# Patient Record
Sex: MALE | Race: WHITE | NOT HISPANIC OR LATINO | Employment: OTHER | URBAN - METROPOLITAN AREA
[De-identification: names, ages, dates, MRNs, and addresses within clinical notes are randomized per-mention and may not be internally consistent; named-entity substitution may affect disease eponyms.]

---

## 2017-01-05 ENCOUNTER — TRANSFERRED RECORDS (OUTPATIENT)
Dept: HEALTH INFORMATION MANAGEMENT | Facility: CLINIC | Age: 72
End: 2017-01-05

## 2017-01-13 ENCOUNTER — HOSPITAL ENCOUNTER (OUTPATIENT)
Dept: CT IMAGING | Facility: CLINIC | Age: 72
Discharge: HOME | End: 2017-01-13
Attending: INTERNAL MEDICINE | Admitting: INTERNAL MEDICINE
Payer: COMMERCIAL

## 2017-01-13 DIAGNOSIS — J30.9 ALLERGIC RHINITIS, UNSPECIFIED ALLERGIC RHINITIS TRIGGER, UNSPECIFIED RHINITIS SEASONALITY: ICD-10-CM

## 2017-01-13 DIAGNOSIS — R05.9 COUGH: ICD-10-CM

## 2017-01-13 PROCEDURE — 71250 CT THORAX DX C-: CPT

## 2017-01-26 ENCOUNTER — TRANSFERRED RECORDS (OUTPATIENT)
Dept: HEALTH INFORMATION MANAGEMENT | Facility: CLINIC | Age: 72
End: 2017-01-26

## 2017-01-30 ENCOUNTER — TELEPHONE (OUTPATIENT)
Dept: NEUROSURGERY | Facility: CLINIC | Age: 72
End: 2017-01-30

## 2017-01-30 NOTE — TELEPHONE ENCOUNTER
Left message that because his appointment is here and not at [facility], his x-ray appointment is now scheduled at [facility]  For noon and then he will see Miryam Laboy at 1    Rachel Whitt CMA, AAS

## 2017-01-31 ENCOUNTER — HOSPITAL ENCOUNTER (OUTPATIENT)
Dept: GENERAL RADIOLOGY | Facility: CLINIC | Age: 72
Discharge: HOME | End: 2017-01-31
Attending: NURSE PRACTITIONER | Admitting: NURSE PRACTITIONER
Payer: COMMERCIAL

## 2017-01-31 ENCOUNTER — OFFICE VISIT (OUTPATIENT)
Dept: NEUROSURGERY | Facility: CLINIC | Age: 72
End: 2017-01-31
Attending: NURSE PRACTITIONER
Payer: COMMERCIAL

## 2017-01-31 VITALS
HEART RATE: 85 BPM | TEMPERATURE: 97.8 F | BODY MASS INDEX: 32.91 KG/M2 | SYSTOLIC BLOOD PRESSURE: 107 MMHG | OXYGEN SATURATION: 94 % | WEIGHT: 243 LBS | DIASTOLIC BLOOD PRESSURE: 74 MMHG | HEIGHT: 72 IN

## 2017-01-31 DIAGNOSIS — J30.9 ALLERGIC RHINITIS: Primary | ICD-10-CM

## 2017-01-31 DIAGNOSIS — Z98.1 STATUS POST LUMBAR SPINAL FUSION: Primary | ICD-10-CM

## 2017-01-31 DIAGNOSIS — Z98.1 STATUS POST LUMBAR SPINAL FUSION: ICD-10-CM

## 2017-01-31 DIAGNOSIS — R05.9 COUGH: ICD-10-CM

## 2017-01-31 PROCEDURE — 72100 X-RAY EXAM L-S SPINE 2/3 VWS: CPT

## 2017-01-31 PROCEDURE — 99214 OFFICE O/P EST MOD 30 MIN: CPT | Performed by: NURSE PRACTITIONER

## 2017-01-31 PROCEDURE — 99211 OFF/OP EST MAY X REQ PHY/QHP: CPT | Performed by: NURSE PRACTITIONER

## 2017-01-31 ASSESSMENT — PAIN SCALES - GENERAL: PAINLEVEL: NO PAIN (0)

## 2017-01-31 NOTE — NURSING NOTE
Khurram Reynoso is a 71 year old male who presents for:  Chief Complaint   Patient presents with     Neurologic Problem     7 month follow, status post lumbar fusion DOS 6/27/16, right numbness in the leg        Initial Vitals:  /74 mmHg  Pulse 85  Temp(Src) 97.8  F (36.6  C) (Oral)  Ht 6' (1.829 m)  Wt 243 lb (110.224 kg)  BMI 32.95 kg/m2  SpO2 94% Estimated body mass index is 32.95 kg/(m^2) as calculated from the following:    Height as of this encounter: 6' (1.829 m).    Weight as of this encounter: 243 lb (110.224 kg).. Body surface area is 2.37 meters squared. BP completed using cuff size: large  No Pain (0)    Do you feel safe in your environment?  Yes  Do you need any refills today? No    Nursing Comments: 7 month follow up, status post lumbar fusion DOS 6/27/16, right leg numbness.  Patient rates his pain today as 0    5 min. nursing intake time  Roma Andrade MA     Discharge plan: - Continue activity using pain as your guide  - followup in 6 months with xray prior   - Call the clinic at [phone] for increased pain or any other questions and concerns.  2 min. nursing discharge time  Roma Andrade MA

## 2017-01-31 NOTE — PROGRESS NOTES
[facility]  Neurosurgery followup:    HPI: Mr. Reynoso is a 71 year old male that returns almost 7 months post Redo L2-5 decompression with instrumented fusion.  He has continued baseline weakness on the right leg. He has completed PT. He states that his right leg numbness does not affect his walking or his ability to do his ADL's. He reports that his pain is 0/10.   Exam:  Constitutional:  Alert, well nourished, NAD.  HEENT: Normocephalic, atraumatic.   Pulm:  Without shortness of breath   CV:  No pitting edema of BLE.      Neurological:  Awake  Alert  Oriented x 3  Motor exam:        IP Q DF PF EHL  R   5  5   5   5    5  L   5  5   5   5    5     Able to spontaneously move L/E bilaterally  Sensation intact throughout all L/E dermatomes     Imaging:  Lumbar xray shows fusion intact  A/P:  Mr. Reynoso is a 71 year old male that returns almost 7 months post Redo L2-5 decompression with instrumented fusion.  He has continued baseline weakness on the right leg. He has completed PT. He states that his right leg numbness does not affect his walking or his ability to do his ADL's. He reports that his pain is 0/10.       Plan:  - Continue activity using pain as your guide  - followup in 6 months with xray prior   - Call the clinic at [phone] for increased pain or any other questions and concerns.      Miryam Laboy [facility]  [facility]  [address]  [address] [place] [address]    Tel [phone]  Pager [phone]

## 2017-01-31 NOTE — MR AVS SNAPSHOT
After Visit Summary   1/31/2017    Khurram Reynoso    MRN: [id number]           Patient Information     Date Of Birth          1945        Visit Information        Provider Department      1/31/2017 1:00 PM Miryam Laboy APRN CNP [facility] Neurosurgery [facility]        Today's Diagnoses     Status post lumbar spinal fusion    -  1       Care Instructions    Plan:  - Continue activity using pain as your guide  - followup in 6 months with xray prior   - Call the clinic at [phone] for increased pain or any other questions and concerns.            Follow-ups after your visit        Future tests that were ordered for you today     Open Future Orders        Priority Expected Expires Ordered    XR Lumbar Spine 2/3 Views Routine 7/28/2017 1/31/2018 1/31/2017    AFB Culture Non Blood Routine  1/31/2018 1/31/2017    AFB stain Routine  1/31/2018 1/31/2017    Fungus Culture, non-blood Routine  1/31/2018 1/31/2017    Koh prep Routine  1/31/2018 1/31/2017    Gram stain Routine  1/31/2018 1/31/2017    Sputum Culture Aerobic Bacterial Routine  1/31/2018 1/31/2017            Who to contact     If you have questions or need follow up information about today's clinic visit or your schedule please contact [facility] directly at [phone].  Normal or non-critical lab and imaging results will be communicated to you by MyChart, letter or phone within 4 business days after the clinic has received the results. If you do not hear from us within 7 days, please contact the clinic through MyChart or phone. If you have a critical or abnormal lab result, we will notify you by phone as soon as possible.  Submit refill requests through [employer] or call your pharmacy and they will forward the refill request to us. Please allow 3 business days for your refill to be completed.          Additional Information About Your Visit        [employer] Information     [employer] gives you secure  access to your electronic health record. If you see a primary care provider, you can also send messages to your care team and make appointments. If you have questions, please call your primary care clinic.  If you do not have a primary care provider, please call [phone] and they will assist you.        Care EveryWhere ID     This is your Care EveryWhere ID. This could be used by other organizations to access your [facility] medical records  [id number]        Your Vitals Were     Pulse Temperature Height BMI (Body Mass Index) Pulse Oximetry       85 97.8  F (36.6  C) (Oral) 6' (1.829 m) 32.95 kg/m2 94%        Blood Pressure from Last 3 Encounters:   01/31/17 107/74   11/22/16 118/76   10/31/16 109/70    Weight from Last 3 Encounters:   01/31/17 243 lb (110.224 kg)   11/22/16 245 lb (111.131 kg)   10/31/16 243 lb 12.8 oz (110.587 kg)               Primary Care Provider    Clinic [facility]       No address on file        Thank you!     Thank you for choosing [facility] NEUROSURGERY [facility]  for your care. Our goal is always to provide you with excellent care. Hearing back from our patients is one way we can continue to improve our services. Please take a few minutes to complete the written survey that you may receive in the mail after your visit with us. Thank you!             Your Updated Medication List - Protect others around you: Learn how to safely use, store and throw away your medicines at www.disposemymeds.org.          This list is accurate as of: 1/31/17  1:21 PM.  Always use your most recent med list.                   Brand Name Dispense Instructions for use    albuterol 108 (90 BASE) MCG/ACT Inhaler    PROAIR HFA/PROVENTIL HFA/VENTOLIN HFA     Inhale 2 puffs into the lungs as needed for shortness of breath / dyspnea or wheezing       BREO ELLIPTA 200-25 MCG/INH oral inhaler   Generic drug:  fluticasone-vilanterol      Inhale QD       gabapentin 300 MG capsule    NEURONTIN    90  capsule    Take 1 capsule (300 mg) by mouth 3 times daily       mometasone 50 MCG/ACT spray    NASONEX     Inhale qd

## 2017-01-31 NOTE — NURSING NOTE
Khurram Reynoso is a 71 year old male who presents for:  Chief Complaint   Patient presents with     Neurologic Problem     7 month follow, status post lumbar fusion DOS 6/27/16, right numbness in the leg        Initial Vitals:  Wt 243 lb (110.224 kg) Estimated body mass index is 32.95 kg/(m^2) as calculated from the following:    Height as of 11/22/16: 6' (1.829 m).    Weight as of this encounter: 243 lb (110.224 kg).. There is no height on file to calculate BSA. BP completed using cuff size: large  No Pain (0)    Do you feel safe in your environment?  Yes  Do you need any refills today? No    Nursing Comments: 7 month follow, status post lumbar fusion DOS 6/27/16, right numbness in the leg.  Patient rates his pain today as 0      5 min. nursing intake time  Roma Andrade MA       Discharge plan: - Continue activity using pain as your guide  - followup in 6 months with xray prior   - Call the clinic at [phone] for increased pain or any other questions and concerns.  2 min. nursing discharge time  Roma Andrade MA

## 2017-01-31 NOTE — PATIENT INSTRUCTIONS
Plan:  - Continue activity using pain as your guide  - followup in 6 months with xray prior   - Call the clinic at [phone] for increased pain or any other questions and concerns.

## 2017-02-01 ENCOUNTER — DOCUMENTATION ONLY (OUTPATIENT)
Dept: OTHER | Facility: CLINIC | Age: 72
End: 2017-02-01

## 2017-02-01 DIAGNOSIS — Z71.89 ACP (ADVANCE CARE PLANNING): Primary | Chronic | ICD-10-CM

## 2017-02-02 DIAGNOSIS — J30.9 ALLERGIC RHINITIS: ICD-10-CM

## 2017-02-02 DIAGNOSIS — R05.9 COUGH: ICD-10-CM

## 2017-02-02 LAB
BACTERIA SPEC CULT: ABNORMAL
FUNGUS SPEC CULT: ABNORMAL
GRAM STN SPEC: ABNORMAL
KOH PREP SPEC: NORMAL
Lab: ABNORMAL
MICRO REPORT STATUS: ABNORMAL
MICRO REPORT STATUS: NORMAL
SPECIMEN SOURCE: ABNORMAL
SPECIMEN SOURCE: NORMAL

## 2017-02-02 PROCEDURE — 87205 SMEAR GRAM STAIN: CPT | Performed by: FAMILY MEDICINE

## 2017-02-02 PROCEDURE — 87210 SMEAR WET MOUNT SALINE/INK: CPT | Performed by: FAMILY MEDICINE

## 2017-02-03 ENCOUNTER — TRANSFERRED RECORDS (OUTPATIENT)
Dept: HEALTH INFORMATION MANAGEMENT | Facility: CLINIC | Age: 72
End: 2017-02-03

## 2017-02-03 DIAGNOSIS — R05.9 COUGH: ICD-10-CM

## 2017-02-03 DIAGNOSIS — J30.9 ALLERGIC RHINITIS: ICD-10-CM

## 2017-02-03 PROCEDURE — 99000 SPECIMEN HANDLING OFFICE-LAB: CPT | Performed by: FAMILY MEDICINE

## 2017-02-03 PROCEDURE — 87116 MYCOBACTERIA CULTURE: CPT | Mod: 90 | Performed by: FAMILY MEDICINE

## 2017-02-03 PROCEDURE — 87206 SMEAR FLUORESCENT/ACID STAI: CPT | Mod: 90 | Performed by: FAMILY MEDICINE

## 2017-02-06 LAB
ACID FAST STN SPEC QL: NORMAL
MICRO REPORT STATUS: NORMAL
SPECIMEN SOURCE: NORMAL

## 2017-02-07 ENCOUNTER — TELEPHONE (OUTPATIENT)
Dept: NEUROSURGERY | Facility: CLINIC | Age: 72
End: 2017-02-07

## 2017-02-08 NOTE — TELEPHONE ENCOUNTER
Spoke with patient over the phone. He decided he'd like to continue taking the gabapentin since he's still experiencing numbness in his right leg. Advised patient to take medication as prescribed and contact clinic if symptoms change or worsen. Patient verbalized understanding.

## 2017-02-10 ENCOUNTER — TELEPHONE (OUTPATIENT)
Dept: NEUROSURGERY | Facility: CLINIC | Age: 72
End: 2017-02-10

## 2017-02-10 DIAGNOSIS — Z98.1 STATUS POST LUMBAR SPINAL FUSION: Primary | ICD-10-CM

## 2017-02-10 RX ORDER — GABAPENTIN 300 MG/1
300 CAPSULE ORAL 3 TIMES DAILY
Qty: 90 CAPSULE | Refills: 2 | Status: SHIPPED | OUTPATIENT
Start: 2017-02-10 | End: 2017-07-25

## 2017-02-10 NOTE — TELEPHONE ENCOUNTER
Pt called and left message on clinic vm stating Christopher is having a hard time reaching our clinic re: Gabapentin. No further details were left.

## 2017-02-10 NOTE — TELEPHONE ENCOUNTER
Gabapentin refilled and sent to [facility] pharmacy in [facility]. Patient notified.

## 2017-03-31 LAB
MICRO REPORT STATUS: NORMAL
MYCOBACTERIUM SPEC CULT: NORMAL
SPECIMEN SOURCE: NORMAL

## 2017-04-26 ENCOUNTER — TELEPHONE (OUTPATIENT)
Dept: NEUROSURGERY | Facility: CLINIC | Age: 72
End: 2017-04-26

## 2017-04-26 NOTE — TELEPHONE ENCOUNTER
Spoke to patient. Patient takes 1 tab Gabapentin TID.Taper schedule is to decrease by one tab every 3 days then done. Patient verbalized understanding.

## 2017-04-26 NOTE — TELEPHONE ENCOUNTER
"Pt would like to stop gabapentin, but was told by Miryam not to stop \"cold turkey\".   Please call   "

## 2017-07-25 ENCOUNTER — OFFICE VISIT (OUTPATIENT)
Dept: PEDIATRICS | Facility: CLINIC | Age: 72
End: 2017-07-25
Payer: COMMERCIAL

## 2017-07-25 ENCOUNTER — RADIANT APPOINTMENT (OUTPATIENT)
Dept: GENERAL RADIOLOGY | Facility: CLINIC | Age: 72
End: 2017-07-25
Attending: INTERNAL MEDICINE
Payer: COMMERCIAL

## 2017-07-25 VITALS
BODY MASS INDEX: 33.18 KG/M2 | HEIGHT: 72 IN | HEART RATE: 81 BPM | OXYGEN SATURATION: 97 % | DIASTOLIC BLOOD PRESSURE: 68 MMHG | TEMPERATURE: 98.3 F | WEIGHT: 245 LBS | SYSTOLIC BLOOD PRESSURE: 120 MMHG

## 2017-07-25 DIAGNOSIS — Z01.818 PREOP GENERAL PHYSICAL EXAM: Primary | ICD-10-CM

## 2017-07-25 DIAGNOSIS — J32.9 CHRONIC RECURRENT SINUSITIS: ICD-10-CM

## 2017-07-25 DIAGNOSIS — Z13.6 CARDIOVASCULAR SCREENING; LDL GOAL LESS THAN 100: ICD-10-CM

## 2017-07-25 DIAGNOSIS — R06.2 WHEEZING WITHOUT DIAGNOSIS OF ASTHMA: ICD-10-CM

## 2017-07-25 DIAGNOSIS — Z23 NEED FOR PNEUMOCOCCAL VACCINATION: ICD-10-CM

## 2017-07-25 DIAGNOSIS — Z11.59 NEED FOR HEPATITIS C SCREENING TEST: ICD-10-CM

## 2017-07-25 LAB
ALBUMIN SERPL-MCNC: 3.7 G/DL (ref 3.4–5)
ALP SERPL-CCNC: 128 U/L (ref 40–150)
ALT SERPL W P-5'-P-CCNC: 33 U/L (ref 0–70)
ANION GAP SERPL CALCULATED.3IONS-SCNC: 4 MMOL/L (ref 3–14)
AST SERPL W P-5'-P-CCNC: 37 U/L (ref 0–45)
BILIRUB SERPL-MCNC: 0.6 MG/DL (ref 0.2–1.3)
BUN SERPL-MCNC: 17 MG/DL (ref 7–30)
CALCIUM SERPL-MCNC: 8.8 MG/DL (ref 8.5–10.1)
CHLORIDE SERPL-SCNC: 108 MMOL/L (ref 94–109)
CHOLEST SERPL-MCNC: 178 MG/DL
CO2 SERPL-SCNC: 29 MMOL/L (ref 20–32)
CREAT SERPL-MCNC: 1.13 MG/DL (ref 0.66–1.25)
GFR SERPL CREATININE-BSD FRML MDRD: 64 ML/MIN/1.7M2
GLUCOSE SERPL-MCNC: 86 MG/DL (ref 70–99)
HCV AB SERPL QL IA: NORMAL
HDLC SERPL-MCNC: 44 MG/DL
LDLC SERPL CALC-MCNC: 109 MG/DL
NONHDLC SERPL-MCNC: 134 MG/DL
POTASSIUM SERPL-SCNC: 4.2 MMOL/L (ref 3.4–5.3)
PROT SERPL-MCNC: 7.5 G/DL (ref 6.8–8.8)
SODIUM SERPL-SCNC: 141 MMOL/L (ref 133–144)
TRIGL SERPL-MCNC: 124 MG/DL

## 2017-07-25 PROCEDURE — 80053 COMPREHEN METABOLIC PANEL: CPT | Performed by: INTERNAL MEDICINE

## 2017-07-25 PROCEDURE — 36415 COLL VENOUS BLD VENIPUNCTURE: CPT | Performed by: INTERNAL MEDICINE

## 2017-07-25 PROCEDURE — 90670 PCV13 VACCINE IM: CPT | Performed by: INTERNAL MEDICINE

## 2017-07-25 PROCEDURE — 80061 LIPID PANEL: CPT | Performed by: INTERNAL MEDICINE

## 2017-07-25 PROCEDURE — 99215 OFFICE O/P EST HI 40 MIN: CPT | Mod: 25 | Performed by: INTERNAL MEDICINE

## 2017-07-25 PROCEDURE — 86803 HEPATITIS C AB TEST: CPT | Performed by: INTERNAL MEDICINE

## 2017-07-25 PROCEDURE — 93000 ELECTROCARDIOGRAM COMPLETE: CPT | Performed by: INTERNAL MEDICINE

## 2017-07-25 PROCEDURE — 71020 XR CHEST 2 VW: CPT

## 2017-07-25 PROCEDURE — 90471 IMMUNIZATION ADMIN: CPT | Performed by: INTERNAL MEDICINE

## 2017-07-25 NOTE — PROGRESS NOTES
[facility]  [address]  [address]  [place] [address]  [phone]  Dept: [phone]    PRE-OP EVALUATION:  Today's date: 2017    Khurram Reynoso (: 1945) presents for pre-operative evaluation assessment as requested by Dr. Martinez.  He requires evaluation and anesthesia risk assessment prior to undergoing surgery/procedure for treatment of Sinus Surgery .  Proposed procedure: treatment for recurrent sinusitis    Date of Surgery/ Procedure: 2017  Time of Surgery/ Procedure: [facility]  Hospital/Surgical Facility: [facility]  Fax number for surgical facility: [phone], [phone]  Primary Physician: [facility]  Type of Anesthesia Anticipated: to be determined    Patient has a Health Care Directive or Living Will:  YES     Preop Questions 2017   1.  Do you have a history of heart attack, stroke, stent, bypass or surgery on an artery in the head, neck, heart or legs? No   2.  Do you ever have any pain or discomfort in your chest? No   3.  Do you have a history of  Heart Failure? No   4.   Are you troubled by shortness of breath when:  walking on a level surface, or up a slight hill, or at night? No   5.  Do you currently have a cold, bronchitis or other respiratory infection? No   6.  Do you have a cough, shortness of breath, or wheezing? YES - All due to sinuses   7.  Do you sometimes get pains in the calves of your legs when you walk? No   8. Do you or anyone in your family have previous history of blood clots? YES - Mother    9.  Do you or does anyone in your family have a serious bleeding problem such as prolonged bleeding following surgeries or cuts? No   10. Have you ever had problems with anemia or been told to take iron pills? No   11. Have you had any abnormal blood loss such as black, tarry or bloody stools? No   12. Have you ever had a blood transfusion? No   13. Have you or any of your relatives ever had problems  with anesthesia? No   14. Do you have sleep apnea, excessive snoring or daytime drowsiness? No   15. Do you have any prosthetic heart valves? No   16. Do you have prosthetic joints? YES - Three, Two knees and right hip. Rods in back as well L2-5           HPI:                                                      Brief HPI related to upcoming procedure: Mr. Reynoso has been having sinus issues for the last 2 years. He will get recurrent sinus infections that progress to infections in the lungs. He had CT scan of the sinuses showing retained fluid and concerns for ongoing infection.     He has had trials of inhalers in the past, which did not help. Has ongoing congestion from the nose area. No chest pain or pressures. No shortness of breath. Not currently on medications.    He has a history PVCs and underwent ablation in the past. While he was being evaluated for this, he had a V. Fib arrest requiring cardioversion. He has been doing well since that time. Not on current medications. Last echo was in 2/2016 and demonstrated EF of 50-55% and had moderate mid and distal inferolateral wall hypokinesis. He has followed with cardiology.         MEDICAL HISTORY:                                                    Patient Active Problem List    Diagnosis Date Noted     ACP (advance care planning) 02/01/2017     Priority: Medium     Advance Care Planning 2/1/2017: Receipt of ACP document:  Received: Health Care Directive which was witnessed or notarized on 8/2/16.  Document previously scanned on 11/28/16.  Validation form completed and sent to be scanned.  Code Status reflects choices in most recent ACP document.  Confirmed/documented designated decision maker(s).  Added by Stefany Wilson RN, Advance Care Planning Liaison.         PVC (premature ventricular contraction)      Priority: Medium     PAC (premature atrial contraction)      Priority: Medium     CARDIOVASCULAR SCREENING; LDL GOAL LESS THAN 160 02/10/2010      Priority: Medium     Gait difficulty 12/30/2009     Priority: Medium     Primary cardiomyopathy (H) 07/18/2006     Priority: Medium     Problem list name updated by automated process. Provider to review       Acute bronchitis 10/17/2005     Priority: Medium     Cardiac dysrhythmia 07/27/2005     Priority: Medium     Problem list name updated by automated process. Provider to review        Past Medical History:   Diagnosis Date     Acute bronchitis 10/17/2005     Cardiac arrest (H) 6/2006    V-Fib arrest during heart cath     CARDIAC DYSRHYTHMIA NOS 7/27/2005     Degeneration of lumbar or lumbosacral intervertebral disc      Other chronic pain     back     PAC (premature atrial contraction)      PONV (postoperative nausea and vomiting)      PRIM CARDIOMYOPATHY NEC 7/18/2006     PVC (premature ventricular contraction)      Renal disease     kidney stones     Uncomplicated asthma      Past Surgical History:   Procedure Laterality Date     C NONSPECIFIC PROCEDURE      knee     CARDIAC SURGERY      Ablation     DECOMPRESS DISC RF LUMBAR  3/2001     OPTICAL TRACKING SYSTEM FUSION SPINE POSTERIOR LUMBAR THREE+ LEVELS N/A 6/27/2016    Procedure: OPTICAL TRACKING SYSTEM FUSION SPINE POSTERIOR LUMBAR THREE+ LEVELS;  Surgeon: Hieu Araiza MD;  Location: [facility] OR     ORTHOPEDIC SURGERY Bilateral     total knee replacements     ORTHOPEDIC SURGERY Right     Total Hipe Replacement     SOFT TISSUE SURGERY Right     right wrist ganglion surgery     Current Outpatient Prescriptions   Medication Sig Dispense Refill     gabapentin (NEURONTIN) 300 MG capsule Take 1 capsule (300 mg) by mouth 3 times daily 90 capsule 2     BREO ELLIPTA 200-25 MCG/INH oral inhaler Inhale QD       mometasone (NASONEX) 50 MCG/ACT nasal spray Inhale qd       albuterol (PROAIR HFA, PROVENTIL HFA, VENTOLIN HFA) 108 (90 BASE) MCG/ACT inhaler Inhale 2 puffs into the lungs as needed for shortness of breath / dyspnea or wheezing       OTC products: no  recent use of OTC ASA, NSAIDS or Steroids    No Known Allergies   Latex Allergy: NO    Social History   Substance Use Topics     Smoking status: Never Smoker     Smokeless tobacco: Never Used     Alcohol use No     History   Drug Use No       REVIEW OF SYSTEMS:                                                    Constitutional, neuro, ENT, endocrine, pulmonary, cardiac, gastrointestinal, genitourinary, musculoskeletal, integument and psychiatric systems are negative, except as otherwise noted.      EXAM:                                                    /68 (BP Location: Right arm, Cuff Size: Adult Large)  Pulse 81  Temp 98.3  F (36.8  C) (Oral)  Ht 6' (1.829 m)  Wt 245 lb (111.1 kg)  SpO2 97%  BMI 33.23 kg/m2    GENERAL APPEARANCE: healthy, alert and no distress     EYES: EOMI,  PERRL     HENT: significant congestion in the sinuses, cobblestoned pharynx     NECK: no adenopathy, no asymmetry, masses, or scars and thyroid normal to palpation     RESP: lungs clear to auscultation - no rales, rhonchi, occasional fine wheezing, improves with coughing     CV: regular rates and rhythm, normal S1 S2, no S3 or S4 and no murmur, click or rub     ABDOMEN:  soft, nontender, no HSM or masses and bowel sounds normal     MS: extremities normal- no gross deformities noted, no evidence of inflammation in joints, FROM in all extremities.     SKIN: no suspicious lesions or rashes     NEURO: Normal strength and tone, sensory exam grossly normal, mentation intact and speech normal     PSYCH: mentation appears normal. and affect normal/bright     LYMPHATICS: No axillary, cervical, or supraclavicular nodes    DIAGNOSTICS:                                                    EKG: unchanged from baseline. Possibly old Q waves present, sinus rhythm  Results for orders placed or performed in visit on 07/25/17 (from the past 48 hour(s))   Lipid panel reflex to direct LDL   Result Value Ref Range    Cholesterol 178 <200 mg/dL     Triglycerides 124 <150 mg/dL    HDL Cholesterol 44 >39 mg/dL    LDL Cholesterol Calculated 109 (H) <100 mg/dL    Non HDL Cholesterol 134 (H) <130 mg/dL   Comprehensive metabolic panel   Result Value Ref Range    Sodium 141 133 - 144 mmol/L    Potassium 4.2 3.4 - 5.3 mmol/L    Chloride 108 94 - 109 mmol/L    Carbon Dioxide 29 20 - 32 mmol/L    Anion Gap 4 3 - 14 mmol/L    Glucose 86 70 - 99 mg/dL    Urea Nitrogen 17 7 - 30 mg/dL    Creatinine 1.13 0.66 - 1.25 mg/dL    GFR Estimate 64 >60 mL/min/1.7m2    GFR Estimate If Black 77 >60 mL/min/1.7m2    Calcium 8.8 8.5 - 10.1 mg/dL    Bilirubin Total 0.6 0.2 - 1.3 mg/dL    Albumin 3.7 3.4 - 5.0 g/dL    Protein Total 7.5 6.8 - 8.8 g/dL    Alkaline Phosphatase 128 40 - 150 U/L    ALT 33 0 - 70 U/L    AST 37 0 - 45 U/L         Chest xray without acute infiltrates    IMPRESSION:                                                    Reason for surgery/procedure: Pre-operative clearance  Diagnosis/reason for consult: recurrent sinusitis     The proposed surgical procedure is considered LOW risk.    REVISED CARDIAC RISK INDEX  The patient has the following serious cardiovascular risks for perioperative complications such as (MI, PE, VFib and 3  AV Block):  Heart disease in the past, no current issues    INTERPRETATION: 1 risks: Class II (low risk - 0.9% complication rate)    The patient has the following additional risks for perioperative complications:  No identified additional risks      ICD-10-CM    1. Preop general physical exam Z01.818 EKG 12-lead complete w/read - Clinics   2. Chronic recurrent sinusitis J32.9    3. CARDIOVASCULAR SCREENING; LDL GOAL LESS THAN 100 Z13.6 Lipid panel reflex to direct LDL     Comprehensive metabolic panel   4. Need for hepatitis C screening test Z11.59 Hepatitis C Screen Reflex to HCV RNA Quant and Genotype   5. Wheezing without diagnosis of asthma R06.2 XR Chest 2 Views       RECOMMENDATIONS:                                                         Pulmonary Risk  Incentive spirometry post op  Respiratory Therapy (Respiratory Care IP Consult)  post op  He has breo and albuterol at home, instructed to restart breo and bring albuterol with to surgical center      APPROVAL GIVEN to proceed with proposed procedure, without further diagnostic evaluation       Signed Electronically by: Camacho Mercer MD, MD    Copy of this evaluation report is provided to requesting physician.    [place] Preop Guidelines

## 2017-07-25 NOTE — PATIENT INSTRUCTIONS
1) Restart the Breo and use the albuterol, bring with you to surgery    2) Chest xray today    3) labs downstairs as well    4) Pneumonia vaccine today    Camacho Mercer MD      Before Your Surgery      Call your surgeon if there is any change in your health. This includes signs of a cold or flu (such as a sore throat, runny nose, cough, rash or fever).    Do not smoke, drink alcohol or take over the counter medicine (unless your surgeon or primary care doctor tells you to) for the 24 hours before and after surgery.    If you take prescribed drugs: Follow your doctor s orders about which medicines to take and which to stop until after surgery.    Eating and drinking prior to surgery: follow the instructions from your surgeon    Take a shower or bath the night before surgery. Use the soap your surgeon gave you to gently clean your skin. If you do not have soap from your surgeon, use your regular soap. Do not shave or scrub the surgery site.  Wear clean pajamas and have clean sheets on your bed.

## 2017-07-25 NOTE — NURSING NOTE
Chief Complaint   Patient presents with     Pre-Op Exam       Initial /68 (BP Location: Right arm, Cuff Size: Adult Large)  Pulse 81  Temp 98.3  F (36.8  C) (Oral)  Ht 6' (1.829 m)  Wt 245 lb (111.1 kg)  SpO2 97%  BMI 33.23 kg/m2 Estimated body mass index is 33.23 kg/(m^2) as calculated from the following:    Height as of this encounter: 6' (1.829 m).    Weight as of this encounter: 245 lb (111.1 kg).  Medication Reconciliation: complete   Debo Broussard MA

## 2017-07-25 NOTE — MR AVS SNAPSHOT
After Visit Summary   7/25/2017    Khurram Reynoso    MRN: [id number]           Patient Information     Date Of Birth          1945        Visit Information        Provider Department      7/25/2017 9:30 AM Camacho Mercer MD [facility]        Today's Diagnoses     Preop general physical exam    -  1    Chronic recurrent sinusitis        CARDIOVASCULAR SCREENING; LDL GOAL LESS THAN 100        Need for hepatitis C screening test        Wheezing without diagnosis of asthma          Care Instructions    1) Restart the Breo and use the albuterol, bring with you to surgery    2) Chest xray today    3) labs downstairs as well    4) Pneumonia vaccine today    Camacho Mercer MD      Before Your Surgery      Call your surgeon if there is any change in your health. This includes signs of a cold or flu (such as a sore throat, runny nose, cough, rash or fever).    Do not smoke, drink alcohol or take over the counter medicine (unless your surgeon or primary care doctor tells you to) for the 24 hours before and after surgery.    If you take prescribed drugs: Follow your doctor s orders about which medicines to take and which to stop until after surgery.    Eating and drinking prior to surgery: follow the instructions from your surgeon    Take a shower or bath the night before surgery. Use the soap your surgeon gave you to gently clean your skin. If you do not have soap from your surgeon, use your regular soap. Do not shave or scrub the surgery site.  Wear clean pajamas and have clean sheets on your bed.           Follow-ups after your visit        Your next 10 appointments already scheduled     Aug 02, 2017 10:15 AM CDT   XR LUMBAR SPINE 2/3 VIEWS with [id number]   [facility] ([facility])    [address] [address] [address] [phone]           Please bring a list of your current medicines to your exam. (Include vitamins,  minerals and over-thecounter medicines.) Leave your valuables at home.  Tell your doctor if there is a chance you may be pregnant.  You do not need to do anything special for this exam.            Aug 02, 2017 10:40 AM CDT   Return Visit with MICKEY Burroughs CNP   [facility] ([facility])    [address]  [facility] [address] [phone]              Future tests that were ordered for you today     Open Future Orders        Priority Expected Expires Ordered    XR Chest 2 Views Routine 7/25/2017 7/25/2018 7/25/2017            Who to contact     If you have questions or need follow up information about today's clinic visit or your schedule please contact [facility] directly at [phone].  Normal or non-critical lab and imaging results will be communicated to you by Cortria Corporationhart, letter or phone within 4 business days after the clinic has received the results. If you do not hear from us within 7 days, please contact the clinic through [employer]hart or phone. If you have a critical or abnormal lab result, we will notify you by phone as soon as possible.  Submit refill requests through Dim[employer] D[employer] or call your pharmacy and they will forward the refill request to us. Please allow 3 business days for your refill to be completed.          Additional Information About Your Visit        Dim[employer] D[employer] Information     [employer] gives you secure access to your electronic health record. If you see a primary care provider, you can also send messages to your care team and make appointments. If you have questions, please call your primary care clinic.  If you do not have a primary care provider, please call [phone] and they will assist you.        Care EveryWhere ID     This is your Care EveryWhere ID. This could be used by other organizations to access your [facility] medical records  [id number]        Your Vitals Were     Pulse Temperature Height Pulse  Oximetry BMI (Body Mass Index)       81 98.3  F (36.8  C) (Oral) 6' (1.829 m) 97% 33.23 kg/m2        Blood Pressure from Last 3 Encounters:   07/25/17 120/68   01/31/17 107/74   11/22/16 118/76    Weight from Last 3 Encounters:   07/25/17 245 lb (111.1 kg)   01/31/17 243 lb (110.2 kg)   11/22/16 245 lb (111.1 kg)              We Performed the Following     Comprehensive metabolic panel     EKG 12-lead complete w/read - Clinics     Hepatitis C Screen Reflex to HCV RNA Quant and Genotype     Lipid panel reflex to direct LDL          Today's Medication Changes          These changes are accurate as of: 7/25/17  9:50 AM.  If you have any questions, ask your nurse or doctor.               Stop taking these medicines if you haven't already. Please contact your care team if you have questions.     albuterol 108 (90 BASE) MCG/ACT Inhaler   Commonly known as:  PROAIR HFA/PROVENTIL HFA/VENTOLIN HFA   Stopped by:  Camacho Mercer MD           BREO ELLIPTA 200-25 MCG/INH oral inhaler   Generic drug:  fluticasone-vilanterol   Stopped by:  Camacho Mercer MD           mometasone 50 MCG/ACT spray   Commonly known as:  NASONEX   Stopped by:  Camacho Mercer MD                    Primary Care Provider    Clinic [facility]       No address on file        Equal Access to Services     [place] AH: Hadii aad ku hadasho Soomaali, waaxda luqadaha, qaybta kaalmada adeegyada, erica roldan aderichy tidwell . So [facility] [phone].    ATENCIÓN: Si habla español, tiene a santiago disposición servicios gratuitos de asistencia lingüística. Lizeth al [phone].    We comply with applicable federal civil rights laws and [place] laws. We do not discriminate on the basis of race, color, national origin, age, disability sex, sexual orientation or gender identity.            Thank you!     Thank you for choosing [facility]  for your care. Our goal is always to provide you with excellent care. Hearing back  from our patients is one way we can continue to improve our services. Please take a few minutes to complete the written survey that you may receive in the mail after your visit with us. Thank you!             Your Updated Medication List - Protect others around you: Learn how to safely use, store and throw away your medicines at www.disposemymeds.org.      Notice  As of 7/25/2017  9:50 AM    You have not been prescribed any medications.

## 2017-08-02 ENCOUNTER — HOSPITAL ENCOUNTER (OUTPATIENT)
Dept: GENERAL RADIOLOGY | Facility: CLINIC | Age: 72
Discharge: HOME | End: 2017-08-02
Attending: NURSE PRACTITIONER | Admitting: NURSE PRACTITIONER
Payer: COMMERCIAL

## 2017-08-02 ENCOUNTER — TELEPHONE (OUTPATIENT)
Dept: NEUROSURGERY | Facility: CLINIC | Age: 72
End: 2017-08-02

## 2017-08-02 ENCOUNTER — OFFICE VISIT (OUTPATIENT)
Dept: NEUROSURGERY | Facility: CLINIC | Age: 72
End: 2017-08-02
Attending: NURSE PRACTITIONER
Payer: COMMERCIAL

## 2017-08-02 VITALS
DIASTOLIC BLOOD PRESSURE: 84 MMHG | HEART RATE: 74 BPM | TEMPERATURE: 97.9 F | BODY MASS INDEX: 33.23 KG/M2 | SYSTOLIC BLOOD PRESSURE: 129 MMHG | WEIGHT: 245 LBS | OXYGEN SATURATION: 96 %

## 2017-08-02 DIAGNOSIS — Z98.1 STATUS POST LUMBAR SPINAL FUSION: Primary | ICD-10-CM

## 2017-08-02 DIAGNOSIS — Z98.1 STATUS POST LUMBAR SPINAL FUSION: ICD-10-CM

## 2017-08-02 PROCEDURE — 72100 X-RAY EXAM L-S SPINE 2/3 VWS: CPT

## 2017-08-02 PROCEDURE — 99214 OFFICE O/P EST MOD 30 MIN: CPT | Performed by: NURSE PRACTITIONER

## 2017-08-02 PROCEDURE — 99211 OFF/OP EST MAY X REQ PHY/QHP: CPT | Performed by: NURSE PRACTITIONER

## 2017-08-02 ASSESSMENT — PAIN SCALES - GENERAL: PAINLEVEL: MILD PAIN (3)

## 2017-08-02 NOTE — NURSING NOTE
Khurram Reynoso is a 72 year old male who presents for:  Chief Complaint   Patient presents with     Neurologic Problem     6 mo f/u with xray prior. hk (1 yr f/u DOS 06/27/16 s/p lumbar fusion         Initial Vitals:  /84 (BP Location: Left arm, Patient Position: Chair, Cuff Size: Adult Large)  Pulse 74  Temp 97.9  F (36.6  C) (Oral)  Wt 245 lb (111.1 kg)  SpO2 96%  BMI 33.23 kg/m2 Estimated body mass index is 33.23 kg/(m^2) as calculated from the following:    Height as of 7/25/17: 6' (1.829 m).    Weight as of this encounter: 245 lb (111.1 kg).. Body surface area is 2.38 meters squared. BP completed using cuff size: large  Mild Pain (3)    Do you feel safe in your environment?  Yes  Do you need any refills today? No    Nursing Comments: 6 mo f/u with xray prior.  (1 yr f/u DOS 06/27/16 s/p lumbar fusion .  Patient rates 3 pain today as 8/2/17      5 min. nursing intake time  Jael Elam CMA      Discharge plan: See NP dictation  2 min. nursing discharge time  Jael Elam CMA

## 2017-08-02 NOTE — PROGRESS NOTES
[facility]  Neurosurgery followup:    HPI: Mr. Reynoso is a 71 year old male that returns almost 12 months post Redo L2-5 decompression with instrumented fusion.  He has continued baseline weakness on the right leg. He is doing well and trying to be active. He denies any pain at this time.     Exam:  Constitutional:  Alert, well nourished, NAD.  HEENT: Normocephalic, atraumatic.   Pulm:  Without shortness of breath   CV:  No pitting edema of BLE.      Neurological:  Awake  Alert  Oriented x 3  Motor exam:        IP Q DF PF EHL  R   5  5   5   5    5  L   5  5   5   5    5     Able to spontaneously move L/E bilaterally  Sensation intact throughout all L/E dermatomes       Imaging: lumbar xray shows fusion intact     A/P: Mr. Reynoso is a 71 year old male that returns almost 12 months post Redo L2-5 decompression with instrumented fusion.  He has continued baseline weakness on the right leg. He is doing well and trying to be active. He denies any pain at this time.  At this time it was explained that he can return as needed.      Patient Instructions   1. Continue activity     2.  Follow up as needed            Miryam Laboy [facility]  [facility]  [address] [address]    Tel [phone]  Pager [phone]

## 2017-08-02 NOTE — MR AVS SNAPSHOT
After Visit Summary   8/2/2017    Khurram Reynoso    MRN: [id number]           Patient Information     Date Of Birth          1945        Visit Information        Provider Department      8/2/2017 10:40 AM Miryam Laboy APRN CNP [facility]        Care Instructions    1. Continue activity     2.  Follow up as needed             Follow-ups after your visit        Who to contact     If you have questions or need follow up information about today's clinic visit or your schedule please contact [facility] directly at [phone].  Normal or non-critical lab and imaging results will be communicated to you by [employer]shart, letter or phone within 4 business days after the clinic has received the results. If you do not hear from us within 7 days, please contact the clinic through [employer] or phone. If you have a critical or abnormal lab result, we will notify you by phone as soon as possible.  Submit refill requests through [employer] or call your pharmacy and they will forward the refill request to us. Please allow 3 business days for your refill to be completed.          Additional Information About Your Visit        MyChart Information     [employer] gives you secure access to your electronic health record. If you see a primary care provider, you can also send messages to your care team and make appointments. If you have questions, please call your primary care clinic.  If you do not have a primary care provider, please call [phone] and they will assist you.        Care EveryWhere ID     This is your Care EveryWhere ID. This could be used by other organizations to access your [facility] medical records  [id number]        Your Vitals Were     Pulse Temperature Pulse Oximetry BMI (Body Mass Index)          74 97.9  F (36.6  C) (Oral) 96% 33.23 kg/m2         Blood Pressure from Last 3 Encounters:   08/02/17 129/84   07/25/17 120/68   01/31/17 107/74    Weight from  Last 3 Encounters:   08/02/17 245 lb (111.1 kg)   07/25/17 245 lb (111.1 kg)   01/31/17 243 lb (110.2 kg)              Today, you had the following     No orders found for display       Primary Care Provider Office Phone # Fax #    Camacho Mercer [phone]       [facility] [address] [place] [address]        Equal Access to Services     [facility] : Hadii aad ku hadasho Soomaali, waaxda luqadaha, qaybta kaalmada adeegyada, waxay idiin hayaan adeeg kharash laabdoul . So [facility] [phone].    ATENCIÓN: Si habla español, tiene a santiago disposición servicios gratuitos de asistencia lingüística. Llame al [phone].    We comply with applicable federal civil rights laws and [place] laws. We do not discriminate on the basis of race, color, national origin, age, disability sex, sexual orientation or gender identity.            Thank you!     Thank you for choosing [facility]  for your care. Our goal is always to provide you with excellent care. Hearing back from our patients is one way we can continue to improve our services. Please take a few minutes to complete the written survey that you may receive in the mail after your visit with us. Thank you!             Your Updated Medication List - Protect others around you: Learn how to safely use, store and throw away your medicines at www.disposemymeds.org.      Notice  As of 8/2/2017 11:13 AM    You have not been prescribed any medications.

## 2017-08-02 NOTE — TELEPHONE ENCOUNTER
Has some additional questions following appt this morning.   Still having lots of numbness in r leg and is wondering if it will ever subside and has pain on r side and wondering if they will ever go away.

## 2018-01-26 ENCOUNTER — RECORDS - HEALTHEAST (OUTPATIENT)
Dept: ADMINISTRATIVE | Facility: OTHER | Age: 73
End: 2018-01-26

## 2018-01-26 ENCOUNTER — HOSPITAL ENCOUNTER (OUTPATIENT)
Dept: ULTRASOUND IMAGING | Facility: CLINIC | Age: 73
Discharge: HOME | End: 2018-01-26
Attending: ORTHOPAEDIC SURGERY

## 2018-01-26 DIAGNOSIS — M79.89 PAIN AND SWELLING OF LOWER EXTREMITY, RIGHT: ICD-10-CM

## 2018-01-26 DIAGNOSIS — M79.604 PAIN AND SWELLING OF LOWER EXTREMITY, RIGHT: ICD-10-CM

## 2018-02-05 ENCOUNTER — TRANSFERRED RECORDS (OUTPATIENT)
Dept: HEALTH INFORMATION MANAGEMENT | Facility: CLINIC | Age: 73
End: 2018-02-05

## 2018-06-20 ENCOUNTER — OFFICE VISIT (OUTPATIENT)
Dept: INTERNAL MEDICINE | Facility: CLINIC | Age: 73
End: 2018-06-20
Payer: COMMERCIAL

## 2018-06-20 ENCOUNTER — RADIANT APPOINTMENT (OUTPATIENT)
Dept: GENERAL RADIOLOGY | Facility: CLINIC | Age: 73
End: 2018-06-20
Attending: INTERNAL MEDICINE
Payer: COMMERCIAL

## 2018-06-20 VITALS
HEIGHT: 72 IN | WEIGHT: 227 LBS | DIASTOLIC BLOOD PRESSURE: 60 MMHG | SYSTOLIC BLOOD PRESSURE: 100 MMHG | HEART RATE: 86 BPM | BODY MASS INDEX: 30.75 KG/M2 | TEMPERATURE: 98 F | OXYGEN SATURATION: 95 %

## 2018-06-20 DIAGNOSIS — R05.9 COUGH: Primary | ICD-10-CM

## 2018-06-20 DIAGNOSIS — R05.9 COUGH: ICD-10-CM

## 2018-06-20 DIAGNOSIS — J45.30 MILD PERSISTENT ASTHMA WITHOUT COMPLICATION: ICD-10-CM

## 2018-06-20 LAB
FEF 25/75: 2.74
FEV-1: 3.16
FEV1/FVC: 74
FVC: 4.25

## 2018-06-20 PROCEDURE — 71046 X-RAY EXAM CHEST 2 VIEWS: CPT

## 2018-06-20 PROCEDURE — 99214 OFFICE O/P EST MOD 30 MIN: CPT | Mod: 25 | Performed by: INTERNAL MEDICINE

## 2018-06-20 PROCEDURE — 94010 BREATHING CAPACITY TEST: CPT | Performed by: INTERNAL MEDICINE

## 2018-06-20 RX ORDER — PREDNISONE 20 MG/1
TABLET ORAL
Qty: 20 TABLET | Refills: 0 | Status: SHIPPED | OUTPATIENT
Start: 2018-06-20 | End: 2018-07-27

## 2018-06-20 RX ORDER — FLUTICASONE PROPIONATE 50 MCG
1-2 SPRAY, SUSPENSION (ML) NASAL DAILY
Qty: 1 BOTTLE | Refills: 11 | Status: SHIPPED | OUTPATIENT
Start: 2018-06-20 | End: 2019-10-28

## 2018-06-20 NOTE — PROGRESS NOTES
SUBJECTIVE:   Khurram Reynoso is a 73 year old male who presents to clinic today for the following health issues:      Ongoing sinus drainage, cough and congestion:    Concerns for chronic cough. Has had symptoms for 2-3 years. Seen ENT, Pulmonary. Has had sinus surgery , not improved. Tried antiallergy treatment. Has postnasal drip, feels a tickle in the throat, triggers cough.   Has also h/o asthma, takes as needed Albuterol. Has chronic phlegm production, usually mucus, wheezing.   No chest pain, heart palpitations, edema.   Treated with PPI for possible GERD with no improvement.         Problem list and histories reviewed & adjusted, as indicated.  Additional history: as documented    Patient Active Problem List   Diagnosis     Cardiac dysrhythmia     Acute bronchitis     Primary cardiomyopathy (H)     Gait difficulty     CARDIOVASCULAR SCREENING; LDL GOAL LESS THAN 160     PVC (premature ventricular contraction)     PAC (premature atrial contraction)     ACP (advance care planning)     Past Surgical History:   Procedure Laterality Date     C NONSPECIFIC PROCEDURE      knee     CARDIAC SURGERY      Ablation     DECOMPRESS DISC RF LUMBAR  3/2001     OPTICAL TRACKING SYSTEM FUSION SPINE POSTERIOR LUMBAR THREE+ LEVELS N/A 6/27/2016    Procedure: OPTICAL TRACKING SYSTEM FUSION SPINE POSTERIOR LUMBAR THREE+ LEVELS;  Surgeon: Hieu Araiza MD;  Location: [facility] OR     ORTHOPEDIC SURGERY Bilateral     total knee replacements     ORTHOPEDIC SURGERY Right     Total Hipe Replacement     SOFT TISSUE SURGERY Right     right wrist ganglion surgery       Social History   Substance Use Topics     Smoking status: Never Smoker     Smokeless tobacco: Never Used     Alcohol use No     Family History   Problem Relation Age of Onset     C.A.D. Father      Hypertension Father      Heart Surgery Father      bypass     Cancer Brother      bone ca      Family History Negative Mother      Other - See Comments Sister       polio     Unknown/Adopted Maternal Grandmother      Unknown/Adopted Maternal Grandfather      Unknown/Adopted Paternal Grandmother      Unknown/Adopted Paternal Grandfather      Family History Negative Sister          Current Outpatient Prescriptions   Medication Sig Dispense Refill     fluticasone (FLONASE) 50 MCG/ACT spray Spray 1-2 sprays into both nostrils daily 1 Bottle 11     fluticasone (FLOVENT DISKUS) 100 MCG/BLIST AEPB Inhale 1 puff into the lungs 2 times daily 3 Inhaler 1     OMEPRAZOLE PO Take 10 mg by mouth 2 times daily (before meals)       predniSONE (DELTASONE) 20 MG tablet Take 3 tabs (60 mg) by mouth daily x 3 days, 2 tabs (40 mg) daily x 3 days, 1 tab (20 mg) daily x 3 days, then 1/2 tab (10 mg) x 3 days. 20 tablet 0     RaNITidine HCl (ZANTAC PO) Take by mouth as needed for heartburn         Reviewed and updated as needed this visit by clinical staff  Tobacco  Allergies  Meds  Med Hx  Surg Hx  Fam Hx  Soc Hx      Reviewed and updated as needed this visit by Provider         ROS:  Constitutional, HEENT, cardiovascular, pulmonary, gi and gu systems are negative, except as otherwise noted.    OBJECTIVE:     /60  Pulse 86  Temp 98  F (36.7  C) (Oral)  Ht 6' (1.829 m)  Wt 227 lb (103 kg)  SpO2 95%  BMI 30.79 kg/m2  Body mass index is 30.79 kg/(m^2).   GENERAL: healthy, alert and no distress  EYES: Eyes grossly normal to inspection, PERRL and conjunctivae and sclerae normal  HENT: ear canals and TM's normal, nose and mouth without ulcers or lesions  NECK: no adenopathy, no asymmetry, masses, or scars and thyroid normal to palpation  RESP: lungs  - no rales, bilateral rhonchi and coarse wheezes  CV: regular rate and rhythm, normal S1 S2, no S3 or S4, no murmur, click or rub, no peripheral edema and peripheral pulses strong  ABDOMEN: soft, nontender, no hepatosplenomegaly, no masses and bowel sounds normal  MS: no gross musculoskeletal defects noted, no edema    Diagnostic Test  Results:  Results for orders placed or performed in visit on 06/20/18 (from the past 24 hour(s))   Spirometry, Breathing Capacity: Normal Order, Clinic Performed   Result Value Ref Range    FEV-1 3.16     FVC 4.25     FEV1/FVC 74     FEF 25/75 2.74        ASSESSMENT/PLAN:     Problem List Items Addressed This Visit     None      Visit Diagnoses     Cough    -  Primary    Relevant Medications    fluticasone (FLONASE) 50 MCG/ACT spray    fluticasone (FLOVENT DISKUS) 100 MCG/BLIST AEPB    Other Relevant Orders    XR Chest 2 Views    Spirometry, Breathing Capacity: Normal Order, Clinic Performed (Completed)    Mild persistent asthma without complication        Relevant Medications    fluticasone (FLONASE) 50 MCG/ACT spray    fluticasone (FLOVENT DISKUS) 100 MCG/BLIST AEPB    predniSONE (DELTASONE) 20 MG tablet           Start on nasal steroid   Start on inhaled steroid and continue PRN Albuterol.   Short course of oral Prednisone to help with acute symptoms       Follow-Up:in 1 month     Familia Gillespie MD  [facility]

## 2018-06-20 NOTE — MR AVS SNAPSHOT
After Visit Summary   6/20/2018    Khurram Reynoso    MRN: [id number]           Patient Information     Date Of Birth          1945        Visit Information        Provider Department      6/20/2018 10:00 AM Familia Gillespie MD [facility]        Today's Diagnoses     Cough    -  1    Mild persistent asthma without complication           Follow-ups after your visit        Who to contact     If you have questions or need follow up information about today's clinic visit or your schedule please contact [facility] directly at [phone].  Normal or non-critical lab and imaging results will be communicated to you by [employer]shart, letter or phone within 4 business days after the clinic has received the results. If you do not hear from us within 7 days, please contact the clinic through [employer] or phone. If you have a critical or abnormal lab result, we will notify you by phone as soon as possible.  Submit refill requests through [employer] or call your pharmacy and they will forward the refill request to us. Please allow 3 business days for your refill to be completed.          Additional Information About Your Visit        MyChart Information     [employer] gives you secure access to your electronic health record. If you see a primary care provider, you can also send messages to your care team and make appointments. If you have questions, please call your primary care clinic.  If you do not have a primary care provider, please call [phone] and they will assist you.        Care EveryWhere ID     This is your Care EveryWhere ID. This could be used by other organizations to access your [facility] medical records  [id number]        Your Vitals Were     Pulse Temperature Height Pulse Oximetry BMI (Body Mass Index)       86 98  F (36.7  C) (Oral) 6' (1.829 m) 95% 30.79 kg/m2        Blood Pressure from Last 3 Encounters:   06/20/18 100/60   08/02/17 129/84   07/25/17 120/68     Weight from Last 3 Encounters:   06/20/18 227 lb (103 kg)   08/02/17 245 lb (111.1 kg)   07/25/17 245 lb (111.1 kg)              We Performed the Following     Spirometry, Breathing Capacity: Normal Order, Clinic Performed          Today's Medication Changes          These changes are accurate as of 6/20/18 11:05 AM.  If you have any questions, ask your nurse or doctor.               Start taking these medicines.        Dose/Directions    fluticasone 100 MCG/BLIST Aepb   Commonly known as:  FLOVENT DISKUS   Used for:  Cough, Mild persistent asthma without complication   Started by:  Familia Gillespie MD        Dose:  1 puff   Inhale 1 puff into the lungs 2 times daily   Quantity:  3 Inhaler   Refills:  1       fluticasone 50 MCG/ACT spray   Commonly known as:  FLONASE   Used for:  Cough, Mild persistent asthma without complication   Started by:  Familia Gillespie MD        Dose:  1-2 spray   Spray 1-2 sprays into both nostrils daily   Quantity:  1 Bottle   Refills:  11       predniSONE 20 MG tablet   Commonly known as:  DELTASONE   Used for:  Mild persistent asthma without complication   Started by:  Familia Gillespie MD        Take 3 tabs (60 mg) by mouth daily x 3 days, 2 tabs (40 mg) daily x 3 days, 1 tab (20 mg) daily x 3 days, then 1/2 tab (10 mg) x 3 days.   Quantity:  20 tablet   Refills:  0            Where to get your medicines      [email or web address] [address] [place] [address] [id number] [place] [address] AT [address] [address] [facility] [address]     Phone:  [phone]     fluticasone 100 MCG/BLIST Aepb    fluticasone 50 MCG/ACT spray    predniSONE 20 MG tablet                Primary Care Provider Office Phone # Fax #    Camacho Mercer [phone] [address] [place] [address]        Equal Access to Services     [facility] AH: Kecia Brewer, vicente gonzalez, erica carlin  lore hyderichy kim'aan ah. So [facility] [phone].    ATENCIÓN: Si habla janine, tiene a santiago disposición servicios gratuitos de asistencia lingüística. Lizeth al [phone].    We comply with applicable federal civil rights laws and [place] laws. We do not discriminate on the basis of race, color, national origin, age, disability, sex, sexual orientation, or gender identity.            Thank you!     Thank you for choosing [facility]  for your care. Our goal is always to provide you with excellent care. Hearing back from our patients is one way we can continue to improve our services. Please take a few minutes to complete the written survey that you may receive in the mail after your visit with us. Thank you!             Your Updated Medication List - Protect others around you: Learn how to safely use, store and throw away your medicines at www.disposemymeds.org.          This list is accurate as of 6/20/18 11:05 AM.  Always use your most recent med list.                   Brand Name Dispense Instructions for use Diagnosis    fluticasone 100 MCG/BLIST Aepb    FLOVENT DISKUS    3 Inhaler    Inhale 1 puff into the lungs 2 times daily    Cough, Mild persistent asthma without complication       fluticasone 50 MCG/ACT spray    FLONASE    1 Bottle    Spray 1-2 sprays into both nostrils daily    Cough, Mild persistent asthma without complication       OMEPRAZOLE PO      Take 10 mg by mouth 2 times daily (before meals)        predniSONE 20 MG tablet    DELTASONE    20 tablet    Take 3 tabs (60 mg) by mouth daily x 3 days, 2 tabs (40 mg) daily x 3 days, 1 tab (20 mg) daily x 3 days, then 1/2 tab (10 mg) x 3 days.    Mild persistent asthma without complication       ZANTAC PO      Take by mouth as needed for heartburn

## 2018-07-27 ENCOUNTER — OFFICE VISIT (OUTPATIENT)
Dept: INTERNAL MEDICINE | Facility: CLINIC | Age: 73
End: 2018-07-27
Payer: COMMERCIAL

## 2018-07-27 VITALS
HEIGHT: 72 IN | OXYGEN SATURATION: 96 % | HEART RATE: 96 BPM | DIASTOLIC BLOOD PRESSURE: 70 MMHG | SYSTOLIC BLOOD PRESSURE: 122 MMHG | WEIGHT: 227 LBS | BODY MASS INDEX: 30.75 KG/M2 | TEMPERATURE: 97.4 F

## 2018-07-27 DIAGNOSIS — N52.9 ERECTILE DYSFUNCTION, UNSPECIFIED ERECTILE DYSFUNCTION TYPE: ICD-10-CM

## 2018-07-27 DIAGNOSIS — J98.01 ACUTE BRONCHOSPASM: Primary | ICD-10-CM

## 2018-07-27 PROCEDURE — 84403 ASSAY OF TOTAL TESTOSTERONE: CPT | Performed by: INTERNAL MEDICINE

## 2018-07-27 PROCEDURE — 99214 OFFICE O/P EST MOD 30 MIN: CPT | Performed by: INTERNAL MEDICINE

## 2018-07-27 PROCEDURE — 36415 COLL VENOUS BLD VENIPUNCTURE: CPT | Performed by: INTERNAL MEDICINE

## 2018-07-27 PROCEDURE — 84270 ASSAY OF SEX HORMONE GLOBUL: CPT | Performed by: INTERNAL MEDICINE

## 2018-07-27 RX ORDER — VARDENAFIL HYDROCHLORIDE 20 MG/1
10-20 TABLET ORAL DAILY PRN
Qty: 6 TABLET | Refills: 1 | Status: SHIPPED | OUTPATIENT
Start: 2018-07-27 | End: 2020-03-02

## 2018-07-27 NOTE — NURSING NOTE
Vital signs:  Temp: 97.4  F (36.3  C) Temp src: Oral BP: 122/70 (irregular) Pulse: 96     SpO2: 96 %     Height: 6' (182.9 cm) Weight: 227 lb (103 kg)  Estimated body mass index is 30.79 kg/(m^2) as calculated from the following:    Height as of this encounter: 6' (1.829 m).    Weight as of this encounter: 227 lb (103 kg).

## 2018-07-27 NOTE — PROGRESS NOTES
SUBJECTIVE:   Khurram Reynoso is a 73 year old male who presents to clinic today for the following health issues:      1 month F/U:    Patient is seen for a follow up visit.  Has h/o chronic cough, postnasal drip, wheezing.   Has been on Flonase and Flovent , mild improvement noted. Still has cough, tickle in the throat.   No chest pain. Has symptoms worse with laying on the right side.   Concern for ED, tried Cialis, not effective. Has h/o BPH with minor symptoms. Mild urine frequency, no nocturia.       Problem list and histories reviewed & adjusted, as indicated.  Additional history: as documented    Patient Active Problem List   Diagnosis     Cardiac dysrhythmia     Acute bronchitis     Primary cardiomyopathy (H)     Gait difficulty     CARDIOVASCULAR SCREENING; LDL GOAL LESS THAN 160     PVC (premature ventricular contraction)     PAC (premature atrial contraction)     ACP (advance care planning)     Past Surgical History:   Procedure Laterality Date     C NONSPECIFIC PROCEDURE      knee     CARDIAC SURGERY      Ablation     DECOMPRESS DISC RF LUMBAR  3/2001     OPTICAL TRACKING SYSTEM FUSION SPINE POSTERIOR LUMBAR THREE+ LEVELS N/A 6/27/2016    Procedure: OPTICAL TRACKING SYSTEM FUSION SPINE POSTERIOR LUMBAR THREE+ LEVELS;  Surgeon: Hieu Araiza MD;  Location: [facility] OR     ORTHOPEDIC SURGERY Bilateral     total knee replacements     ORTHOPEDIC SURGERY Right     Total Hipe Replacement     SOFT TISSUE SURGERY Right     right wrist ganglion surgery       Social History   Substance Use Topics     Smoking status: Never Smoker     Smokeless tobacco: Never Used     Alcohol use No     Family History   Problem Relation Age of Onset     C.A.D. Father      Hypertension Father      Heart Surgery Father      bypass     Cancer Brother      bone ca      Family History Negative Mother      Other - See Comments Sister      polio     Unknown/Adopted Maternal Grandmother      Unknown/Adopted Maternal Grandfather       Unknown/Adopted Paternal Grandmother      Unknown/Adopted Paternal Grandfather      Family History Negative Sister          Current Outpatient Prescriptions   Medication Sig Dispense Refill     fluticasone (FLONASE) 50 MCG/ACT spray Spray 1-2 sprays into both nostrils daily 1 Bottle 11     fluticasone-salmeterol (ADVAIR) 250-50 MCG/DOSE diskus inhaler Inhale 1 puff into the lungs 2 times daily 3 Inhaler 1     RaNITidine HCl (ZANTAC PO) Take by mouth as needed for heartburn       vardenafil (LEVITRA) 20 MG tablet Take 0.5-1 tablets (10-20 mg) by mouth daily as needed 60 min prior to sex. Do not use with nitroglycerin, terazosin or doxazosin. 6 tablet 1     OMEPRAZOLE PO Take 10 mg by mouth 2 times daily (before meals)         Reviewed and updated as needed this visit by clinical staff       Reviewed and updated as needed this visit by Provider         ROS:  Constitutional, HEENT, cardiovascular, pulmonary, gi and gu systems are negative, except as otherwise noted.    OBJECTIVE:     /70  Pulse 96  Temp 97.4  F (36.3  C) (Oral)  Ht 6' (1.829 m)  Wt 227 lb (103 kg)  SpO2 96%  BMI 30.79 kg/m2  Body mass index is 30.79 kg/(m^2).   GENERAL: healthy, alert and no distress  NECK: no adenopathy, no asymmetry, masses, or scars and thyroid normal to palpation  RESP: lungs clear to auscultation - no rales, rhonchi ,. + coarse expiratory wheezes  CV: regular rate and rhythm, normal S1 S2, no S3 or S4, no murmur, click or rub, no peripheral edema and peripheral pulses strong  ABDOMEN: soft, nontender, no hepatosplenomegaly, no masses and bowel sounds normal  MS: no gross musculoskeletal defects noted, no edema    Diagnostic Test Results:  none     ASSESSMENT/PLAN:     Problem List Items Addressed This Visit     None      Visit Diagnoses     Acute bronchospasm    -  Primary    Relevant Medications    fluticasone-salmeterol (ADVAIR) 250-50 MCG/DOSE diskus inhaler    Erectile dysfunction, unspecified erectile  dysfunction type        Relevant Medications    vardenafil (LEVITRA) 20 MG tablet    Other Relevant Orders    Testosterone Free and Total           Assess testosterone  Trial of Levitra   Star on Advair, advised for side effects     Follow-Up:in 3 months       Familia Gillespie MD  [facility]

## 2018-07-27 NOTE — LETTER
[facility]  [address], [address]  [place] [address]  [phone]        August 1, 2018    Khurram Reynoso  [address] [facility] [address]            Dear Mr. Khurram Reynoso:      The results of your recent labs were NORMAL.      If you have any further questions or problems, please contact our office.      Sincerely,        Familia Gillespie M.D.

## 2018-07-27 NOTE — MR AVS SNAPSHOT
After Visit Summary   7/27/2018    Khurram Reynoso    MRN: [id number]           Patient Information     Date Of Birth          1945        Visit Information        Provider Department      7/27/2018 11:20 AM Familia Gillespie MD [facility]        Today's Diagnoses     Acute bronchospasm    -  1       Follow-ups after your visit        Follow-up notes from your care team     Return in about 3 months (around 10/27/2018).      Who to contact     If you have questions or need follow up information about today's clinic visit or your schedule please contact [facility] directly at [phone].  Normal or non-critical lab and imaging results will be communicated to you by [employer], letter or phone within 4 business days after the clinic has received the results. If you do not hear from us within 7 days, please contact the clinic through [employer] or phone. If you have a critical or abnormal lab result, we will notify you by phone as soon as possible.  Submit refill requests through [employer] or call your pharmacy and they will forward the refill request to us. Please allow 3 business days for your refill to be completed.          Additional Information About Your Visit        MyChart Information     [employer] gives you secure access to your electronic health record. If you see a primary care provider, you can also send messages to your care team and make appointments. If you have questions, please call your primary care clinic.  If you do not have a primary care provider, please call [phone] and they will assist you.        Care EveryWhere ID     This is your Care EveryWhere ID. This could be used by other organizations to access your [place] medical records  [id number]        Your Vitals Were     Pulse Temperature Height Pulse Oximetry BMI (Body Mass Index)       96 97.4  F (36.3  C) (Oral) 6' (1.829 m) 96% 30.79 kg/m2        Blood Pressure from Last 3 Encounters:    07/27/18 122/70   06/20/18 100/60   08/02/17 129/84    Weight from Last 3 Encounters:   07/27/18 227 lb (103 kg)   06/20/18 227 lb (103 kg)   08/02/17 245 lb (111.1 kg)              Today, you had the following     No orders found for display         Today's Medication Changes          These changes are accurate as of 7/27/18 11:56 AM.  If you have any questions, ask your nurse or doctor.               Start taking these medicines.        Dose/Directions    fluticasone-salmeterol 250-50 MCG/DOSE diskus inhaler   Commonly known as:  ADVAIR   Used for:  Acute bronchospasm   Started by:  Familia Gillespie MD        Dose:  1 puff   Inhale 1 puff into the lungs 2 times daily   Quantity:  3 Inhaler   Refills:  1         Stop taking these medicines if you haven't already. Please contact your care team if you have questions.     fluticasone 100 MCG/BLIST Aepb   Commonly known as:  FLOVENT DISKUS   Stopped by:  Familia Gillespie MD                Where to get your medicines      These medications were sent to [facility] [address] [place] [address] AT [address]  [address] [facility] [address]     Phone:  [phone]     fluticasone-salmeterol 250-50 MCG/DOSE diskus inhaler                Primary Care Provider Office Phone # Fax #    Camacho Mercer [phone] [address] [place] [address]        Equal Access to Services     [facility] ALETA AH: Hadii adán daleo Sochasidy, waaxda luqadaha, qaybta kaalmada solitario, erica jenkins. So [facility] [phone].    ATENCIÓN: Si habla español, tiene a santiago disposición servicios gratuitos de asistencia lingüística. Lizeth al [phone].    We comply with applicable federal civil rights laws and [place] laws. We do not discriminate on the basis of race, color, national origin, age, disability, sex, sexual orientation, or gender identity.            Thank you!     Thank  you for choosing [facility]  for your care. Our goal is always to provide you with excellent care. Hearing back from our patients is one way we can continue to improve our services. Please take a few minutes to complete the written survey that you may receive in the mail after your visit with us. Thank you!             Your Updated Medication List - Protect others around you: Learn how to safely use, store and throw away your medicines at www.disposemymeds.org.          This list is accurate as of 7/27/18 11:56 AM.  Always use your most recent med list.                   Brand Name Dispense Instructions for use Diagnosis    fluticasone 50 MCG/ACT spray    FLONASE    1 Bottle    Spray 1-2 sprays into both nostrils daily    Cough, Mild persistent asthma without complication       fluticasone-salmeterol 250-50 MCG/DOSE diskus inhaler    ADVAIR    3 Inhaler    Inhale 1 puff into the lungs 2 times daily    Acute bronchospasm       OMEPRAZOLE PO      Take 10 mg by mouth 2 times daily (before meals)        ZANTAC PO      Take by mouth as needed for heartburn

## 2018-07-31 PROBLEM — J45.30 MILD PERSISTENT ASTHMA WITHOUT COMPLICATION: Status: ACTIVE | Noted: 2018-07-20

## 2018-07-31 PROBLEM — J45.30 MILD PERSISTENT ASTHMA WITHOUT COMPLICATION: Status: ACTIVE | Noted: 2018-07-31

## 2018-07-31 LAB
SHBG SERPL-SCNC: 63 NMOL/L (ref 11–80)
TESTOST FREE SERPL-MCNC: 5.56 NG/DL (ref 4.7–24.4)
TESTOST SERPL-MCNC: 425 NG/DL (ref 240–950)

## 2018-09-16 DIAGNOSIS — R06.02 SOB (SHORTNESS OF BREATH): Primary | ICD-10-CM

## 2018-09-17 NOTE — TELEPHONE ENCOUNTER
"Requested Prescriptions   Pending Prescriptions Disp Refills     VENTOLIN  (90 Base) MCG/ACT inhaler [Pharmacy Med Name: VENTOLIN HFA INH W/DOS CTR 200PUFFS] 18 g 0    Last Written Prescription Date:   Not on meds list  Last Fill Quantity: n/a,  # refills: n/a   Last office visit: 7/27/2018 with prescribing provider:     Future Office Visit:   Next 5 appointments (look out 90 days)     Sep 19, 2018  3:20 PM CDT   SHORT with Familia Gillespie MD   [facility]    [address]  [facility] [address]   [phone]                Sig: INHALE 2 PUFFS BY MOUTH FOUR TIMES DAILY IF NEEDED    Asthma Maintenance Inhalers - Anticholinergics Failed    9/16/2018  1:33 PM       Failed - Asthma control assessment score within normal limits in last 6 months    Please review ACT score.          Passed - Patient is age 12 years or older       Passed - Recent (6 mo) or future (30 days) visit within the authorizing provider's specialty    Patient had office visit in the last 6 months or has a visit in the next 30 days with authorizing provider or within the authorizing provider's specialty.  See \"Patient Info\" tab in inbasket, or \"Choose Columns\" in Meds & Orders section of the refill encounter.            "

## 2018-09-19 ENCOUNTER — OFFICE VISIT (OUTPATIENT)
Dept: INTERNAL MEDICINE | Facility: CLINIC | Age: 73
End: 2018-09-19
Payer: COMMERCIAL

## 2018-09-19 VITALS
HEART RATE: 97 BPM | HEIGHT: 72 IN | OXYGEN SATURATION: 96 % | WEIGHT: 229 LBS | TEMPERATURE: 98.5 F | SYSTOLIC BLOOD PRESSURE: 122 MMHG | DIASTOLIC BLOOD PRESSURE: 68 MMHG | BODY MASS INDEX: 31.02 KG/M2

## 2018-09-19 DIAGNOSIS — Z23 NEED FOR PROPHYLACTIC VACCINATION AND INOCULATION AGAINST INFLUENZA: ICD-10-CM

## 2018-09-19 DIAGNOSIS — J98.01 ACUTE BRONCHOSPASM: ICD-10-CM

## 2018-09-19 DIAGNOSIS — R06.02 SOB (SHORTNESS OF BREATH): ICD-10-CM

## 2018-09-19 DIAGNOSIS — J45.30 MILD PERSISTENT ASTHMA WITHOUT COMPLICATION: Primary | ICD-10-CM

## 2018-09-19 PROCEDURE — 90471 IMMUNIZATION ADMIN: CPT | Performed by: INTERNAL MEDICINE

## 2018-09-19 PROCEDURE — 99214 OFFICE O/P EST MOD 30 MIN: CPT | Mod: 25 | Performed by: INTERNAL MEDICINE

## 2018-09-19 PROCEDURE — 90662 IIV NO PRSV INCREASED AG IM: CPT | Performed by: INTERNAL MEDICINE

## 2018-09-19 RX ORDER — GUAIFENESIN 600 MG/1
1200 TABLET, EXTENDED RELEASE ORAL 2 TIMES DAILY PRN
Qty: 40 TABLET | Refills: 0 | Status: SHIPPED | OUTPATIENT
Start: 2018-09-19 | End: 2018-11-27

## 2018-09-19 RX ORDER — ALBUTEROL SULFATE 90 UG/1
AEROSOL, METERED RESPIRATORY (INHALATION)
Qty: 18 G | Refills: 0 | Status: SHIPPED | OUTPATIENT
Start: 2018-09-19 | End: 2018-09-19

## 2018-09-19 RX ORDER — PREDNISONE 20 MG/1
TABLET ORAL
Qty: 30 TABLET | Refills: 0 | Status: SHIPPED | OUTPATIENT
Start: 2018-09-19 | End: 2018-11-05

## 2018-09-19 RX ORDER — ALBUTEROL SULFATE 90 UG/1
AEROSOL, METERED RESPIRATORY (INHALATION)
Qty: 18 G | Refills: 3 | Status: SHIPPED | OUTPATIENT
Start: 2018-09-19 | End: 2020-03-02

## 2018-09-19 NOTE — PROGRESS NOTES

## 2018-09-19 NOTE — NURSING NOTE
Vital signs:  Temp: 98.5  F (36.9  C) Temp src: Oral BP: 122/68 Pulse: 97     SpO2: 96 %     Height: 6' (182.9 cm) Weight: 229 lb (103.9 kg)  Estimated body mass index is 31.06 kg/(m^2) as calculated from the following:    Height as of this encounter: 6' (1.829 m).    Weight as of this encounter: 229 lb (103.9 kg).

## 2018-09-19 NOTE — PROGRESS NOTES
SUBJECTIVE:   Khurram Reynoso is a 73 year old male who presents to clinic today for the following health issues:      Sinus drainage, SOB/difficulty breathing and cough:    Patient is seen for a follow up visit.  Has h/o asthma, SOB, wheezing, cough with clear mucus productions.   On Advair and Albuterol. Still not well controlled. Symptoms worse with laying down.   Has postnasal drip, chronic cough to clear the throat. On Flonase nasal spray.   Discussed preventive measures.       Problem list and histories reviewed & adjusted, as indicated.  Additional history: as documented    Patient Active Problem List   Diagnosis     Cardiac dysrhythmia     Acute bronchitis     Primary cardiomyopathy (H)     Gait difficulty     CARDIOVASCULAR SCREENING; LDL GOAL LESS THAN 160     PVC (premature ventricular contraction)     PAC (premature atrial contraction)     ACP (advance care planning)     Mild persistent asthma without complication     Past Surgical History:   Procedure Laterality Date     C NONSPECIFIC PROCEDURE      knee     CARDIAC SURGERY      Ablation     DECOMPRESS DISC RF LUMBAR  3/2001     OPTICAL TRACKING SYSTEM FUSION SPINE POSTERIOR LUMBAR THREE+ LEVELS N/A 6/27/2016    Procedure: OPTICAL TRACKING SYSTEM FUSION SPINE POSTERIOR LUMBAR THREE+ LEVELS;  Surgeon: Hieu Araiza MD;  Location: [facility] OR     ORTHOPEDIC SURGERY Bilateral     total knee replacements     ORTHOPEDIC SURGERY Right     Total Hipe Replacement     SOFT TISSUE SURGERY Right     right wrist ganglion surgery       Social History   Substance Use Topics     Smoking status: Never Smoker     Smokeless tobacco: Never Used     Alcohol use No     Family History   Problem Relation Age of Onset     C.A.D. Father      Hypertension Father      Heart Surgery Father      bypass     Cancer Brother      bone ca      Family History Negative Mother      Other - See Comments Sister      polio     Unknown/Adopted Maternal Grandmother       Unknown/Adopted Maternal Grandfather      Unknown/Adopted Paternal Grandmother      Unknown/Adopted Paternal Grandfather      Family History Negative Sister          Current Outpatient Prescriptions   Medication Sig Dispense Refill     albuterol (VENTOLIN HFA) 108 (90 Base) MCG/ACT inhaler INHALE 2 PUFFS BY MOUTH FOUR TIMES DAILY IF NEEDED 18 g 3     fluticasone (FLONASE) 50 MCG/ACT spray Spray 1-2 sprays into both nostrils daily 1 Bottle 11     fluticasone-salmeterol (ADVAIR) 250-50 MCG/DOSE diskus inhaler Inhale 1 puff into the lungs 2 times daily 3 Inhaler 1     guaiFENesin (MUCINEX) 600 MG 12 hr tablet Take 2 tablets (1,200 mg) by mouth 2 times daily as needed for congestion 40 tablet 0     predniSONE (DELTASONE) 20 MG tablet Take 3 tabs (60 mg) by mouth daily x 3 days, 2 tabs (40 mg) daily x 3 days, 1 tab (20 mg) daily x 3 days, then 1/2 tab (10 mg) x 20 days. 30 tablet 0     RaNITidine HCl (ZANTAC PO) Take by mouth as needed for heartburn       vardenafil (LEVITRA) 20 MG tablet Take 0.5-1 tablets (10-20 mg) by mouth daily as needed 60 min prior to sex. Do not use with nitroglycerin, terazosin or doxazosin. 6 tablet 1     [DISCONTINUED] fluticasone-salmeterol (ADVAIR) 250-50 MCG/DOSE diskus inhaler Inhale 1 puff into the lungs 2 times daily 3 Inhaler 1     [DISCONTINUED] VENTOLIN  (90 Base) MCG/ACT inhaler INHALE 2 PUFFS BY MOUTH FOUR TIMES DAILY IF NEEDED 18 g 0       Reviewed and updated as needed this visit by clinical staff  Tobacco  Allergies       Reviewed and updated as needed this visit by Provider         ROS:  Constitutional, HEENT, cardiovascular, pulmonary, gi and gu systems are negative, except as otherwise noted.    OBJECTIVE:     /68  Pulse 97  Temp 98.5  F (36.9  C) (Oral)  Ht 6' (1.829 m)  Wt 229 lb (103.9 kg)  SpO2 96%  BMI 31.06 kg/m2  Body mass index is 31.06 kg/(m^2).   GENERAL: healthy, alert and no distress  EYES: Eyes grossly normal to inspection, PERRL and  conjunctivae and sclerae normal  HENT: ear canals and TM's normal, nose and mouth without ulcers or lesions  NECK: no adenopathy, no asymmetry, masses, or scars and thyroid normal to palpation  RESP: lungs  - no rales, + rhonchi and expiratory coarse wheezes  CV: regular rate and rhythm, normal S1 S2, no S3 or S4, no murmur, click or rub, no peripheral edema and peripheral pulses strong  ABDOMEN: soft, nontender, no hepatosplenomegaly, no masses and bowel sounds normal  MS: no gross musculoskeletal defects noted, no edema    Diagnostic Test Results:  none     ASSESSMENT/PLAN:     Problem List Items Addressed This Visit     Mild persistent asthma without complication - Primary    Relevant Medications    fluticasone-salmeterol (ADVAIR) 250-50 MCG/DOSE diskus inhaler    albuterol (VENTOLIN HFA) 108 (90 Base) MCG/ACT inhaler    predniSONE (DELTASONE) 20 MG tablet    guaiFENesin (MUCINEX) 600 MG 12 hr tablet    Other Relevant Orders    CT Chest w Contrast      Other Visit Diagnoses     Acute bronchospasm        Relevant Medications    fluticasone-salmeterol (ADVAIR) 250-50 MCG/DOSE diskus inhaler    SOB (shortness of breath)        Relevant Medications    fluticasone-salmeterol (ADVAIR) 250-50 MCG/DOSE diskus inhaler    albuterol (VENTOLIN HFA) 108 (90 Base) MCG/ACT inhaler    predniSONE (DELTASONE) 20 MG tablet    Other Relevant Orders    CT Chest w Contrast    Need for prophylactic vaccination and inoculation against influenza        Relevant Orders    FLU VACCINE, INCREASED ANTIGEN, PRESV FREE, AGE 65+ [72008] (Completed)    Vaccine Administration, Initial [40127] (Completed)           Assess lungs CT- possible bronchiectasis , ILD  Continue inhalers, start Prednisone taper   Start Mucinex   continue Flonase  Immunized for influenza     Follow-Up: in one month       Familia Gillespie MD  [facility]

## 2018-09-19 NOTE — MR AVS SNAPSHOT
After Visit Summary   9/19/2018    Khurram Reynoso    MRN: [id number]           Patient Information     Date Of Birth          1945        Visit Information        Provider Department      9/19/2018 3:20 PM Familia Gillespie MD [facility]        Today's Diagnoses     Mild persistent asthma without complication    -  1    Acute bronchospasm        SOB (shortness of breath)           Follow-ups after your visit        Follow-up notes from your care team     Return in about 4 weeks (around 10/17/2018) for Routine Visit.      Future tests that were ordered for you today     Open Future Orders        Priority Expected Expires Ordered    CT Chest w Contrast Routine  9/19/2019 9/19/2018            Who to contact     If you have questions or need follow up information about today's clinic visit or your schedule please contact [facility] directly at [phone].  Normal or non-critical lab and imaging results will be communicated to you by [employer], letter or phone within 4 business days after the clinic has received the results. If you do not hear from us within 7 days, please contact the clinic through [employer] or phone. If you have a critical or abnormal lab result, we will notify you by phone as soon as possible.  Submit refill requests through [employer] or call your pharmacy and they will forward the refill request to us. Please allow 3 business days for your refill to be completed.          Additional Information About Your Visit        MyChart Information     [employer] gives you secure access to your electronic health record. If you see a primary care provider, you can also send messages to your care team and make appointments. If you have questions, please call your primary care clinic.  If you do not have a primary care provider, please call [phone] and they will assist you.        Care EveryWhere ID     This is your Care EveryWhere ID. This could be used by  other organizations to access your [place] medical records  [id number]        Your Vitals Were     Pulse Temperature Height Pulse Oximetry BMI (Body Mass Index)       97 98.5  F (36.9  C) (Oral) 6' (1.829 m) 96% 31.06 kg/m2        Blood Pressure from Last 3 Encounters:   09/19/18 122/68   07/27/18 122/70   06/20/18 100/60    Weight from Last 3 Encounters:   09/19/18 229 lb (103.9 kg)   07/27/18 227 lb (103 kg)   06/20/18 227 lb (103 kg)                 Today's Medication Changes          These changes are accurate as of 9/19/18  4:01 PM.  If you have any questions, ask your nurse or doctor.               Start taking these medicines.        Dose/Directions    guaiFENesin 600 MG 12 hr tablet   Commonly known as:  MUCINEX   Used for:  Mild persistent asthma without complication   Started by:  Familia Gillespie MD        Dose:  1200 mg   Take 2 tablets (1,200 mg) by mouth 2 times daily as needed for congestion   Quantity:  40 tablet   Refills:  0       predniSONE 20 MG tablet   Commonly known as:  DELTASONE   Used for:  SOB (shortness of breath), Mild persistent asthma without complication   Started by:  Familia Gillespie MD        Take 3 tabs (60 mg) by mouth daily x 3 days, 2 tabs (40 mg) daily x 3 days, 1 tab (20 mg) daily x 3 days, then 1/2 tab (10 mg) x 20 days.   Quantity:  30 tablet   Refills:  0            Where to get your medicines      These medications were sent to [facility] [address] [facility] [address] [facility] AT [address] [address] [facility] [address]     Phone:  [phone]     albuterol 108 (90 Base) MCG/ACT inhaler    fluticasone-salmeterol 250-50 MCG/DOSE diskus inhaler    guaiFENesin 600 MG 12 hr tablet    predniSONE 20 MG tablet                Primary Care Provider Office Phone # Fax #    Familia Gillespie [phone]       [facility] [address]  [facility] [address]        Equal Access to Services     DALE RIEVR AH: Kecia guevara  jeff Brewer, waaxda luqadaha, qaybta kaalmada solitario, erica delcidbenita gregory. So [facility] [phone].    ATENCIÓN: Si habla janine, tiene a santiago disposición servicios gratuitos de asistencia lingüística. Lizeth al [phone].    We comply with applicable federal civil rights laws and [place] laws. We do not discriminate on the basis of race, color, national origin, age, disability, sex, sexual orientation, or gender identity.            Thank you!     Thank you for choosing [facility]  for your care. Our goal is always to provide you with excellent care. Hearing back from our patients is one way we can continue to improve our services. Please take a few minutes to complete the written survey that you may receive in the mail after your visit with us. Thank you!             Your Updated Medication List - Protect others around you: Learn how to safely use, store and throw away your medicines at www.disposemymeds.org.          This list is accurate as of 9/19/18  4:01 PM.  Always use your most recent med list.                   Brand Name Dispense Instructions for use Diagnosis    albuterol 108 (90 Base) MCG/ACT inhaler    VENTOLIN HFA    18 g    INHALE 2 PUFFS BY MOUTH FOUR TIMES DAILY IF NEEDED    SOB (shortness of breath)       fluticasone 50 MCG/ACT spray    FLONASE    1 Bottle    Spray 1-2 sprays into both nostrils daily    Cough, Mild persistent asthma without complication       fluticasone-salmeterol 250-50 MCG/DOSE diskus inhaler    ADVAIR    3 Inhaler    Inhale 1 puff into the lungs 2 times daily    Acute bronchospasm       guaiFENesin 600 MG 12 hr tablet    MUCINEX    40 tablet    Take 2 tablets (1,200 mg) by mouth 2 times daily as needed for congestion    Mild persistent asthma without complication       predniSONE 20 MG tablet    DELTASONE    30 tablet    Take 3 tabs (60 mg) by mouth daily x 3 days, 2 tabs (40 mg) daily x 3 days, 1 tab (20 mg) daily x 3 days, then  1/2 tab (10 mg) x 20 days.    SOB (shortness of breath), Mild persistent asthma without complication       vardenafil 20 MG tablet    LEVITRA    6 tablet    Take 0.5-1 tablets (10-20 mg) by mouth daily as needed 60 min prior to sex. Do not use with nitroglycerin, terazosin or doxazosin.    Erectile dysfunction, unspecified erectile dysfunction type       ZANTAC PO      Take by mouth as needed for heartburn

## 2018-09-19 NOTE — TELEPHONE ENCOUNTER
Routing refill request to provider for review/approval because:  Drug not active on patient's medication list  No ACT on file

## 2018-10-08 ENCOUNTER — TRANSFERRED RECORDS (OUTPATIENT)
Dept: HEALTH INFORMATION MANAGEMENT | Facility: CLINIC | Age: 73
End: 2018-10-08

## 2018-10-18 ENCOUNTER — MYC MEDICAL ADVICE (OUTPATIENT)
Dept: INTERNAL MEDICINE | Facility: CLINIC | Age: 73
End: 2018-10-18

## 2018-10-22 NOTE — TELEPHONE ENCOUNTER
Please see patient's mychart message below.  Patient is asking for ct results from 10-8-18.    Please advise, thanks.

## 2018-11-02 DIAGNOSIS — Z98.1 HISTORY OF LUMBAR FUSION: Primary | ICD-10-CM

## 2018-11-05 ENCOUNTER — OFFICE VISIT (OUTPATIENT)
Dept: INTERNAL MEDICINE | Facility: CLINIC | Age: 73
End: 2018-11-05
Payer: COMMERCIAL

## 2018-11-05 VITALS
BODY MASS INDEX: 31.15 KG/M2 | DIASTOLIC BLOOD PRESSURE: 66 MMHG | RESPIRATION RATE: 12 BRPM | OXYGEN SATURATION: 98 % | TEMPERATURE: 97.9 F | HEIGHT: 72 IN | HEART RATE: 91 BPM | WEIGHT: 230 LBS | SYSTOLIC BLOOD PRESSURE: 118 MMHG

## 2018-11-05 DIAGNOSIS — N20.0 CALCULUS OF KIDNEY: ICD-10-CM

## 2018-11-05 DIAGNOSIS — M10.071 ACUTE IDIOPATHIC GOUT OF RIGHT FOOT: ICD-10-CM

## 2018-11-05 DIAGNOSIS — J45.30 MILD PERSISTENT ASTHMA WITHOUT COMPLICATION: ICD-10-CM

## 2018-11-05 DIAGNOSIS — Z00.00 ENCOUNTER FOR PREVENTATIVE ADULT HEALTH CARE EXAMINATION: Primary | ICD-10-CM

## 2018-11-05 LAB
ALBUMIN UR-MCNC: NEGATIVE MG/DL
APPEARANCE UR: CLEAR
BILIRUB UR QL STRIP: NEGATIVE
COLOR UR AUTO: YELLOW
ERYTHROCYTE [DISTWIDTH] IN BLOOD BY AUTOMATED COUNT: 14.1 % (ref 10–15)
GLUCOSE UR STRIP-MCNC: NEGATIVE MG/DL
HCT VFR BLD AUTO: 47.7 % (ref 40–53)
HGB BLD-MCNC: 15.9 G/DL (ref 13.3–17.7)
HGB UR QL STRIP: NEGATIVE
KETONES UR STRIP-MCNC: NEGATIVE MG/DL
LEUKOCYTE ESTERASE UR QL STRIP: NEGATIVE
MCH RBC QN AUTO: 31.6 PG (ref 26.5–33)
MCHC RBC AUTO-ENTMCNC: 33.3 G/DL (ref 31.5–36.5)
MCV RBC AUTO: 95 FL (ref 78–100)
NITRATE UR QL: NEGATIVE
PH UR STRIP: 5 PH (ref 5–7)
PLATELET # BLD AUTO: 339 10E9/L (ref 150–450)
RBC # BLD AUTO: 5.03 10E12/L (ref 4.4–5.9)
SOURCE: NORMAL
SP GR UR STRIP: <=1.005 (ref 1–1.03)
UROBILINOGEN UR STRIP-ACNC: 0.2 EU/DL (ref 0.2–1)
WBC # BLD AUTO: 7.9 10E9/L (ref 4–11)

## 2018-11-05 PROCEDURE — 85027 COMPLETE CBC AUTOMATED: CPT | Performed by: INTERNAL MEDICINE

## 2018-11-05 PROCEDURE — 99397 PER PM REEVAL EST PAT 65+ YR: CPT | Performed by: INTERNAL MEDICINE

## 2018-11-05 PROCEDURE — G0103 PSA SCREENING: HCPCS | Performed by: INTERNAL MEDICINE

## 2018-11-05 PROCEDURE — 80053 COMPREHEN METABOLIC PANEL: CPT | Performed by: INTERNAL MEDICINE

## 2018-11-05 PROCEDURE — 36415 COLL VENOUS BLD VENIPUNCTURE: CPT | Performed by: INTERNAL MEDICINE

## 2018-11-05 PROCEDURE — 84439 ASSAY OF FREE THYROXINE: CPT | Performed by: INTERNAL MEDICINE

## 2018-11-05 PROCEDURE — 84443 ASSAY THYROID STIM HORMONE: CPT | Performed by: INTERNAL MEDICINE

## 2018-11-05 PROCEDURE — 84550 ASSAY OF BLOOD/URIC ACID: CPT | Performed by: INTERNAL MEDICINE

## 2018-11-05 PROCEDURE — 80061 LIPID PANEL: CPT | Performed by: INTERNAL MEDICINE

## 2018-11-05 PROCEDURE — 81003 URINALYSIS AUTO W/O SCOPE: CPT | Performed by: INTERNAL MEDICINE

## 2018-11-05 NOTE — PROGRESS NOTES
SUBJECTIVE:   Khurram Reynoso is a 73 year old male who presents to clinic today for the following health issues:      Discuss CT results:

## 2018-11-05 NOTE — NURSING NOTE
Vital signs:  Temp: 97.9  F (36.6  C) Temp src: Oral BP: 118/66 Pulse: 91   Resp: 12 SpO2: 98 %     Height: 6' (182.9 cm) Weight: 230 lb (104.3 kg)  Estimated body mass index is 31.19 kg/(m^2) as calculated from the following:    Height as of this encounter: 6' (1.829 m).    Weight as of this encounter: 230 lb (104.3 kg).

## 2018-11-05 NOTE — MR AVS SNAPSHOT
After Visit Summary   11/5/2018    Khurram Reynoso    MRN: [id number]           Patient Information     Date Of Birth          1945        Visit Information        Provider Department      11/5/2018 10:00 AM Familia Gillespie MD [facility]        Today's Diagnoses     Mild persistent asthma without complication    -  1       Follow-ups after your visit        Additional Services     ALLERGY/ASTHMA ADULT REFERRAL       Your provider has referred you to: N: [facility] [phone]   [email or web address]    Please be aware that coverage of these services is subject to the terms and limitations of your health insurance plan.  Call member services at your health plan with any benefit or coverage questions.      Please bring the following with you to your appointment:    (1) Any X-Rays, CTs or MRIs which have been performed.  Contact the facility where they were done to arrange for  prior to your scheduled appointment.    (2) List of current medications  (3) This referral request   (4) Any documents/labs given to you for this referral                  Follow-up notes from your care team     Return in about 6 months (around 5/5/2019) for Physical Exam.      Your next 10 appointments already scheduled     Nov 07, 2018 11:00 AM CST   XR LUMBAR SPINE 2/3 VIEWS with [id number]   [facility] ([facility])    [address] [address] [address] [phone]           How do I prepare for my exam? (Food and drink instructions) No Food and Drink Restrictions.  How do I prepare for my exam? (Other instructions) You do not need to do anything special for this exam.  What should I wear: Wear comfortable clothes.  How long does the exam take: Most scans take less than 5 minutes.  What should I bring: Bring a list of your medicines, including vitamins, minerals and over-the-counter drugs. It is safest to leave  personal items at home.  Do I need a :  No  is needed.  What do I need to tell my doctor: Tell your doctor if there s any chance you are pregnant.  What should I do after the exam: No restrictions, You may resume normal activities.  What is this test: An image of a specific body part shown in shades of black and white.  Who should I call with questions: If you have any questions, please call the Imaging Department where you will have your exam. Directions, parking instructions, and other information is available on our website, [email or web address].            Nov 07, 2018 11:40 AM CST   Return Visit with Hieu Araiza MD   [facility] ([facility])    [address] [facility] [address] [place] [address] [phone]              Who to contact     If you have questions or need follow up information about today's clinic visit or your schedule please contact [facility] directly at [phone].  Normal or non-critical lab and imaging results will be communicated to you by "[employer]hart, letter or phone within 4 business days after the clinic has received the results. If you do not hear from us within 7 days, please contact the clinic through [employer] or phone. If you have a critical or abnormal lab result, we will notify you by phone as soon as possible.  Submit refill requests through [employer] or call your pharmacy and they will forward the refill request to us. Please allow 3 business days for your refill to be completed.          Additional Information About Your Visit        [employer] Information     [employer] gives you secure access to your electronic health record. If you see a primary care provider, you can also send messages to your care team and make appointments. If you have questions, please call your primary care clinic.  If you do not have a primary care provider, please call [phone] and they will assist you.         Care EveryWhere ID     This is your Care EveryWhere ID. This could be used by other organizations to access your [place] medical records  [id number]        Your Vitals Were     Pulse Temperature Respirations Height Pulse Oximetry BMI (Body Mass Index)    91 97.9  F (36.6  C) (Oral) 12 6' (1.829 m) 98% 31.19 kg/m2       Blood Pressure from Last 3 Encounters:   11/05/18 118/66   09/19/18 122/68   07/27/18 122/70    Weight from Last 3 Encounters:   11/05/18 230 lb (104.3 kg)   09/19/18 229 lb (103.9 kg)   07/27/18 227 lb (103 kg)              We Performed the Following     ALLERGY/ASTHMA ADULT REFERRAL        Primary Care Provider Office Phone # Fax #    Familia Gillespie [phone]       [address] [facility] [address]        Equal Access to Services     [facility]: Hadii aad ku hadasho Soomaali, waaxda luqadaha, qaybta kaalmada adeegyada, waxay idiin hayaan aderichy tidwell . So [facility] [phone].    ATENCIÓN: Si habla español, tiene a santiago disposición servicios gratuitos de asistencia lingüística. Llame al [phone].    We comply with applicable federal civil rights laws and [place] laws. We do not discriminate on the basis of race, color, national origin, age, disability, sex, sexual orientation, or gender identity.            Thank you!     Thank you for choosing [facility]  for your care. Our goal is always to provide you with excellent care. Hearing back from our patients is one way we can continue to improve our services. Please take a few minutes to complete the written survey that you may receive in the mail after your visit with us. Thank you!             Your Updated Medication List - Protect others around you: Learn how to safely use, store and throw away your medicines at www.disposemymeds.org.          This list is accurate as of 11/5/18 10:31 AM.  Always use your most recent med list.                   Brand Name Dispense Instructions for use  Diagnosis    albuterol 108 (90 Base) MCG/ACT inhaler    VENTOLIN HFA    18 g    INHALE 2 PUFFS BY MOUTH FOUR TIMES DAILY IF NEEDED    SOB (shortness of breath)       fluticasone 50 MCG/ACT spray    FLONASE    1 Bottle    Spray 1-2 sprays into both nostrils daily    Cough, Mild persistent asthma without complication       fluticasone-salmeterol 250-50 MCG/DOSE diskus inhaler    ADVAIR    3 Inhaler    Inhale 1 puff into the lungs 2 times daily    Acute bronchospasm       guaiFENesin 600 MG 12 hr tablet    MUCINEX    40 tablet    Take 2 tablets (1,200 mg) by mouth 2 times daily as needed for congestion    Mild persistent asthma without complication       vardenafil 20 MG tablet    LEVITRA    6 tablet    Take 0.5-1 tablets (10-20 mg) by mouth daily as needed 60 min prior to sex. Do not use with nitroglycerin, terazosin or doxazosin.    Erectile dysfunction, unspecified erectile dysfunction type       ZANTAC PO      Take by mouth as needed for heartburn

## 2018-11-06 LAB
ALBUMIN SERPL-MCNC: 3.9 G/DL (ref 3.4–5)
ALP SERPL-CCNC: 201 U/L (ref 40–150)
ALT SERPL W P-5'-P-CCNC: 26 U/L (ref 0–70)
ANION GAP SERPL CALCULATED.3IONS-SCNC: 12 MMOL/L (ref 3–14)
AST SERPL W P-5'-P-CCNC: 21 U/L (ref 0–45)
BILIRUB SERPL-MCNC: 0.5 MG/DL (ref 0.2–1.3)
BUN SERPL-MCNC: 16 MG/DL (ref 7–30)
CALCIUM SERPL-MCNC: 9.3 MG/DL (ref 8.5–10.1)
CHLORIDE SERPL-SCNC: 105 MMOL/L (ref 94–109)
CHOLEST SERPL-MCNC: 178 MG/DL
CO2 SERPL-SCNC: 24 MMOL/L (ref 20–32)
CREAT SERPL-MCNC: 1.06 MG/DL (ref 0.66–1.25)
GFR SERPL CREATININE-BSD FRML MDRD: 69 ML/MIN/1.7M2
GLUCOSE SERPL-MCNC: 71 MG/DL (ref 70–99)
HDLC SERPL-MCNC: 53 MG/DL
LDLC SERPL CALC-MCNC: 92 MG/DL
NONHDLC SERPL-MCNC: 125 MG/DL
POTASSIUM SERPL-SCNC: 4.1 MMOL/L (ref 3.4–5.3)
PROT SERPL-MCNC: 8 G/DL (ref 6.8–8.8)
PSA SERPL-ACNC: 1.75 UG/L (ref 0–4)
SODIUM SERPL-SCNC: 141 MMOL/L (ref 133–144)
T4 FREE SERPL-MCNC: 1 NG/DL (ref 0.76–1.46)
TRIGL SERPL-MCNC: 164 MG/DL
TSH SERPL DL<=0.005 MIU/L-ACNC: 5.08 MU/L (ref 0.4–4)
URATE SERPL-MCNC: 7.2 MG/DL (ref 3.5–7.2)

## 2018-11-07 ENCOUNTER — OFFICE VISIT (OUTPATIENT)
Dept: NEUROSURGERY | Facility: CLINIC | Age: 73
End: 2018-11-07
Attending: INTERNAL MEDICINE
Payer: COMMERCIAL

## 2018-11-07 ENCOUNTER — RADIANT APPOINTMENT (OUTPATIENT)
Dept: GENERAL RADIOLOGY | Facility: CLINIC | Age: 73
End: 2018-11-07
Attending: NURSE PRACTITIONER
Payer: COMMERCIAL

## 2018-11-07 VITALS
HEART RATE: 81 BPM | OXYGEN SATURATION: 96 % | WEIGHT: 225 LBS | HEIGHT: 72 IN | DIASTOLIC BLOOD PRESSURE: 70 MMHG | SYSTOLIC BLOOD PRESSURE: 125 MMHG | BODY MASS INDEX: 30.48 KG/M2

## 2018-11-07 DIAGNOSIS — Z98.1 STATUS POST LUMBAR SPINAL FUSION: ICD-10-CM

## 2018-11-07 DIAGNOSIS — M54.50 LUMBAR SPINE PAIN: Primary | ICD-10-CM

## 2018-11-07 DIAGNOSIS — Z98.1 HISTORY OF LUMBAR FUSION: ICD-10-CM

## 2018-11-07 PROCEDURE — 72100 X-RAY EXAM L-S SPINE 2/3 VWS: CPT

## 2018-11-07 PROCEDURE — G0463 HOSPITAL OUTPT CLINIC VISIT: HCPCS

## 2018-11-07 PROCEDURE — 99213 OFFICE O/P EST LOW 20 MIN: CPT | Performed by: NEUROLOGICAL SURGERY

## 2018-11-07 RX ORDER — METHOCARBAMOL 750 MG/1
750 TABLET, FILM COATED ORAL 4 TIMES DAILY PRN
Qty: 90 TABLET | Refills: 1 | Status: SHIPPED | OUTPATIENT
Start: 2018-11-07 | End: 2020-08-14

## 2018-11-07 ASSESSMENT — PAIN SCALES - GENERAL: PAINLEVEL: MODERATE PAIN (4)

## 2018-11-07 NOTE — LETTER
11/7/2018         RE: Khurram Reynoso  [address]  [place] [address]        Dear Colleague,    Thank you for referring your patient, Khurram Reynoso, to the [facility]. Please see a copy of my visit note below.    Neurosurgery Follow Up Clinic Visit      Khurram Reynoso returns for follow up visit regarding his L2-5 decompression and fusion by me on June 27, 2016    Currently the patient describes having soreness in his back secondary to coughing just above his previous incision.  He states that his left dorsiflexion weakness is stable.  Denies any radicular pain and new onset weakness.    Exam is stable  He continues to have left dorsiflexion weakness, otherwise, full motor strength in the bilateral lower extremities    We reviewed the x-rays of his lumbar spine that show L2-5 good hardware placement and stable construct      Plan:   I discussed with the patient a trial of Robaxin for his pain and spasms and he will try physical therapy if no better.  He does not need any surgical intervention at this time.              Again, thank you for allowing me to participate in the care of your patient.        Sincerely,        Hieu Araiza MD

## 2018-11-07 NOTE — PATIENT INSTRUCTIONS
Preventive Health Recommendations:     See your health care provider every year to    Review health changes.     Discuss preventive care.      Review your medicines if your doctor has prescribed any.      Talk with your health care provider about whether you should have a test to screen for prostate cancer (PSA).    Every 3 years, have a diabetes test (fasting glucose). If you are at risk for diabetes, you should have this test more often.    Every 5 years, have a cholesterol test. Have this test more often if you are at risk for high cholesterol or heart disease.     Every 10 years, have a colonoscopy. Or, have a yearly FIT test (stool test). These exams will check for colon cancer.    Talk to with your health care provider about screening for Abdominal Aortic Aneurysm if you have a family history of AAA or have a history of smoking.    Shots:     Get a flu shot each year.     Get a tetanus shot every 10 years.     Talk to your doctor about your pneumonia vaccines. There are now two you should receive - Pneumovax (PPSV 23) and Prevnar (PCV 13).     Talk to your pharmacist about a shingles vaccine.     Talk to your doctor about the hepatitis B vaccine.  Nutrition:     Eat at least 5 servings of fruits and vegetables each day.     Eat whole-grain bread, whole-wheat pasta and brown rice instead of white grains and rice.     Get adequate Calcium and Vitamin D.   Lifestyle    Exercise for at least 150 minutes a week (30 minutes a day, 5 days a week). This will help you control your weight and prevent disease.     Limit alcohol to one drink per day.     No smoking.     Wear sunscreen to prevent skin cancer.    See your dentist every six months for an exam and cleaning.    See your eye doctor every 1 to 2 years to screen for conditions such as glaucoma, macular degeneration, cataracts, etc.    Personalized Prevention Plan  You are due for the preventive services outlined below.  Your care team is available to assist you  in scheduling these services.  If you have already completed any of these items, please share that information with your care team to update in your medical record.  Health Maintenance Due   Topic Date Due     Heart Failure Action Plan Reviewed Every 3 Years  1945     Asthma Action Plan - yearly  06/02/1950     Asthma Control Test - every 6 months  06/02/1950     AORTIC ANEURYSM SCREENING (SYSTEM ASSIGNED)  06/02/2011     FALL RISK ASSESSMENT  06/09/2017

## 2018-11-07 NOTE — NURSING NOTE
Khurram Reynoso is a 73 year old male who presents for:  Chief Complaint   Patient presents with     Neurologic Problem      Feeling pain and foot drop. Worst on the left leg, a lot of numbness on the right leg, starting on the left side too.        Vitals:    Vitals:    11/07/18 1158   BP: 125/70   BP Location: Right arm   Patient Position: Sitting   Cuff Size: Adult Large   Pulse: 81   SpO2: 96%   Weight: 225 lb (102.1 kg)   Height: 6' (1.829 m)       BMI:  Estimated body mass index is 30.52 kg/(m^2) as calculated from the following:    Height as of this encounter: 6' (1.829 m).    Weight as of this encounter: 225 lb (102.1 kg).    Pain Score:  Moderate Pain (4)      Do you feel safe in your environment?  Yes      Vicki Villagran

## 2018-11-07 NOTE — PROGRESS NOTES
Neurosurgery Follow Up Clinic Visit      Khurram Reynoso returns for follow up visit regarding his L2-5 decompression and fusion by me on June 27, 2016    Currently the patient describes having soreness in his back secondary to coughing just above his previous incision.  He states that his left dorsiflexion weakness is stable.  Denies any radicular pain and new onset weakness.    Exam is stable  He continues to have left dorsiflexion weakness, otherwise, full motor strength in the bilateral lower extremities    We reviewed the x-rays of his lumbar spine that show L2-5 good hardware placement and stable construct      Plan:   I discussed with the patient a trial of Robaxin for his pain and spasms and he will try physical therapy if no better.  He does not need any surgical intervention at this time.

## 2018-11-07 NOTE — PROGRESS NOTES
SUBJECTIVE:   CC: Khurram Reynoso is an 73 year old male who presents for preventative health visit.     Healthy Habits:    Do you get at least three servings of calcium containing foods daily (dairy, green leafy vegetables, etc.)? yes    Amount of exercise or daily activities, outside of work: 3 day(s) per week    Problems taking medications regularly No    Medication side effects: No    Have you had an eye exam in the past two years? yes    Do you see a dentist twice per year? yes    Do you have sleep apnea, excessive snoring or daytime drowsiness?no       PROBLEMS TO ADD ON...    Has h/o chronic cough, mild asthma. Had improvement with steroids, on inhaled bronchodilator/ steroid treatment now. Has postnasal drainage with laying down. Triggers cough. No chest pain, SOB, fevers.   Has had a chest CT, normal lungs, incidental finding of kidney stone. Pt is not  Symptomatic.   Has h/o gout. No acute flares.     Today's PHQ-2 Score:   PHQ-2 ( 1999 Pfizer) 6/20/2018 1/31/2017   Q1: Little interest or pleasure in doing things 0 0   Q2: Feeling down, depressed or hopeless 0 0   PHQ-2 Score 0 0       Abuse: Current or Past(Physical, Sexual or Emotional)- No  Do you feel safe in your environment - Yes    Social History   Substance Use Topics     Smoking status: Never Smoker     Smokeless tobacco: Never Used     Alcohol use No      If you drink alcohol do you typically have >3 drinks per day or >7 drinks per week? No                      Last PSA:   PSA   Date Value Ref Range Status   11/05/2018 1.75 0 - 4 ug/L Final     Comment:     Assay Method:  Chemiluminescence using Siemens Vista analyzer       Reviewed orders with patient. Reviewed health maintenance and updated orders accordingly - Yes  Labs reviewed in EPIC  BP Readings from Last 3 Encounters:   11/05/18 118/66   09/19/18 122/68   07/27/18 122/70    Wt Readings from Last 3 Encounters:   11/05/18 230 lb (104.3 kg)   09/19/18 229 lb (103.9 kg)   07/27/18 227 lb  (103 kg)                    Reviewed and updated as needed this visit by clinical staff  Tobacco  Allergies  Meds  Med Hx  Surg Hx  Fam Hx  Soc Hx        Reviewed and updated as needed this visit by Provider            ROS:  CONSTITUTIONAL: NEGATIVE for fever, chills, change in weight  INTEGUMENTARY/SKIN: NEGATIVE for worrisome rashes, moles or lesions  EYES: NEGATIVE for vision changes or irritation  ENT: NEGATIVE for ear, mouth and throat problems  RESP: NEGATIVE for significant cough or SOB  CV: NEGATIVE for chest pain, palpitations or peripheral edema  GI: NEGATIVE for nausea, abdominal pain, heartburn, or change in bowel habits   male: negative for dysuria, hematuria, decreased urinary stream, erectile dysfunction, urethral discharge  MUSCULOSKELETAL: NEGATIVE for significant arthralgias or myalgia  NEURO: NEGATIVE for weakness, dizziness or paresthesias  PSYCHIATRIC: NEGATIVE for changes in mood or affect    OBJECTIVE:   /66  Pulse 91  Temp 97.9  F (36.6  C) (Oral)  Resp 12  Ht 6' (1.829 m)  Wt 230 lb (104.3 kg)  SpO2 98%  BMI 31.19 kg/m2  EXAM:  GENERAL: healthy, alert and no distress  EYES: Eyes grossly normal to inspection, PERRL and conjunctivae and sclerae normal  HENT: ear canals and TM's normal, nose and mouth without ulcers or lesions  NECK: no adenopathy, no asymmetry, masses, or scars and thyroid normal to palpation  RESP: lungs clear to auscultation - no rales, rhonchi or wheezes  CV: regular rate and rhythm, normal S1 S2, no S3 or S4, no murmur, click or rub, no peripheral edema and peripheral pulses strong  ABDOMEN: soft, nontender, no hepatosplenomegaly, no masses and bowel sounds normal  MS: no gross musculoskeletal defects noted, no edema  SKIN: no suspicious lesions or rashes  NEURO: Normal strength and tone, mentation intact and speech normal  PSYCH: mentation appears normal, affect normal/bright    Diagnostic Test Results:  Results for orders placed or performed in visit  on 11/05/18   CBC with platelets   Result Value Ref Range    WBC 7.9 4.0 - 11.0 10e9/L    RBC Count 5.03 4.4 - 5.9 10e12/L    Hemoglobin 15.9 13.3 - 17.7 g/dL    Hematocrit 47.7 40.0 - 53.0 %    MCV 95 78 - 100 fl    MCH 31.6 26.5 - 33.0 pg    MCHC 33.3 31.5 - 36.5 g/dL    RDW 14.1 10.0 - 15.0 %    Platelet Count 339 150 - 450 10e9/L   Comprehensive metabolic panel   Result Value Ref Range    Sodium 141 133 - 144 mmol/L    Potassium 4.1 3.4 - 5.3 mmol/L    Chloride 105 94 - 109 mmol/L    Carbon Dioxide 24 20 - 32 mmol/L    Anion Gap 12 3 - 14 mmol/L    Glucose 71 70 - 99 mg/dL    Urea Nitrogen 16 7 - 30 mg/dL    Creatinine 1.06 0.66 - 1.25 mg/dL    GFR Estimate 69 >60 mL/min/1.7m2    GFR Estimate If Black 83 >60 mL/min/1.7m2    Calcium 9.3 8.5 - 10.1 mg/dL    Bilirubin Total 0.5 0.2 - 1.3 mg/dL    Albumin 3.9 3.4 - 5.0 g/dL    Protein Total 8.0 6.8 - 8.8 g/dL    Alkaline Phosphatase 201 (H) 40 - 150 U/L    ALT 26 0 - 70 U/L    AST 21 0 - 45 U/L   TSH with free T4 reflex   Result Value Ref Range    TSH 5.08 (H) 0.40 - 4.00 mU/L   Prostate spec antigen screen   Result Value Ref Range    PSA 1.75 0 - 4 ug/L   *UA reflex to Microscopic   Result Value Ref Range    Color Urine Yellow     Appearance Urine Clear     Glucose Urine Negative NEG^Negative mg/dL    Bilirubin Urine Negative NEG^Negative    Ketones Urine Negative NEG^Negative mg/dL    Specific Gravity Urine <=1.005 1.003 - 1.035    Blood Urine Negative NEG^Negative    pH Urine 5.0 5.0 - 7.0 pH    Protein Albumin Urine Negative NEG^Negative mg/dL    Urobilinogen Urine 0.2 0.2 - 1.0 EU/dL    Nitrite Urine Negative NEG^Negative    Leukocyte Esterase Urine Negative NEG^Negative    Source Midstream Urine    Uric acid   Result Value Ref Range    Uric Acid 7.2 3.5 - 7.2 mg/dL   Lipid panel reflex to direct LDL Non-fasting   Result Value Ref Range    Cholesterol 178 <200 mg/dL    Triglycerides 164 (H) <150 mg/dL    HDL Cholesterol 53 >39 mg/dL    LDL Cholesterol  Calculated 92 <100 mg/dL    Non HDL Cholesterol 125 <130 mg/dL   T4 free   Result Value Ref Range    T4 Free 1.00 0.76 - 1.46 ng/dL       ASSESSMENT/PLAN:       ICD-10-CM    1. Encounter for preventative adult health care examination Z00.00 CBC with platelets     Comprehensive metabolic panel     TSH with free T4 reflex     Prostate spec antigen screen     *UA reflex to Microscopic     Uric acid     Lipid panel reflex to direct LDL Non-fasting     CANCELED: Lipid panel reflex to direct LDL Fasting   2. Mild persistent asthma without complication J45.30 ALLERGY/ASTHMA ADULT REFERRAL     CBC with platelets     Comprehensive metabolic panel     TSH with free T4 reflex     *UA reflex to Microscopic     Uric acid     T4 free     T4 free   3. Acute idiopathic gout of right foot M10.071 *UA reflex to Microscopic   4. Calculus of kidney N20.0 *UA reflex to Microscopic     Uric acid       COUNSELING:  Reviewed preventive health counseling, as reflected in patient instructions       Regular exercise       Healthy diet/nutrition       Vision screening       Hearing screening       Colon cancer screening       Prostate cancer screening    BP Readings from Last 1 Encounters:   11/05/18 118/66     Estimated body mass index is 31.19 kg/(m^2) as calculated from the following:    Height as of this encounter: 6' (1.829 m).    Weight as of this encounter: 230 lb (104.3 kg).      Weight management plan: Discussed healthy diet and exercise guidelines and patient will follow up in 3 months in clinic to re-evaluate.     reports that he has never smoked. He has never used smokeless tobacco.      Counseling Resources:  ATP IV Guidelines  Pooled Cohorts Equation Calculator  FRAX Risk Assessment  ICSI Preventive Guidelines  Dietary Guidelines for Americans, 2010  USDA's MyPlate  ASA Prophylaxis  Lung CA Screening    Familia Gillespie MD  [facility]

## 2018-11-07 NOTE — PATIENT INSTRUCTIONS
1. Trial Robaxin 1 tab three times per day as needed for muscle spasms.  Prescription sent to [facility] in [place].

## 2018-11-07 NOTE — MR AVS SNAPSHOT
After Visit Summary   11/7/2018    Khurram Reynoso    MRN: [id number]           Patient Information     Date Of Birth          1945        Visit Information        Provider Department      11/7/2018 11:40 AM Hieu Araiza MD [facility]        Today's Diagnoses     Lumbar spine pain    -  1      Care Instructions    1. Trial Robaxin 1 tab three times per day as needed for muscle spasms.  Prescription sent to [facility] in [place].           Follow-ups after your visit        Who to contact     If you have questions or need follow up information about today's clinic visit or your schedule please contact [facility] [place] directly at [phone].  Normal or non-critical lab and imaging results will be communicated to you by MyChart, letter or phone within 4 business days after the clinic has received the results. If you do not hear from us within 7 days, please contact the clinic through Sok[employer] or phone. If you have a critical or abnormal lab result, we will notify you by phone as soon as possible.  Submit refill requests through [employer] or call your pharmacy and they will forward the refill request to us. Please allow 3 business days for your refill to be completed.          Additional Information About Your Visit        MyChart Information     [employer] gives you secure access to your electronic health record. If you see a primary care provider, you can also send messages to your care team and make appointments. If you have questions, please call your primary care clinic.  If you do not have a primary care provider, please call [phone] and they will assist you.        Care EveryWhere ID     This is your Care EveryWhere ID. This could be used by other organizations to access your [facility] medical records  [id number]        Your Vitals Were     Pulse Height Pulse Oximetry BMI (Body Mass Index)          81 6' (1.829 m) 96% 30.52 kg/m2          Blood Pressure from Last 3 Encounters:   11/07/18 125/70   11/05/18 118/66   09/19/18 122/68    Weight from Last 3 Encounters:   11/07/18 225 lb (102.1 kg)   11/05/18 230 lb (104.3 kg)   09/19/18 229 lb (103.9 kg)              Today, you had the following     No orders found for display         Today's Medication Changes          These changes are accurate as of 11/7/18 12:25 PM.  If you have any questions, ask your nurse or doctor.               Start taking these medicines.        Dose/Directions    methocarbamol 750 MG tablet   Commonly known as:  ROBAXIN   Used for:  Lumbar spine pain   Started by:  Hieu Araiza MD        Dose:  750 mg   Take 1 tablet (750 mg) by mouth 4 times daily as needed for muscle spasms   Quantity:  90 tablet   Refills:  1            Where to get your medicines      These medications were sent to [facility] [address] [place] [address] [address]  [address] [facility] [address]     Phone:  [phone]     methocarbamol 750 MG tablet                Primary Care Provider Office Phone # Fax #    Familia Gillespie [phone]       [address] [facility] [address]        Equal Access to Services     [place] AH: Hadii aad ku hadasho Soomaali, waaxda luqadaha, qaybta kaalmada adeegyada, waxay idiin hayaan romana jenkins. So [facility] [phone].    ATENCIÓN: Si habla español, tiene a santiago disposición servicios gratuitos de asistencia lingüística. Lizeth al [phone].    We comply with applicable federal civil rights laws and [place] laws. We do not discriminate on the basis of race, color, national origin, age, disability, sex, sexual orientation, or gender identity.            Thank you!     Thank you for choosing [facility]  for your care. Our goal is always to provide you with excellent care. Hearing back from our patients is one way we can continue to improve our  services. Please take a few minutes to complete the written survey that you may receive in the mail after your visit with us. Thank you!             Your Updated Medication List - Protect others around you: Learn how to safely use, store and throw away your medicines at www.disposemymeds.org.          This list is accurate as of 11/7/18 12:25 PM.  Always use your most recent med list.                   Brand Name Dispense Instructions for use Diagnosis    albuterol 108 (90 Base) MCG/ACT inhaler    VENTOLIN HFA    18 g    INHALE 2 PUFFS BY MOUTH FOUR TIMES DAILY IF NEEDED    SOB (shortness of breath)       fluticasone 50 MCG/ACT spray    FLONASE    1 Bottle    Spray 1-2 sprays into both nostrils daily    Cough, Mild persistent asthma without complication       fluticasone-salmeterol 250-50 MCG/DOSE diskus inhaler    ADVAIR    3 Inhaler    Inhale 1 puff into the lungs 2 times daily    Acute bronchospasm       guaiFENesin 600 MG 12 hr tablet    MUCINEX    40 tablet    Take 2 tablets (1,200 mg) by mouth 2 times daily as needed for congestion    Mild persistent asthma without complication       methocarbamol 750 MG tablet    ROBAXIN    90 tablet    Take 1 tablet (750 mg) by mouth 4 times daily as needed for muscle spasms    Lumbar spine pain       vardenafil 20 MG tablet    LEVITRA    6 tablet    Take 0.5-1 tablets (10-20 mg) by mouth daily as needed 60 min prior to sex. Do not use with nitroglycerin, terazosin or doxazosin.    Erectile dysfunction, unspecified erectile dysfunction type       ZANTAC PO      Take by mouth as needed for heartburn

## 2018-11-12 ENCOUNTER — TRANSFERRED RECORDS (OUTPATIENT)
Dept: HEALTH INFORMATION MANAGEMENT | Facility: CLINIC | Age: 73
End: 2018-11-12

## 2018-11-27 ENCOUNTER — OFFICE VISIT (OUTPATIENT)
Dept: INTERNAL MEDICINE | Facility: CLINIC | Age: 73
End: 2018-11-27
Payer: COMMERCIAL

## 2018-11-27 ENCOUNTER — RADIANT APPOINTMENT (OUTPATIENT)
Dept: GENERAL RADIOLOGY | Facility: CLINIC | Age: 73
End: 2018-11-27
Attending: INTERNAL MEDICINE
Payer: COMMERCIAL

## 2018-11-27 VITALS
TEMPERATURE: 97.8 F | HEART RATE: 89 BPM | BODY MASS INDEX: 31.29 KG/M2 | SYSTOLIC BLOOD PRESSURE: 110 MMHG | WEIGHT: 231 LBS | DIASTOLIC BLOOD PRESSURE: 60 MMHG | HEIGHT: 72 IN | OXYGEN SATURATION: 96 %

## 2018-11-27 DIAGNOSIS — R05.9 COUGH: Primary | ICD-10-CM

## 2018-11-27 DIAGNOSIS — R05.9 COUGH: ICD-10-CM

## 2018-11-27 DIAGNOSIS — J45.30 MILD PERSISTENT ASTHMA WITHOUT COMPLICATION: ICD-10-CM

## 2018-11-27 DIAGNOSIS — J01.01 ACUTE RECURRENT MAXILLARY SINUSITIS: ICD-10-CM

## 2018-11-27 PROCEDURE — 99214 OFFICE O/P EST MOD 30 MIN: CPT | Performed by: INTERNAL MEDICINE

## 2018-11-27 PROCEDURE — 71046 X-RAY EXAM CHEST 2 VIEWS: CPT

## 2018-11-27 NOTE — PROGRESS NOTES
SUBJECTIVE:   Khurram Reynoso is a 73 year old male who presents to clinic today for the following health issues:      Cough, sinus congestion and pressure:    Presents with 3 weeks symptoms of increased cough, productive of scarce yellow colored phlegm.   Has nasal congestion, thick nasal secretions, pressure and HA in the frontal areas. No fever, no SOB. Has coughing spells affecting sleep. No n/v/CP.   Has h/o chronic cough, mild asthma, on inhaled steroid and bronchodilators.   Has noticed more coughing at night with laying down and after eating.       Problem list and histories reviewed & adjusted, as indicated.  Additional history: as documented    Patient Active Problem List   Diagnosis     Cardiac dysrhythmia     Acute bronchitis     Primary cardiomyopathy (H)     Gait difficulty     CARDIOVASCULAR SCREENING; LDL GOAL LESS THAN 160     PVC (premature ventricular contraction)     PAC (premature atrial contraction)     ACP (advance care planning)     Mild persistent asthma without complication     Calculus of kidney     Acute idiopathic gout of right foot     Past Surgical History:   Procedure Laterality Date     C NONSPECIFIC PROCEDURE      knee     CARDIAC SURGERY      Ablation     DECOMPRESS DISC RF LUMBAR  3/2001     OPTICAL TRACKING SYSTEM FUSION SPINE POSTERIOR LUMBAR THREE+ LEVELS N/A 6/27/2016    Procedure: OPTICAL TRACKING SYSTEM FUSION SPINE POSTERIOR LUMBAR THREE+ LEVELS;  Surgeon: Hieu Araiza MD;  Location: [facility] OR     ORTHOPEDIC SURGERY Bilateral     total knee replacements     ORTHOPEDIC SURGERY Right     Total Hipe Replacement     SOFT TISSUE SURGERY Right     right wrist ganglion surgery       Social History   Substance Use Topics     Smoking status: Never Smoker     Smokeless tobacco: Never Used     Alcohol use No     Family History   Problem Relation Age of Onset     C.A.D. Father      Hypertension Father      Heart Surgery Father      bypass     Cancer Brother      bone ca       Family History Negative Mother      Other - See Comments Sister      polio     Unknown/Adopted Maternal Grandmother      Unknown/Adopted Maternal Grandfather      Unknown/Adopted Paternal Grandmother      Unknown/Adopted Paternal Grandfather      Family History Negative Sister          Current Outpatient Prescriptions   Medication Sig Dispense Refill     albuterol (VENTOLIN HFA) 108 (90 Base) MCG/ACT inhaler INHALE 2 PUFFS BY MOUTH FOUR TIMES DAILY IF NEEDED 18 g 3     amoxicillin-clavulanate (AUGMENTIN) 875-125 MG tablet Take 1 tablet by mouth 2 times daily 20 tablet 0     fluticasone (FLONASE) 50 MCG/ACT spray Spray 1-2 sprays into both nostrils daily 1 Bottle 11     fluticasone-salmeterol (ADVAIR) 250-50 MCG/DOSE diskus inhaler Inhale 1 puff into the lungs 2 times daily 3 Inhaler 1     methocarbamol (ROBAXIN) 750 MG tablet Take 1 tablet (750 mg) by mouth 4 times daily as needed for muscle spasms 90 tablet 1     RaNITidine HCl (ZANTAC PO) Take by mouth as needed for heartburn       vardenafil (LEVITRA) 20 MG tablet Take 0.5-1 tablets (10-20 mg) by mouth daily as needed 60 min prior to sex. Do not use with nitroglycerin, terazosin or doxazosin. 6 tablet 1       Reviewed and updated as needed this visit by clinical staff  Tobacco  Allergies       Reviewed and updated as needed this visit by Provider         ROS:  Constitutional, HEENT, cardiovascular, pulmonary, gi and gu systems are negative, except as otherwise noted.    OBJECTIVE:     /60  Pulse 89  Temp 97.8  F (36.6  C) (Oral)  Ht 6' (1.829 m)  Wt 231 lb (104.8 kg)  SpO2 96%  BMI 31.33 kg/m2  Body mass index is 31.33 kg/(m^2).   GENERAL: healthy, alert and no distress  EYES: Eyes grossly normal to inspection, PERRL and conjunctivae and sclerae normal  HENT: ear canals and TM's normal, nose and mouth without ulcers or lesions, nasal mucosal edema, yellow exudate, frontal sinus tenderness   NECK: no adenopathy, no asymmetry, masses, or scars and  thyroid normal to palpation  RESP: lungs clear to auscultation - no rales, rhonchi , + expiratory mild wheezes  CV: regular rate and rhythm, normal S1 S2, no S3 or S4, no murmur, click or rub, no peripheral edema and peripheral pulses strong  ABDOMEN: soft, nontender, no hepatosplenomegaly, no masses and bowel sounds normal  MS: no gross musculoskeletal defects noted, no edema    Diagnostic Test Results:  No results found for this or any previous visit (from the past 24 hour(s)).    ASSESSMENT/PLAN:     Problem List Items Addressed This Visit     Mild persistent asthma without complication      Other Visit Diagnoses     Cough    -  Primary    Relevant Orders    XR Chest 2 Views (Completed)    SPEECH THERAPY REFERRAL    Acute recurrent maxillary sinusitis        Relevant Medications    amoxicillin-clavulanate (AUGMENTIN) 875-125 MG tablet           Assess CXR- no infiltrate   Start Augmentin for acute sinusitis > 3 weeks symptoms   Cont current treatment   Assess video swallow exam for possible aspiration   Cont antireflux treatment     Follow-Up:with results     Familia Gillespie MD  [facility]

## 2018-11-27 NOTE — MR AVS SNAPSHOT
After Visit Summary   11/27/2018    Khurram Reynoso    MRN: [id number]           Patient Information     Date Of Birth          1945        Visit Information        Provider Department      11/27/2018 4:20 PM Familia Gillespie MD [facility]        Today's Diagnoses     Cough    -  1    Acute recurrent maxillary sinusitis        Mild persistent asthma without complication           Follow-ups after your visit        Additional Services     SPEECH THERAPY REFERRAL       If you have not heard from the scheduling office within 2 business days, please call [phone] for all locations, with the exception of [place], please call [phone] and [place], please call [phone].    Please be aware that coverage of these services is subject to the terms and limitations of your health insurance plan.  Call member services at your health plan with any benefit or coverage questions.                  Future tests that were ordered for you today     Open Future Orders        Priority Expected Expires Ordered    SPEECH THERAPY REFERRAL Routine  11/27/2019 11/27/2018            Who to contact     If you have questions or need follow up information about today's clinic visit or your schedule please contact [facility] directly at [phone].  Normal or non-critical lab and imaging results will be communicated to you by MyChart, letter or phone within 4 business days after the clinic has received the results. If you do not hear from us within 7 days, please contact the clinic through [employer] or phone. If you have a critical or abnormal lab result, we will notify you by phone as soon as possible.  Submit refill requests through [employer] or call your pharmacy and they will forward the refill request to us. Please allow 3 business days for your refill to be completed.          Additional Information About Your Visit        [employer] Information     [employer] gives you secure  access to your electronic health record. If you see a primary care provider, you can also send messages to your care team and make appointments. If you have questions, please call your primary care clinic.  If you do not have a primary care provider, please call [phone] and they will assist you.        Care EveryWhere ID     This is your Care EveryWhere ID. This could be used by other organizations to access your [facility] medical records  [id number]        Your Vitals Were     Pulse Temperature Height Pulse Oximetry BMI (Body Mass Index)       89 97.8  F (36.6  C) (Oral) 6' (1.829 m) 96% 31.33 kg/m2        Blood Pressure from Last 3 Encounters:   11/27/18 110/60   11/07/18 125/70   11/05/18 118/66    Weight from Last 3 Encounters:   11/27/18 231 lb (104.8 kg)   11/07/18 225 lb (102.1 kg)   11/05/18 230 lb (104.3 kg)                 Today's Medication Changes          These changes are accurate as of 11/27/18 11:59 PM.  If you have any questions, ask your nurse or doctor.               Start taking these medicines.        Dose/Directions    amoxicillin-clavulanate 875-125 MG tablet   Commonly known as:  AUGMENTIN   Used for:  Acute recurrent maxillary sinusitis   Started by:  Familia Gillespie MD        Dose:  1 tablet   Take 1 tablet by mouth 2 times daily   Quantity:  20 tablet   Refills:  0            Where to get your medicines      These medications were sent to [facility] [address] [place] [address] [id number] [place] [address] AT [address] [place] [address]     Phone:  [phone]     amoxicillin-clavulanate 875-125 MG tablet                Primary Care Provider Office Phone # Fax #    Familia Gillespie [phone]       [address]  [facility] [address]        Equal Access to Services     [facility] [place] AH: Hadii adán guevara hadasho Soomaali, waaxda luqadaha, qaybta kaalmada adeegyada, erica jenkins. So [facility]  [phone].    ATENCIÓN: Si cesia mehta, tiene a santiago disposición servicios gratuitos de asistencia lingüística. Lizeth العراقي [phone].    We comply with applicable federal civil rights laws and [place] laws. We do not discriminate on the basis of race, color, national origin, age, disability, sex, sexual orientation, or gender identity.            Thank you!     Thank you for choosing [facility]  for your care. Our goal is always to provide you with excellent care. Hearing back from our patients is one way we can continue to improve our services. Please take a few minutes to complete the written survey that you may receive in the mail after your visit with us. Thank you!             Your Updated Medication List - Protect others around you: Learn how to safely use, store and throw away your medicines at www.disposemymeds.org.          This list is accurate as of 11/27/18 11:59 PM.  Always use your most recent med list.                   Brand Name Dispense Instructions for use Diagnosis    albuterol 108 (90 Base) MCG/ACT inhaler    VENTOLIN HFA    18 g    INHALE 2 PUFFS BY MOUTH FOUR TIMES DAILY IF NEEDED    SOB (shortness of breath)       amoxicillin-clavulanate 875-125 MG tablet    AUGMENTIN    20 tablet    Take 1 tablet by mouth 2 times daily    Acute recurrent maxillary sinusitis       fluticasone 50 MCG/ACT nasal spray    FLONASE    1 Bottle    Spray 1-2 sprays into both nostrils daily    Cough, Mild persistent asthma without complication       fluticasone-salmeterol 250-50 MCG/DOSE inhaler    ADVAIR    3 Inhaler    Inhale 1 puff into the lungs 2 times daily    Acute bronchospasm       methocarbamol 750 MG tablet    ROBAXIN    90 tablet    Take 1 tablet (750 mg) by mouth 4 times daily as needed for muscle spasms    Lumbar spine pain       vardenafil 20 MG tablet    LEVITRA    6 tablet    Take 0.5-1 tablets (10-20 mg) by mouth daily as needed 60 min prior to sex. Do not use with  nitroglycerin, terazosin or doxazosin.    Erectile dysfunction, unspecified erectile dysfunction type       ZANTAC PO      Take by mouth as needed for heartburn

## 2018-11-27 NOTE — NURSING NOTE
Vital signs:  Temp: 97.8  F (36.6  C) Temp src: Oral BP: 110/60 Pulse: 89     SpO2: 96 %     Height: 6' (182.9 cm) Weight: 231 lb (104.8 kg)  Estimated body mass index is 31.33 kg/(m^2) as calculated from the following:    Height as of this encounter: 6' (1.829 m).    Weight as of this encounter: 231 lb (104.8 kg).

## 2018-12-27 ENCOUNTER — HOSPITAL ENCOUNTER (OUTPATIENT)
Dept: SPEECH THERAPY | Facility: CLINIC | Age: 73
End: 2018-12-27
Attending: INTERNAL MEDICINE
Payer: COMMERCIAL

## 2018-12-27 ENCOUNTER — HOSPITAL ENCOUNTER (OUTPATIENT)
Dept: GENERAL RADIOLOGY | Facility: CLINIC | Age: 73
Discharge: HOME | End: 2018-12-27
Attending: INTERNAL MEDICINE | Admitting: INTERNAL MEDICINE
Payer: COMMERCIAL

## 2018-12-27 DIAGNOSIS — R05.9 COUGH: ICD-10-CM

## 2018-12-27 DIAGNOSIS — R13.10 DYSPHAGIA: ICD-10-CM

## 2018-12-27 PROCEDURE — 92611 MOTION FLUOROSCOPY/SWALLOW: CPT | Mod: GN

## 2018-12-27 PROCEDURE — 74230 X-RAY XM SWLNG FUNCJ C+: CPT

## 2018-12-27 RX ORDER — BARIUM SULFATE 400 MG/ML
SUSPENSION ORAL ONCE
Status: COMPLETED | OUTPATIENT
Start: 2018-12-27 | End: 2018-12-27

## 2018-12-27 RX ADMIN — BARIUM SULFATE 30 ML: 400 SUSPENSION ORAL at 15:06

## 2018-12-27 NOTE — PROGRESS NOTES
Video Swallow Study:      12/27/18 1500   General Information   Type Of Visit Initial   Start Of Care Date 12/27/18   Referring Physician Dr. Gillespie   Orders Evaluate And Treat   Medical Diagnosis Dysphagia, cough   General Information Comments Pt referred for a video swallow study to assess if aspiration is a possible cause for coughing x3 years and consistent thick secretions that are difficult to manage. Pt reports coughing sometimes while eating ice cream, sometimes after finishing a meal and most prominently when he lays down to go to bed (happens when he lays on his side not his back). MD also ordered a chest x-ray and CT/chest to assess lungs, both revealed clear lungs with no acute disease.    VFSS Evaluation   VFSS Additional Documentation Yes   VFSS Eval: Radiology   Radiologist Dr. Fuentes   Views Taken left lateral   Physical Location of Procedure [facility]   VFSS Eval: Thin Liquid Texture Trial   Mode of Presentation, Thin Liquid cup;straw;self-fed   Order of Presentation 1, 2, 3   Oral Phase, Thin Liquid Premature pharyngeal entry  (triggered mostly at valleculae, trace over epiglottis)   Pharyngeal Phase, Thin Liquid Delayed swallow reflex   Rosenbek's Penetration Aspiration Scale: Thin Liquid Trial Results 2 - contrast enters airway, remains above the vocal cords, no residue remains (penetration)   Diagnostic Statement trace in-out penetration with rapid consecutive sips, none with small single sips via cup/straw   VFSS Eval: Puree Solid Texture Trial   Mode of Presentation, Puree spoon;self-fed   Order of Presentation 4   Oral Phase, Puree Premature pharyngeal entry  (to the valleculae)   Pharyngeal Phase, Puree Delayed swallow reflex   Rosenbek's Penetration Aspiration Scale: Puree Food Trial Results 1 - no aspiration, contrast does not enter airway   Diagnostic Statement possible UE bolus retention/slow movement   VFSS Eval: Solid Food Texture Trial   Mode of Presentation, Solid self-fed   Order of  Presentation 5   Oral Phase, Solid Premature pharyngeal entry  (to the valleculae)   Pharyngeal Phase, Solid Delayed swallow reflex   Rosenbek's Penetration Aspiration Scale: Solid Food Trial Results 1 - no aspiration, contrast does not enter airway   Diagnostic Statement possible UE bolus retention/slow movement   Esophageal Phase of Swallow   Patient reports or presents with symptoms of esophageal dysphagia No   Esophageal comments may benefit from further GI/esophageal work up   Swallow Eval: Clinical Impressions   Skilled Criteria for Therapy Intervention No problems identified which require skilled intervention   Functional Assessment Scale (FAS) 6   Treatment Diagnosis minimal oropharyngeal dysphagia   Diet texture recommendations Regular diet;Thin liquids   Risks and Benefits of Treatment have been explained. Yes   Patient, family and/or staff in agreement with Plan of Care Yes   Clinical Impression Comments Video swallow study completed with thin liquids, puree solids, general solids. Pt currently presents with minimal oropharyngeal dysphagia, functional in most settings. Pt with premature spillage to the valleculae with all consistencies, though trace amount creeping over the epiglottis with thin liquids prior to swallow initiation. Hyolaryngeal elevation/excursion and epiglottic inversion functional and complete. Trace flash in-out before/during penetration noted with rapid consecutive sips of thin liquids (which is not atypical), no penetration with small single sips. No other penetration or aspiration observed. No significant pharyngeal residuals noted, however possible upper esophagus bolus retention noted, specifically with solids. Pt may benefit from further GI work up (esophagram vs EGD vs manometry) for further assessment re: esophageal function, determine if reflux could possibly be contributing to pt's sx.     Recommend: continue regular solids, thin liquids via single sips with general safe  swallow precautions. Advised pt to remain upright 30 minis-1 hour s/p meals and for 2-3 prior to sleeping at night. Pt verbalized understanding.      Total Session Time   SLP Eval: VideoFluoroscopic Swallow function Minutes (78687) 30   Total Evaluation Time 30

## 2018-12-28 ENCOUNTER — MYC MEDICAL ADVICE (OUTPATIENT)
Dept: INTERNAL MEDICINE | Facility: CLINIC | Age: 73
End: 2018-12-28

## 2018-12-28 DIAGNOSIS — R13.10 DYSPHAGIA: ICD-10-CM

## 2018-12-28 DIAGNOSIS — R13.12 OROPHARYNGEAL DYSPHAGIA: Primary | ICD-10-CM

## 2019-01-09 ENCOUNTER — TELEPHONE (OUTPATIENT)
Dept: INTERNAL MEDICINE | Facility: CLINIC | Age: 74
End: 2019-01-09

## 2019-01-09 NOTE — TELEPHONE ENCOUNTER
Kiley, Speech Therapist at [facility] calls. Patient scheduled to see her tomorrow for outpatient evaluation, however when she reviewed his Video swallow study there was no evidence of aspiration or recommendation from the speech therapist for further speech therapy. The speech therapist had suggested further GI workup to further assess esophageal function to see if reflux could be contributing to symptoms.     Advised Kiley that Dr. Gillespie recommended outpatient speech therapy for exercises due to possible predisposition to aspiration. Kiley wanted to make sure she is not missing anything, she can provide patient with exercises to do at home, but asks to confirm that patient should have outpatient speech therapy.     Please call Kiley back with an update - phone [phone], okay to leave detailed voice message.

## 2019-01-10 ENCOUNTER — HOSPITAL ENCOUNTER (OUTPATIENT)
Dept: SPEECH THERAPY | Facility: CLINIC | Age: 74
Setting detail: THERAPIES SERIES
End: 2019-01-10
Attending: INTERNAL MEDICINE
Payer: COMMERCIAL

## 2019-01-10 DIAGNOSIS — R13.12 OROPHARYNGEAL DYSPHAGIA: ICD-10-CM

## 2019-01-10 PROCEDURE — 92610 EVALUATE SWALLOWING FUNCTION: CPT | Mod: GN | Performed by: SPEECH-LANGUAGE PATHOLOGIST

## 2019-01-11 ENCOUNTER — OFFICE VISIT (OUTPATIENT)
Dept: URGENT CARE | Facility: URGENT CARE | Age: 74
End: 2019-01-11
Payer: COMMERCIAL

## 2019-01-11 VITALS
TEMPERATURE: 99 F | SYSTOLIC BLOOD PRESSURE: 120 MMHG | DIASTOLIC BLOOD PRESSURE: 76 MMHG | WEIGHT: 231 LBS | HEART RATE: 87 BPM | OXYGEN SATURATION: 95 % | BODY MASS INDEX: 31.33 KG/M2

## 2019-01-11 DIAGNOSIS — J01.01 ACUTE RECURRENT MAXILLARY SINUSITIS: Primary | ICD-10-CM

## 2019-01-11 PROCEDURE — 99214 OFFICE O/P EST MOD 30 MIN: CPT | Performed by: PHYSICIAN ASSISTANT

## 2019-01-11 RX ORDER — CEFUROXIME AXETIL 500 MG/1
500 TABLET ORAL 2 TIMES DAILY
Qty: 20 TABLET | Refills: 0 | Status: SHIPPED | OUTPATIENT
Start: 2019-01-11 | End: 2019-07-10

## 2019-01-11 NOTE — PROGRESS NOTES
"     Speech-Language Pathology Department-Clinical Dysphagia Evaluation  [facility]  [phone]  01/10/19 1000   General Information   Type Of Visit Initial   Start Of Care Date 01/10/19   Referring Physician Dr. Gillespie   Orders Evaluate And Treat   Medical Diagnosis Dysphagia   Onset Of Illness/injury Or Date Of Surgery 01/04/19   Pertinent History of Current Problem/OT: Additional Occupational Profile Info Mr. Reynoso is a 73 year old male who is here for a follow up after the videoswallow study he completed on 12/27/18, per the request of his primary MD.  The results of his videoswallow study indicated no aspiration or penetration and , \" minimal oropharyngeal dysphagia, functional in most settings,\" with recommendations to follow up with GI for further assessment of esophageal function to determine if reflux could possibly be contributing to his symptoms. Mr. Reynoso does not report any difficulty with swallowing foods or liquids since the evaluation and is not concerned of aspiration, however, he does report more mucus which makes him cough (sinus drainage) and pain/discomfort in lower esophagus. Mr. Reynoso will reportedly cough even when he is not eating any foods or liquids.  He reports that he thought today's appointment was to examine his lower esophagus and determine if reflux is contributing to his symptoms since results from his videoswallow study were normal.     Respiratory Status Room air   Prior Level Of Function Comment Patient is currently eating a regular diet with thin liquids   Living Environment [facility]   General Observations Patient is pleasant and cooperative   Patient/family Goals Patient would like to determine why he is having pain when swallowing in his lower esophagus.    Fall Risk Screen   Have you fallen 2 or more times in the past year? No   Have you fallen and had an injury in the past year? No   Is patient a fall risk? No   Clinical " Swallow Evaluation   Oral Musculature generally intact   Structural Abnormalities none present   Dentition present and adequate   Mucosal Quality good   Mandibular Strength and Mobility intact   Oral Labial Strength and Mobility WFL   Lingual Strength and Mobility WFL   Velar Elevation intact   Buccal Strength and Mobility intact   Laryngeal Function Cough;Throat clear;Swallow;Voicing initiated;Dry swallow palpated   Clinical Swallow Eval: Thin Liquid Texture Trial   Mode of Presentation, Thin Liquids cup;self-fed   Volume of Liquid or Food Presented 3 sips from cup   Oral Phase of Swallow WFL   Pharyngeal Phase of Swallow intact   Diagnostic Statement No clinical s/s of aspiration with thin water trial.  Pt observed to cough prior to any po trials.     Swallow Compensations   Swallow Compensations No compensations were used   Results No difficulties noted   Educational Assessment   Barriers to Learning No barriers   Preferred Learning Style Listening;Reading;Demonstration;Pictures/video   General Therapy Interventions   Intervention Comments Provided patient with reflux management strategies and foods to avoid and strategies for managing thick saliva.  Patient not interested in oropharyngeal exercises.    Swallow Eval: Clinical Impressions   Skilled Criteria for Therapy Intervention No problems identified which require skilled intervention   Functional Assessment Scale (FAS) 7   Dysphagia Outcome Severity Scale (IAN) Level 7 - IAN   Treatment Diagnosis Functional oral-pharyngeal swallow   Diet texture recommendations Regular diet;Thin liquids   Patient, family and/or staff in agreement with Plan of Care Yes   Clinical Impression Comments Mr. Reynoso presents with functional oropharyngeal swallow, there is no aspiration or penetration on any consistency trialed as evidenced by recent videoswallow study on 12/27/18 and trials from today's session.  Mr. Reynoso is not at risk for aspiration when eating.  Based on  these results it is recommended that he follow up with GI medicine to fully assess his esophageal function to determine if this is impacting his symptoms. No further speech language therapy services indicated at this time as his swallow function is within normal limits.  Mr. Reynoso was given verbal and written information regarding management of thick saliva and dry mouth, and strategies to minimize reflux symptoms.  He was not interested in receiving oral-pharyngeal exercises at this time.    Total Session Time   SLP Eval: oral/pharyngeal swallow function, clinical minutes ([id number]) 30   Total Evaluation Time 30 min

## 2019-01-12 NOTE — PROGRESS NOTES
SUBJECTIVE:   Khurram Reynoso is a 73 year old male presenting with a chief complaint of   Chief Complaint   Patient presents with     Urgent Care     Sinus Problem     pressure, facial pain started back in November        He is an established patient of [facility].    URI Adult    Onset of symptoms was 2 month(s) ago.  Course of illness is worsening.    Severity moderate  Current and Associated symptoms: facial pain/pressure, and post nasal drainage  Treatment measures tried include flonase nasal spray.  Predisposing factors include HX of chronic sinusitis and HX of asthma.  Denies f/c/n/v/d.      Review of Systems   Constitutional: Negative for chills and fever.   HENT: Positive for congestion, postnasal drip, sinus pressure and sinus pain.    Respiratory: Negative for cough.    Gastrointestinal: Negative for diarrhea and vomiting.   Neurological: Negative for headaches.       Past Medical History:   Diagnosis Date     Acute bronchitis 10/17/2005     Cardiac arrest (H) 6/2006    V-Fib arrest during heart cath     CARDIAC DYSRHYTHMIA NOS 7/27/2005     Degeneration of lumbar or lumbosacral intervertebral disc      Other chronic pain     back     PAC (premature atrial contraction)      PONV (postoperative nausea and vomiting)      PRIM CARDIOMYOPATHY NEC 7/18/2006     PVC (premature ventricular contraction)      Renal disease     kidney stones     Uncomplicated asthma      Family History   Problem Relation Age of Onset     C.A.D. Father      Hypertension Father      Heart Surgery Father         bypass     Cancer Brother         bone ca      Family History Negative Mother      Other - See Comments Sister         polio     Unknown/Adopted Maternal Grandmother      Unknown/Adopted Maternal Grandfather      Unknown/Adopted Paternal Grandmother      Unknown/Adopted Paternal Grandfather      Family History Negative Sister      Current Outpatient Medications   Medication Sig Dispense Refill     cefuroxime (CEFTIN) 500 MG  tablet Take 1 tablet (500 mg) by mouth 2 times daily for 10 days 20 tablet 0     albuterol (VENTOLIN HFA) 108 (90 Base) MCG/ACT inhaler INHALE 2 PUFFS BY MOUTH FOUR TIMES DAILY IF NEEDED 18 g 3     amoxicillin-clavulanate (AUGMENTIN) 875-125 MG tablet Take 1 tablet by mouth 2 times daily (Patient not taking: Reported on 1/11/2019) 20 tablet 0     fluticasone (FLONASE) 50 MCG/ACT spray Spray 1-2 sprays into both nostrils daily 1 Bottle 11     fluticasone-salmeterol (ADVAIR) 250-50 MCG/DOSE diskus inhaler Inhale 1 puff into the lungs 2 times daily 3 Inhaler 1     methocarbamol (ROBAXIN) 750 MG tablet Take 1 tablet (750 mg) by mouth 4 times daily as needed for muscle spasms 90 tablet 1     RaNITidine HCl (ZANTAC PO) Take by mouth as needed for heartburn       vardenafil (LEVITRA) 20 MG tablet Take 0.5-1 tablets (10-20 mg) by mouth daily as needed 60 min prior to sex. Do not use with nitroglycerin, terazosin or doxazosin. 6 tablet 1     Social History     Tobacco Use     Smoking status: Never Smoker     Smokeless tobacco: Never Used   Substance Use Topics     Alcohol use: No     Alcohol/week: 0.0 oz       OBJECTIVE  /76   Pulse 87   Temp 99  F (37.2  C) (Oral)   Wt 104.8 kg (231 lb)   SpO2 95%   BMI 31.33 kg/m      Physical Exam   Constitutional: He appears well-developed and well-nourished. No distress.   HENT:   Head: Normocephalic and atraumatic.   Right Ear: Tympanic membrane normal.   Left Ear: Tympanic membrane normal.   Mouth/Throat: Oropharynx is clear and moist.   Nasal passages with boggy turbinates. Maxillary sinuses are tender to percussion.   Eyes: Conjunctivae are normal.   Neck: Normal range of motion.   Cardiovascular: Regular rhythm and normal heart sounds.   Pulmonary/Chest: Effort normal and breath sounds normal. No respiratory distress. He has no wheezes. He has no rales.   Neurological: He is alert.   Skin: Skin is warm and dry.   Psychiatric: He has a normal mood and affect.   Nursing  note and vitals reviewed.      Labs:  No results found for this or any previous visit (from the past 24 hour(s)).      ASSESSMENT:      ICD-10-CM    1. Acute recurrent maxillary sinusitis J01.01 cefuroxime (CEFTIN) 500 MG tablet          PLAN:    Chronic sinusitis: Ceftin Rx. Continue to use flonase nasal spray. Follow up if any worsening symptoms. He agrees.     Followup:    If not improving or if condition worsens, follow up with your Primary Care Provider

## 2019-01-13 ASSESSMENT — ENCOUNTER SYMPTOMS
SINUS PRESSURE: 1
FEVER: 0
SINUS PAIN: 1
HEADACHES: 0
COUGH: 0
CHILLS: 0
DIARRHEA: 0
VOMITING: 0

## 2019-01-22 ENCOUNTER — TRANSFERRED RECORDS (OUTPATIENT)
Dept: HEALTH INFORMATION MANAGEMENT | Facility: CLINIC | Age: 74
End: 2019-01-22

## 2019-02-11 ENCOUNTER — TRANSFERRED RECORDS (OUTPATIENT)
Dept: HEALTH INFORMATION MANAGEMENT | Facility: CLINIC | Age: 74
End: 2019-02-11

## 2019-02-11 ENCOUNTER — OFFICE VISIT - HEALTHEAST (OUTPATIENT)
Dept: CARDIOLOGY | Facility: CLINIC | Age: 74
End: 2019-02-11

## 2019-02-11 DIAGNOSIS — I77.810 AORTIC ROOT DILATATION (H): ICD-10-CM

## 2019-02-11 DIAGNOSIS — I48.0 PAF (PAROXYSMAL ATRIAL FIBRILLATION) (H): ICD-10-CM

## 2019-02-11 ASSESSMENT — MIFFLIN-ST. JEOR: SCORE: 1825.35

## 2019-02-18 ENCOUNTER — TRANSFERRED RECORDS (OUTPATIENT)
Dept: HEALTH INFORMATION MANAGEMENT | Facility: CLINIC | Age: 74
End: 2019-02-18

## 2019-02-22 ENCOUNTER — HOSPITAL ENCOUNTER (OUTPATIENT)
Dept: CARDIOLOGY | Facility: CLINIC | Age: 74
Discharge: HOME | End: 2019-02-22
Attending: INTERNAL MEDICINE

## 2019-02-22 DIAGNOSIS — I77.810 AORTIC ROOT DILATATION (H): ICD-10-CM

## 2019-02-22 ASSESSMENT — MIFFLIN-ST. JEOR: SCORE: 1825.35

## 2019-02-25 LAB
AORTIC ROOT: 4.1 CM
AR DECEL SLOPE: 2100 MM/S2
AR PEAK VELOCITY: 353 CM/S
ASCENDING AORTA: 3.5 CM
AV REGURGITANT PEAK GRADIENT: 49.8 MMHG
AV REGURGITATION PRESSURE HALF TIME: 666 MS
BSA FOR ECHO PROCEDURE: 2.31 M2
CV BLOOD PRESSURE: ABNORMAL MMHG
CV ECHO HEIGHT: 72 IN
CV ECHO WEIGHT: 232 LBS
DOP CALC LVOT AREA: 4.91 CM2
DOP CALC LVOT DIAMETER: 2.5 CM
DOP CALC LVOT PEAK VEL: 79.9 CM/S
DOP CALC LVOT STROKE VOLUME: 89.8 CM3
DOP CALCLVOT PEAK VEL VTI: 18.3 CM
EJECTION FRACTION: 43 % (ref 55–75)
FRACTIONAL SHORTENING: 28.3 % (ref 28–44)
INTERVENTRICULAR SEPTUM IN END DIASTOLE: 1.31 CM (ref 0.6–1)
IVS/PW RATIO: 1
LA AREA 1: 16.2 CM2
LA AREA 2: 21.6 CM2
LEFT ATRIUM LENGTH: 4.76 CM
LEFT ATRIUM SIZE: 3.3 CM
LEFT ATRIUM VOLUME INDEX: 27.1 ML/M2
LEFT ATRIUM VOLUME: 62.5 ML
LEFT VENTRICLE CARDIAC INDEX: 3 L/MIN/M2
LEFT VENTRICLE CARDIAC OUTPUT: 6.8 L/MIN
LEFT VENTRICLE DIASTOLIC VOLUME INDEX: 57.1 CM3/M2 (ref 34–74)
LEFT VENTRICLE DIASTOLIC VOLUME: 132 CM3 (ref 62–150)
LEFT VENTRICLE HEART RATE: 76 BPM
LEFT VENTRICLE MASS INDEX: 93.5 G/M2
LEFT VENTRICLE SYSTOLIC VOLUME INDEX: 32.5 CM3/M2 (ref 11–31)
LEFT VENTRICLE SYSTOLIC VOLUME: 75 CM3 (ref 21–61)
LEFT VENTRICULAR INTERNAL DIMENSION IN DIASTOLE: 4.38 CM (ref 4.2–5.8)
LEFT VENTRICULAR INTERNAL DIMENSION IN SYSTOLE: 3.14 CM (ref 2.5–4)
LEFT VENTRICULAR MASS: 216.1 G
LEFT VENTRICULAR OUTFLOW TRACT MEAN GRADIENT: 2 MMHG
LEFT VENTRICULAR OUTFLOW TRACT MEAN VELOCITY: 57.3 CM/S
LEFT VENTRICULAR OUTFLOW TRACT PEAK GRADIENT: 3 MMHG
LEFT VENTRICULAR POSTERIOR WALL IN END DIASTOLE: 1.31 CM (ref 0.6–1)
LV STROKE VOLUME INDEX: 38.9 ML/M2
MITRAL VALVE E/A RATIO: 0.7
MV AVERAGE E/E' RATIO: 9.9 CM/S
MV DECELERATION TIME: 176 MS
MV E'TISSUE VEL-LAT: 4.39 CM/S
MV E'TISSUE VEL-MED: 6.04 CM/S
MV LATERAL E/E' RATIO: 11.8
MV MEDIAL E/E' RATIO: 8.6
MV PEAK A VELOCITY: 71 CM/S
MV PEAK E VELOCITY: 51.8 CM/S
NUC REST DIASTOLIC VOLUME INDEX: 3712 LBS
NUC REST SYSTOLIC VOLUME INDEX: 72 IN
PR MAX PG: 3 MMHG
PR PEAK VELOCITY: 84.5 CM/S
TRICUSPID REGURGITATION PEAK PRESSURE GRADIENT: 21.5 MMHG
TRICUSPID VALVE ANULAR PLANE SYSTOLIC EXCURSION: 2.1 CM
TRICUSPID VALVE PEAK REGURGITANT VELOCITY: 232 CM/S

## 2019-03-28 ENCOUNTER — TELEPHONE (OUTPATIENT)
Dept: OTOLARYNGOLOGY | Facility: CLINIC | Age: 74
End: 2019-03-28

## 2019-03-28 NOTE — TELEPHONE ENCOUNTER
Received a VM from the patient saying he received his packet of information for his appointment with Dr. Potts on 4/9 and is wondering if it would be beneficial to have everything done that it said will be done during that appointment as at [facility] he had a lot of these same tests done.  He isn't sure that his insurance would cover it due to it being duplicative testing.    Please review and call patient to let him know what testing is needed and will be done.

## 2019-03-28 NOTE — TELEPHONE ENCOUNTER
Returned patient call to answer questions about upcoming appointment. Patient wanted to make sure we had records from [facility]. Records in chart.

## 2019-03-29 NOTE — TELEPHONE ENCOUNTER
FUTURE VISIT INFORMATION      FUTURE VISIT INFORMATION:    Date: 4/8/19    Time: 9:00 am    Location: [facility]  REFERRAL INFORMATION:    Referring provider:  Dr. Zana Khan    Referring providers clinic:  [facility]    Reason for visit/diagnosis  Cough and Sinus Drainage    RECORDS REQUESTED FROM:       Clinic name Comments Records Status Imaging Status   [facility] Office visit/Referral-1/22/19 Dr. Bill Leroy    [facility] Office visit-1/10/19 [facility] Upper GI-2/18/19 [facility] [place] XR Video Speech eval w/ esophagram 12/27/18 Epic PACS   [facility] UC Visit 1/11/19 [facility] Office Visit 11/27/18, 6/20/18Dr. Gillespie [facility]    [facility] ED Visit 5/23/17 Care Everywhere    [facility] Office Visit 3/16/16 Vicki Decker DO [facility] Radiology XR Chest 11/27/18, 6/20/18  CT Chest 1/13/17 [facility]  PACS   [facility] CT Chest, 10[facility]8/18 [facility]

## 2019-04-08 ENCOUNTER — PRE VISIT (OUTPATIENT)
Dept: OTOLARYNGOLOGY | Facility: CLINIC | Age: 74
End: 2019-04-08

## 2019-04-08 ENCOUNTER — OFFICE VISIT (OUTPATIENT)
Dept: OTOLARYNGOLOGY | Facility: CLINIC | Age: 74
End: 2019-04-08

## 2019-04-08 VITALS — HEIGHT: 72 IN | RESPIRATION RATE: 12 BRPM | BODY MASS INDEX: 31.29 KG/M2 | WEIGHT: 231 LBS

## 2019-04-08 DIAGNOSIS — J38.7 IRRITABLE LARYNX SYNDROME: ICD-10-CM

## 2019-04-08 DIAGNOSIS — R13.19 ESOPHAGEAL DYSPHAGIA: ICD-10-CM

## 2019-04-08 DIAGNOSIS — R05.3 CHRONIC COUGH: Primary | ICD-10-CM

## 2019-04-08 DIAGNOSIS — R05.3 CHRONIC COUGH: ICD-10-CM

## 2019-04-08 DIAGNOSIS — J01.20 ACUTE ETHMOIDAL SINUSITIS, RECURRENCE NOT SPECIFIED: ICD-10-CM

## 2019-04-08 DIAGNOSIS — R49.0 DYSPHONIA: Primary | ICD-10-CM

## 2019-04-08 LAB
GRAM STN SPEC: ABNORMAL
GRAM STN SPEC: ABNORMAL
SPECIMEN SOURCE: ABNORMAL

## 2019-04-08 ASSESSMENT — MIFFLIN-ST. JEOR: SCORE: 1835.81

## 2019-04-08 NOTE — PROGRESS NOTES
Dear Dr. Khan:    I had the pleasure of meeting Khurram Reynoso in consultation at the [facility] of the [facility] at your request, for evaluation of chronic cough. The note from our visit follows. Speech recognition software may have been used in the documentation below; input is reviewed before signature to the best of my ability. I appreciate the opportunity to participate in the care of this pleasant patient.    Please feel free to contact me with any questions.    Sincerely yours,    Lavern Potts M.D., M.P.H.  , Laryngology  Director, [facility]  Otolaryngology- Head & Neck Surgery  [phone]          =====    History of Present Illness:   Khurram Reynoso is a pleasant 73-year-old male with a history of asthma who presents with a 3-4 year history of chronic cough. Symptoms include mucus in throat and frequent cough.      Voice  No concerns.      Swallowing   No concerns.  He does not experience increased swallowing effort with any texture(s).      Cough/Throat-clearing  He notes a three to four year history of chronic cough associated with sinus drainage.  He feels it is worse after eating, especially sweet sticky foods like ice cream.    Prior workup included chest MRI, sinus CT scan. Per the patient, these were unremarkable.  A trial of omeprazole was unhelpful.  Prior antibiotics may have helped transiently.    Swallow evaluation in December 2018 showed minimal oropharyngeal dysphagia.  Upper esophageal bolus retention was noted with solids.    Often has the feeling of mucus in the throat.  This is worse with voice use or with laying down.    He feels like he has a chest rattle first, sometimes also a tickle in the throat.  He routinely does saline nasal rinses and gargles but has not needed to recently.  He also uses Flonase.       Breathing  No concerns.       Throat discomfort  No concerns.        Reflux-type symptoms  He experiences heartburn/indigestion rarely. He is not taking reflux medications.      Prior Epic and outside records were reviewed for this visit. He previously had sinus surgery with Dr. Martinez.      MEDICATIONS:     Current Outpatient Medications   Medication Sig Dispense Refill     albuterol (VENTOLIN HFA) 108 (90 Base) MCG/ACT inhaler INHALE 2 PUFFS BY MOUTH FOUR TIMES DAILY IF NEEDED 18 g 3     fluticasone (FLONASE) 50 MCG/ACT spray Spray 1-2 sprays into both nostrils daily 1 Bottle 11     fluticasone-salmeterol (ADVAIR) 250-50 MCG/DOSE diskus inhaler Inhale 1 puff into the lungs 2 times daily 3 Inhaler 1     methocarbamol (ROBAXIN) 750 MG tablet Take 1 tablet (750 mg) by mouth 4 times daily as needed for muscle spasms 90 tablet 1     RaNITidine HCl (ZANTAC PO) Take by mouth as needed for heartburn       vardenafil (LEVITRA) 20 MG tablet Take 0.5-1 tablets (10-20 mg) by mouth daily as needed 60 min prior to sex. Do not use with nitroglycerin, terazosin or doxazosin. 6 tablet 1     amoxicillin-clavulanate (AUGMENTIN) 875-125 MG tablet Take 1 tablet by mouth 2 times daily (Patient not taking: Reported on 1/11/2019) 20 tablet 0       ALLERGIES:  No Known Allergies    PAST MEDICAL HISTORY:   Past Medical History:   Diagnosis Date     Acute bronchitis 10/17/2005     Cardiac arrest (H) 6/2006    V-Fib arrest during heart cath     CARDIAC DYSRHYTHMIA NOS 7/27/2005     Degeneration of lumbar or lumbosacral intervertebral disc      Other chronic pain     back     PAC (premature atrial contraction)      PONV (postoperative nausea and vomiting)      PRIM CARDIOMYOPATHY NEC 7/18/2006     PVC (premature ventricular contraction)      Renal disease     kidney stones     Uncomplicated asthma         PAST SURGICAL HISTORY:   Past Surgical History:   Procedure Laterality Date     C NONSPECIFIC PROCEDURE      knee     CARDIAC SURGERY      Ablation     DECOMPRESS DISC RF LUMBAR  3/2001     OPTICAL  TRACKING SYSTEM FUSION SPINE POSTERIOR LUMBAR THREE+ LEVELS N/A 6/27/2016    Procedure: OPTICAL TRACKING SYSTEM FUSION SPINE POSTERIOR LUMBAR THREE+ LEVELS;  Surgeon: Hieu Araiza MD;  Location: [facility] OR     ORTHOPEDIC SURGERY Bilateral     total knee replacements     ORTHOPEDIC SURGERY Right     Total Hipe Replacement     SOFT TISSUE SURGERY Right     right wrist ganglion surgery       HABITS/SOCIAL HISTORY:    Social History     Tobacco Use     Smoking status: Never Smoker     Smokeless tobacco: Never Used   Substance Use Topics     Alcohol use: No     Alcohol/week: 0.0 oz     Retired teacher    FAMILY HISTORY:    Family History   Problem Relation Age of Onset     C.A.D. Father      Hypertension Father      Heart Surgery Father         bypass     Cancer Brother         bone ca      Family History Negative Mother      Other - See Comments Sister         polio     Unknown/Adopted Maternal Grandmother      Unknown/Adopted Maternal Grandfather      Unknown/Adopted Paternal Grandmother      Unknown/Adopted Paternal Grandfather      Family History Negative Sister        REVIEW OF SYSTEMS:  The patient completed a comprehensive 11 point review of systems (below), which was reviewed. Positives are as noted below; pertinent findings are as noted in the HPI.     Patient Supplied Answers to Review of Systems   ENT ROS 4/2/2019   Ears, Nose, Throat Hearing loss, Ringing/noise in ears, Nasal congestion or drainage   Musculoskeletal Sore or stiff joints, Swollen joints, Back pain, Swollen legs/feet   Allergy/Immunology Allergies or hay fever       PHYSICAL EXAMINATION:  General: The patient was alert and conversant, and in no acute distress.    Eyes: PERRL, conjunctiva and lids normal, sclera nonicteric.  Nose: Anterior rhinoscopy: no gross abnormalities. no  bleeding; no  mucopurulence; septum grossly normal, no  anterior mucoid drainage and/or crusting.  Oral cavity/oropharynx: No masses or lesions. Dentition in  good condition. Floor of mouth and oral tongue soft to palpation. Tongue mobility and palate elevation intact and symmetric.  Ears: Normal auricles, external auditory canals bilaterally. Visualized portions of tympanic membranes normal bilaterally.   Neck: No palpable cervical lymphadenopathy. There was moderate  tenderness to palpation of the thyrohyoid space, which was very narrow. No obvious thyroid abnormality. Landmarks palpable.  Resp: Breathing comfortably, no stridor or stertor.  Neuro: Symmetric facial function. Other cranial nerves as documented above.  Psych: Normal affect, pleasant and cooperative.  Voice/speech: No significant dysphonia.  Extremities: No cyanosis, clubbing, or edema of the upper extremities.    Intake scores  Total Score for Last Patient-Answered VHI Questionnaire  VHI Total Score 4/8/2019   VHI Total Score 2     Total Score for Last Patient-Answered EAT Questionnaire  EAT Total Score 4/8/2019   EAT Total Score 1     Total Score for Last Patient-Answered CSI Questionnaire  CSI Total Score 4/8/2019   CSI Total Score 8         PROCEDURE:   Flexible fiberoptic laryngoscopy  Indications: This procedure was warranted to evaluate the patient's laryngeal anatomy and function. Risks, benefits, and alternatives were discussed.  Description: After written informed consent was obtained, a time-out was performed to confirm patient identity, procedure, and procedure site. Topical 3% lidocaine with 0.25% phenylephrine was applied to the nasal cavities. I performed the endoscopy and no complications were apparent.  Performed by: Lavren Potts MD MPH  SLP: Thaddeus Kim MM, MA, CCC-SLP   Findings: Normal nasopharynx. Sticky mucopurulent secretions R>L. Normal base of tongue, valleculae, and epiglottis. The right true vocal fold demonstrated normal mobility. The left true vocal fold demonstrated normal mobility. The medial edges of the vocal folds appeared smooth and straight. Mild left  "posterior vocal fold mucosal irritation. Glissade produced appropriate elongation. There was mild to moderate supraglottic recruitment with connected speech. Mucosa of the false vocal folds, aryepiglottic folds, piriform sinuses, and posterior glottis unremarkable. Airway was patent. Response to the therapy probes was good.. Left posterior vocal fold mucosal irritation highlighted on NBI.                       Swallow study 12/27/18  SLP report:  \"Video swallow study completed with thin liquids, puree solids, general solids. Pt currently presents with minimal oropharyngeal dysphagia, functional in most settings. Pt with premature spillage to the valleculae with all consistencies, though trace amount creeping over the epiglottis with thin liquids prior to swallow initiation. Hyolaryngeal elevation/excursion and epiglottic inversion functional and complete. Trace flash in-out before/during penetration noted with rapid consecutive sips of thin liquids (which is not atypical), no penetration with small single sips. No other penetration or aspiration observed. No significant pharyngeal residuals noted, however possible upper esophagus bolus retention noted, specifically with solids. Pt may benefit from further GI work up (esophagram vs EGD vs manometry) for further assessment re: esophageal function, determine if reflux could possibly be contributing to pt's sx.\"  Radiologist report  \"1. Minimal flash penetration was noted when the patient took  consecutive swallows of thin barium.  2. Otherwise unremarkable video swallowing exam. No evidence for  Aspiration.\"    IMPRESSION AND PLAN:   Khurram Reynoso presents with chronic cough that is likely multifactorial, including sinusitis, irritable larynx and mucosal irritation, and possible esophageal dysfunction.    Recommendations:  1) Follow up on sinus culture. We also discussed continuing routine sinus care.  2) I recommended speech therapy as initial primary management, " with goals including improving laryngeal hygiene, reducing laryngeal irritability and decreasing vocal fold trauma.     If his symptoms persist despite these steps, it would be reasonable for him to return to [facility] for further evaluation given the upper esophageal bolus retention observed on his swallow study.    He will return in three to four months, or sooner as needed. We have not yet discussed neuromodulators as other initial measures are more appropriate.     I appreciate the opportunity to participate in the care of this pleasant patient.

## 2019-04-08 NOTE — PATIENT INSTRUCTIONS
1.  You were seen in the ENT Clinic today by Dr. Potts.  If you have any questions or concerns after your appointment, please call [phone].    2.  Plan is to return to clinic to work with [facility] for Speech Therapy.   3. We will call you with results of your sinus culture.  4. Follow up in 3-4 months with Dr. Potts or sooner as needed.    Clinic contact information:   1. To schedule an appointment or to speak to someone regarding your medical care, please call [phone], option #1   2. Miranda RN:[phone]   3. Surgery scheduling (if applicable):       Ying Stewart: [phone]    4. Fax: [phone]   5. [phone]

## 2019-04-08 NOTE — LETTER
4/8/2019     RE: Khurram Reynoso  [address]  [facility] [address]     Dear Colleague,    Thank you for referring your patient, Khurram Reynoso, to the [facility]. Please see a copy of my visit note below.    [facility]  Evaluation report    Clinician: Thaddeus Kim M.M., M.A., CCC/SLP  Seen in conjunction with: Dr. Potts  Referring physician:  Dr. Khan  Patient: Khurram Reynoso  Date of Visit: 4/8/2019    HISTORY  Chief complaint: Khurram Reynoso is a 73 year old gentlemanpresenting today for evaluation of cough.    Onset: 3-4 years ago  Inciting incident: unclear, initially felt it was illness  Course: Stable  Salient history: He has a history significant for paroxismal atrial fibrillation, sinus surgery, knee surgery, and hip replacement .  Symptoms initially presented with what felt like cold like symptoms.  Frequent sinus drainage and cough.  Symptoms would often present after eating with higher viscosity foods precipitating the problem.  He has had an extensive workup including MRI, sinus CT, and video swallow study.  Video swallow study demonstrated flash penetration on think liquids, and possible slow transit in the proximal esophagus,  but was otherwise within normal limits.  All other testing was negative per patient report.  Given association with eating he was referred to [facility].  Dr. Khan felt there was a low likelihood of GI etiology, given ENT symptoms, but felt a upper endoscopy was warranted.  For significant sinus pressure and antibiotic was provided at this visit as well.  This demonstrated esophagitis and reactive gastropathy.  A follow-up with a second ENT was also recommended for which he presents today. Patient reports that the use of antibiotics have been helpful for reducing sinus pressure, but have made no significant difference with regard to cough.      CURRENT SYMPTOMS INCLUDE  VOICE    No  changes in quality    If he sings or uses his voice it will cause an in increase in the sensation of mucus    COUGH/THROAT CLEARING    Frequent cough    Moderately productive    Often spits out these secretions    Significant thickened mucus recently in his nose  o Both rhinitis and PND    Cough can be initiated with tickle but also with a rattle in the chest    his cough/ throat clearing triggers include:  o Laying down at night  o Talking    Patient denies significant dyspnea, dysphagia and pain.     OTHER PERTINENT HISTORY    Otherwise unknown.  Please also refer to Dr. Potts's dictation.     Past Medical History:   Diagnosis Date     Acute bronchitis 10/17/2005     Cardiac arrest (H) 6/2006    V-Fib arrest during heart cath     CARDIAC DYSRHYTHMIA NOS 7/27/2005     Degeneration of lumbar or lumbosacral intervertebral disc      Other chronic pain     back     PAC (premature atrial contraction)      PONV (postoperative nausea and vomiting)      PRIM CARDIOMYOPATHY NEC 7/18/2006     PVC (premature ventricular contraction)      Renal disease     kidney stones     Uncomplicated asthma      Past Surgical History:   Procedure Laterality Date     C NONSPECIFIC PROCEDURE      knee     CARDIAC SURGERY      Ablation     DECOMPRESS DISC RF LUMBAR  3/2001     OPTICAL TRACKING SYSTEM FUSION SPINE POSTERIOR LUMBAR THREE+ LEVELS N/A 6/27/2016    Procedure: OPTICAL TRACKING SYSTEM FUSION SPINE POSTERIOR LUMBAR THREE+ LEVELS;  Surgeon: Hieu Araiza MD;  Location: [facility] OR     ORTHOPEDIC SURGERY Bilateral     total knee replacements     ORTHOPEDIC SURGERY Right     Total Hipe Replacement     SOFT TISSUE SURGERY Right     right wrist ganglion surgery       OBJECTIVE  PATIENT REPORTED MEASURES  Patient Supplied Answers To VHI Questionnaire  Voice Handicap Index (VHI-10) 4/8/2019   My voice makes it difficult for people to hear me 0   People have difficulty understanding me in a noisy room 0   My voice difficulties  "restrict my personal and social life.  0   I feel left out of conversations because of my voice 0   My voice problem causes me to lose income 0   I feel as though I have to strain to produce voice 0   The clarity of my voice is unpredictable 0   My voice problem upsets me 0   My voice makes me feel handicapped 2   People ask, \"What's wrong with your voice?\" 0   VHI-10 2     Patient Supplied Answers To CSI Questionnaire  Cough Severity Index (CSI) 4/8/2019   My cough is worse when I lie down 3   My coughing problem causes me to restrict my personal and social life 1   I tend to avoid places because of my cough problem 1   I feel embarrassed because of my coughing problem 2   People ask, ''What's wrong?'' because I cough a lot 1   I run out of air when I cough 2   My coughing problem affects my voice 1   My coughing problem limits my physical activity 1   My coughing problem upsets me 2   People ask me if I am sick because I cough a lot 2   CSI Score 16     Patient Supplied Answers To EAT Questionnaire  Eating Assessment Tool (EAT-10) 4/8/2019   My swallowing problem has caused me to lose weight 0   My swallowing problem interferes with my ability to go out for meals 0   Swallowing liquids takes extra effort 0   Swallowing solids takes extra effort 0   Swallowing pills takes extra effort 0   Swallowing is painful 0   The pleasure of eating is affected by my swallowing 0   When I swallow food sticks in my throat 0   I cough when I eat 1   Swallowing is stressful 0   EAT-10 1     PERCEPTUAL EVALUATION (CPT 49356)  POSTURE / TENSION:     neck and shoulders    BREATHING:     appears within normal limits and adequate     LARYNGEAL PALPATION:     tenderness of the thyrohyoid area    reduced thyrohyoid space    left greater than right    VOICE:    Roughness: Mild Consistent    Breathiness: Minimal    Strain: Mild Intermittent    Loudness    Conversational speech:  WNL    Projected speech:  WNL    Pitch:    Conversational " speech:  WNL    Pitch glide:     Adequate access to loVerisim register with no notable breaks    Resonance:    Conversational speech: intermittent laryngeal pharyngeal resonance    Singing vs. Speech:  Decreased roughness and strain in sustained phonation vs. Running speech    CAPE-V Overall Severity:  16/100    COUGH/THROAT CLEARING:    Frequent Cough    Increased in conjunction with voice use, but not exclusively    Locus of cough/ throat clear: sounds consistent with upper airway     THERAPY PROBES: Improvement was elicited with use of forward resonant stimuli and coordination of respiration and phonation    ____________________________________________________________________  Laryngeal Function Studies   Laryngeal function studies are not warranted during today's evaluation.  ____________________________________________________________________    LARYNGEAL EXAMINATION  Procedure: Flexible endoscopy with chip-tip technology without stroboscopy, right nostril; topical anesthesia with 3% Lidocaine and 0.25% phenylephrine was applied.   Performed by: Dr. Potts   The laryngeal and pharyngeal structures were evaluated for gross appearance, mobility, function, and focal lesions / abnormalities of the associated mucosa.    All findings were within normal limits with the exception of the following salient features:     Visible evidence of sinus drainage in the nasopharynx (R>L)    Mild posterior commissure erythema    Left vocal process /medial arytenoid wall demonstrate visible irritation / erythema    With speaking voice use there is mildly excessive compression of the posterior glottis    No focal lesions or abnormalities of the vocal folds bilaterally    Moderately thickened secretions present throughout the supraglottis and at the level of the larynx      Visible irritation of the posterior commissure and left vocal process      The laryngeal exam was reviewed with Mr. Reynoso, and I provided pertinent explanations, as  well as written and oral information.    ASSESSMENT / PLAN  IMPRESSIONS: Khurram Reynoso is presenting today with R49.0 (Dysphonia) and R05 (Chronic Cough) in the context of J38.7 (Irritable Larynx Syndrome) and an imbalance in function of the intrinsic and extrinsic muscles of the larynx.  Laryngeal evaluation demonstrates essentially healthy vocal fold mucosa, with mild irritation of the left vocal process and posterior commissure.  This is likely associated with patterns of excessive compression of the posterior glottis during phonation and frequent cough.  Ultimately these findings result in increased sensitivity of the laryngeal and pharyngeal mucosa which in turn exacerbate chronic cough.  Cough is however multifactorial in nature, as evidenced by nasal drainage which was cultured today by Dr. Potts.    STIMULABILITY: results of therapy probes during perceptual and laryngeal evaluation demonstrate improvement with use of forward resonant stimuli and coordination of respiration and phonation    RECOMMENDATIONS:     A course of speech therapy is recommended to promote reduced discomfort, effort and fatigue and help reduce chronic cough, mucosal irritation and hyperfunctional / phonotraumatic behaviors which contribute to ongoing laryngeal hypersensitivity..     A culture of nasal drainage was taken today.  Pending labs physician may recommend a course of antibiotics, and possibly referral to sinus specialist.  Patient agrees with this plan of care.    He demonstrates a Good prognosis for improvement given adherence to therapeutic recommendations.     Positive indicators: positive response to therapy probes diagnosis is known to respond to treatment    Negative indicators: none    DURATION / FREQUENCY: 4 bi-weekly and 2 monthly one-hour sessions    GOALS:  Patient goal:   1. To improve and maintain a healthy voice quality  2. To understand the problem and fix it as much as possible    Short-term goal(s): Within the  first 4 sessions, Mr. Reynoso:  1. will demonstrate assigned laryngeal massage techniques with 80% accuracy or better with no clinician support  2. will be able to demonstrate provided cough suppression and substitution strategies from memory independently with 90% accuracy  3. will be able to independently list key factors in maintenance of good vocal hygiene with 80% accuracy, and report on their use outside the therapy room.  4. will demonstrate semi-occluded vocal tract (SOVT) exercises with at least 80% accuracy with no clinician support  5. will demonstrate the ability to alternate between target and habitual voice quality given clinician cue 75% of the time during therapy tasks    Long-term goal(s): In 6 months, Mr. Reynoso will:  1. Report a week of typical vocal activities, in which dysphonia and cough do not exceed a level of 2 out of 10, 80% of the time     This treatment plan was developed with the patient who agreed with the recommendations.    TOTAL SERVICE TIME: 60 minutes  EVALUATION OF VOICE AND RESONANCE (21793)  NO CHARGE FACILITY FEE (51218)    Thaddeus Kim M.M., M.A., CCC-SLP  Speech-Language Pathologist  Certificate of Vocology  [phone]    *this report was created in part through the use of computerized dictation software, and though reviewed following completion, some typographic errors may persist.  If there is confusion regarding any of this notes contents, please contact me for clarification.*

## 2019-04-08 NOTE — LETTER
4/8/2019      RE: Khurram Reynoso  [address]  [facility] [address]       Dear Dr. Khan:    I had the pleasure of meeting Khurram Reynoso in consultation at the [facility] of the [facility] at your request, for evaluation of chronic cough. The note from our visit follows. Speech recognition software may have been used in the documentation below; input is reviewed before signature to the best of my ability. I appreciate the opportunity to participate in the care of this pleasant patient.    Please feel free to contact me with any questions.    Sincerely yours,    Lavern Potts M.D., M.P.H.  , Laryngology  Director, [facility]  Otolaryngology- Head & Neck Surgery  [phone]          =====    History of Present Illness:   Khurram Reynoso is a pleasant 73-year-old male with a history of asthma who presents with a 3-4 year history of chronic cough. Symptoms include mucus in throat and frequent cough.      Voice  No concerns.      Swallowing   No concerns.  He does not experience increased swallowing effort with any texture(s).      Cough/Throat-clearing  He notes a three to four year history of chronic cough associated with sinus drainage.  He feels it is worse after eating, especially sweet sticky foods like ice cream.    Prior workup included chest MRI, sinus CT scan. Per the patient, these were unremarkable.  A trial of omeprazole was unhelpful.  Prior antibiotics may have helped transiently.    Swallow evaluation in December 2018 showed minimal oropharyngeal dysphagia.  Upper esophageal bolus retention was noted with solids.    Often has the feeling of mucus in the throat.  This is worse with voice use or with laying down.    He feels like he has a chest rattle first, sometimes also a tickle in the throat.  He routinely does saline nasal rinses and gargles but has not needed to recently.  He also  uses Flonase.       Breathing  No concerns.       Throat discomfort  No concerns.       Reflux-type symptoms  He experiences heartburn/indigestion rarely. He is not taking reflux medications.      Prior Epic and outside records were reviewed for this visit. He previously had sinus surgery with Dr. Martinez.      MEDICATIONS:     Current Outpatient Medications   Medication Sig Dispense Refill     albuterol (VENTOLIN HFA) 108 (90 Base) MCG/ACT inhaler INHALE 2 PUFFS BY MOUTH FOUR TIMES DAILY IF NEEDED 18 g 3     fluticasone (FLONASE) 50 MCG/ACT spray Spray 1-2 sprays into both nostrils daily 1 Bottle 11     fluticasone-salmeterol (ADVAIR) 250-50 MCG/DOSE diskus inhaler Inhale 1 puff into the lungs 2 times daily 3 Inhaler 1     methocarbamol (ROBAXIN) 750 MG tablet Take 1 tablet (750 mg) by mouth 4 times daily as needed for muscle spasms 90 tablet 1     RaNITidine HCl (ZANTAC PO) Take by mouth as needed for heartburn       vardenafil (LEVITRA) 20 MG tablet Take 0.5-1 tablets (10-20 mg) by mouth daily as needed 60 min prior to sex. Do not use with nitroglycerin, terazosin or doxazosin. 6 tablet 1     amoxicillin-clavulanate (AUGMENTIN) 875-125 MG tablet Take 1 tablet by mouth 2 times daily (Patient not taking: Reported on 1/11/2019) 20 tablet 0       ALLERGIES:  No Known Allergies    PAST MEDICAL HISTORY:   Past Medical History:   Diagnosis Date     Acute bronchitis 10/17/2005     Cardiac arrest (H) 6/2006    V-Fib arrest during heart cath     CARDIAC DYSRHYTHMIA NOS 7/27/2005     Degeneration of lumbar or lumbosacral intervertebral disc      Other chronic pain     back     PAC (premature atrial contraction)      PONV (postoperative nausea and vomiting)      PRIM CARDIOMYOPATHY NEC 7/18/2006     PVC (premature ventricular contraction)      Renal disease     kidney stones     Uncomplicated asthma         PAST SURGICAL HISTORY:   Past Surgical History:   Procedure Laterality Date     C NONSPECIFIC PROCEDURE      knee      CARDIAC SURGERY      Ablation     DECOMPRESS DISC RF LUMBAR  3/2001     OPTICAL TRACKING SYSTEM FUSION SPINE POSTERIOR LUMBAR THREE+ LEVELS N/A 6/27/2016    Procedure: OPTICAL TRACKING SYSTEM FUSION SPINE POSTERIOR LUMBAR THREE+ LEVELS;  Surgeon: Hieu Araiza MD;  Location: [facility] OR     ORTHOPEDIC SURGERY Bilateral     total knee replacements     ORTHOPEDIC SURGERY Right     Total Hipe Replacement     SOFT TISSUE SURGERY Right     right wrist ganglion surgery       HABITS/SOCIAL HISTORY:    Social History     Tobacco Use     Smoking status: Never Smoker     Smokeless tobacco: Never Used   Substance Use Topics     Alcohol use: No     Alcohol/week: 0.0 oz     Retired teacher    FAMILY HISTORY:    Family History   Problem Relation Age of Onset     C.A.D. Father      Hypertension Father      Heart Surgery Father         bypass     Cancer Brother         bone ca      Family History Negative Mother      Other - See Comments Sister         polio     Unknown/Adopted Maternal Grandmother      Unknown/Adopted Maternal Grandfather      Unknown/Adopted Paternal Grandmother      Unknown/Adopted Paternal Grandfather      Family History Negative Sister        REVIEW OF SYSTEMS:  The patient completed a comprehensive 11 point review of systems (below), which was reviewed. Positives are as noted below; pertinent findings are as noted in the HPI.     Patient Supplied Answers to Review of Systems   ENT ROS 4/2/2019   Ears, Nose, Throat Hearing loss, Ringing/noise in ears, Nasal congestion or drainage   Musculoskeletal Sore or stiff joints, Swollen joints, Back pain, Swollen legs/feet   Allergy/Immunology Allergies or hay fever       PHYSICAL EXAMINATION:  General: The patient was alert and conversant, and in no acute distress.    Eyes: PERRL, conjunctiva and lids normal, sclera nonicteric.  Nose: Anterior rhinoscopy: no gross abnormalities. no  bleeding; no  mucopurulence; septum grossly normal, no  anterior mucoid  drainage and/or crusting.  Oral cavity/oropharynx: No masses or lesions. Dentition in good condition. Floor of mouth and oral tongue soft to palpation. Tongue mobility and palate elevation intact and symmetric.  Ears: Normal auricles, external auditory canals bilaterally. Visualized portions of tympanic membranes normal bilaterally.   Neck: No palpable cervical lymphadenopathy. There was moderate  tenderness to palpation of the thyrohyoid space, which was very narrow. No obvious thyroid abnormality. Landmarks palpable.  Resp: Breathing comfortably, no stridor or stertor.  Neuro: Symmetric facial function. Other cranial nerves as documented above.  Psych: Normal affect, pleasant and cooperative.  Voice/speech: No significant dysphonia.  Extremities: No cyanosis, clubbing, or edema of the upper extremities.    Intake scores  Total Score for Last Patient-Answered VHI Questionnaire  VHI Total Score 4/8/2019   VHI Total Score 2     Total Score for Last Patient-Answered EAT Questionnaire  EAT Total Score 4/8/2019   EAT Total Score 1     Total Score for Last Patient-Answered CSI Questionnaire  CSI Total Score 4/8/2019   CSI Total Score 8         PROCEDURE:   Flexible fiberoptic laryngoscopy  Indications: This procedure was warranted to evaluate the patient's laryngeal anatomy and function. Risks, benefits, and alternatives were discussed.  Description: After written informed consent was obtained, a time-out was performed to confirm patient identity, procedure, and procedure site. Topical 3% lidocaine with 0.25% phenylephrine was applied to the nasal cavities. I performed the endoscopy and no complications were apparent.  Performed by: Lavern Potts MD MPH  SLP: Thaddeus Kim MM, MA, CCC-SLP   Findings: Normal nasopharynx. Sticky mucopurulent secretions R>L. Normal base of tongue, valleculae, and epiglottis. The right true vocal fold demonstrated normal mobility. The left true vocal fold demonstrated normal  "mobility. The medial edges of the vocal folds appeared smooth and straight. Mild left posterior vocal fold mucosal irritation. Glissade produced appropriate elongation. There was mild to moderate supraglottic recruitment with connected speech. Mucosa of the false vocal folds, aryepiglottic folds, piriform sinuses, and posterior glottis unremarkable. Airway was patent. Response to the therapy probes was good.. Left posterior vocal fold mucosal irritation highlighted on NBI.                       Swallow study 12/27/18  SLP report:  \"Video swallow study completed with thin liquids, puree solids, general solids. Pt currently presents with minimal oropharyngeal dysphagia, functional in most settings. Pt with premature spillage to the valleculae with all consistencies, though trace amount creeping over the epiglottis with thin liquids prior to swallow initiation. Hyolaryngeal elevation/excursion and epiglottic inversion functional and complete. Trace flash in-out before/during penetration noted with rapid consecutive sips of thin liquids (which is not atypical), no penetration with small single sips. No other penetration or aspiration observed. No significant pharyngeal residuals noted, however possible upper esophagus bolus retention noted, specifically with solids. Pt may benefit from further GI work up (esophagram vs EGD vs manometry) for further assessment re: esophageal function, determine if reflux could possibly be contributing to pt's sx.\"  Radiologist report  \"1. Minimal flash penetration was noted when the patient took  consecutive swallows of thin barium.  2. Otherwise unremarkable video swallowing exam. No evidence for  Aspiration.\"    IMPRESSION AND PLAN:   Khurram Reynoso presents with chronic cough that is likely multifactorial, including sinusitis, irritable larynx and mucosal irritation, and possible esophageal dysfunction.    Recommendations:  1) Follow up on sinus culture. We also discussed " continuing routine sinus care.  2) I recommended speech therapy as initial primary management, with goals including improving laryngeal hygiene, reducing laryngeal irritability and decreasing vocal fold trauma.     If his symptoms persist despite these steps, it would be reasonable for him to return to [facility] for further evaluation given the upper esophageal bolus retention observed on his swallow study.    He will return in three to four months, or sooner as needed. We have not yet discussed neuromodulators as other initial measures are more appropriate.     I appreciate the opportunity to participate in the care of this pleasant patient.       Lavern Potts MD

## 2019-04-08 NOTE — PROGRESS NOTES
[facility]  Evaluation report    Clinician: Thaddeus Kim M.M., M.A., CCC/SLP  Seen in conjunction with: Dr. Potts  Referring physician:  Dr. Khan  Patient: Khurram Reynoso  Date of Visit: 4/8/2019    HISTORY  Chief complaint: Khurram Reynoso is a 73 year old gentlemanpresenting today for evaluation of cough.    Onset: 3-4 years ago  Inciting incident: unclear, initially felt it was illness  Course: Stable  Salient history: He has a history significant for paroxismal atrial fibrillation, sinus surgery, knee surgery, and hip replacement .  Symptoms initially presented with what felt like cold like symptoms.  Frequent sinus drainage and cough.  Symptoms would often present after eating with higher viscosity foods precipitating the problem.  He has had an extensive workup including MRI, sinus CT, and video swallow study.  Video swallow study demonstrated flash penetration on think liquids, and possible slow transit in the proximal esophagus,  but was otherwise within normal limits.  All other testing was negative per patient report.  Given association with eating he was referred to [facility].  Dr. Khan felt there was a low likelihood of GI etiology, given ENT symptoms, but felt a upper endoscopy was warranted.  For significant sinus pressure and antibiotic was provided at this visit as well.  This demonstrated esophagitis and reactive gastropathy.  A follow-up with a second ENT was also recommended for which he presents today. Patient reports that the use of antibiotics have been helpful for reducing sinus pressure, but have made no significant difference with regard to cough.      CURRENT SYMPTOMS INCLUDE  VOICE    No changes in quality    If he sings or uses his voice it will cause an in increase in the sensation of mucus    COUGH/THROAT CLEARING    Frequent cough    Moderately productive    Often spits out these secretions    Significant thickened mucus recently in his nose  o Both rhinitis  and PND    Cough can be initiated with tickle but also with a rattle in the chest    his cough/ throat clearing triggers include:  o Laying down at night  o Talking    Patient denies significant dyspnea, dysphagia and pain.     OTHER PERTINENT HISTORY    Otherwise unknown.  Please also refer to Dr. Potts's dictation.     Past Medical History:   Diagnosis Date     Acute bronchitis 10/17/2005     Cardiac arrest (H) 6/2006    V-Fib arrest during heart cath     CARDIAC DYSRHYTHMIA NOS 7/27/2005     Degeneration of lumbar or lumbosacral intervertebral disc      Other chronic pain     back     PAC (premature atrial contraction)      PONV (postoperative nausea and vomiting)      PRIM CARDIOMYOPATHY NEC 7/18/2006     PVC (premature ventricular contraction)      Renal disease     kidney stones     Uncomplicated asthma      Past Surgical History:   Procedure Laterality Date     C NONSPECIFIC PROCEDURE      knee     CARDIAC SURGERY      Ablation     DECOMPRESS DISC RF LUMBAR  3/2001     OPTICAL TRACKING SYSTEM FUSION SPINE POSTERIOR LUMBAR THREE+ LEVELS N/A 6/27/2016    Procedure: OPTICAL TRACKING SYSTEM FUSION SPINE POSTERIOR LUMBAR THREE+ LEVELS;  Surgeon: Hieu Araiza MD;  Location: [facility] OR     ORTHOPEDIC SURGERY Bilateral     total knee replacements     ORTHOPEDIC SURGERY Right     Total Hipe Replacement     SOFT TISSUE SURGERY Right     right wrist ganglion surgery       OBJECTIVE  PATIENT REPORTED MEASURES  Patient Supplied Answers To VHI Questionnaire  Voice Handicap Index (VHI-10) 4/8/2019   My voice makes it difficult for people to hear me 0   People have difficulty understanding me in a noisy room 0   My voice difficulties restrict my personal and social life.  0   I feel left out of conversations because of my voice 0   My voice problem causes me to lose income 0   I feel as though I have to strain to produce voice 0   The clarity of my voice is unpredictable 0   My voice problem upsets me 0   My voice  "makes me feel handicapped 2   People ask, \"What's wrong with your voice?\" 0   VHI-10 2     Patient Supplied Answers To CSI Questionnaire  Cough Severity Index (CSI) 4/8/2019   My cough is worse when I lie down 3   My coughing problem causes me to restrict my personal and social life 1   I tend to avoid places because of my cough problem 1   I feel embarrassed because of my coughing problem 2   People ask, ''What's wrong?'' because I cough a lot 1   I run out of air when I cough 2   My coughing problem affects my voice 1   My coughing problem limits my physical activity 1   My coughing problem upsets me 2   People ask me if I am sick because I cough a lot 2   CSI Score 16     Patient Supplied Answers To EAT Questionnaire  Eating Assessment Tool (EAT-10) 4/8/2019   My swallowing problem has caused me to lose weight 0   My swallowing problem interferes with my ability to go out for meals 0   Swallowing liquids takes extra effort 0   Swallowing solids takes extra effort 0   Swallowing pills takes extra effort 0   Swallowing is painful 0   The pleasure of eating is affected by my swallowing 0   When I swallow food sticks in my throat 0   I cough when I eat 1   Swallowing is stressful 0   EAT-10 1     PERCEPTUAL EVALUATION (CPT 21854)  POSTURE / TENSION:     neck and shoulders    BREATHING:     appears within normal limits and adequate     LARYNGEAL PALPATION:     tenderness of the thyrohyoid area    reduced thyrohyoid space    left greater than right    VOICE:    Roughness: Mild Consistent    Breathiness: Minimal    Strain: Mild Intermittent    Loudness    Conversational speech:  WNL    Projected speech:  WNL    Pitch:    Conversational speech:  WNL    Pitch glide:     Adequate access to lo register with no notable breaks    Resonance:    Conversational speech: intermittent laryngeal pharyngeal resonance    Singing vs. Speech:  Decreased roughness and strain in sustained phonation vs. Running speech    CAPE-V Overall " Severity:  16/100    COUGH/THROAT CLEARING:    Frequent Cough    Increased in conjunction with voice use, but not exclusively    Locus of cough/ throat clear: sounds consistent with upper airway     THERAPY PROBES: Improvement was elicited with use of forward resonant stimuli and coordination of respiration and phonation    ____________________________________________________________________  Laryngeal Function Studies   Laryngeal function studies are not warranted during today's evaluation.  ____________________________________________________________________    LARYNGEAL EXAMINATION  Procedure: Flexible endoscopy with chip-tip technology without stroboscopy, right nostril; topical anesthesia with 3% Lidocaine and 0.25% phenylephrine was applied.   Performed by: Dr. Potts   The laryngeal and pharyngeal structures were evaluated for gross appearance, mobility, function, and focal lesions / abnormalities of the associated mucosa.    All findings were within normal limits with the exception of the following salient features:     Visible evidence of sinus drainage in the nasopharynx (R>L)    Mild posterior commissure erythema    Left vocal process /medial arytenoid wall demonstrate visible irritation / erythema    With speaking voice use there is mildly excessive compression of the posterior glottis    No focal lesions or abnormalities of the vocal folds bilaterally    Moderately thickened secretions present throughout the supraglottis and at the level of the larynx      Visible irritation of the posterior commissure and left vocal process      The laryngeal exam was reviewed with Mr. Reynoso, and I provided pertinent explanations, as well as written and oral information.    ASSESSMENT / PLAN  IMPRESSIONS: Khurram Reynoso is presenting today with R49.0 (Dysphonia) and R05 (Chronic Cough) in the context of J38.7 (Irritable Larynx Syndrome) and an imbalance in function of the intrinsic and extrinsic muscles of the larynx.   Laryngeal evaluation demonstrates essentially healthy vocal fold mucosa, with mild irritation of the left vocal process and posterior commissure.  This is likely associated with patterns of excessive compression of the posterior glottis during phonation and frequent cough.  Ultimately these findings result in increased sensitivity of the laryngeal and pharyngeal mucosa which in turn exacerbate chronic cough.  Cough is however multifactorial in nature, as evidenced by nasal drainage which was cultured today by Dr. Potts.    STIMULABILITY: results of therapy probes during perceptual and laryngeal evaluation demonstrate improvement with use of forward resonant stimuli and coordination of respiration and phonation    RECOMMENDATIONS:     A course of speech therapy is recommended to promote reduced discomfort, effort and fatigue and help reduce chronic cough, mucosal irritation and hyperfunctional / phonotraumatic behaviors which contribute to ongoing laryngeal hypersensitivity..     A culture of nasal drainage was taken today.  Pending labs physician may recommend a course of antibiotics, and possibly referral to sinus specialist.  Patient agrees with this plan of care.    He demonstrates a Good prognosis for improvement given adherence to therapeutic recommendations.     Positive indicators: positive response to therapy probes diagnosis is known to respond to treatment    Negative indicators: none    DURATION / FREQUENCY: 4 bi-weekly and 2 monthly one-hour sessions    GOALS:  Patient goal:   1. To improve and maintain a healthy voice quality  2. To understand the problem and fix it as much as possible    Short-term goal(s): Within the first 4 sessions, Mr. Reynoso:  1. will demonstrate assigned laryngeal massage techniques with 80% accuracy or better with no clinician support  2. will be able to demonstrate provided cough suppression and substitution strategies from memory independently with 90% accuracy  3. will be  able to independently list key factors in maintenance of good vocal hygiene with 80% accuracy, and report on their use outside the therapy room.  4. will demonstrate semi-occluded vocal tract (SOVT) exercises with at least 80% accuracy with no clinician support  5. will demonstrate the ability to alternate between target and habitual voice quality given clinician cue 75% of the time during therapy tasks    Long-term goal(s): In 6 months, Mr. Reynoso will:  1. Report a week of typical vocal activities, in which dysphonia and cough do not exceed a level of 2 out of 10, 80% of the time     This treatment plan was developed with the patient who agreed with the recommendations.    TOTAL SERVICE TIME: 60 minutes  EVALUATION OF VOICE AND RESONANCE (28103)  NO CHARGE FACILITY FEE (28539)    Thaddeus Kim M.M., M.A., CCC-SLP  Speech-Language Pathologist  Certificate of Vocology  [phone]    *this report was created in part through the use of computerized dictation software, and though reviewed following completion, some typographic errors may persist.  If there is confusion regarding any of this notes contents, please contact me for clarification.*

## 2019-04-12 DIAGNOSIS — J32.9 SINUS INFECTION: Primary | ICD-10-CM

## 2019-04-12 LAB
BACTERIA SPEC CULT: ABNORMAL
SPECIMEN SOURCE: ABNORMAL

## 2019-04-12 RX ORDER — SULFAMETHOXAZOLE/TRIMETHOPRIM 800-160 MG
1 TABLET ORAL 2 TIMES DAILY
Qty: 20 TABLET | Refills: 0 | Status: SHIPPED | OUTPATIENT
Start: 2019-04-12 | End: 2019-07-10

## 2019-05-12 ENCOUNTER — HOSPITAL ENCOUNTER (EMERGENCY)
Facility: CLINIC | Age: 74
Discharge: HOME | End: 2019-05-12
Attending: EMERGENCY MEDICINE | Admitting: EMERGENCY MEDICINE
Payer: COMMERCIAL

## 2019-05-12 ENCOUNTER — APPOINTMENT (OUTPATIENT)
Dept: GENERAL RADIOLOGY | Facility: CLINIC | Age: 74
End: 2019-05-12
Attending: EMERGENCY MEDICINE
Payer: COMMERCIAL

## 2019-05-12 VITALS
DIASTOLIC BLOOD PRESSURE: 67 MMHG | HEART RATE: 84 BPM | OXYGEN SATURATION: 93 % | TEMPERATURE: 97.7 F | RESPIRATION RATE: 28 BRPM | SYSTOLIC BLOOD PRESSURE: 125 MMHG

## 2019-05-12 DIAGNOSIS — J98.01 ACUTE BRONCHOSPASM: ICD-10-CM

## 2019-05-12 LAB
ANION GAP SERPL CALCULATED.3IONS-SCNC: 6 MMOL/L (ref 3–14)
BASOPHILS # BLD AUTO: 0.1 10E9/L (ref 0–0.2)
BASOPHILS NFR BLD AUTO: 0.8 %
BUN SERPL-MCNC: 19 MG/DL (ref 7–30)
CALCIUM SERPL-MCNC: 8.5 MG/DL (ref 8.5–10.1)
CHLORIDE SERPL-SCNC: 109 MMOL/L (ref 94–109)
CO2 BLDCOV-SCNC: 24 MMOL/L (ref 21–28)
CO2 SERPL-SCNC: 26 MMOL/L (ref 20–32)
CREAT SERPL-MCNC: 1.11 MG/DL (ref 0.66–1.25)
DIFFERENTIAL METHOD BLD: ABNORMAL
EOSINOPHIL # BLD AUTO: 0.9 10E9/L (ref 0–0.7)
EOSINOPHIL NFR BLD AUTO: 12.4 %
ERYTHROCYTE [DISTWIDTH] IN BLOOD BY AUTOMATED COUNT: 13.9 % (ref 10–15)
GFR SERPL CREATININE-BSD FRML MDRD: 66 ML/MIN/{1.73_M2}
GLUCOSE SERPL-MCNC: 100 MG/DL (ref 70–99)
HCT VFR BLD AUTO: 45.9 % (ref 40–53)
HGB BLD-MCNC: 15.8 G/DL (ref 13.3–17.7)
IMM GRANULOCYTES # BLD: 0 10E9/L (ref 0–0.4)
IMM GRANULOCYTES NFR BLD: 0.1 %
LACTATE BLD-SCNC: 1.2 MMOL/L (ref 0.7–2.1)
LYMPHOCYTES # BLD AUTO: 1.7 10E9/L (ref 0.8–5.3)
LYMPHOCYTES NFR BLD AUTO: 23.3 %
MCH RBC QN AUTO: 32.2 PG (ref 26.5–33)
MCHC RBC AUTO-ENTMCNC: 34.4 G/DL (ref 31.5–36.5)
MCV RBC AUTO: 94 FL (ref 78–100)
MONOCYTES # BLD AUTO: 0.6 10E9/L (ref 0–1.3)
MONOCYTES NFR BLD AUTO: 8.3 %
NEUTROPHILS # BLD AUTO: 4.1 10E9/L (ref 1.6–8.3)
NEUTROPHILS NFR BLD AUTO: 55.1 %
NRBC # BLD AUTO: 0 10*3/UL
NRBC BLD AUTO-RTO: 0 /100
PCO2 BLDV: 44 MM HG (ref 40–50)
PH BLDV: 7.35 PH (ref 7.32–7.43)
PLATELET # BLD AUTO: 307 10E9/L (ref 150–450)
PO2 BLDV: 44 MM HG (ref 25–47)
POTASSIUM SERPL-SCNC: 3.9 MMOL/L (ref 3.4–5.3)
RBC # BLD AUTO: 4.91 10E12/L (ref 4.4–5.9)
SAO2 % BLDV FROM PO2: 77 %
SODIUM SERPL-SCNC: 141 MMOL/L (ref 133–144)
WBC # BLD AUTO: 7.4 10E9/L (ref 4–11)

## 2019-05-12 PROCEDURE — 71045 X-RAY EXAM CHEST 1 VIEW: CPT

## 2019-05-12 PROCEDURE — 82803 BLOOD GASES ANY COMBINATION: CPT

## 2019-05-12 PROCEDURE — 85025 COMPLETE CBC W/AUTO DIFF WBC: CPT | Performed by: EMERGENCY MEDICINE

## 2019-05-12 PROCEDURE — 93005 ELECTROCARDIOGRAM TRACING: CPT

## 2019-05-12 PROCEDURE — 94640 AIRWAY INHALATION TREATMENT: CPT

## 2019-05-12 PROCEDURE — 99285 EMERGENCY DEPT VISIT HI MDM: CPT | Mod: 25

## 2019-05-12 PROCEDURE — 80048 BASIC METABOLIC PNL TOTAL CA: CPT | Performed by: EMERGENCY MEDICINE

## 2019-05-12 PROCEDURE — 25000125 ZZHC RX 250

## 2019-05-12 PROCEDURE — 25000128 H RX IP 250 OP 636: Performed by: EMERGENCY MEDICINE

## 2019-05-12 PROCEDURE — 83605 ASSAY OF LACTIC ACID: CPT

## 2019-05-12 PROCEDURE — 96374 THER/PROPH/DIAG INJ IV PUSH: CPT

## 2019-05-12 RX ORDER — DOXYCYCLINE 100 MG/1
100 CAPSULE ORAL 2 TIMES DAILY
Qty: 14 CAPSULE | Refills: 0 | Status: SHIPPED | OUTPATIENT
Start: 2019-05-12 | End: 2019-07-10

## 2019-05-12 RX ORDER — IPRATROPIUM BROMIDE AND ALBUTEROL SULFATE 2.5; .5 MG/3ML; MG/3ML
SOLUTION RESPIRATORY (INHALATION)
Status: COMPLETED
Start: 2019-05-12 | End: 2019-05-12

## 2019-05-12 RX ORDER — IPRATROPIUM BROMIDE AND ALBUTEROL SULFATE 2.5; .5 MG/3ML; MG/3ML
9 SOLUTION RESPIRATORY (INHALATION) ONCE
Status: COMPLETED | OUTPATIENT
Start: 2019-05-12 | End: 2019-05-12

## 2019-05-12 RX ORDER — IPRATROPIUM BROMIDE AND ALBUTEROL SULFATE 2.5; .5 MG/3ML; MG/3ML
6 SOLUTION RESPIRATORY (INHALATION) ONCE
Status: DISCONTINUED | OUTPATIENT
Start: 2019-05-12 | End: 2019-05-12 | Stop reason: HOSPADM

## 2019-05-12 RX ORDER — PREDNISONE 20 MG/1
TABLET ORAL
Qty: 10 TABLET | Refills: 0 | Status: SHIPPED | OUTPATIENT
Start: 2019-05-12 | End: 2019-07-10

## 2019-05-12 RX ORDER — METHYLPREDNISOLONE SODIUM SUCCINATE 125 MG/2ML
125 INJECTION, POWDER, LYOPHILIZED, FOR SOLUTION INTRAMUSCULAR; INTRAVENOUS ONCE
Status: COMPLETED | OUTPATIENT
Start: 2019-05-12 | End: 2019-05-12

## 2019-05-12 RX ADMIN — IPRATROPIUM BROMIDE AND ALBUTEROL SULFATE 9 ML: 2.5; .5 SOLUTION RESPIRATORY (INHALATION) at 12:28

## 2019-05-12 RX ADMIN — IPRATROPIUM BROMIDE AND ALBUTEROL SULFATE 3 ML: .5; 3 SOLUTION RESPIRATORY (INHALATION) at 13:25

## 2019-05-12 RX ADMIN — IPRATROPIUM BROMIDE AND ALBUTEROL SULFATE 9 ML: .5; 3 SOLUTION RESPIRATORY (INHALATION) at 12:28

## 2019-05-12 RX ADMIN — METHYLPREDNISOLONE SODIUM SUCCINATE 125 MG: 125 INJECTION, POWDER, FOR SOLUTION INTRAMUSCULAR; INTRAVENOUS at 12:42

## 2019-05-12 ASSESSMENT — ENCOUNTER SYMPTOMS
FEVER: 0
APPETITE CHANGE: 0
SHORTNESS OF BREATH: 1

## 2019-05-12 NOTE — ED PROVIDER NOTES
History     Chief Complaint:  Shortness of Breath      HPI   Khurram Reynoso is a 73 year old male who presents with his wife for evaluation of shortness of breath. The patient reports that he has a chronic cough which he has been following with ENT and his allergist. The patient reports that the last ENT he saw diagnosed him with pneumonia and a later he discussed his asthma diagnosis with his allergist who told him that he does not have asthma. He comes in today due to difficulty breathing. Mr. Reynoso has been having symptoms of a productive cough since Friday, but then he woke up this morning short of breath. He has albuterol neb at home which he used this morning and felt better. He reports that he had a big breakfast but could not breathe very well so he came in. He reports that walking worsens the shortness of breath. He denies any leg swelling. He denies any fevers today or last several days. The patient denies any new chest pain. The patient reports that he was never a smoker.     Allergies:  No Known Allergies     Medications:    Albuterol (ventolin hfa) 108 (90 base) mcg/act inhaler  Amoxicillin-clavulanate (Augmentin) 875-125 mg tablet  Fluticasone (Flonase) 50 mcg/act spray  Fluticasone-salmeterol (Advair) 250-50 mcg/dose Diskus inhaler  Methocarbamol (robaxin) 750 mg tablet  Ranitidine HCl (zantac PO)  Vardenafil (levitra) 20 mg tablet     Past Medical History:    Acute bronchitis   Cardiac arrest (H)   Cardiac dysrhythmia nos   Degeneration of lumbar or lumbosacral intervertebral disc   Other chronic pain   PAC (premature atrial contraction)   PONV (postoperative nausea and vomiting)   PRIM cardiomyopathy  PVC (premature ventricular contraction)   Renal disease  Uncomplicated asthma      Past Surgical History:    Knee ;   total knee replacements;  total hip replacements;    decompress disc rf lumbar (3/2001);   right wrist ganglion surgery (Right);   Cardiac ablation surgery;   Optical tracking  system fusion spine posterior lumbar three+ levels (6/27/2016).     Family History:    CAD  Hypertension  Bypass surgery  Polio    Social History:  The patient  reports that he has never smoked. He has never used smokeless tobacco. He reports that he does not drink alcohol or use drugs.   Marital Status:   [2]    Review of Systems   Constitutional: Negative for appetite change and fever.   Respiratory: Positive for shortness of breath.    Cardiovascular: Negative for chest pain.   All other systems reviewed and are negative.      Physical Exam     Vital signs  Patient Vitals for the past 24 hrs:   BP Temp Temp src Pulse Heart Rate Resp SpO2   05/12/19 1246 -- -- -- -- -- -- 92 %   05/12/19 1245 116/71 -- -- 81 -- -- 93 %   05/12/19 1227 140/78 97.7  F (36.5  C) Oral -- 92 28 92 %          Physical Exam    General:   Pleasant, age appropriate male.  HEENT:    Oropharynx is moist, without lesions or trismus.  Eyes:    Conjunctiva normal  Neck:    Supple, no meningismus.     CV:     Regular rate and rhythm.      No murmurs, rubs or gallops.       No unilateral leg swelling.       2+ radial pulses bilateral.       No lower extremity edema.  PULM:    Moderate diffuse expiratory wheezing.       No respiratory distress although tachypneic.      Speaks in full sentences.     No rales or rhonci.     No stridor.  ABD:    Soft, non-tender, non-distended.       No rebound, guarding or rigidity.  MSK:     No gross deformity to all four extremities.   LYMPH:   No cervical lymphadenopathy.  NEURO:   Alert; good muscle tone     No tremor  Skin:    Warm, dry and intact.    Psych:    Mood is good and affect is appropriate.          Emergency Department Course   ECG (12:54:52):  Rate 82 bpm. MS interval 164. QRS duration 84. QT/QTc 400/467. P-R-T axes 86 67 -36. Normal sinus rhythm.. T wave abnormality, consider inferior ischemia. Abnormal EKG. Interpreted at 1255 by Patrice Saenz MD.     Imaging:  Chest  XR, 1 view  PORTABLE   Final Result   IMPRESSION: No acute disease.      TRES LABOY MD        Results as read by radiology.   I communicated the results of the imaging studies with the patient who expressed understanding of these findings.      Laboratory:  1228: Duo neb 9 mL   1242: Solumedrol 125 mg IV  1325: Duo neb 3 mL     Interventions:  CBC: Absolute Eosinophils 0.9, otherwise normal   BMP: Glucose 100, otherwise normal     1239 ISTAT gases lactate [place] POCT: pH Venous 7.35, PCO2 Venous 44, PO2 Venous 44, Bicarbonate Venous 24, O2 Sat Venous 77, Lactic Acid 1.2    Emergency Department Course:  Past medical records, nursing notes, and vitals reviewed.  1231: I performed an exam of the patient and obtained history, as documented above.        1426: Ambulated with O2 93 % or higher. No increase in dyspnea. Feels well.     1430: Rechecked the patient - trace late expiratory wheezing. Good air exchange. Tachypnea resolved. Findings and plan explained to the patient. Patient discharged home, status improved, with instructions regarding supportive care, medications, and reasons to return as well as the importance of close follow-up was reviewed.    Impression & Plan    Medical Decision Makin-year-old male presented to the ED with progressively worsening dyspnea.  On examination he has clear signs of acute bronchospasm.  Patient was responsive to bronchodilators and steroids.  Chest x-ray reveals no evidence of pneumonia, pneumothorax or pulmonary edema to otherwise account for shortness of breath.  Basic laboratory studies and EKG are reassuring.  After observation in the ED, patient feels much improved with trace late expiratory wheezing.  He was abmmbulated through the ED without developing hypoxia and no developing dyspnea.  He feels well enough for a trial of home.  Her findings are most consistent with viral induced reactive airway disease.  Patient will be discharged home on a course of prednisone and continue  use of his albuterol nebs at home.  Given his age, it is possible the patient has early onset COPD although uncertain.  Due to this concern, patient will be placed on a 7-day course of doxycycline.  Close follow-up PCP and return to ED for any worsening symptoms.    Diagnosis:    ICD-10-CM    1. Acute bronchospasm J98.01        Disposition:  discharged to home    Deana WELCH am serving as a scribe at 12:52 PM on 5/12/2019 to document services personally performed by Patrice Saenz MD based on my observations and the provider's statements to me.    [facility] EMERGENCY DEPARTMENT       Patrice Saenz MD  05/14/19 0725

## 2019-05-12 NOTE — ED AVS SNAPSHOT
[facility] Emergency Department  [address]  [facility] [address]  Phone:  [phone]  Fax:  [phone]                                    Khurram Reynoso   MRN: [id number]    Department:  [facility] Emergency Department   Date of Visit:  5/12/2019           After Visit Summary Signature Page    I have received my discharge instructions, and my questions have been answered. I have discussed any challenges I see with this plan with the nurse or doctor.    ..........................................................................................................................................  Patient/Patient Representative Signature      ..........................................................................................................................................  Patient Representative Print Name and Relationship to Patient    ..................................................               ................................................  Date                                   Time    ..........................................................................................................................................  Reviewed by Signature/Title    ...................................................              ..............................................  Date                                               Time          22EPIC Rev 08/18

## 2019-05-12 NOTE — ED TRIAGE NOTES
Pt arrives with SOB starting last night worsening today. States thinks its asthma, last neb this morning. Pt tachypneic with inspiratory wheezes throughout.

## 2019-05-12 NOTE — DISCHARGE INSTRUCTIONS
Please follow-up with your pulmonologist for further evaluation and management of your breathing issues.    Discharge Instructions  Bronchitis, Pneumonia, Bronchospasm    You were seen today for a chest infection or inflammation. If your provider decided this was due to a bacterial infection, you may need an antibiotic. Sometimes these are caused by a virus, and then an antibiotic will not help.     Generally, every Emergency Department visit should have a follow-up clinic visit with either a primary or a specialty clinic/provider. Please follow-up as instructed by your emergency provider today.    Return to the Emergency Department if:  Your breathing gets much worse.  You are very weak, or feel much more ill.  You develop new symptoms, such as chest pain.  You cough up blood.  You are vomiting (throwing up) enough that you cannot keep fluids or your medicine down.    What can I do to help myself?  Fill any prescriptions the provider gave you and take them right away--especially antibiotics. Be sure to finish the whole antibiotic prescription.  You may be given a prescription for an inhaler, which can help loosen tight air passages.  Use this as needed, but not more often than directed. Inhalers work much better when used with a spacer.   You may be given a prescription for a steroid to reduce inflammation. Used long-term, these can have side effects, but for short-term use they are safe. You may notice restlessness or increased appetite.      You may use non-prescription cough or cold medicines. Cough medicines may help, but don?t make the cough go away completely.   Avoid smoke, because this can make your symptoms worse. If you smoke, this may be a good time to quit! Consider using nicotine lozenges, gum, or patches to reduce cravings.   If you have a fever, Tylenol  (acetaminophen), Motrin  (ibuprofen), or Advil  (ibuprofen) may help bring fever down and may help you feel more comfortable. Be sure to read and  follow the package directions, and ask your provider if you have questions.  Be sure to get your flu shot each year.  For certain ages, the pneumonia shot can help prevent pneumonia.  If you were given a prescription for medicine here today, be sure to read all of the information (including the package insert) that comes with your prescription.  This will include important information about the medicine, its side effects, and any warnings that you need to know about.  The pharmacist who fills the prescription can provide more information and answer questions you may have about the medicine.  If you have questions or concerns that the pharmacist cannot address, please call or return to the Emergency Department.     Remember that you can always come back to the Emergency Department if you are not able to see your regular provider in the amount of time listed above, if you get any new symptoms, or if there is anything that worries you.

## 2019-05-12 NOTE — ED NOTES
Pt ambulated with no assistance and SpO2 monitoring. Pt denied feeling dizzy or lightheaded. SpO2 was 95% when we started and dropped to 93% during the trial. Pulse was 82 bpm when we started and shashank to 91 bpm during the trial.

## 2019-05-13 LAB — INTERPRETATION ECG - MUSE: NORMAL

## 2019-07-10 ENCOUNTER — OFFICE VISIT (OUTPATIENT)
Dept: FAMILY MEDICINE | Facility: CLINIC | Age: 74
End: 2019-07-10
Payer: COMMERCIAL

## 2019-07-10 ENCOUNTER — ANCILLARY PROCEDURE (OUTPATIENT)
Dept: GENERAL RADIOLOGY | Facility: CLINIC | Age: 74
End: 2019-07-10
Attending: FAMILY MEDICINE
Payer: COMMERCIAL

## 2019-07-10 VITALS
WEIGHT: 240.6 LBS | TEMPERATURE: 98.7 F | HEART RATE: 87 BPM | DIASTOLIC BLOOD PRESSURE: 62 MMHG | SYSTOLIC BLOOD PRESSURE: 118 MMHG | OXYGEN SATURATION: 96 % | BODY MASS INDEX: 32.59 KG/M2 | HEIGHT: 72 IN

## 2019-07-10 DIAGNOSIS — M79.644 PAIN OF FINGER OF RIGHT HAND: Primary | ICD-10-CM

## 2019-07-10 DIAGNOSIS — M79.644 PAIN OF FINGER OF RIGHT HAND: ICD-10-CM

## 2019-07-10 DIAGNOSIS — M10.9 ACUTE GOUTY ARTHRITIS: ICD-10-CM

## 2019-07-10 PROCEDURE — 73140 X-RAY EXAM OF FINGER(S): CPT | Mod: RT

## 2019-07-10 PROCEDURE — 99213 OFFICE O/P EST LOW 20 MIN: CPT | Performed by: FAMILY MEDICINE

## 2019-07-10 RX ORDER — INDOMETHACIN 50 MG/1
50 CAPSULE ORAL
Qty: 42 CAPSULE | Refills: 0 | Status: ON HOLD | OUTPATIENT
Start: 2019-07-10 | End: 2020-08-06

## 2019-07-10 ASSESSMENT — MIFFLIN-ST. JEOR: SCORE: 1874.35

## 2019-07-10 NOTE — PROGRESS NOTES
Subjective     Khurram Reynoso is a 74 year old male who presents to clinic today for the following health issues:    HPI     Patient here with right index finger pain for the past five days. The pain had a sudden onset. Patient has a history of gout and states this pain seems similar. He is taking Advil and allopurinol for pain management. Denies injury. Denies fever, other joint pain.    Patient Active Problem List   Diagnosis     Cardiac dysrhythmia     Acute bronchitis     Primary cardiomyopathy (H)     Gait difficulty     CARDIOVASCULAR SCREENING; LDL GOAL LESS THAN 160     PVC (premature ventricular contraction)     PAC (premature atrial contraction)     ACP (advance care planning)     Mild persistent asthma without complication     Calculus of kidney     Acute idiopathic gout of right foot     Past Surgical History:   Procedure Laterality Date     C NONSPECIFIC PROCEDURE      knee     CARDIAC SURGERY      Ablation     DECOMPRESS DISC RF LUMBAR  3/2001     OPTICAL TRACKING SYSTEM FUSION SPINE POSTERIOR LUMBAR THREE+ LEVELS N/A 6/27/2016    Procedure: OPTICAL TRACKING SYSTEM FUSION SPINE POSTERIOR LUMBAR THREE+ LEVELS;  Surgeon: Hieu Araiza MD;  Location: [facility] OR     ORTHOPEDIC SURGERY Bilateral     total knee replacements     ORTHOPEDIC SURGERY Right     Total Hipe Replacement     SOFT TISSUE SURGERY Right     right wrist ganglion surgery       Social History     Tobacco Use     Smoking status: Never Smoker     Smokeless tobacco: Never Used   Substance Use Topics     Alcohol use: No     Alcohol/week: 0.0 oz     Family History   Problem Relation Age of Onset     C.A.D. Father      Hypertension Father      Heart Surgery Father         bypass     Cancer Brother         bone ca      Family History Negative Mother      Other - See Comments Sister         polio     Unknown/Adopted Maternal Grandmother      Unknown/Adopted Maternal Grandfather      Unknown/Adopted Paternal Grandmother       Unknown/Adopted Paternal Grandfather      Family History Negative Sister          Reviewed and updated as needed this visit by Provider  Tobacco  Allergies  Meds  Problems  Med Hx  Surg Hx  Fam Hx       Review of Systems   ROS COMP: CONSTITUTIONAL: NEGATIVE for fever  MUSCULOSKELETAL: as noted above     This document serves as a record of the services and decisions personally performed and made by Asif Moctezuma MD. It was created on his behalf by Mariam Brock, a trained medical scribe. The creation of this document is based on the provider's statements to the medical scribe.  Mariam Brock 4:11 PM July 10, 2019      Objective    /62 (BP Location: Right arm, Patient Position: Chair, Cuff Size: Adult Large)   Pulse 87   Temp 98.7  F (37.1  C) (Oral)   Ht 1.829 m (6')   Wt 109.1 kg (240 lb 9.6 oz)   SpO2 96%   BMI 32.63 kg/m    Body mass index is 32.63 kg/m .  Physical Exam   GENERAL: healthy, alert and no distress  MS: Moderate swelling at PIP     X-ray Right Finger ordered and interpreted in the office today. X-ray shows: normal.  Radiology read pending.    Diagnostic Test Results:  Labs reviewed in Epic      Assessment & Plan     1. Pain of finger of right hand  Pain appears to be likely gout.  No significant degenerative changes noted.  Does not have symptoms of infection currently.  Discussed consider aspiration of fluid versus treating as likely gout and patient would like to treat as below initially and follow-up if he has change in symptoms.  - XR Finger Right G/E 2 Views; Future    2. Acute gouty arthritis  - indomethacin (INDOCIN) 50 MG capsule; Take 1 capsule (50 mg) by mouth 3 times daily (with meals) for 14 days  Dispense: 42 capsule; Refill: 0     BMI:   Estimated body mass index is 32.63 kg/m  as calculated from the following:    Height as of this encounter: 1.829 m (6').    Weight as of this encounter: 109.1 kg (240 lb 9.6 oz).   Weight management plan: Discussed healthy diet and  exercise guidelines    Return in about 3 days (around 7/13/2019) for if not improving or sooner if worsening symptoms.    The information in this document, created by the medical scribe for me, accurately reflects the services I personally performed and the decisions made by me. I have reviewed and approved this document for accuracy prior to leaving the patient care area.  July 10, 2019 4:35 PM    Asif Moctezuma MD  [facility]

## 2019-07-11 ASSESSMENT — ASTHMA QUESTIONNAIRES: ACT_TOTALSCORE: 19

## 2019-08-19 ENCOUNTER — TRANSFERRED RECORDS (OUTPATIENT)
Dept: HEALTH INFORMATION MANAGEMENT | Facility: CLINIC | Age: 74
End: 2019-08-19

## 2019-08-19 ENCOUNTER — MEDICAL CORRESPONDENCE (OUTPATIENT)
Dept: HEALTH INFORMATION MANAGEMENT | Facility: CLINIC | Age: 74
End: 2019-08-19

## 2019-08-20 ENCOUNTER — HOSPITAL ENCOUNTER (OUTPATIENT)
Dept: LAB | Facility: CLINIC | Age: 74
Discharge: HOME | End: 2019-08-20
Attending: INTERNAL MEDICINE | Admitting: INTERNAL MEDICINE
Payer: COMMERCIAL

## 2019-08-20 DIAGNOSIS — J45.51 SEVERE PERSISTENT ASTHMA WITH EXACERBATION (H): ICD-10-CM

## 2019-08-20 DIAGNOSIS — J45.51 SEVERE PERSISTENT ASTHMA WITH EXACERBATION (H): Primary | ICD-10-CM

## 2019-08-20 LAB
BACTERIA SPEC CULT: ABNORMAL
FUNGUS SPEC CULT: ABNORMAL
GRAM STN SPEC: ABNORMAL
KOH PREP SPEC: NORMAL
Lab: ABNORMAL
SPECIMEN SOURCE: ABNORMAL
SPECIMEN SOURCE: NORMAL

## 2019-08-20 PROCEDURE — 87206 SMEAR FLUORESCENT/ACID STAI: CPT | Performed by: INTERNAL MEDICINE

## 2019-08-20 PROCEDURE — 87205 SMEAR GRAM STAIN: CPT | Performed by: INTERNAL MEDICINE

## 2019-08-20 PROCEDURE — 87015 SPECIMEN INFECT AGNT CONCNTJ: CPT | Performed by: INTERNAL MEDICINE

## 2019-08-20 PROCEDURE — 87116 MYCOBACTERIA CULTURE: CPT | Performed by: INTERNAL MEDICINE

## 2019-08-20 PROCEDURE — 87210 SMEAR WET MOUNT SALINE/INK: CPT | Performed by: INTERNAL MEDICINE

## 2019-08-21 ENCOUNTER — HOSPITAL ENCOUNTER (OUTPATIENT)
Dept: LAB | Facility: CLINIC | Age: 74
Discharge: HOME | End: 2019-08-21
Attending: INTERNAL MEDICINE | Admitting: INTERNAL MEDICINE
Payer: COMMERCIAL

## 2019-08-21 DIAGNOSIS — J45.51 SEVERE PERSISTENT ASTHMA WITH EXACERBATION (H): Primary | ICD-10-CM

## 2019-08-21 DIAGNOSIS — J45.51 SEVERE PERSISTENT ASTHMA WITH EXACERBATION (H): ICD-10-CM

## 2019-08-21 LAB
BASOPHILS # BLD AUTO: 0.1 10E9/L (ref 0–0.2)
BASOPHILS NFR BLD AUTO: 1.1 %
DIFFERENTIAL METHOD BLD: NORMAL
EOSINOPHIL # BLD AUTO: 0.7 10E9/L (ref 0–0.7)
EOSINOPHIL NFR BLD AUTO: 10.6 %
ERYTHROCYTE [DISTWIDTH] IN BLOOD BY AUTOMATED COUNT: 12.6 % (ref 10–15)
GRAM STN SPEC: NORMAL
HCT VFR BLD AUTO: 45.4 % (ref 40–53)
HGB BLD-MCNC: 15.6 G/DL (ref 13.3–17.7)
IMM GRANULOCYTES # BLD: 0 10E9/L (ref 0–0.4)
IMM GRANULOCYTES NFR BLD: 0.5 %
KOH PREP SPEC: NORMAL
LYMPHOCYTES # BLD AUTO: 1.9 10E9/L (ref 0.8–5.3)
LYMPHOCYTES NFR BLD AUTO: 29.2 %
Lab: NORMAL
MCH RBC QN AUTO: 32.5 PG (ref 26.5–33)
MCHC RBC AUTO-ENTMCNC: 34.4 G/DL (ref 31.5–36.5)
MCV RBC AUTO: 95 FL (ref 78–100)
MONOCYTES # BLD AUTO: 0.6 10E9/L (ref 0–1.3)
MONOCYTES NFR BLD AUTO: 8.9 %
NEUTROPHILS # BLD AUTO: 3.3 10E9/L (ref 1.6–8.3)
NEUTROPHILS NFR BLD AUTO: 49.7 %
NRBC # BLD AUTO: 0 10*3/UL
NRBC BLD AUTO-RTO: 0 /100
PLATELET # BLD AUTO: 398 10E9/L (ref 150–450)
RBC # BLD AUTO: 4.8 10E12/L (ref 4.4–5.9)
SPECIMEN SOURCE: NORMAL
SPECIMEN SOURCE: NORMAL
WBC # BLD AUTO: 6.6 10E9/L (ref 4–11)

## 2019-08-21 PROCEDURE — 87149 DNA/RNA DIRECT PROBE: CPT | Performed by: INTERNAL MEDICINE

## 2019-08-21 PROCEDURE — 87153 DNA/RNA SEQUENCING: CPT | Performed by: INTERNAL MEDICINE

## 2019-08-21 PROCEDURE — 87206 SMEAR FLUORESCENT/ACID STAI: CPT | Performed by: INTERNAL MEDICINE

## 2019-08-21 PROCEDURE — 87116 MYCOBACTERIA CULTURE: CPT | Performed by: INTERNAL MEDICINE

## 2019-08-21 PROCEDURE — 87102 FUNGUS ISOLATION CULTURE: CPT | Performed by: INTERNAL MEDICINE

## 2019-08-21 PROCEDURE — 36415 COLL VENOUS BLD VENIPUNCTURE: CPT | Performed by: INTERNAL MEDICINE

## 2019-08-21 PROCEDURE — 87210 SMEAR WET MOUNT SALINE/INK: CPT | Performed by: INTERNAL MEDICINE

## 2019-08-21 PROCEDURE — 85025 COMPLETE CBC W/AUTO DIFF WBC: CPT | Performed by: INTERNAL MEDICINE

## 2019-08-21 PROCEDURE — 87158 CULTURE TYPING ADDED METHOD: CPT | Performed by: INTERNAL MEDICINE

## 2019-08-21 PROCEDURE — 87070 CULTURE OTHR SPECIMN AEROBIC: CPT | Performed by: INTERNAL MEDICINE

## 2019-08-21 PROCEDURE — 87015 SPECIMEN INFECT AGNT CONCNTJ: CPT | Performed by: INTERNAL MEDICINE

## 2019-08-21 PROCEDURE — 87205 SMEAR GRAM STAIN: CPT | Performed by: INTERNAL MEDICINE

## 2019-08-21 PROCEDURE — 87107 FUNGI IDENTIFICATION MOLD: CPT | Performed by: INTERNAL MEDICINE

## 2019-08-21 PROCEDURE — 82785 ASSAY OF IGE: CPT | Performed by: INTERNAL MEDICINE

## 2019-08-22 ENCOUNTER — HOSPITAL ENCOUNTER (OUTPATIENT)
Dept: LAB | Facility: CLINIC | Age: 74
Discharge: HOME | End: 2019-08-22
Attending: INTERNAL MEDICINE | Admitting: INTERNAL MEDICINE
Payer: COMMERCIAL

## 2019-08-22 DIAGNOSIS — J45.51 SEVERE PERSISTENT ASTHMA WITH EXACERBATION (H): ICD-10-CM

## 2019-08-22 LAB
ACID FAST STN SPEC QL: NORMAL
ACID FAST STN SPEC QL: NORMAL
BACTERIA SPEC CULT: ABNORMAL
FUNGUS SPEC CULT: ABNORMAL
GRAM STN SPEC: ABNORMAL
IGE SERPL-ACNC: 1055 KIU/L (ref 0–114)
KOH PREP SPEC: NORMAL
Lab: ABNORMAL
SPECIMEN SOURCE: ABNORMAL
SPECIMEN SOURCE: NORMAL
SPECIMEN SOURCE: NORMAL

## 2019-08-22 PROCEDURE — 87015 SPECIMEN INFECT AGNT CONCNTJ: CPT | Performed by: INTERNAL MEDICINE

## 2019-08-22 PROCEDURE — 87116 MYCOBACTERIA CULTURE: CPT | Performed by: INTERNAL MEDICINE

## 2019-08-22 PROCEDURE — 87206 SMEAR FLUORESCENT/ACID STAI: CPT | Performed by: INTERNAL MEDICINE

## 2019-08-22 PROCEDURE — 87210 SMEAR WET MOUNT SALINE/INK: CPT | Performed by: INTERNAL MEDICINE

## 2019-08-22 PROCEDURE — 87205 SMEAR GRAM STAIN: CPT | Performed by: INTERNAL MEDICINE

## 2019-08-23 LAB
ACID FAST STN SPEC QL: NORMAL
ACID FAST STN SPEC QL: NORMAL
BACTERIA SPEC CULT: NORMAL
SPECIMEN SOURCE: NORMAL
SPECIMEN SOURCE: NORMAL

## 2019-08-26 ENCOUNTER — OFFICE VISIT (OUTPATIENT)
Dept: URGENT CARE | Facility: URGENT CARE | Age: 74
End: 2019-08-26
Payer: COMMERCIAL

## 2019-08-26 VITALS
DIASTOLIC BLOOD PRESSURE: 75 MMHG | BODY MASS INDEX: 32.51 KG/M2 | TEMPERATURE: 98.9 F | RESPIRATION RATE: 14 BRPM | SYSTOLIC BLOOD PRESSURE: 125 MMHG | HEIGHT: 72 IN | WEIGHT: 240 LBS | HEART RATE: 98 BPM

## 2019-08-26 DIAGNOSIS — R22.0 LIP SWELLING: Primary | ICD-10-CM

## 2019-08-26 DIAGNOSIS — Z91.030 BEE STING ALLERGY: ICD-10-CM

## 2019-08-26 LAB
ACID FAST STN SPEC QL: NORMAL
SPECIMEN SOURCE: NORMAL

## 2019-08-26 PROCEDURE — 99214 OFFICE O/P EST MOD 30 MIN: CPT | Performed by: FAMILY MEDICINE

## 2019-08-26 RX ORDER — PREDNISONE 10 MG/1
40 TABLET ORAL ONCE
Status: DISCONTINUED | OUTPATIENT
Start: 2019-08-26 | End: 2020-01-14

## 2019-08-26 RX ORDER — EPINEPHRINE 0.3 MG/.3ML
0.3 INJECTION SUBCUTANEOUS PRN
Qty: 1 EACH | Refills: 1 | Status: SHIPPED | OUTPATIENT
Start: 2019-08-26 | End: 2020-11-09

## 2019-08-26 RX ORDER — DIPHENHYDRAMINE HCL 25 MG
50 CAPSULE ORAL ONCE
Status: DISCONTINUED | OUTPATIENT
Start: 2019-08-26 | End: 2020-11-09

## 2019-08-26 ASSESSMENT — MIFFLIN-ST. JEOR: SCORE: 1871.63

## 2019-08-26 NOTE — PATIENT INSTRUCTIONS
Take prednisone as prescribed by your doctor for your asthma, this will also help with the swelling.     Take benadryl 25-50mg every 6-8 hours as needed to help with itching/swelling. (caution dizziness)    Apply ice to the area of redness for 10-15 minutes at a time every 1-2 hours to help with swelling    Have the epi-pen ready in case your throat becomes tight, you become lightheaded.  - Call 911 immediately and use epi pen if you have these symptoms  - Please have your significant other read the instructions on how to use this in the event you are unable to administer to yourself

## 2019-08-26 NOTE — PROGRESS NOTES
Subjective:   Khurram Reynoso is a 74 year old male who presents for   Chief Complaint   Patient presents with     Insect Bites     swelling right side of the face.      Bee stings today about an hour ago on the left forearm, right face/cheek, one on back of head    Does not feel dizzy or lightheaded at this time.     He did not take any medications so far today.     Of note he was about to start prednisone at direction of pulmonologist. No swelling of the throat or difficulty breathing. NO hx of copd. Has diagnosis of asthma after a very thorough workup.   He is not a smoker.     He does not have an epi-pen at home.   Has been stung multiple times in the past and has never had symptoms like this before.     Patient Active Problem List    Diagnosis Date Noted     Calculus of kidney 11/05/2018     Priority: Medium     Acute idiopathic gout of right foot 11/05/2018     Priority: Medium     Mild persistent asthma without complication 07/20/2018     Priority: Medium     ACP (advance care planning) 02/01/2017     Priority: Medium     Advance Care Planning 2/1/2017: Receipt of ACP document:  Received: Health Care Directive which was witnessed or notarized on 8/2/16.  Document previously scanned on 11/28/16.  Validation form completed and sent to be scanned.  Code Status reflects choices in most recent ACP document.  Confirmed/documented designated decision maker(s).  Added by Stefany Wilson RN, Advance Care Planning Liaison.         PVC (premature ventricular contraction)      Priority: Medium     PAC (premature atrial contraction)      Priority: Medium     CARDIOVASCULAR SCREENING; LDL GOAL LESS THAN 160 02/10/2010     Priority: Medium     Gait difficulty 12/30/2009     Priority: Medium     Primary cardiomyopathy (H) 07/18/2006     Priority: Medium     Problem list name updated by automated process. Provider to review       Acute bronchitis 10/17/2005     Priority: Medium     Cardiac dysrhythmia 07/27/2005      Priority: Medium     Problem list name updated by automated process. Provider to review         Current Outpatient Medications   Medication     albuterol (VENTOLIN HFA) 108 (90 Base) MCG/ACT inhaler     EPINEPHrine (EPIPEN/ADRENACLICK/OR ANY BX GENERIC EQUIV) 0.3 MG/0.3ML injection 2-pack     fluticasone (FLONASE) 50 MCG/ACT spray     fluticasone-salmeterol (ADVAIR) 250-50 MCG/DOSE diskus inhaler     RaNITidine HCl (ZANTAC PO)     indomethacin (INDOCIN) 50 MG capsule     methocarbamol (ROBAXIN) 750 MG tablet     vardenafil (LEVITRA) 20 MG tablet     Current Facility-Administered Medications   Medication     diphenhydrAMINE (BENADRYL) capsule 50 mg     predniSONE (DELTASONE) tablet 40 mg       ROS:  As above per HPI    Objective:   /75 (BP Location: Right arm, Patient Position: Sitting, Cuff Size: Adult Large)   Pulse 98   Temp 98.9  F (37.2  C) (Oral)   Resp 14   Ht 1.829 m (6')   Wt 108.9 kg (240 lb)   BMI 32.55 kg/m  , Body mass index is 32.55 kg/m .  Gen:  NAD, well-nourished, sitting in chair comfortably  HEENT: EOMI, sclera anicteric, Head normocephalic, ; nares patent; moist mucous membranes, no throat swelling, mallampati II/IV,  Neck: trachea midline, no thyromegaly, no neck swelling  CV:  Hemodynamically stable, RRR  Pulm:  no increased work of breathing , end expiratory wheezes diffusely  Extrem: no cyanosis, edema or clubbing  Skin: no obvious rashes or abnormalities  Psych: Euthymic, linear thoughts, normal rate of speech                    Assessment & Plan:   Khurram Reynoso, 74 year old male who presents with:    Lip swelling  Bee sting allergy  Patient was observed in clinic for 90 minutes from time of check-in to discharge. No decreased in BP seen and no symptoms of throat swelling or dizziness. Benadryl and prednisone were given at 1406, he tolerated this. No worsening of symptoms. Recommended ice to the areas intermittently. He is about to start prednisone for his respiratory  symptoms so this should provide adequate medication. Benadryl PRN q8h for itching/swelling. Epi-pen prescription was sent with patient given his mucosal/lip involvement today.   - EPINEPHrine (EPIPEN/ADRENACLICK/OR ANY BX GENERIC EQUIV) 0.3 MG/0.3ML injection 2-pack  Dispense: 1 each; Refill: 1      Dank Vazquez MD   [facility]    The use of Dragon/Wishpot dictation services may have been used to construct the content in this note; any grammatical or spelling errors are non-intentional. Please contact the author of this note directly if you are in need of any clarification.

## 2019-08-26 NOTE — NURSING NOTE
Per provider verbal order, gave pt 40mg oral Prednisone, and 50mg oral Benadryl.  Pt drank w/glass of water and no complications of swallowing.  Verified pt identity using name and .  Diphenhydramin NDC: 0692-9688-54 Lot: [id number] Exp: 2021  Prednisone NDC: [id number] Lot: [id number] Exp: 10/2020  Darshan Parkinson, CMA

## 2019-09-06 ENCOUNTER — HOSPITAL ENCOUNTER (OUTPATIENT)
Dept: CT IMAGING | Facility: CLINIC | Age: 74
Discharge: HOME | End: 2019-09-06
Attending: INTERNAL MEDICINE | Admitting: INTERNAL MEDICINE
Payer: COMMERCIAL

## 2019-09-06 DIAGNOSIS — J45.51 SEVERE PERSISTENT ASTHMA WITH (ACUTE) EXACERBATION (H): ICD-10-CM

## 2019-09-06 PROCEDURE — 71250 CT THORAX DX C-: CPT

## 2019-09-17 ENCOUNTER — TRANSFERRED RECORDS (OUTPATIENT)
Dept: HEALTH INFORMATION MANAGEMENT | Facility: CLINIC | Age: 74
End: 2019-09-17

## 2019-09-24 LAB
FUNGUS SPEC CULT: ABNORMAL
FUNGUS SPEC CULT: ABNORMAL
SPECIMEN SOURCE: ABNORMAL

## 2019-10-02 ENCOUNTER — TRANSFERRED RECORDS (OUTPATIENT)
Dept: HEALTH INFORMATION MANAGEMENT | Facility: CLINIC | Age: 74
End: 2019-10-02

## 2019-10-02 ENCOUNTER — COMMUNICATION - HEALTHEAST (OUTPATIENT)
Dept: CARDIOLOGY | Facility: CLINIC | Age: 74
End: 2019-10-02

## 2019-10-04 ENCOUNTER — AMBULATORY - HEALTHEAST (OUTPATIENT)
Dept: CARDIOLOGY | Facility: CLINIC | Age: 74
End: 2019-10-04

## 2019-10-04 DIAGNOSIS — I48.0 PAF (PAROXYSMAL ATRIAL FIBRILLATION) (H): ICD-10-CM

## 2019-10-04 DIAGNOSIS — Z00.6 EXAMINATION OF PARTICIPANT OR CONTROL IN CLINICAL RESEARCH: ICD-10-CM

## 2019-10-04 LAB
ATRIAL RATE - MUSE: 71 BPM
DIASTOLIC BLOOD PRESSURE - MUSE: NORMAL
INTERPRETATION ECG - MUSE: NORMAL
P AXIS - MUSE: 70 DEGREES
PR INTERVAL - MUSE: 166 MS
QRS DURATION - MUSE: 90 MS
QT - MUSE: 410 MS
QTC - MUSE: 445 MS
R AXIS - MUSE: 59 DEGREES
SYSTOLIC BLOOD PRESSURE - MUSE: NORMAL
T AXIS - MUSE: 34 DEGREES
VENTRICULAR RATE- MUSE: 71 BPM

## 2019-10-04 ASSESSMENT — MIFFLIN-ST. JEOR: SCORE: 1806.75

## 2019-10-07 ENCOUNTER — AMBULATORY - HEALTHEAST (OUTPATIENT)
Dept: CARDIOLOGY | Facility: CLINIC | Age: 74
End: 2019-10-07

## 2019-10-10 LAB
MYCOBACTERIUM SPEC CULT: ABNORMAL
SPECIMEN SOURCE: ABNORMAL

## 2019-10-16 LAB
MYCOBACTERIUM SPEC CULT: NORMAL
MYCOBACTERIUM SPEC CULT: NORMAL
SPECIMEN SOURCE: NORMAL

## 2019-10-18 LAB
MYCOBACTERIUM SPEC CULT: NORMAL
MYCOBACTERIUM SPEC CULT: NORMAL
SPECIMEN SOURCE: NORMAL

## 2019-10-28 DIAGNOSIS — R05.9 COUGH: ICD-10-CM

## 2019-10-28 DIAGNOSIS — J45.30 MILD PERSISTENT ASTHMA WITHOUT COMPLICATION: ICD-10-CM

## 2019-10-28 NOTE — TELEPHONE ENCOUNTER
"Requested Prescriptions   Pending Prescriptions Disp Refills     fluticasone (FLONASE) 50 MCG/ACT nasal spray [Pharmacy Med Name: FLUTICASONE 50MCG NASAL SP (120) RX] 16 mL 0     Sig: SHAKE LIQUID AND USE 1 TO 2 SPRAYS IN EACH NOSTRIL DAILY   Last Written Prescription Date:  06/20/2018  Last Fill Quantity: 1 bottle,  # refills: 11   Last office visit: 11/27/2018 with prescribing provider:     Future Office Visit:      Inhaled Steroids Protocol Failed - 10/28/2019  9:24 AM        Failed - Asthma control assessment score within normal limits in last 6 months     Please review ACT score.           Failed - Recent (6 mo) or future (30 days) visit within the authorizing provider's specialty     Patient had office visit in the last 6 months or has a visit in the next 30 days with authorizing provider or within the authorizing provider's specialty.  See \"Patient Info\" tab in inbasket, or \"Choose Columns\" in Meds & Orders section of the refill encounter.            Passed - Patient is age 12 or older        Passed - Medication is active on med list        "

## 2019-10-29 RX ORDER — FLUTICASONE PROPIONATE 50 MCG
SPRAY, SUSPENSION (ML) NASAL
Qty: 16 ML | Refills: 0 | Status: SHIPPED | OUTPATIENT
Start: 2019-10-29 | End: 2019-12-26

## 2019-11-07 ENCOUNTER — HEALTH MAINTENANCE LETTER (OUTPATIENT)
Age: 74
End: 2019-11-07

## 2019-11-07 ENCOUNTER — TRANSFERRED RECORDS (OUTPATIENT)
Dept: HEALTH INFORMATION MANAGEMENT | Facility: CLINIC | Age: 74
End: 2019-11-07

## 2019-11-15 ENCOUNTER — TRANSFERRED RECORDS (OUTPATIENT)
Dept: HEALTH INFORMATION MANAGEMENT | Facility: CLINIC | Age: 74
End: 2019-11-15

## 2019-12-20 ENCOUNTER — TRANSFERRED RECORDS (OUTPATIENT)
Dept: HEALTH INFORMATION MANAGEMENT | Facility: CLINIC | Age: 74
End: 2019-12-20

## 2019-12-23 DIAGNOSIS — J45.30 MILD PERSISTENT ASTHMA WITHOUT COMPLICATION: ICD-10-CM

## 2019-12-23 DIAGNOSIS — R05.9 COUGH: ICD-10-CM

## 2019-12-23 NOTE — LETTER
[place] [address]  [facility] [address]  [phone]        December 26, 2019      Khurram Reynoso  [address]  [facility] [address]          Dear Khurram Reynoso    APPOINTMENT REMINDER:   Our records indicates that it is time for you to be seen for a recheck.     Your current medication request will be approved for one refill but you will need to be seen before any additional refills can be approved.    Taking care of your health is important to us, and ongoing visits with your provider are vital to your care.    We look forward to seeing you in the near future.  You may call our office at [phone] to schedule a visit.    Please disregard this notice if you have already made an appointment.      Sincerely,        [facility] [place] Nursing Team

## 2019-12-26 RX ORDER — FLUTICASONE PROPIONATE 50 MCG
SPRAY, SUSPENSION (ML) NASAL
Qty: 16 G | Refills: 0 | Status: SHIPPED | OUTPATIENT
Start: 2019-12-26 | End: 2020-05-11

## 2020-01-14 ENCOUNTER — OFFICE VISIT (OUTPATIENT)
Dept: INTERNAL MEDICINE | Facility: CLINIC | Age: 75
End: 2020-01-14
Payer: COMMERCIAL

## 2020-01-14 ENCOUNTER — ANCILLARY PROCEDURE (OUTPATIENT)
Dept: GENERAL RADIOLOGY | Facility: CLINIC | Age: 75
End: 2020-01-14
Attending: INTERNAL MEDICINE
Payer: COMMERCIAL

## 2020-01-14 VITALS
RESPIRATION RATE: 11 BRPM | BODY MASS INDEX: 33.18 KG/M2 | DIASTOLIC BLOOD PRESSURE: 72 MMHG | OXYGEN SATURATION: 95 % | TEMPERATURE: 98.3 F | HEART RATE: 89 BPM | SYSTOLIC BLOOD PRESSURE: 128 MMHG | HEIGHT: 72 IN | WEIGHT: 245 LBS

## 2020-01-14 DIAGNOSIS — J45.41 MODERATE PERSISTENT ASTHMA WITH EXACERBATION: ICD-10-CM

## 2020-01-14 DIAGNOSIS — J45.41 MODERATE PERSISTENT ASTHMA WITH EXACERBATION: Primary | ICD-10-CM

## 2020-01-14 PROCEDURE — 99214 OFFICE O/P EST MOD 30 MIN: CPT | Performed by: INTERNAL MEDICINE

## 2020-01-14 PROCEDURE — 71046 X-RAY EXAM CHEST 2 VIEWS: CPT

## 2020-01-14 RX ORDER — ALBUTEROL SULFATE 0.83 MG/ML
SOLUTION RESPIRATORY (INHALATION) EVERY 4 HOURS PRN
Status: ON HOLD | COMMUNITY
Start: 2020-01-08 | End: 2022-07-13

## 2020-01-14 RX ORDER — PREDNISONE 20 MG/1
TABLET ORAL
Qty: 20 TABLET | Refills: 0 | Status: SHIPPED | OUTPATIENT
Start: 2020-01-14 | End: 2020-03-02

## 2020-01-14 ASSESSMENT — MIFFLIN-ST. JEOR: SCORE: 1894.31

## 2020-01-14 NOTE — PROGRESS NOTES
Subjective     Khurram Reynoso is a 74 year old male who presents to clinic today for the following health issues:    HPI     Patient presents with 1 week of cough, congestion, wheezing, clear phlegm.   Has h/o asthma. On inhaled bronchodilators and steroids. No chest pain. No fever. No palpitations.   Seen by pulmonary. Has been controlled until last week.   Has  Had influenza immunization.   Has nasal congestion, mild sinus drainage, uses a nettipot         Patient Active Problem List   Diagnosis     Cardiac dysrhythmia     Acute bronchitis     Primary cardiomyopathy (H)     Gait difficulty     CARDIOVASCULAR SCREENING; LDL GOAL LESS THAN 160     PVC (premature ventricular contraction)     PAC (premature atrial contraction)     ACP (advance care planning)     Mild persistent asthma without complication     Calculus of kidney     Acute idiopathic gout of right foot     Past Surgical History:   Procedure Laterality Date     C NONSPECIFIC PROCEDURE      knee     CARDIAC SURGERY      Ablation     DECOMPRESS DISC RF LUMBAR  3/2001     OPTICAL TRACKING SYSTEM FUSION SPINE POSTERIOR LUMBAR THREE+ LEVELS N/A 6/27/2016    Procedure: OPTICAL TRACKING SYSTEM FUSION SPINE POSTERIOR LUMBAR THREE+ LEVELS;  Surgeon: Hieu Araiza MD;  Location: [facility] OR     ORTHOPEDIC SURGERY Bilateral     total knee replacements     ORTHOPEDIC SURGERY Right     Total Hipe Replacement     SOFT TISSUE SURGERY Right     right wrist ganglion surgery       Social History     Tobacco Use     Smoking status: Never Smoker     Smokeless tobacco: Never Used   Substance Use Topics     Alcohol use: No     Alcohol/week: 0.0 standard drinks     Family History   Problem Relation Age of Onset     C.A.D. Father      Hypertension Father      Heart Surgery Father         bypass     Cancer Brother         bone ca      Family History Negative Mother      Other - See Comments Sister         polio     Unknown/Adopted Maternal Grandmother       Unknown/Adopted Maternal Grandfather      Unknown/Adopted Paternal Grandmother      Unknown/Adopted Paternal Grandfather      Family History Negative Sister          Current Outpatient Medications   Medication Sig Dispense Refill     albuterol (PROVENTIL) (2.5 MG/3ML) 0.083% neb solution        albuterol (VENTOLIN HFA) 108 (90 Base) MCG/ACT inhaler INHALE 2 PUFFS BY MOUTH FOUR TIMES DAILY IF NEEDED 18 g 3     BREO ELLIPTA 200-25 MCG/INH Inhaler INHALE 1 PUFF PO ONCE QAM.       EPINEPHrine (EPIPEN/ADRENACLICK/OR ANY BX GENERIC EQUIV) 0.3 MG/0.3ML injection 2-pack Inject 0.3 mLs (0.3 mg) into the muscle as needed for anaphylaxis 1 each 1     fluticasone (FLONASE) 50 MCG/ACT nasal spray SHAKE LIQUID AND USE 1 TO 2 SPRAYS IN EACH NOSTRIL DAILY 16 g 0     methocarbamol (ROBAXIN) 750 MG tablet Take 1 tablet (750 mg) by mouth 4 times daily as needed for muscle spasms 90 tablet 1     predniSONE (DELTASONE) 20 MG tablet Take 3 tabs by mouth daily x 3 days, then 2 tabs daily x 3 days, then 1 tab daily x 3 days, then 1/2 tab daily x 3 days. 20 tablet 0     RaNITidine HCl (ZANTAC PO) Take by mouth as needed for heartburn       vardenafil (LEVITRA) 20 MG tablet Take 0.5-1 tablets (10-20 mg) by mouth daily as needed 60 min prior to sex. Do not use with nitroglycerin, terazosin or doxazosin. 6 tablet 1     indomethacin (INDOCIN) 50 MG capsule Take 1 capsule (50 mg) by mouth 3 times daily (with meals) for 14 days (Patient not taking: Reported on 1/14/2020) 42 capsule 0       Reviewed and updated as needed this visit by Provider         Review of Systems   ROS COMP: Constitutional, HEENT, cardiovascular, pulmonary, gi and gu systems are negative, except as otherwise noted.      Objective    /72   Pulse 89   Temp 98.3  F (36.8  C) (Oral)   Resp 11   Ht 1.829 m (6')   Wt 111.1 kg (245 lb)   SpO2 95%   BMI 33.23 kg/m    Body mass index is 33.23 kg/m .  Physical Exam   GENERAL: healthy, alert and no distress  EYES: Eyes  grossly normal to inspection, PERRL and conjunctivae and sclerae normal  HENT: ear canals and TM's normal, nose - mild erythema, and mouth without ulcers or lesions  NECK: no adenopathy, no asymmetry, masses, or scars and thyroid normal to palpation  RESP: lungs - no rales, bilateral coarse rhonchi and expiratory  wheezes  CV: regular rate and rhythm, normal S1 S2, no S3 or S4, no murmur, click or rub, no peripheral edema and peripheral pulses strong  ABDOMEN: soft, nontender, no hepatosplenomegaly, no masses and bowel sounds normal  MS: no gross musculoskeletal defects noted, no edema    Diagnostic Test Results:  Labs reviewed in Epic  No results found for this or any previous visit (from the past 24 hour(s)).        Assessment & Plan   Problem List Items Addressed This Visit     None      Visit Diagnoses     Moderate persistent asthma with exacerbation    -  Primary    Relevant Medications    BREO ELLIPTA 200-25 MCG/INH Inhaler    albuterol (PROVENTIL) (2.5 MG/3ML) 0.083% neb solution    predniSONE (DELTASONE) 20 MG tablet    Other Relevant Orders    XR Chest 2 Views         Assess X rays - no infiltrate  Asthma exacerbation, start on Prednisone taper  Reassess if cough becomes more productive, if fever, SOB.   Cont inhalers     BMI:   Estimated body mass index is 33.23 kg/m  as calculated from the following:    Height as of this encounter: 1.829 m (6').    Weight as of this encounter: 111.1 kg (245 lb).   Weight management plan: Discussed healthy diet and exercise guidelines        See Patient Instructions  Return in about 4 weeks (around 2/11/2020) for follow up on acute problem if persists.    Familia Gillespie MD  [facility]

## 2020-02-06 ENCOUNTER — TRANSFERRED RECORDS (OUTPATIENT)
Dept: HEALTH INFORMATION MANAGEMENT | Facility: CLINIC | Age: 75
End: 2020-02-06

## 2020-02-16 ENCOUNTER — HEALTH MAINTENANCE LETTER (OUTPATIENT)
Age: 75
End: 2020-02-16

## 2020-03-02 ENCOUNTER — OFFICE VISIT (OUTPATIENT)
Dept: INTERNAL MEDICINE | Facility: CLINIC | Age: 75
End: 2020-03-02
Payer: COMMERCIAL

## 2020-03-02 VITALS
OXYGEN SATURATION: 95 % | DIASTOLIC BLOOD PRESSURE: 66 MMHG | RESPIRATION RATE: 12 BRPM | HEIGHT: 72 IN | BODY MASS INDEX: 33.18 KG/M2 | TEMPERATURE: 98.7 F | SYSTOLIC BLOOD PRESSURE: 112 MMHG | HEART RATE: 91 BPM | WEIGHT: 245 LBS

## 2020-03-02 DIAGNOSIS — Z00.00 ENCOUNTER FOR PREVENTATIVE ADULT HEALTH CARE EXAMINATION: Primary | ICD-10-CM

## 2020-03-02 DIAGNOSIS — Z12.5 SCREENING FOR PROSTATE CANCER: ICD-10-CM

## 2020-03-02 DIAGNOSIS — J45.41 MODERATE PERSISTENT ASTHMA WITH EXACERBATION: ICD-10-CM

## 2020-03-02 DIAGNOSIS — R06.02 SOB (SHORTNESS OF BREATH): ICD-10-CM

## 2020-03-02 LAB
ALBUMIN UR-MCNC: NEGATIVE MG/DL
APPEARANCE UR: CLEAR
BILIRUB UR QL STRIP: NEGATIVE
COLOR UR AUTO: YELLOW
ERYTHROCYTE [DISTWIDTH] IN BLOOD BY AUTOMATED COUNT: 14.1 % (ref 10–15)
GLUCOSE UR STRIP-MCNC: NEGATIVE MG/DL
HCT VFR BLD AUTO: 44.3 % (ref 40–53)
HGB BLD-MCNC: 14.7 G/DL (ref 13.3–17.7)
HGB UR QL STRIP: ABNORMAL
KETONES UR STRIP-MCNC: NEGATIVE MG/DL
LEUKOCYTE ESTERASE UR QL STRIP: NEGATIVE
MCH RBC QN AUTO: 31.2 PG (ref 26.5–33)
MCHC RBC AUTO-ENTMCNC: 33.2 G/DL (ref 31.5–36.5)
MCV RBC AUTO: 94 FL (ref 78–100)
NITRATE UR QL: NEGATIVE
PH UR STRIP: 7 PH (ref 5–7)
PLATELET # BLD AUTO: 324 10E9/L (ref 150–450)
RBC # BLD AUTO: 4.71 10E12/L (ref 4.4–5.9)
RBC #/AREA URNS AUTO: NORMAL /HPF
SOURCE: ABNORMAL
SP GR UR STRIP: 1.02 (ref 1–1.03)
UROBILINOGEN UR STRIP-ACNC: 0.2 EU/DL (ref 0.2–1)
WBC # BLD AUTO: 9.3 10E9/L (ref 4–11)
WBC #/AREA URNS AUTO: NORMAL /HPF

## 2020-03-02 PROCEDURE — 80053 COMPREHEN METABOLIC PANEL: CPT | Performed by: INTERNAL MEDICINE

## 2020-03-02 PROCEDURE — 99214 OFFICE O/P EST MOD 30 MIN: CPT | Mod: 25 | Performed by: INTERNAL MEDICINE

## 2020-03-02 PROCEDURE — 84443 ASSAY THYROID STIM HORMONE: CPT | Performed by: INTERNAL MEDICINE

## 2020-03-02 PROCEDURE — 84439 ASSAY OF FREE THYROXINE: CPT | Performed by: INTERNAL MEDICINE

## 2020-03-02 PROCEDURE — 85027 COMPLETE CBC AUTOMATED: CPT | Performed by: INTERNAL MEDICINE

## 2020-03-02 PROCEDURE — 81001 URINALYSIS AUTO W/SCOPE: CPT | Performed by: INTERNAL MEDICINE

## 2020-03-02 PROCEDURE — G0103 PSA SCREENING: HCPCS | Performed by: INTERNAL MEDICINE

## 2020-03-02 PROCEDURE — 99397 PER PM REEVAL EST PAT 65+ YR: CPT | Performed by: INTERNAL MEDICINE

## 2020-03-02 PROCEDURE — 80061 LIPID PANEL: CPT | Performed by: INTERNAL MEDICINE

## 2020-03-02 PROCEDURE — 36415 COLL VENOUS BLD VENIPUNCTURE: CPT | Performed by: INTERNAL MEDICINE

## 2020-03-02 RX ORDER — PREDNISONE 20 MG/1
TABLET ORAL
Qty: 25 TABLET | Refills: 0 | Status: SHIPPED | OUTPATIENT
Start: 2020-03-02 | End: 2020-03-05

## 2020-03-02 RX ORDER — ALBUTEROL SULFATE 90 UG/1
AEROSOL, METERED RESPIRATORY (INHALATION)
Qty: 18 G | Refills: 3 | Status: SHIPPED | OUTPATIENT
Start: 2020-03-02 | End: 2021-06-18

## 2020-03-02 ASSESSMENT — ENCOUNTER SYMPTOMS
NAUSEA: 0
CONSTIPATION: 0
ARTHRALGIAS: 1
PARESTHESIAS: 0
MYALGIAS: 0
FEVER: 0
FREQUENCY: 0
ABDOMINAL PAIN: 0
HEADACHES: 0
DIARRHEA: 0
PALPITATIONS: 0
WEAKNESS: 0
HEMATURIA: 0
HEMATOCHEZIA: 0
NERVOUS/ANXIOUS: 0
DIZZINESS: 0
SORE THROAT: 0
JOINT SWELLING: 0
CHILLS: 0
DYSURIA: 0
HEARTBURN: 0
COUGH: 1
SHORTNESS OF BREATH: 0
EYE PAIN: 0

## 2020-03-02 ASSESSMENT — MIFFLIN-ST. JEOR: SCORE: 1894.31

## 2020-03-02 ASSESSMENT — ACTIVITIES OF DAILY LIVING (ADL): CURRENT_FUNCTION: NO ASSISTANCE NEEDED

## 2020-03-02 NOTE — LETTER
[facility]  [address], [address]  [place] [address]  [phone]        March 4, 2020    Khurram Reynoso  [address]  [facility] [address]            Dear Mr. Khurram Reynoso:      The results of your recent labs were NORMAL.      If you have any further questions or problems, please contact our office.      Sincerely,        Familia Gillespie M.D.

## 2020-03-02 NOTE — NURSING NOTE
Vital signs:  Temp: 98.7  F (37.1  C) Temp src: Oral BP: 112/66 Pulse: 91   Resp: 12 SpO2: 95 %     Height: 182.9 cm (6') Weight: 111.1 kg (245 lb)  Estimated body mass index is 33.23 kg/m  as calculated from the following:    Height as of this encounter: 1.829 m (6').    Weight as of this encounter: 111.1 kg (245 lb).

## 2020-03-02 NOTE — PROGRESS NOTES
"SUBJECTIVE:   Khurram Reynoso is a 74 year old male who presents for Preventive Visit.      Are you in the first 12 months of your Medicare coverage?  No    Healthy Habits:     In general, how would you rate your overall health?  Good    Frequency of exercise:  None    Do you usually eat at least 4 servings of fruit and vegetables a day, include whole grains    & fiber and avoid regularly eating high fat or \"junk\" foods?  No    Taking medications regularly:  Yes    Medication side effects:  None    Ability to successfully perform activities of daily living:  No assistance needed    Home Safety:  No safety concerns identified    Hearing Impairment:  Difficulty following a conversation in a noisy restaurant or crowded room, find that men's voices are easier to understand than woman's and difficulty understanding soft or whispered speech    In the past 6 months, have you been bothered by leaking of urine?  No    In general, how would you rate your overall mental or emotional health?  Excellent      PHQ-2 Total Score: 0    Additional concerns today:  Yes    Do you feel safe in your environment? Yes    Have you ever done Advance Care Planning? (For example, a Health Directive, POLST, or a discussion with a medical provider or your loved ones about your wishes): Yes, patient states has an Advance Care Planning document and will bring a copy to the clinic.      Fall risk  Fallen 2 or more times in the past year?: Yes  Any fall with injury in the past year?: No    Cognitive Screening   1) Repeat 3 items (Leader, Season, Table)    2) Clock draw: NORMAL  3) 3 item recall: Recalls 2 objects   Results: NORMAL clock, 2 items recalled: COGNITIVE IMPAIRMENT LESS LIKELY    Mini-CogTM Copyright DA Sprague. Licensed by the author for use in [facility]; reprinted with permission (jolanta@.[facility]). All rights reserved.      Do you have sleep apnea, excessive snoring or daytime drowsiness?: no    Reviewed and updated as " needed this visit by clinical staff  Tobacco  Allergies  Meds         Reviewed and updated as needed this visit by Provider        Social History     Tobacco Use     Smoking status: Never Smoker     Smokeless tobacco: Never Used   Substance Use Topics     Alcohol use: No     Alcohol/week: 0.0 standard drinks     If you drink alcohol do you typically have >3 drinks per day or >7 drinks per week? No    Alcohol Use 3/2/2020   Prescreen: >3 drinks/day or >7 drinks/week? Not Applicable           PROBLEMS TO ADD ON...    Patient has h/o asthma. Has had exacerbation with need of Prednisone course. Improved , but now again has increased wheezing, cough, mild SOB , nighttime symptoms. No fever, CP, non productive cough.     Current providers sharing in care for this patient include:   Patient Care Team:  Familia Gillespie MD as PCP - [facility] (Internal Medicine)  Peterson, [facility] as PCP - Internal Medicine  [facility], Lavern Norris MD as MD (Otolaryngology)  Familia Gillespie MD as Assigned PCP    The following health maintenance items are reviewed in Epic and correct as of today:  Health Maintenance   Topic Date Due     ASTHMA ACTION PLAN  1945     ZOSTER IMMUNIZATION (1 of 2) 06/02/1996     AORTIC ANEURYSM SCREENING (SYSTEM ASSIGNED)  06/02/2011     MEDICARE ANNUAL WELLNESS VISIT  11/05/2019     ASTHMA CONTROL TEST  01/10/2020     FALL RISK ASSESSMENT  07/10/2020     DTAP/TDAP/TD IMMUNIZATION (2 - Td) 07/11/2020     ADVANCE CARE PLANNING  02/01/2022     LIPID  11/05/2023     COLONOSCOPY  01/05/2027     HEPATITIS C SCREENING  Completed     PHQ-2  Completed     INFLUENZA VACCINE  Completed     PNEUMOCOCCAL IMMUNIZATION 65+ LOW/MEDIUM RISK  Completed     IPV IMMUNIZATION  Aged Out     MENINGITIS IMMUNIZATION  Aged Out     Lab work is in process  Labs reviewed in EPIC      Review of Systems   Constitutional: Negative for chills and fever.   HENT: Positive for congestion and hearing loss. Negative for  ear pain and sore throat.    Eyes: Negative for pain and visual disturbance.   Respiratory: Positive for cough. Negative for shortness of breath.    Cardiovascular: Positive for peripheral edema. Negative for chest pain and palpitations.   Gastrointestinal: Negative for abdominal pain, constipation, diarrhea, heartburn, hematochezia and nausea.   Genitourinary: Positive for impotence. Negative for discharge, dysuria, frequency, genital sores, hematuria and urgency.   Musculoskeletal: Positive for arthralgias. Negative for joint swelling and myalgias.   Skin: Negative for rash.   Neurological: Negative for dizziness, weakness, headaches and paresthesias.   Psychiatric/Behavioral: Negative for mood changes. The patient is not nervous/anxious.          OBJECTIVE:   Ht 1.829 m (6')   BMI 33.23 kg/m   Estimated body mass index is 33.23 kg/m  as calculated from the following:    Height as of this encounter: 1.829 m (6').    Weight as of 1/14/20: 111.1 kg (245 lb).  Physical Exam  GENERAL: healthy, alert and no distress  EYES: Eyes grossly normal to inspection, PERRL and conjunctivae and sclerae normal  HENT: ear canals and TM's normal, nose and mouth without ulcers or lesions  NECK: no adenopathy, no asymmetry, masses, or scars and thyroid normal to palpation  RESP: lungs  - no rales, + course rhonchi and wheezes  CV: regular rate and rhythm, normal S1 S2, no S3 or S4, no murmur, click or rub, no peripheral edema and peripheral pulses strong  ABDOMEN: soft, nontender, no hepatosplenomegaly, no masses and bowel sounds normal  RECTAL: normal sphincter tone, no rectal masses, prostate 1+ normal size, smooth, nontender without nodules or masses  MS: no gross musculoskeletal defects noted, no edema  SKIN: no suspicious lesions or rashes  NEURO: Normal strength and tone, mentation intact and speech normal  PSYCH: mentation appears normal, affect normal/bright    Diagnostic Test Results:  Labs reviewed in Epic  Results for  orders placed or performed in visit on 03/02/20 (from the past 24 hour(s))   CBC with platelets   Result Value Ref Range    WBC 9.3 4.0 - 11.0 10e9/L    RBC Count 4.71 4.4 - 5.9 10e12/L    Hemoglobin 14.7 13.3 - 17.7 g/dL    Hematocrit 44.3 40.0 - 53.0 %    MCV 94 78 - 100 fl    MCH 31.2 26.5 - 33.0 pg    MCHC 33.2 31.5 - 36.5 g/dL    RDW 14.1 10.0 - 15.0 %    Platelet Count 324 150 - 450 10e9/L   *UA reflex to Microscopic   Result Value Ref Range    Color Urine Yellow     Appearance Urine Clear     Glucose Urine Negative NEG^Negative mg/dL    Bilirubin Urine Negative NEG^Negative    Ketones Urine Negative NEG^Negative mg/dL    Specific Gravity Urine 1.020 1.003 - 1.035    Blood Urine Trace (A) NEG^Negative    pH Urine 7.0 5.0 - 7.0 pH    Protein Albumin Urine Negative NEG^Negative mg/dL    Urobilinogen Urine 0.2 0.2 - 1.0 EU/dL    Nitrite Urine Negative NEG^Negative    Leukocyte Esterase Urine Negative NEG^Negative    Source Midstream Urine    Urine Microscopic   Result Value Ref Range    WBC Urine 0 - 5 OTO5^0 - 5 /HPF    RBC Urine O - 2 OTO2^O - 2 /HPF       ASSESSMENT / PLAN:       ICD-10-CM    1. Encounter for preventative adult health care examination Z00.00 CBC with platelets     Comprehensive metabolic panel     Lipid panel reflex to direct LDL Non-fasting     TSH with free T4 reflex     Prostate spec antigen screen     *UA reflex to Microscopic     Urine Microscopic   2. SOB (shortness of breath) R06.02 albuterol (VENTOLIN HFA) 108 (90 Base) MCG/ACT inhaler     OFFICE/OUTPT VISIT,EST,LEVL III   3. Moderate persistent asthma with exacerbation J45.41 predniSONE (DELTASONE) 20 MG tablet     CBC with platelets     Comprehensive metabolic panel     OFFICE/OUTPT VISIT,EST,LEVL III   4. Screening for prostate cancer Z12.5 Prostate spec antigen screen   assess lab work  Continue treatment   Start on Prednisone taper  Follow up with pulmonary     COUNSELING:  Reviewed preventive health counseling, as reflected in  patient instructions       Regular exercise       Healthy diet/nutrition       Vision screening       Hearing screening       Dental care       Colon cancer screening       Prostate cancer screening    Estimated body mass index is 33.23 kg/m  as calculated from the following:    Height as of this encounter: 1.829 m (6').    Weight as of 1/14/20: 111.1 kg (245 lb).    Weight management plan: Discussed healthy diet and exercise guidelines     reports that he has never smoked. He has never used smokeless tobacco.      Appropriate preventive services were discussed with this patient, including applicable screening as appropriate for cardiovascular disease, diabetes, osteopenia/osteoporosis, and glaucoma.  As appropriate for age/gender, discussed screening for colorectal cancer, prostate cancer, breast cancer, and cervical cancer. Checklist reviewing preventive services available has been given to the patient.    Reviewed patients plan of care and provided an AVS. The Intermediate Care Plan ( asthma action plan, low back pain action plan, and migraine action plan) for Khurram meets the Care Plan requirement. This Care Plan has been established and reviewed with the Patient.    Counseling Resources:  ATP IV Guidelines  Pooled Cohorts Equation Calculator  Breast Cancer Risk Calculator  FRAX Risk Assessment  ICSI Preventive Guidelines  Dietary Guidelines for Americans, 2010  [employer] MyPlate  ASA Prophylaxis  Lung CA Screening    Familia Gillespie MD  [facility]    Identified Health Risks:

## 2020-03-03 LAB
ALBUMIN SERPL-MCNC: 3.5 G/DL (ref 3.4–5)
ALP SERPL-CCNC: 122 U/L (ref 40–150)
ALT SERPL W P-5'-P-CCNC: 31 U/L (ref 0–70)
ANION GAP SERPL CALCULATED.3IONS-SCNC: 8 MMOL/L (ref 3–14)
AST SERPL W P-5'-P-CCNC: 26 U/L (ref 0–45)
BILIRUB SERPL-MCNC: 0.4 MG/DL (ref 0.2–1.3)
BUN SERPL-MCNC: 19 MG/DL (ref 7–30)
CALCIUM SERPL-MCNC: 9 MG/DL (ref 8.5–10.1)
CHLORIDE SERPL-SCNC: 107 MMOL/L (ref 94–109)
CHOLEST SERPL-MCNC: 162 MG/DL
CO2 SERPL-SCNC: 24 MMOL/L (ref 20–32)
CREAT SERPL-MCNC: 1.04 MG/DL (ref 0.66–1.25)
GFR SERPL CREATININE-BSD FRML MDRD: 71 ML/MIN/{1.73_M2}
GLUCOSE SERPL-MCNC: 82 MG/DL (ref 70–99)
HDLC SERPL-MCNC: 42 MG/DL
LDLC SERPL CALC-MCNC: 101 MG/DL
NONHDLC SERPL-MCNC: 120 MG/DL
POTASSIUM SERPL-SCNC: 4.4 MMOL/L (ref 3.4–5.3)
PROT SERPL-MCNC: 7.8 G/DL (ref 6.8–8.8)
PSA SERPL-ACNC: 1.95 UG/L (ref 0–4)
SODIUM SERPL-SCNC: 139 MMOL/L (ref 133–144)
T4 FREE SERPL-MCNC: 1.11 NG/DL (ref 0.76–1.46)
TRIGL SERPL-MCNC: 94 MG/DL
TSH SERPL DL<=0.005 MIU/L-ACNC: 5.33 MU/L (ref 0.4–4)

## 2020-03-05 ENCOUNTER — TELEPHONE (OUTPATIENT)
Dept: INTERNAL MEDICINE | Facility: CLINIC | Age: 75
End: 2020-03-05

## 2020-03-05 DIAGNOSIS — J45.41 MODERATE PERSISTENT ASTHMA WITH EXACERBATION: ICD-10-CM

## 2020-03-05 RX ORDER — PREDNISONE 20 MG/1
TABLET ORAL
Qty: 25 TABLET | Refills: 0 | Status: SHIPPED | OUTPATIENT
Start: 2020-03-05 | End: 2020-07-14

## 2020-03-05 NOTE — TELEPHONE ENCOUNTER
Instruction says: Take 3 tabs by mouth daily x 3 days, then 2 tabs daily x 3 days, then 1 tab daily x 3 days, then 1/2 tab daily x 3 days, then 1/5 tablet every other day for 10 days     For now I would advise them to dispense from 3 tabs Daily to 1/2 tab daily for 3 days and hold off on the clarification of 1/5 tablet with Familia Gillespie MD by tomorrow.    Casimiro Glaser MD

## 2020-03-05 NOTE — TELEPHONE ENCOUNTER
[facility] pharmacy calling to ask get clarification for prednisone. They need to clarify directions on how the patient is suppose to take 1/5 of a tablet and also the quantity does not match the directions. The patient already came there today to pick this up but wasn't able to do so.  Please ask another provider to do this if possible since Dr Gillespie is gone today.

## 2020-03-05 NOTE — TELEPHONE ENCOUNTER
See message below. Pharmacy needs clarification for Prednisone directions AND Quantity amount.

## 2020-03-05 NOTE — TELEPHONE ENCOUNTER
Call to [facility] and left detailed message.     Dr Gillespie, please clarify the 1/5 tablet directions AND the dispense amount.

## 2020-04-24 ENCOUNTER — TRANSFERRED RECORDS (OUTPATIENT)
Dept: HEALTH INFORMATION MANAGEMENT | Facility: CLINIC | Age: 75
End: 2020-04-24

## 2020-05-09 DIAGNOSIS — J45.30 MILD PERSISTENT ASTHMA WITHOUT COMPLICATION: ICD-10-CM

## 2020-05-09 DIAGNOSIS — R05.9 COUGH: ICD-10-CM

## 2020-05-11 RX ORDER — FLUTICASONE PROPIONATE 50 MCG
SPRAY, SUSPENSION (ML) NASAL
Qty: 16 G | Refills: 2 | Status: SHIPPED | OUTPATIENT
Start: 2020-05-11 | End: 2020-10-27

## 2020-05-11 NOTE — TELEPHONE ENCOUNTER
Pending Prescriptions:                       Disp   Refills    fluticasone (FLONASE) 50 MCG/ACT nasal sp*16 g   2            Sig: SHAKE LIQUID AND USE 1 TO 2 SPRAYS IN EACH           NOSTRIL DAILY    Prescription approved per [facility] Refill Protocol.

## 2020-06-12 ENCOUNTER — HOSPITAL ENCOUNTER (EMERGENCY)
Facility: CLINIC | Age: 75
Discharge: HOME | End: 2020-06-12
Attending: EMERGENCY MEDICINE | Admitting: EMERGENCY MEDICINE
Payer: COMMERCIAL

## 2020-06-12 ENCOUNTER — APPOINTMENT (OUTPATIENT)
Dept: GENERAL RADIOLOGY | Facility: CLINIC | Age: 75
End: 2020-06-12
Attending: EMERGENCY MEDICINE
Payer: COMMERCIAL

## 2020-06-12 VITALS
RESPIRATION RATE: 18 BRPM | SYSTOLIC BLOOD PRESSURE: 126 MMHG | HEART RATE: 90 BPM | OXYGEN SATURATION: 96 % | DIASTOLIC BLOOD PRESSURE: 81 MMHG

## 2020-06-12 DIAGNOSIS — S61.419A LACERATION OF HAND INVOLVING EXTENSOR TENDON, INITIAL ENCOUNTER: ICD-10-CM

## 2020-06-12 DIAGNOSIS — S66.829A LACERATION OF HAND INVOLVING EXTENSOR TENDON, INITIAL ENCOUNTER: ICD-10-CM

## 2020-06-12 DIAGNOSIS — S61.012A LACERATION OF LEFT THUMB WITHOUT FOREIGN BODY WITHOUT DAMAGE TO NAIL, INITIAL ENCOUNTER: ICD-10-CM

## 2020-06-12 PROCEDURE — 12002 RPR S/N/AX/GEN/TRNK2.6-7.5CM: CPT | Mod: 59

## 2020-06-12 PROCEDURE — 99284 EMERGENCY DEPT VISIT MOD MDM: CPT | Mod: 25

## 2020-06-12 PROCEDURE — 73140 X-RAY EXAM OF FINGER(S): CPT | Mod: LT

## 2020-06-12 PROCEDURE — 25000132 ZZH RX MED GY IP 250 OP 250 PS 637: Performed by: EMERGENCY MEDICINE

## 2020-06-12 PROCEDURE — 26418 REPAIR FINGER TENDON: CPT

## 2020-06-12 RX ORDER — CEPHALEXIN 500 MG/1
500 CAPSULE ORAL 3 TIMES DAILY
Qty: 15 CAPSULE | Refills: 0 | Status: SHIPPED | OUTPATIENT
Start: 2020-06-12 | End: 2020-07-14

## 2020-06-12 RX ORDER — CEPHALEXIN 500 MG/1
1000 CAPSULE ORAL ONCE
Status: COMPLETED | OUTPATIENT
Start: 2020-06-12 | End: 2020-06-12

## 2020-06-12 RX ORDER — BUPIVACAINE HYDROCHLORIDE 5 MG/ML
INJECTION, SOLUTION PERINEURAL
Status: DISCONTINUED
Start: 2020-06-12 | End: 2020-06-12 | Stop reason: HOSPADM

## 2020-06-12 RX ADMIN — CEPHALEXIN 1000 MG: 500 CAPSULE ORAL at 13:12

## 2020-06-12 ASSESSMENT — ENCOUNTER SYMPTOMS
NUMBNESS: 0
WOUND: 1
WEAKNESS: 1

## 2020-06-12 NOTE — DISCHARGE INSTRUCTIONS
We have placed 2 sutures into the left extensor tendon as well as 18 stitches into the skin to repair a jagged multilayer laceration of the thumb.  Use antibiotics as prevention for infection.  Please follow-up with Dr. Clements after the weekend for reassessment of extensor tendon injury.

## 2020-06-12 NOTE — ED PROVIDER NOTES
History     Chief Complaint:  Laceration    HPI  Khurram Reynoso is a 75 year old male who presents for evaluation of a laceration to his left thumb. The patient was using a chain saw for landscaping and this slipped and cut his thumb. He has full sensation but limited range of motion. The patient had a prior tendon surgery on this thumb several years ago and has some residual limited motion but today feels he is not able to extend the thumb his baseline amount. He denies any other injuries.     Allergies:  No known drug allergies    Medications:    Albuterol  Breo Ellipta  Epipen  Indocin  Robaxin  Zantac    Past Medical History:    Cardiac dysrhythmia  Acute bronchitis  Primary cardiomyopathy  PVC   PAC  Mild persistent asthma   Calculus of kidney  Acute idiopathic gout of right foot   Cardiac arrest  Degeneration of lumbar intervertebral discs  Chronic back pain  Renal disease     Past Surgical History:    Orthopedic surgery bilateral  Decompress disc rf lumbar   Soft tissue surgery right  Cardiac surgery  Optical tracking system fusion spine posterior lumbar three+ levels    Family History:    C.A.D. in his father  Cancer in his brother  Heart Surgery in his father  Hypertension in his father    Social History:  The patient arrives alone  Smoking: never  Alcohol use: no  PCP: Familia Gillespie  Marital Status:  [2]      Review of Systems   Skin: Positive for wound.   Neurological: Positive for weakness. Negative for numbness.   All other systems reviewed and are negative.      Physical Exam     Patient Vitals for the past 24 hrs:   BP Pulse Heart Rate Resp SpO2   06/12/20 1102 126/81 90 90 18 96 %     Physical Exam  Constitutional:       Appearance: He is obese.   Cardiovascular:      Rate and Rhythm: Normal rate.   Pulmonary:      Effort: Pulmonary effort is normal.   Musculoskeletal:      Comments: Left thumb: There is a laceration over the dorsum of the left thumb in between the metacarpophalangeal  and distal interphalangeal joint.  This is jagged with 3 parallel lacerations involving the dorsum of the thumb.  The thumb is held in flexion patient is unable to dorsiflex the distal aspect of the thumb.  There is a large amount of swelling over the metacarpal joint consistent with patient's history of arthritis.  No nailbed injury   Neurological:      Mental Status: He is alert.           Emergency Department Course     Imaging:  Radiology findings were communicated with the patient who voiced understanding of the findings.    XR Finger, 2 views left  IMPRESSION: No acute-appearing bony abnormality. Osteoarthritis   throughout the thumb. Small amount of soft tissue gas is seen   consistent with known laceration  Reading per radiology      Procedures    Laceration Repair        LACERATION:  A complex clean 3 cm laceration.      LOCATION:  Left thumb      FUNCTION:  Distally sensation, circulation and motor are intact.      ANESTHESIA:  Local using bupivacaine total of 8 mLs as didigtal block      PREPARATION:  Irrigation and Scrubbing with Normal Saline and Shur Clens      DEBRIDEMENT:  wound explored, no foreign body found      CLOSURE:  Wound was closed with Two Layers: The both proximal and distal aspects of the extensor tendons were identified using 3-0 Vicryl a figure-of-eight stitch was performed to tie the distal and proximal ends of the tendon together.  Patient tolerated this well.  Overlying skin was then sutured using 18 single 5-0 Ethilon in interrupted stitches with moderate skin approximation.  Wound was bandaged by the ER tech.  Patient is placed in a thumb AlumaFoam splint in extension.    Interventions:  1312 Keflex 1000 mg PO    Emergency Department Course:  Past medical records, nursing notes, and vitals reviewed.  1112: I performed an exam of the patient and obtained history, as documented above.     The patient was sent for a XR while in the emergency department, findings above    1320: I  rechecked the patient. Explained findings to patient.    I personally reviewed the imaging results with the Patient and answered all related questions prior to discharge.     Findings and plan explained to the Patient. Patient discharged home with instructions regarding supportive care, medications, and reasons to return. The importance of close follow-up was reviewed.  Impression & Plan      Medical Decision Making:  Khurram Reynoso is a 75 year old male who presents to the emergency department today for evaluation of thumb injury.  Patient has a history of prior tendon repair on examination patient has an exam consistent with likely extensor tendon injury.  Due to the fact that I could identify both the distal and proximal ends of the extensor tendon this was repaired using a figure of eight 3-0 Vicryl suture and then skin was repaired.  Extensor tendon repair is limited due to my inability only to suture the proximal end by a small amount.  Patient is encouraged to follow-up with hand surgery for reassessment of the repair.  For now we will start prophylactic antibiotics recommend holding thumb in extension as bridge to follow-up with hand surgery.    Diagnosis:    ICD-10-CM   1. Laceration of hand involving extensor tendon, initial encounter  S61.419A    S66.829A   2. Laceration of left thumb without foreign body without damage to nail, initial encounter  S61.012A       Disposition:   discharged to home    Discharge Medications:     Medication List      Started    cephALEXin 500 MG capsule  Commonly known as:  KEFLEX  500 mg, Oral, 3 TIMES DAILY            Scribe Disclosure:  Patricia WELCH, am serving as a scribe at 11:12 AM on 6/12/2020 to document services personally performed by Boni Smart MD based on my observations and the provider's statements to me.    [facility] EMERGENCY DEPARTMENT     Boni Smart MD  06/13/20 0856

## 2020-06-12 NOTE — ED AVS SNAPSHOT
[facility] Emergency Department  [address]  [facility] [address]  Phone:  [phone]  Fax:  [phone]                                    Khurram Reynoso   MRN: [id number]    Department:  [facility] Emergency Department   Date of Visit:  6/12/2020           After Visit Summary Signature Page    I have received my discharge instructions, and my questions have been answered. I have discussed any challenges I see with this plan with the nurse or doctor.    ..........................................................................................................................................  Patient/Patient Representative Signature      ..........................................................................................................................................  Patient Representative Print Name and Relationship to Patient    ..................................................               ................................................  Date                                   Time    ..........................................................................................................................................  Reviewed by Signature/Title    ...................................................              ..............................................  Date                                               Time          22EPIC Rev 08/18

## 2020-07-09 DIAGNOSIS — M10.9 ACUTE GOUTY ARTHRITIS: ICD-10-CM

## 2020-07-10 RX ORDER — INDOMETHACIN 50 MG/1
CAPSULE ORAL
Qty: 42 CAPSULE | Refills: 0 | OUTPATIENT
Start: 2020-07-10

## 2020-07-13 ENCOUNTER — NURSE TRIAGE (OUTPATIENT)
Dept: NURSING | Facility: CLINIC | Age: 75
End: 2020-07-13

## 2020-07-13 NOTE — TELEPHONE ENCOUNTER
"RN triage call from patient  He reports   Right ankle swelling for a few days  He has used some indomethacin that he had on hand which seemed to bring relief, he does not have anymore and would like to try some more  He states his Right leg is always swollen from the knee down and his cardiologist has said it was not \"a big deal\"  He states his right ankle now looks like someone blew air in it.  No real pain no calf pain no change in breathing  No color change no temperature change can walk and move foot and toes as normal  Patient declined having a visit  Review symptoms of when to call back  Routing message to primary pool for assistance  Bhakti Lyman RN  [facility] Nurse Advisor          Additional Information    Negative: Difficulty breathing    Negative: Entire foot is cool or blue in comparison to other side    Negative: Fever    Negative: Patient sounds very sick or weak to the triager    Negative: [1] SEVERE pain (e.g., excruciating, unable to walk) AND [2] not improved after 2 hours of pain medicine    Negative: [1] Can't move swollen joint at all AND [2] no fever    Negative: [1] Redness AND [2] painful when touched AND [3] no fever    Negative: [1] Red area or streak [2] large (> 2 in. or 5 cm)    Negative: [1] Very swollen joint AND [2] no fever    Negative: Looks like a boil, infected sore, deep ulcer or other infected rash (spreading redness, pus)    Swollen ankle joint  (Exception: area of localized swelling which is itchy)    Protocols used: ANKLE SWELLING-A-AH      "

## 2020-07-13 NOTE — TELEPHONE ENCOUNTER
See FNA visit. Please advise if Virtual Visit would suffice.     He declined appointment per notes below.     Last OV on 3/2/20

## 2020-07-14 ENCOUNTER — VIRTUAL VISIT (OUTPATIENT)
Dept: INTERNAL MEDICINE | Facility: CLINIC | Age: 75
End: 2020-07-14
Payer: COMMERCIAL

## 2020-07-14 DIAGNOSIS — M79.89 FOOT SWELLING: ICD-10-CM

## 2020-07-14 DIAGNOSIS — M10.071 ACUTE IDIOPATHIC GOUT OF RIGHT FOOT: Primary | ICD-10-CM

## 2020-07-14 PROCEDURE — 99213 OFFICE O/P EST LOW 20 MIN: CPT | Mod: 95 | Performed by: INTERNAL MEDICINE

## 2020-07-14 RX ORDER — OMEPRAZOLE 10 MG/1
20 CAPSULE, DELAYED RELEASE ORAL DAILY
COMMUNITY
End: 2020-11-09

## 2020-07-14 NOTE — PROGRESS NOTES
"Khurram Reynoso is a 75 year old male who is being evaluated via a billable video visit.      The patient has been notified of following:     \"This video visit will be conducted via a call between you and your physician/provider. We have found that certain health care needs can be provided without the need for an in-person physical exam.  This service lets us provide the care you need with a video conversation.  If a prescription is necessary we can send it directly to your pharmacy.  If lab work is needed we can place an order for that and you can then stop by our lab to have the test done at a later time.    Video visits are billed at different rates depending on your insurance coverage.  Please reach out to your insurance provider with any questions.    If during the course of the call the physician/provider feels a video visit is not appropriate, you will not be charged for this service.\"    Patient has given verbal consent for Video visit? Yes  How would you like to obtain your AVS? [facility]  Patient would like the video invitation sent by: Send to e-mail at: [phone]  Will anyone else be joining your video visit? No    Subjective     Khurram Reynoso is a 75 year old male who presents today via video visit for the following health issues:    HPI    Presents with swelling of the right foot and ankle for 3 days.   Worse at the end of the day. Better after rest and leg elevation.   No pain, able to walk, no injury, no redness, no warmth. No fever.   Mild swelling in the lower leg noted. Has h/o edema in the past, on and off.   Has h/o gout, affecting the ankle and toe. No pain currently.        Video Start Time: 5:15 PM        Patient Active Problem List   Diagnosis     Cardiac dysrhythmia     Acute bronchitis     Primary cardiomyopathy (H)     Gait difficulty     CARDIOVASCULAR SCREENING; LDL GOAL LESS THAN 160     PVC (premature ventricular contraction)     PAC (premature atrial contraction)     ACP " (advance care planning)     Mild persistent asthma without complication     Calculus of kidney     Acute idiopathic gout of right foot     Past Surgical History:   Procedure Laterality Date     C NONSPECIFIC PROCEDURE      knee     CARDIAC SURGERY      Ablation     DECOMPRESS DISC RF LUMBAR  3/2001     OPTICAL TRACKING SYSTEM FUSION SPINE POSTERIOR LUMBAR THREE+ LEVELS N/A 6/27/2016    Procedure: OPTICAL TRACKING SYSTEM FUSION SPINE POSTERIOR LUMBAR THREE+ LEVELS;  Surgeon: Hieu Araiza MD;  Location: [facility] OR     ORTHOPEDIC SURGERY Bilateral     total knee replacements     ORTHOPEDIC SURGERY Right     Total Hipe Replacement     SOFT TISSUE SURGERY Right     right wrist ganglion surgery       Social History     Tobacco Use     Smoking status: Never Smoker     Smokeless tobacco: Never Used   Substance Use Topics     Alcohol use: No     Alcohol/week: 0.0 standard drinks     Family History   Problem Relation Age of Onset     C.A.D. Father      Hypertension Father      Heart Surgery Father         bypass     Cancer Brother         bone ca      Family History Negative Mother      Other - See Comments Sister         polio     Unknown/Adopted Maternal Grandmother      Unknown/Adopted Maternal Grandfather      Unknown/Adopted Paternal Grandmother      Unknown/Adopted Paternal Grandfather      Family History Negative Sister          Current Outpatient Medications   Medication Sig Dispense Refill     albuterol (PROVENTIL) (2.5 MG/3ML) 0.083% neb solution        albuterol (VENTOLIN HFA) 108 (90 Base) MCG/ACT inhaler INHALE 2 PUFFS BY MOUTH FOUR TIMES DAILY IF NEEDED 18 g 3     BREO ELLIPTA 200-25 MCG/INH Inhaler INHALE 1 PUFF PO ONCE QAM.       fluticasone (FLONASE) 50 MCG/ACT nasal spray SHAKE LIQUID AND USE 1 TO 2 SPRAYS IN EACH NOSTRIL DAILY 16 g 2     omeprazole (PRILOSEC) 10 MG DR capsule Take 20 mg by mouth daily       EPINEPHrine (EPIPEN/ADRENACLICK/OR ANY BX GENERIC EQUIV) 0.3 MG/0.3ML injection 2-pack  Inject 0.3 mLs (0.3 mg) into the muscle as needed for anaphylaxis (Patient not taking: Reported on 7/14/2020) 1 each 1     indomethacin (INDOCIN) 50 MG capsule Take 1 capsule (50 mg) by mouth 3 times daily (with meals) for 14 days 42 capsule 0     methocarbamol (ROBAXIN) 750 MG tablet Take 1 tablet (750 mg) by mouth 4 times daily as needed for muscle spasms (Patient not taking: Reported on 7/14/2020) 90 tablet 1       Reviewed and updated as needed this visit by Provider         Review of Systems   Constitutional, HEENT, cardiovascular, pulmonary, gi and gu systems are negative, except as otherwise noted.      Objective             Physical Exam     GENERAL: Healthy, alert and no distress  EYES: Eyes grossly normal to inspection.  No discharge or erythema, or obvious scleral/conjunctival abnormalities.  RESP: No audible wheeze, cough, or visible cyanosis.  No visible retractions or increased work of breathing.    SKIN: Visible skin clear. No significant rash, abnormal pigmentation or lesions.  Right lower leg and ankle and foot edema 2+ , no redness   NEURO: Cranial nerves grossly intact.  Mentation and speech appropriate for age.  PSYCH: Mentation appears normal, affect normal/bright, judgement and insight intact, normal speech and appearance well-groomed.      Diagnostic Test Results:  Labs reviewed in Epic        Assessment & Plan   Problem List Items Addressed This Visit     Acute idiopathic gout of right foot - Primary    Relevant Orders    D dimer, quantitative    Uric acid    Erythrocyte sedimentation rate auto      Other Visit Diagnoses     Foot swelling        Relevant Orders    D dimer, quantitative    Uric acid    Erythrocyte sedimentation rate auto                 See Patient Instructions  Return in about 1 week (around 7/21/2020) for follow up on acute problem if persists.    Familia Gillespie MD  [facility]      Video-Visit Details    Type of service:  Video Visit    Video End  Time:5:24 PM    Originating Location (pt. Location): Home    Distant Location (provider location):  [facility]     Platform used for Video Visit: Doximity    Assess lab work for acute inflammation, DVT  Keep leg elevated  Use TEDs  Keep low salt diet       Familia Gillespie MD

## 2020-07-16 DIAGNOSIS — M10.071 ACUTE IDIOPATHIC GOUT OF RIGHT FOOT: ICD-10-CM

## 2020-07-16 DIAGNOSIS — M79.89 FOOT SWELLING: ICD-10-CM

## 2020-07-16 LAB
D DIMER PPP FEU-MCNC: 1.7 UG/ML FEU (ref 0–0.5)
ERYTHROCYTE [SEDIMENTATION RATE] IN BLOOD BY WESTERGREN METHOD: 7 MM/H (ref 0–20)
URATE SERPL-MCNC: 9.7 MG/DL (ref 3.5–7.2)

## 2020-07-16 PROCEDURE — 85379 FIBRIN DEGRADATION QUANT: CPT | Performed by: INTERNAL MEDICINE

## 2020-07-16 PROCEDURE — 36415 COLL VENOUS BLD VENIPUNCTURE: CPT | Performed by: INTERNAL MEDICINE

## 2020-07-16 PROCEDURE — 84550 ASSAY OF BLOOD/URIC ACID: CPT | Performed by: INTERNAL MEDICINE

## 2020-07-16 PROCEDURE — 85652 RBC SED RATE AUTOMATED: CPT | Performed by: INTERNAL MEDICINE

## 2020-07-22 ENCOUNTER — HOSPITAL ENCOUNTER (OUTPATIENT)
Dept: ULTRASOUND IMAGING | Facility: CLINIC | Age: 75
Discharge: HOME | End: 2020-07-22
Attending: INTERNAL MEDICINE | Admitting: INTERNAL MEDICINE
Payer: COMMERCIAL

## 2020-07-22 DIAGNOSIS — M79.89 FOOT SWELLING: ICD-10-CM

## 2020-07-22 PROCEDURE — 93971 EXTREMITY STUDY: CPT | Mod: RT

## 2020-08-06 ENCOUNTER — ANESTHESIA EVENT (OUTPATIENT)
Dept: SURGERY | Facility: CLINIC | Age: 75
End: 2020-08-06
Payer: COMMERCIAL

## 2020-08-06 ENCOUNTER — PREP FOR PROCEDURE (OUTPATIENT)
Dept: SURGERY | Facility: CLINIC | Age: 75
End: 2020-08-06

## 2020-08-06 ENCOUNTER — MYC MEDICAL ADVICE (OUTPATIENT)
Dept: INTERNAL MEDICINE | Facility: CLINIC | Age: 75
End: 2020-08-06

## 2020-08-06 ENCOUNTER — APPOINTMENT (OUTPATIENT)
Dept: GENERAL RADIOLOGY | Facility: CLINIC | Age: 75
End: 2020-08-06
Attending: UROLOGY
Payer: COMMERCIAL

## 2020-08-06 ENCOUNTER — APPOINTMENT (OUTPATIENT)
Dept: CT IMAGING | Facility: CLINIC | Age: 75
End: 2020-08-06
Attending: NURSE PRACTITIONER
Payer: COMMERCIAL

## 2020-08-06 ENCOUNTER — HOSPITAL ENCOUNTER (OUTPATIENT)
Facility: CLINIC | Age: 75
Discharge: HOME | End: 2020-08-06
Attending: NURSE PRACTITIONER | Admitting: UROLOGY
Payer: COMMERCIAL

## 2020-08-06 ENCOUNTER — ANESTHESIA (OUTPATIENT)
Dept: SURGERY | Facility: CLINIC | Age: 75
End: 2020-08-06
Payer: COMMERCIAL

## 2020-08-06 VITALS
WEIGHT: 243.4 LBS | HEIGHT: 72 IN | TEMPERATURE: 97.4 F | DIASTOLIC BLOOD PRESSURE: 90 MMHG | OXYGEN SATURATION: 95 % | RESPIRATION RATE: 16 BRPM | SYSTOLIC BLOOD PRESSURE: 144 MMHG | BODY MASS INDEX: 32.97 KG/M2 | HEART RATE: 71 BPM

## 2020-08-06 DIAGNOSIS — N20.1 URETERAL CALCULUS: Primary | ICD-10-CM

## 2020-08-06 DIAGNOSIS — N20.1 LEFT URETERAL STONE: ICD-10-CM

## 2020-08-06 DIAGNOSIS — N20.1 LEFT URETERAL STONE: Primary | ICD-10-CM

## 2020-08-06 DIAGNOSIS — N20.0 KIDNEY STONE: ICD-10-CM

## 2020-08-06 DIAGNOSIS — Z11.59 ENCOUNTER FOR SCREENING FOR OTHER VIRAL DISEASES: Primary | ICD-10-CM

## 2020-08-06 LAB
ALBUMIN UR-MCNC: NEGATIVE MG/DL
ANION GAP SERPL CALCULATED.3IONS-SCNC: 6 MMOL/L (ref 3–14)
APPEARANCE UR: CLEAR
BILIRUB UR QL STRIP: NEGATIVE
BUN SERPL-MCNC: 19 MG/DL (ref 7–30)
CALCIUM SERPL-MCNC: 8.9 MG/DL (ref 8.5–10.1)
CHLORIDE SERPL-SCNC: 108 MMOL/L (ref 94–109)
CO2 SERPL-SCNC: 25 MMOL/L (ref 20–32)
COLOR UR AUTO: ABNORMAL
CREAT SERPL-MCNC: 1.52 MG/DL (ref 0.66–1.25)
ERYTHROCYTE [DISTWIDTH] IN BLOOD BY AUTOMATED COUNT: 13.1 % (ref 10–15)
GFR SERPL CREATININE-BSD FRML MDRD: 44 ML/MIN/{1.73_M2}
GLUCOSE SERPL-MCNC: 113 MG/DL (ref 70–99)
GLUCOSE UR STRIP-MCNC: NEGATIVE MG/DL
HCT VFR BLD AUTO: 48.2 % (ref 40–53)
HGB BLD-MCNC: 16.2 G/DL (ref 13.3–17.7)
HGB UR QL STRIP: ABNORMAL
KETONES UR STRIP-MCNC: NEGATIVE MG/DL
LABORATORY COMMENT REPORT: NORMAL
LEUKOCYTE ESTERASE UR QL STRIP: NEGATIVE
MCH RBC QN AUTO: 31.5 PG (ref 26.5–33)
MCHC RBC AUTO-ENTMCNC: 33.6 G/DL (ref 31.5–36.5)
MCV RBC AUTO: 94 FL (ref 78–100)
MUCOUS THREADS #/AREA URNS LPF: PRESENT /LPF
NITRATE UR QL: NEGATIVE
PH UR STRIP: 5 PH (ref 5–7)
PLATELET # BLD AUTO: 347 10E9/L (ref 150–450)
POTASSIUM SERPL-SCNC: 4.1 MMOL/L (ref 3.4–5.3)
RBC # BLD AUTO: 5.14 10E12/L (ref 4.4–5.9)
RBC #/AREA URNS AUTO: 8 /HPF (ref 0–2)
SARS-COV-2 RNA SPEC QL NAA+PROBE: NEGATIVE
SARS-COV-2 RNA SPEC QL NAA+PROBE: NORMAL
SODIUM SERPL-SCNC: 139 MMOL/L (ref 133–144)
SOURCE: ABNORMAL
SP GR UR STRIP: 1.02 (ref 1–1.03)
SPECIMEN SOURCE: NORMAL
SPECIMEN SOURCE: NORMAL
SQUAMOUS #/AREA URNS AUTO: <1 /HPF (ref 0–1)
UROBILINOGEN UR STRIP-MCNC: NORMAL MG/DL (ref 0–2)
WBC # BLD AUTO: 10 10E9/L (ref 4–11)
WBC #/AREA URNS AUTO: 2 /HPF (ref 0–5)

## 2020-08-06 PROCEDURE — 71000027 ZZH RECOVERY PHASE 2 EACH 15 MINS: Performed by: UROLOGY

## 2020-08-06 PROCEDURE — 36000052 ZZH SURGERY LEVEL 2 EA 15 ADDTL MIN: Performed by: UROLOGY

## 2020-08-06 PROCEDURE — 40000306 ZZH STATISTIC PRE PROC ASSESS II: Performed by: UROLOGY

## 2020-08-06 PROCEDURE — 99203 OFFICE O/P NEW LOW 30 MIN: CPT | Performed by: PHYSICIAN ASSISTANT

## 2020-08-06 PROCEDURE — U0003 INFECTIOUS AGENT DETECTION BY NUCLEIC ACID (DNA OR RNA); SEVERE ACUTE RESPIRATORY SYNDROME CORONAVIRUS 2 (SARS-COV-2) (CORONAVIRUS DISEASE [COVID-19]), AMPLIFIED PROBE TECHNIQUE, MAKING USE OF HIGH THROUGHPUT TECHNOLOGIES AS DESCRIBED BY CMS-2020-01-R: HCPCS | Performed by: NURSE PRACTITIONER

## 2020-08-06 PROCEDURE — 85027 COMPLETE CBC AUTOMATED: CPT | Performed by: NURSE PRACTITIONER

## 2020-08-06 PROCEDURE — 52332 CYSTOSCOPY AND TREATMENT: CPT | Mod: LT | Performed by: UROLOGY

## 2020-08-06 PROCEDURE — 25000128 H RX IP 250 OP 636: Performed by: NURSE PRACTITIONER

## 2020-08-06 PROCEDURE — 99201 ZZC OFFICE/OUTPT VISIT, NEW, LEVEL I: CPT | Performed by: UROLOGY

## 2020-08-06 PROCEDURE — 37000008 ZZH ANESTHESIA TECHNICAL FEE, 1ST 30 MIN: Performed by: UROLOGY

## 2020-08-06 PROCEDURE — 25000132 ZZH RX MED GY IP 250 OP 250 PS 637: Performed by: NURSE PRACTITIONER

## 2020-08-06 PROCEDURE — 25800030 ZZH RX IP 258 OP 636: Performed by: NURSE PRACTITIONER

## 2020-08-06 PROCEDURE — C9803 HOPD COVID-19 SPEC COLLECT: HCPCS

## 2020-08-06 PROCEDURE — 81001 URINALYSIS AUTO W/SCOPE: CPT | Performed by: NURSE PRACTITIONER

## 2020-08-06 PROCEDURE — 25800030 ZZH RX IP 258 OP 636: Performed by: NURSE ANESTHETIST, CERTIFIED REGISTERED

## 2020-08-06 PROCEDURE — C2617 STENT, NON-COR, TEM W/O DEL: HCPCS | Performed by: UROLOGY

## 2020-08-06 PROCEDURE — 99207 ZZC CDG-CODE CATEGORY CHANGED: CPT | Performed by: PHYSICIAN ASSISTANT

## 2020-08-06 PROCEDURE — 99285 EMERGENCY DEPT VISIT HI MDM: CPT | Mod: 25

## 2020-08-06 PROCEDURE — 80048 BASIC METABOLIC PNL TOTAL CA: CPT | Performed by: NURSE PRACTITIONER

## 2020-08-06 PROCEDURE — 25000128 H RX IP 250 OP 636: Performed by: NURSE ANESTHETIST, CERTIFIED REGISTERED

## 2020-08-06 PROCEDURE — 37000009 ZZH ANESTHESIA TECHNICAL FEE, EACH ADDTL 15 MIN: Performed by: UROLOGY

## 2020-08-06 PROCEDURE — 96374 THER/PROPH/DIAG INJ IV PUSH: CPT | Mod: 59

## 2020-08-06 PROCEDURE — 71000012 ZZH RECOVERY PHASE 1 LEVEL 1 FIRST HR: Performed by: UROLOGY

## 2020-08-06 PROCEDURE — 96361 HYDRATE IV INFUSION ADD-ON: CPT

## 2020-08-06 PROCEDURE — 25000128 H RX IP 250 OP 636: Performed by: UROLOGY

## 2020-08-06 PROCEDURE — 36000054 ZZH SURGERY LEVEL 2 W FLUORO 1ST 30 MIN: Performed by: UROLOGY

## 2020-08-06 PROCEDURE — 74176 CT ABD & PELVIS W/O CONTRAST: CPT

## 2020-08-06 PROCEDURE — 40000277 XR SURGERY CARM FLUORO LESS THAN 5 MIN W STILLS: Mod: TC

## 2020-08-06 PROCEDURE — 25000125 ZZHC RX 250: Performed by: NURSE ANESTHETIST, CERTIFIED REGISTERED

## 2020-08-06 PROCEDURE — 25800030 ZZH RX IP 258 OP 636: Performed by: ANESTHESIOLOGY

## 2020-08-06 PROCEDURE — 27210794 ZZH OR GENERAL SUPPLY STERILE: Performed by: UROLOGY

## 2020-08-06 DEVICE — STENT URETERAL POLARIS ULTRA 5FRX24CM M0061921220
Type: IMPLANTABLE DEVICE | Site: URETHRA | Status: NON-FUNCTIONAL
Removed: 2020-08-20

## 2020-08-06 RX ORDER — PROCHLORPERAZINE 25 MG
12.5 SUPPOSITORY, RECTAL RECTAL EVERY 12 HOURS PRN
Status: CANCELLED | OUTPATIENT
Start: 2020-08-06

## 2020-08-06 RX ORDER — CEFAZOLIN SODIUM 1 G/3ML
1 INJECTION, POWDER, FOR SOLUTION INTRAMUSCULAR; INTRAVENOUS SEE ADMIN INSTRUCTIONS
Status: DISCONTINUED | OUTPATIENT
Start: 2020-08-06 | End: 2020-08-06 | Stop reason: HOSPADM

## 2020-08-06 RX ORDER — HYDROMORPHONE HYDROCHLORIDE 1 MG/ML
.3-.5 INJECTION, SOLUTION INTRAMUSCULAR; INTRAVENOUS; SUBCUTANEOUS EVERY 5 MIN PRN
Status: DISCONTINUED | OUTPATIENT
Start: 2020-08-06 | End: 2020-08-06 | Stop reason: HOSPADM

## 2020-08-06 RX ORDER — HYDRALAZINE HYDROCHLORIDE 20 MG/ML
2.5-5 INJECTION INTRAMUSCULAR; INTRAVENOUS EVERY 10 MIN PRN
Status: DISCONTINUED | OUTPATIENT
Start: 2020-08-06 | End: 2020-08-06 | Stop reason: HOSPADM

## 2020-08-06 RX ORDER — AMOXICILLIN 250 MG
1 CAPSULE ORAL 2 TIMES DAILY PRN
Status: CANCELLED | OUTPATIENT
Start: 2020-08-06

## 2020-08-06 RX ORDER — ONDANSETRON 4 MG/1
4 TABLET, ORALLY DISINTEGRATING ORAL EVERY 30 MIN PRN
Status: DISCONTINUED | OUTPATIENT
Start: 2020-08-06 | End: 2020-08-06 | Stop reason: HOSPADM

## 2020-08-06 RX ORDER — FENTANYL CITRATE 50 UG/ML
25-50 INJECTION, SOLUTION INTRAMUSCULAR; INTRAVENOUS
Status: DISCONTINUED | OUTPATIENT
Start: 2020-08-06 | End: 2020-08-06 | Stop reason: HOSPADM

## 2020-08-06 RX ORDER — LIDOCAINE HYDROCHLORIDE 10 MG/ML
INJECTION, SOLUTION INFILTRATION; PERINEURAL PRN
Status: DISCONTINUED | OUTPATIENT
Start: 2020-08-06 | End: 2020-08-06

## 2020-08-06 RX ORDER — ONDANSETRON 4 MG/1
4 TABLET, ORALLY DISINTEGRATING ORAL EVERY 6 HOURS PRN
Status: CANCELLED | OUTPATIENT
Start: 2020-08-06

## 2020-08-06 RX ORDER — POLYETHYLENE GLYCOL 3350 17 G/17G
17 POWDER, FOR SOLUTION ORAL DAILY PRN
Status: CANCELLED | OUTPATIENT
Start: 2020-08-06

## 2020-08-06 RX ORDER — ONDANSETRON 2 MG/ML
INJECTION INTRAMUSCULAR; INTRAVENOUS PRN
Status: DISCONTINUED | OUTPATIENT
Start: 2020-08-06 | End: 2020-08-06

## 2020-08-06 RX ORDER — FENTANYL CITRATE 50 UG/ML
INJECTION, SOLUTION INTRAMUSCULAR; INTRAVENOUS PRN
Status: DISCONTINUED | OUTPATIENT
Start: 2020-08-06 | End: 2020-08-06

## 2020-08-06 RX ORDER — ONDANSETRON 2 MG/ML
4 INJECTION INTRAMUSCULAR; INTRAVENOUS EVERY 30 MIN PRN
Status: DISCONTINUED | OUTPATIENT
Start: 2020-08-06 | End: 2020-08-06 | Stop reason: HOSPADM

## 2020-08-06 RX ORDER — SODIUM CHLORIDE 9 MG/ML
INJECTION, SOLUTION INTRAVENOUS CONTINUOUS
Status: CANCELLED | OUTPATIENT
Start: 2020-08-06

## 2020-08-06 RX ORDER — GLYCOPYRROLATE 0.2 MG/ML
INJECTION, SOLUTION INTRAMUSCULAR; INTRAVENOUS PRN
Status: DISCONTINUED | OUTPATIENT
Start: 2020-08-06 | End: 2020-08-06

## 2020-08-06 RX ORDER — CEFAZOLIN SODIUM 2 G/100ML
2 INJECTION, SOLUTION INTRAVENOUS
Status: DISCONTINUED | OUTPATIENT
Start: 2020-08-06 | End: 2020-08-06 | Stop reason: CLARIF

## 2020-08-06 RX ORDER — PROPOFOL 10 MG/ML
INJECTION, EMULSION INTRAVENOUS PRN
Status: DISCONTINUED | OUTPATIENT
Start: 2020-08-06 | End: 2020-08-06

## 2020-08-06 RX ORDER — NALOXONE HYDROCHLORIDE 0.4 MG/ML
.1-.4 INJECTION, SOLUTION INTRAMUSCULAR; INTRAVENOUS; SUBCUTANEOUS
Status: CANCELLED | OUTPATIENT
Start: 2020-08-06

## 2020-08-06 RX ORDER — LIDOCAINE 40 MG/G
CREAM TOPICAL
Status: DISCONTINUED | OUTPATIENT
Start: 2020-08-06 | End: 2020-08-06 | Stop reason: HOSPADM

## 2020-08-06 RX ORDER — CEFAZOLIN SODIUM 2 G/100ML
2 INJECTION, SOLUTION INTRAVENOUS
Status: COMPLETED | OUTPATIENT
Start: 2020-08-06 | End: 2020-08-06

## 2020-08-06 RX ORDER — SODIUM CHLORIDE, SODIUM LACTATE, POTASSIUM CHLORIDE, CALCIUM CHLORIDE 600; 310; 30; 20 MG/100ML; MG/100ML; MG/100ML; MG/100ML
INJECTION, SOLUTION INTRAVENOUS CONTINUOUS
Status: DISCONTINUED | OUTPATIENT
Start: 2020-08-06 | End: 2020-08-06 | Stop reason: HOSPADM

## 2020-08-06 RX ORDER — LABETALOL 20 MG/4 ML (5 MG/ML) INTRAVENOUS SYRINGE
10
Status: DISCONTINUED | OUTPATIENT
Start: 2020-08-06 | End: 2020-08-06 | Stop reason: HOSPADM

## 2020-08-06 RX ORDER — DIMENHYDRINATE 50 MG
50 TABLET ORAL ONCE
Status: COMPLETED | OUTPATIENT
Start: 2020-08-06 | End: 2020-08-06

## 2020-08-06 RX ORDER — PROCHLORPERAZINE MALEATE 5 MG
5 TABLET ORAL EVERY 6 HOURS PRN
Status: CANCELLED | OUTPATIENT
Start: 2020-08-06

## 2020-08-06 RX ORDER — KETOROLAC TROMETHAMINE 15 MG/ML
15 INJECTION, SOLUTION INTRAMUSCULAR; INTRAVENOUS ONCE
Status: COMPLETED | OUTPATIENT
Start: 2020-08-06 | End: 2020-08-06

## 2020-08-06 RX ORDER — ACETAMINOPHEN 325 MG/1
650 TABLET ORAL EVERY 4 HOURS PRN
Status: CANCELLED | OUTPATIENT
Start: 2020-08-06

## 2020-08-06 RX ORDER — KETOROLAC TROMETHAMINE 30 MG/ML
INJECTION, SOLUTION INTRAMUSCULAR; INTRAVENOUS PRN
Status: DISCONTINUED | OUTPATIENT
Start: 2020-08-06 | End: 2020-08-06

## 2020-08-06 RX ORDER — NALOXONE HYDROCHLORIDE 0.4 MG/ML
.1-.4 INJECTION, SOLUTION INTRAMUSCULAR; INTRAVENOUS; SUBCUTANEOUS
Status: DISCONTINUED | OUTPATIENT
Start: 2020-08-06 | End: 2020-08-06 | Stop reason: HOSPADM

## 2020-08-06 RX ORDER — OXYCODONE HYDROCHLORIDE 5 MG/1
5-10 TABLET ORAL
Status: CANCELLED | OUTPATIENT
Start: 2020-08-06

## 2020-08-06 RX ORDER — HYDROMORPHONE HYDROCHLORIDE 1 MG/ML
0.2 INJECTION, SOLUTION INTRAMUSCULAR; INTRAVENOUS; SUBCUTANEOUS
Status: CANCELLED | OUTPATIENT
Start: 2020-08-06

## 2020-08-06 RX ORDER — ONDANSETRON 2 MG/ML
4 INJECTION INTRAMUSCULAR; INTRAVENOUS EVERY 6 HOURS PRN
Status: CANCELLED | OUTPATIENT
Start: 2020-08-06

## 2020-08-06 RX ORDER — DEXAMETHASONE SODIUM PHOSPHATE 4 MG/ML
INJECTION, SOLUTION INTRA-ARTICULAR; INTRALESIONAL; INTRAMUSCULAR; INTRAVENOUS; SOFT TISSUE PRN
Status: DISCONTINUED | OUTPATIENT
Start: 2020-08-06 | End: 2020-08-06

## 2020-08-06 RX ORDER — AMOXICILLIN 250 MG
2 CAPSULE ORAL 2 TIMES DAILY PRN
Status: CANCELLED | OUTPATIENT
Start: 2020-08-06

## 2020-08-06 RX ADMIN — LIDOCAINE HYDROCHLORIDE 40 MG: 10 INJECTION, SOLUTION INFILTRATION; PERINEURAL at 14:23

## 2020-08-06 RX ADMIN — PHENYLEPHRINE HYDROCHLORIDE 100 MCG: 10 INJECTION INTRAVENOUS at 14:31

## 2020-08-06 RX ADMIN — CEFAZOLIN SODIUM 2 G: 2 INJECTION, SOLUTION INTRAVENOUS at 14:16

## 2020-08-06 RX ADMIN — ONDANSETRON HYDROCHLORIDE 4 MG: 2 INJECTION, SOLUTION INTRAVENOUS at 14:30

## 2020-08-06 RX ADMIN — GLYCOPYRROLATE 0.2 MG: 0.2 INJECTION, SOLUTION INTRAMUSCULAR; INTRAVENOUS at 14:23

## 2020-08-06 RX ADMIN — Medication 100 MG: at 14:23

## 2020-08-06 RX ADMIN — DIMENHYDRINATE 50 MG: 50 TABLET ORAL at 09:40

## 2020-08-06 RX ADMIN — MIDAZOLAM 2 MG: 1 INJECTION INTRAMUSCULAR; INTRAVENOUS at 14:16

## 2020-08-06 RX ADMIN — SODIUM CHLORIDE 1000 ML: 9 INJECTION, SOLUTION INTRAVENOUS at 10:02

## 2020-08-06 RX ADMIN — SODIUM CHLORIDE, POTASSIUM CHLORIDE, SODIUM LACTATE AND CALCIUM CHLORIDE: 600; 310; 30; 20 INJECTION, SOLUTION INTRAVENOUS at 13:39

## 2020-08-06 RX ADMIN — DEXAMETHASONE SODIUM PHOSPHATE 4 MG: 4 INJECTION, SOLUTION INTRA-ARTICULAR; INTRALESIONAL; INTRAMUSCULAR; INTRAVENOUS; SOFT TISSUE at 14:23

## 2020-08-06 RX ADMIN — KETOROLAC TROMETHAMINE 15 MG: 15 INJECTION, SOLUTION INTRAMUSCULAR; INTRAVENOUS at 10:00

## 2020-08-06 RX ADMIN — PROPOFOL 180 MG: 10 INJECTION, EMULSION INTRAVENOUS at 14:23

## 2020-08-06 RX ADMIN — FENTANYL CITRATE 100 MCG: 50 INJECTION, SOLUTION INTRAMUSCULAR; INTRAVENOUS at 14:23

## 2020-08-06 ASSESSMENT — ENCOUNTER SYMPTOMS
BACK PAIN: 1
NAUSEA: 0
SHORTNESS OF BREATH: 0
VOMITING: 0
DYSURIA: 0
FEVER: 0
ABDOMINAL PAIN: 1
HEMATURIA: 0
CHILLS: 0

## 2020-08-06 ASSESSMENT — MIFFLIN-ST. JEOR
SCORE: 1882.06
SCORE: 1866.63

## 2020-08-06 NOTE — BRIEF OP NOTE
Diagnosis: Proximal left ureteral calculus  Procedure: Video cystoscopy, left double-J stent placement (5 Bangladeshi by 24 cm), LUCI  Surgeon: Guille  Anesthesia: General laryngeal mask  EBL: 0 ml  Findings: Lightly calcified stone in proximal left ureter

## 2020-08-06 NOTE — ED TRIAGE NOTES
Patient awoke with severe LLQ and left flank pain with nausea, no vomiting.  He has had a kidney stone in the past and this feels similar.  ABCs intact.  Patient is alert and oriented x3.

## 2020-08-06 NOTE — TELEPHONE ENCOUNTER
See his mychart message.   Attempted to contact pt. Left message to call clinic.     Also sent mychart message since not answering his phone.     *Pt calls back. He is near the clinic. The pain is below the rib cage on the left side. Advised him that he needs to go to the ED if the pain is severe. He agreed with this and will go over to the ED.

## 2020-08-06 NOTE — ANESTHESIA PREPROCEDURE EVALUATION
Anesthesia Pre-Procedure Evaluation    Patient: Khurram Reynoso   MRN: [id number] : 1945          Preoperative Diagnosis: Kidney stone [N20.0]    Procedure(s):  CYSTOSCOPY, WITH URETERAL STENT INSERTION, DOUBLE J STENT LEFT SIDE    Past Medical History:   Diagnosis Date     Acute bronchitis 10/17/2005     Cardiac arrest (H) 2006    V-Fib arrest during heart cath     CARDIAC DYSRHYTHMIA NOS 2005     Degeneration of lumbar or lumbosacral intervertebral disc      Other chronic pain     back     PAC (premature atrial contraction)      PONV (postoperative nausea and vomiting)      PRIM CARDIOMYOPATHY NEC 2006     PVC (premature ventricular contraction)      Renal disease     kidney stones     Uncomplicated asthma      Past Surgical History:   Procedure Laterality Date     C NONSPECIFIC PROCEDURE      knee     CARDIAC SURGERY      Ablation     DECOMPRESS DISC RF LUMBAR  3/2001     OPTICAL TRACKING SYSTEM FUSION SPINE POSTERIOR LUMBAR THREE+ LEVELS N/A 2016    Procedure: OPTICAL TRACKING SYSTEM FUSION SPINE POSTERIOR LUMBAR THREE+ LEVELS;  Surgeon: Hieu Araiza MD;  Location: [facility] OR     ORTHOPEDIC SURGERY Bilateral     total knee replacements     ORTHOPEDIC SURGERY Right     Total Hipe Replacement     SOFT TISSUE SURGERY Right     right wrist ganglion surgery     Anesthesia Evaluation     . Pt has had prior anesthetic.     History of anesthetic complications   - PONV        ROS/MED HX    ENT/Pulmonary:     (+)asthma , . .    Neurologic:       Cardiovascular:     (+) ----. : . CHF . . :. .       METS/Exercise Tolerance:     Hematologic:         Musculoskeletal:         GI/Hepatic:     (+) GERD       Renal/Genitourinary:     (+) chronic renal disease, type: ARF, Nephrolithiasis ,       Endo:     (+) Other Endocrine Disorder gout.      Psychiatric:         Infectious Disease:         Malignancy:         Other:    (+) H/O Chronic Pain,                        Physical Exam      Airway    Mallampati: II  TM distance: >3 FB  Neck ROM: full    Dental     Cardiovascular       Pulmonary             Lab Results   Component Value Date    WBC 10.0 08/06/2020    HGB 16.2 08/06/2020    HCT 48.2 08/06/2020     08/06/2020    SED 7 07/16/2020     08/06/2020    POTASSIUM 4.1 08/06/2020    CHLORIDE 108 08/06/2020    CO2 25 08/06/2020    BUN 19 08/06/2020    CR 1.52 (H) 08/06/2020     (H) 08/06/2020    SHERRON 8.9 08/06/2020    ALBUMIN 3.5 03/02/2020    PROTTOTAL 7.8 03/02/2020    ALT 31 03/02/2020    AST 26 03/02/2020    ALKPHOS 122 03/02/2020    BILITOTAL 0.4 03/02/2020    PTT 31 06/27/2006    INR 0.94 06/27/2006    TSH 5.33 (H) 03/02/2020    T4 1.11 03/02/2020       Preop Vitals  BP Readings from Last 3 Encounters:   08/06/20 (!) 142/86   06/12/20 126/81   03/02/20 112/66    Pulse Readings from Last 3 Encounters:   08/06/20 74   06/12/20 90   03/02/20 91      Resp Readings from Last 3 Encounters:   08/06/20 18   06/12/20 18   03/02/20 12    SpO2 Readings from Last 3 Encounters:   08/06/20 98%   06/12/20 96%   03/02/20 95%      Temp Readings from Last 1 Encounters:   08/06/20 97.3  F (36.3  C) (Temporal)    Ht Readings from Last 1 Encounters:   08/06/20 1.829 m (6')      Wt Readings from Last 1 Encounters:   08/06/20 110.4 kg (243 lb 6.4 oz)    Estimated body mass index is 33.01 kg/m  as calculated from the following:    Height as of this encounter: 1.829 m (6').    Weight as of this encounter: 110.4 kg (243 lb 6.4 oz).       Anesthesia Plan      History & Physical Review  History and physical reviewed and following examination; no interval change.    ASA Status:  3 .    NPO Status:  > 8 hours    Plan for General with Intravenous and Propofol induction. Maintenance will be Balanced.    PONV prophylaxis:  Ondansetron (or other 5HT-3) and Dexamethasone or Solumedrol         Postoperative Care  Postoperative pain management:  IV analgesics.      Consents  Anesthetic plan, risks, benefits and  alternatives discussed with:  Patient..                 Jerald Posada MD                    .

## 2020-08-06 NOTE — OP NOTE
Procedure Date: 08/06/2020      PREOPERATIVE DIAGNOSIS:  Recurrent urolithiasis, left proximal ureteral stone.      POSTOPERATIVE DIAGNOSIS:  Recurrent urolithiasis, left proximal ureteral stone.      PROCEDURE:  Video cystoscopy, left double-J stent placement, EUA.      SURGEON:  Dank Han MD      ANESTHESIA:  General laryngeal mask.      ESTIMATED BLOOD LOSS:  0 mL      INDICATIONS:  The patient is a 75-year-old male with a history of calcium urolithiasis that was seen in the past by Urology in [place], [place] and at [facility] but has never had a metabolic stone evaluation.  The patient was admitted today through the Emergency Room with left flank pain.  His electrolytes are normal.  Creatinine is up to 1.52 and serum calcium level normal.  White count is 10,000 with a normal hemoglobin and platelets.  No evidence for urinary tract infection and a pH of 5.0.  He now presents for left double-J stent.  The procedure, the alternatives, risks and follow-up were carefully discussed.      DESCRIPTION OF THE PROCEDURE:  The patient received 2 grams of IV Ancef, was taken to surgery and placed supine on the operating table.  After adequate general laryngeal mask anesthesia, the patient was placed in lithotomy position, and his genitalia were prepped and draped in a sterile fashion.  A #20 Rwandan Storz cystoscope with 30-degree lens and video were used to visualize the urethra and bladder, using water as an irrigant.      The urethra was normal, and the prostatic fossa was open.  The bladder revealed no tumors or stones and 3+ trabeculation.  The left ureteral orifice was identified, and I passed a Glidewire easily up under fluoroscopy until it passed the stone, which was lightly calcified and curled in the left renal pelvis.  I then passed a 5-Rwandan x 24 cm Polaris stent easily over the Glidewire into good position.  There was efflux of urine from the bladder into the stent when the Glidewire was removed,  and there was a good curl of the stent in the left renal pelvis on fluoroscopy.  The bladder was emptied and the cystoscope removed.  Rectal exam revealed no rectal mass, a benign-feeling prostate and normal seminal vesicles.  The patient went to the recovery room in stable condition and will be discharged on antibiotic for 2 doses and followup with me in 2 weeks for stent removal and ureteroscopic stone extraction using holmium laser.         MICHELLE MARIANO JR, MD             D: 2020   T: 2020   MT:       Name:     SUMANTH HERNANDEZ   MRN:      [id number]        Account:        [id number]   :      1945           Procedure Date: 2020      Document: [id number]       cc: Familia Mariano Jr, MD

## 2020-08-06 NOTE — CONSULTS
Mr. Reynoso is a 75-year-old male with a 6 mm stone in the left proximal ureter on CT scan.  He has small bilateral renal stones and apparently he has passed stones previously.  It is not clear who his urologist has been in the past.  Laboratory studies showed normal electrolytes, creatinine of 1.52 and a normal serum calcium level.  White count is 10,000 with a normal hemoglobin and platelet count.  Urinalysis shows a pH of 5.0, no significant pyuria.  The patient is afebrile with stable vital signs and has been admitted to the floor.  Assessment: Proximal left ureteral calculus with severe left flank pain.  Plan: N.p.o., cystoscopy left double-J stent this afternoon with either extracorporeal shockwave lithotripsy electively or ureteroscopic stone extraction using holmium laser.

## 2020-08-06 NOTE — DISCHARGE INSTRUCTIONS
You received Toradol, an IV form of ibuprofen (Motrin) at 10:00 AM.  Do not take any ibuprofen products until 4:00 PM.        GENERAL ANESTHESIA OR SEDATION ADULT DISCHARGE INSTRUCTIONS   SPECIAL PRECAUTIONS FOR 24 HOURS AFTER SURGERY    IT IS NOT UNUSUAL TO FEEL LIGHT-HEADED OR FAINT, UP TO 24 HOURS AFTER SURGERY OR WHILE TAKING PAIN MEDICATION.  IF YOU HAVE THESE SYMPTOMS; SIT FOR A FEW MINUTES BEFORE STANDING AND HAVE SOMEONE ASSIST YOU WHEN YOU GET UP TO WALK OR USE THE BATHROOM.    YOU SHOULD REST AND RELAX FOR THE NEXT 24 HOURS AND YOU MUST MAKE ARRANGEMENTS TO HAVE SOMEONE STAY WITH YOU FOR AT LEAST 24 HOURS AFTER YOUR DISCHARGE.  AVOID HAZARDOUS AND STRENUOUS ACTIVITIES.  DO NOT MAKE IMPORTANT DECISIONS FOR 24 HOURS.    DO NOT DRIVE ANY VEHICLE OR OPERATE MECHANICAL EQUIPMENT FOR 24 HOURS FOLLOWING THE END OF YOUR SURGERY.  EVEN THOUGH YOU MAY FEEL NORMAL, YOUR REACTIONS MAY BE AFFECTED BY THE MEDICATION YOU HAVE RECEIVED.    DO NOT DRINK ALCOHOLIC BEVERAGES FOR 24 HOURS FOLLOWING YOUR SURGERY.    DRINK CLEAR LIQUIDS (APPLE JUICE, GINGER ALE, 7-UP, BROTH, ETC.).  PROGRESS TO YOUR REGULAR DIET AS YOU FEEL ABLE.    YOU MAY HAVE A DRY MOUTH, A SORE THROAT, MUSCLES ACHES OR TROUBLE SLEEPING.  THESE SHOULD GO AWAY AFTER 24 HOURS.    CALL YOUR DOCTOR FOR ANY OF THE FOLLOWING:  SIGNS OF INFECTION (FEVER, GROWING TENDERNESS AT THE SURGERY SITE, A LARGE AMOUNT OF DRAINAGE OR BLEEDING, SEVERE PAIN, FOUL-SMELLING DRAINAGE, REDNESS OR SWELLING.    IT HAS BEEN OVER 8 TO 10 HOURS SINCE SURGERY AND YOU ARE STILL NOT ABLE TO URINATE (PASS WATER).   CYSTOSCOPY DISCHARGE INSTRUCTIONS  [facility]  [address]  [phone]    YOU MAY GO BACK TO YOUR NORMAL DIET AND ACTIVITY, UNLESS YOUR DOCTOR TELLS YOU NOT TO.    FOR THE NEXT TWO DAYS, YOU MAY NOTICE:    SOME BLOOD IN YOUR URINE.  SOME BURNING WHEN YOU URINATE.  AN URGE TO URINATE MORE OFTEN.  BLADDER SPASMS.    THESE ARE NORMAL AFTER THE  PROCEDURE.  THEY SHOULD GO AWAY AFTER A DAY OR TWO.  TO RELIEVE THESE PROBLEMS:     DRINK 6 TO 8 LARGE GLASSES OF WATER EACH DAY (INCLUDES DRINKS AT MEALS).  THIS WILL HELP CLEAR THE URINE.    TAKE WARM BATHS TO RELIEVE PAIN AND BLADDER SPASMS.  DO NOT ADD ANYTHING TO THE BATH WATER.    YOUR DOCTOR MAY PRESCRIBE PAIN MEDICINE.  YOU MAY ALSO TAKE TYLENOL (ACETAMINOPHEN) FOR PAIN.    CALL YOUR SURGEON IF YOU HAVE:    A FEVER OVER 101 DEGREES.  CHECK YOUR TEMPERATURE UNDER YOUR TONGUE.    CHILLS.    FAILURE TO URINATE (NO URINE COMES OUT WHEN YOU TRY TO USE THE TOILET).  TRY SOAKING IN A BATHTUB FULL OF WARM WATER.  IF STILL NO URINE, CALL YOUR DOCTOR.    A LOT OF BLOOD IN THE URINE, OR BLOOD CLOTS LARGER THAN A NICKEL.      PAIN IN THE BACK OR BELLY AREA (ABDOMEN).    PAIN OR SPASMS THAT ARE NOT RELIEVED BY WARM TUB BATHS AND PAIN MEDICINE.      SEVERE PAIN, BURNING OR OTHER PROBLEMS WHILE PASSING URINE.    PAIN THAT GETS WORSE AFTER TWO DAYS.            STENT INFORMATION/DISCHARGE INSTRUCTIONS  [facility]  [address]  [phone]    During surgery, a stent may be placed in the ureter.  The ureter is the tube that drains urine from the kidney to the bladder.  The stent is placed to dilate (open) the ureter so stone fragments can pass easily through the ureter or to decrease ureteral swelling after surgery or to relieve an obstruction.      The stent is made of silicone.  The upper end of the stent curls in the kidney while the lower end rests in the bladder.    While the stent is in place you may experience the following symptoms:  Blood and/or small blood clots in the urine  Bladder spasms (frequency and urgency of urination)  Discomfort or aching in the back or side where the stent is  Burning or discomfort at the end of urine stream    To decrease these symptoms you should:  Take antispasmodic medication as prescribed (Detrol, Ditropan, etc.)  Drink plenty of fluids but avoid  caffeine and citrus (include cranberry)  If you are having discomfort in back or side, decrease activity    Please call your physician or the physician on call if you experience:  Fever greater than 101 degrees  Severe pain not relieved by pain medication or rest    Please make an appointment for the removal of the stent according to your physician's instructions.

## 2020-08-06 NOTE — DISCHARGE SUMMARY
[facility]  Hospitalist Discharge Summary      Date of Admission:  8/6/2020  Date of Discharge:  8/6/2020  Discharging Provider: Luz Marina Cho PA-C    Discharge Diagnoses   #Renal colic  CT scan with 0.6 x 0.8 cm left proximal ureteral stone at the ureteropelvic junction resulting in moderate left hydronephrosis with perinephric stranding. There is a tiny 1 cm nonobstructing stone lower pole of the left kidney. There are few tiny right upper pole nonobstructing kidney stones  #DANITA, prerenal  #Asthma  #GERD    Follow-ups Needed After Discharge   - Per Urology recommendations    Discharge Disposition   Discharged to home after urologic procedure from PACU  Condition at discharge: Stable    Hospital Course   Khurram Reynoso is a 75 year old male with medical history significant for asthma, cardiac dysrhythmia, chronic pain, gout, kidney stones, and renal disease. He was admitted on 8/6/2020 with left flank pain left lower quadrant pain which began this morning at approximately 5 AM. In the emergency department, patient underwent CT scan which showed 0.6 x 0.8 cm left proximal ureteral stone at the ureteropelvic junction resulting in moderate left hydronephrosis with morteza-nephric stranding. Urology was consulted and patient underwent cystoscopy with left double J stent placement today. He was discharged from the PACU following procedure.     Consultations This Hospital Stay   UROLOGY IP CONSULT  SOCIAL WORK IP CONSULT  PHYSICAL THERAPY ADULT IP CONSULT  OCCUPATIONAL THERAPY ADULT IP CONSULT    Code Status   Prior    Time Spent on this Encounter   I, Luz Marina Cho PA-C, personally saw the patient today and spent less than or equal to 30 minutes discharging this patient.       Luz Marina Cho PA-C  [facility]  ______________________________________________________________________    Physical Exam   Vital Signs: Temp: 97.7  F (36.5  C) Temp src: Temporal BP: (!) 150/98  Pulse: 71 Heart Rate: 71 Resp: 11 SpO2: 100 % O2 Device: None (Room air) Oxygen Delivery: 6 LPM  Weight: 243 lbs 6.4 oz       Primary Care Physician   Familia Gillespie    Discharge Orders   No discharge procedures on file.    Significant Results and Procedures   Most Recent 3 CBC's:  Recent Labs   Lab Test 08/06/20  1005 03/02/20  1637 08/21/19  0825   WBC 10.0 9.3 6.6   HGB 16.2 14.7 15.6   MCV 94 94 95    324 398     Most Recent 3 BMP's:  Recent Labs   Lab Test 08/06/20  1005 03/02/20  1637 05/12/19  1239    139 141   POTASSIUM 4.1 4.4 3.9   CHLORIDE 108 107 109   CO2 25 24 26   BUN 19 19 19   CR 1.52* 1.04 1.11   ANIONGAP 6 8 6   SHERRON 8.9 9.0 8.5   * 82 100*       Discharge Medications   Current Discharge Medication List      CONTINUE these medications which have NOT CHANGED    Details   albuterol (PROVENTIL) (2.5 MG/3ML) 0.083% neb solution       albuterol (VENTOLIN HFA) 108 (90 Base) MCG/ACT inhaler INHALE 2 PUFFS BY MOUTH FOUR TIMES DAILY IF NEEDED  Qty: 18 g, Refills: 3    Comments: Pharmacy may dispense brand covered by insurance (Proair, or proventil or ventolin or generic albuterol inhaler)  Associated Diagnoses: SOB (shortness of breath)      BREO ELLIPTA 200-25 MCG/INH Inhaler INHALE 1 PUFF PO ONCE QAM.      fluticasone (FLONASE) 50 MCG/ACT nasal spray SHAKE LIQUID AND USE 1 TO 2 SPRAYS IN EACH NOSTRIL DAILY  Qty: 16 g, Refills: 2    Associated Diagnoses: Cough; Mild persistent asthma without complication      EPINEPHrine (EPIPEN/ADRENACLICK/OR ANY BX GENERIC EQUIV) 0.3 MG/0.3ML injection 2-pack Inject 0.3 mLs (0.3 mg) into the muscle as needed for anaphylaxis  Qty: 1 each, Refills: 1    Associated Diagnoses: Lip swelling; Bee sting allergy      methocarbamol (ROBAXIN) 750 MG tablet Take 1 tablet (750 mg) by mouth 4 times daily as needed for muscle spasms  Qty: 90 tablet, Refills: 1    Associated Diagnoses: Lumbar spine pain      omeprazole (PRILOSEC) 10 MG DR capsule Take 20 mg by  mouth daily         STOP taking these medications       indomethacin (INDOCIN) 50 MG capsule Comments:   Reason for Stopping:             Allergies   No Known Allergies

## 2020-08-06 NOTE — ED NOTES
Report given to Alexandra in Same Day Surgery.  Patient transferred to Same Day Surgery for procedure.

## 2020-08-06 NOTE — ANESTHESIA POSTPROCEDURE EVALUATION
Patient: Khurram Reynoso    Procedure(s):  CYSTOSCOPY, WITH URETERAL STENT INSERTION, DOUBLE J STENT LEFT SIDE    Diagnosis:Kidney stone [N20.0]  Diagnosis Additional Information: No value filed.    Anesthesia Type:  General    Note:  Anesthesia Post Evaluation    Patient location during evaluation: PACU  Patient participation: Able to fully participate in evaluation  Level of consciousness: awake  Pain management: adequate  Airway patency: patent  Cardiovascular status: acceptable  Respiratory status: acceptable  Hydration status: acceptable  PONV: none             Last vitals:  Vitals:    08/06/20 1545 08/06/20 1600 08/06/20 1615   BP: (!) 146/94 (!) 150/91 (!) 150/98   Pulse: 68 67 71   Resp: 18 20 11   Temp:  97.7  F (36.5  C)    SpO2: 100% 99% 100%         Electronically Signed By: Jerald Posada MD  August 6, 2020  4:47 PM

## 2020-08-06 NOTE — ANESTHESIA CARE TRANSFER NOTE
Patient: Khurram Reynoso    Procedure(s):  CYSTOSCOPY, WITH URETERAL STENT INSERTION, DOUBLE J STENT LEFT SIDE    Diagnosis: Kidney stone [N20.0]  Diagnosis Additional Information: No value filed.    Anesthesia Type:   General     Note:  Airway :Face Mask  Patient transferred to:PACU  Comments:   Spontaneous respirations, oral suctioned, bilateral eye opening and hand grasps.  Extubated to FM O2 6lpm.  VSS to PACU.Handoff Report: Identifed the Patient, Identified the Reponsible Provider, Reviewed the pertinent medical history, Discussed the surgical course, Reviewed Intra-OP anesthesia mangement and issues during anesthesia, Set expectations for post-procedure period and Allowed opportunity for questions and acknowledgement of understanding      Vitals: (Last set prior to Anesthesia Care Transfer)    CRNA VITALS  8/6/2020 1447 - 8/6/2020 1522      8/6/2020             NIBP:  136/86    Pulse:  70    Resp Rate (observed):  18                Electronically Signed By: MICKEY Sullivan CRNA  August 6, 2020  3:22 PM

## 2020-08-06 NOTE — ED PROVIDER NOTES
History   Chief Complaint  Flank Pain      HPI   Khurram Reynoso is a 75 year old male who presents to the emergency department for evaluation of left flank pain and left lower quadrant abdominal pain, which onset this morning around 0500, about 4.5 hours ago. He has a history of kidney stones and reports this feels similar. He denies testicular pain. He denies history of abdominal surgery.     Allergies  No Known Drug Allergies     Medications  Albuterol nebulizer solution   Albuterol inhaler   Breo Ellipta inhaler   EpiPen   Indocin   Robaxin    Past Medical History  Asthma   Cardiac dysrhythmia   Chronic pain   Gout   Kidney stones   Renal disease    Past Surgical History  Cardiac ablation   Knee surgery   Lumbar spine decompression   Right wrist ganglion surgery   Total knee replacement  Total hip replacement     Family History  Coronary artery disease   Hypertension  Heart surgery     Social History:  Smoking Status: Never Smoker  Smokeless Tobacco: Never Used  Alcohol Use: No  Drug Use: No  PCP: Familia Gillespie    Review of Systems   Constitutional: Negative for chills and fever.   Respiratory: Negative for shortness of breath.    Cardiovascular: Negative for chest pain.   Gastrointestinal: Positive for abdominal pain. Negative for nausea and vomiting.   Genitourinary: Negative for dysuria, hematuria and testicular pain.   Musculoskeletal: Positive for back pain.   All other systems reviewed and are negative.    Physical Exam     Patient Vitals for the past 24 hrs:   BP Temp Temp src Pulse Resp SpO2 Height Weight   08/06/20 0923 (!) 142/93 97.8  F (36.6  C) Oral 73 20 99 % -- --   08/06/20 0921 -- -- -- -- -- -- 1.829 m (6') 108.9 kg (240 lb)       Physical Exam    General: Alert, Moderate discomfort, well kept  Eyes: PERRL, conjunctivae pink no scleral icterus or conjunctival injection  ENT:   Moist mucus membranes, posterior oropharynx clear without erythema or exudates, No lymphadenopathy, Normal  voice  Resp:  Lungs clear to auscultation bilaterally, no crackles/rubs/wheezes. Good air movement  CV:  Normal rate and rhythm, no murmurs/rubs/gallops  GI:  Abdomen soft and non-distended.  Normoactive BS.  Left abdominal tenderness. No guarding or rebound, No masses  Skin:  Warm, dry.  No rashes or petechiae  Musculoskeletal: No peripheral edema or calf tenderness, Normal gross ROM   Neuro: Alert and oriented to person/place/time, normal sensation  Psychiatric: Normal affect, cooperative, good eye contact    Emergency Department Course     Imaging:  Radiology findings were communicated with the patient who voiced understanding of the findings.    CT Abdomen Pelvis w/o Contrast  1.  There is a 0.6 x 0.8 cm left proximal ureteral stone at the ureteropelvic junction resulting in moderate left hydronephrosis with perinephric stranding. There is a tiny 1 cm nonobstructing stone lower pole of the left kidney. There are few tiny right upper pole nonobstructing kidney stones. High density left renal cortical cyst.  2.  No other acute findings.    Reading per radiology.      Laboratory:  Laboratory findings were communicated with the patient who voiced understanding of the findings.    CBC: WBC: 10.0, HGB: 16.2, PLT: 347    BMP: Glucose 113 (H), creatinine 1.52 (H), GFR 44 (L), o/w WNL    UA with microscopic: trace blood, RBC 8 (H), mucous present, o/w WNL     Asymptomatic COVID-19 Virus by PCR: Pending     Interventions:  0940 Dramamine 50 mg oral   1000 Ketorolac 15 mg IV  1002 NS 1L IV     Emergency Department Course:  Past medical records, nursing notes, and vitals reviewed.    0931 I performed an exam of the patient as documented above.     0937 The patient provided a urine sample here in the emergency department. This was sent for laboratory testing, findings above.    1005 IV was inserted and blood was drawn for laboratory testing, results above.    1037 The patient was sent for imaging while in the emergency  department, results above.     1052 I rechecked the patient and discussed the results of the workup thus far. Plan explained to the Patient who consents to admission.     1056 I consulted with Dr. Kuo, with urology, regarding the patient's history and presentation here in the emergency department. Urology recommended admission and OR later today for stent placement and stone retrieval.     1103 A Nasopharyngeal swab was obtained here in the emergency department.    1130 Discussed the patient with Luz Marina Cho PA-C for Dr. Kent, who will admit the patient after his procedure.     I personally reviewed the laboratory and imaging results with the Patient and answered all related questions prior to admission.     Impression & Plan     Medical Decision Making:  Khurram Reynoso is a 75 year old male presented for evaluation of sudden onset left sided abdominal discomfort.  He initially stated he felt it in his left back.  On examination he still felt discomfort there however I was only able to reproduce discomfort with left-sided abdominal evaluation.  He does have a history of stones and was concerned this may have been a kidney stone.  His examination was concerning for kidney stone CT scan was obtained which confirms kidney stone.  This was a large proximal stone.  I discussed the case with urology who plans to take patient to the operating room for stent placement and stone retrieval.  Currently has slightly elevated kidney function however no other signs of infection.  Pain was easily controlled with NSAIDs as above.  He has been n.p.o. since early this morning and urology plans to take him to the operating room this afternoon.  I have arranged a bed with the hospitalist for after his procedure.  Rapid COVID testing was obtained.  He will be transferred from the emergency department to the operating room.    Covid-19  Khurram Reynoso was evaluated during a global COVID-19 pandemic, which necessitated  consideration that the patient might be at risk for infection with the SARS-CoV-2 virus that causes COVID-19.   Applicable protocols for evaluation were followed during the patient's care. COVID-19 was considered as part of the patient's evaluation. The plan for testing is: a test was obtained during this visit.    Diagnosis:    ICD-10-CM    1. Kidney stone  N20.0    2. Left ureteral stone  N20.1     Added automatically from request for surgery [id number]       Disposition:  Admitted to OR.      Scribe Disclosure:  I, Sherice Salas, am serving as a scribe at 9:26 AM on 8/6/2020 to document services personally performed by Ahmet Nielsen APRN based on my observations and the provider's statements to me.      Ahmet Nielsen APRN CNP  08/06/20 1143

## 2020-08-06 NOTE — H&P
"      [facility]    History and Physical - Hospitalist Service       Date of Admission:  8/6/2020    Assessment & Plan   Khurram Reynoso is a 75 year old male with medical history significant for asthma, nonischemic CMP s/p ablation for PVCs,  chronic pain, gout, kidney stones, and renal disease. He was admitted on 8/6/2020 with left flank pain left lower quadrant pain which began this morning at approximately 5 AM.  He tried eating some toast and tea but was extremely nauseous. One episode vomitus. Endorses quite a bit of belching. Reports pain gradually got worse to a level of 9.5-10/10. Described pain as \"intense\" and that he was unable to sit still with pain.  States this feels similar to prior kidney stones in March of 2001. Stated he had similar pain but less intense in his flank area a few weeks ago but it seemed to improve. Pain is located primarily in left flank area and extends around to left side anterior abdomen. Does not radiate to groin. Denies hematuria, dysuria, fever, chills, palpitations, shortness of breath, and constipation/diarrhea.    In the emergency department, patient underwent CT scan which showed 0.6 x 0.8 cm left proximal ureteral stone at the ureteropelvic junction resulting in moderate left hydronephrosis with morteza-nephric stranding.  There is also a tiny 1 cm nonobstructing stone in the lower pole of left kidney and tiny right upper pole nonobstructing kidney stones. Final report is not completed.  Urology was consulted and recommended stent placement today. Creatinine 1.52.  WBC 10.0.  Hemoglobin 16.2.  Urine analysis with trace blood, negative leukocyte esterase, negative nitrites, 2 WBC, and 8 RBC.  Received 1 L normal saline and Toradol in the emergency department.    #Renal colic  CT scan with 0.6 x 0.8 cm left proximal ureteral stone at the ureteropelvic junction resulting in moderate left hydronephrosis with perinephric stranding. There is a tiny 1 cm " nonobstructing stone lower pole of the left kidney. There are few tiny right upper pole nonobstructing kidney stones  - Urology consult, anticipate stent placement today  - NPO since 0530  - Pain regimen with tylenol and as needed dilaudid and oxycodone  - Antiemetics as needed for nausea   - Strain urine and send for testing if stone is passed  - No evidence of infection at this point, will continue to monitor    #DANITA, prerenal  - Anticipate this will improve with stent placement and IV hydration  - IVF at 125mL/hr  - Continue to monitor daily BMP    #Asthma  - Continue home regimen of albuterol and breo once med reconcilliation completed. Did not take Breo this morning.     #GERD  - Continue home Prilosec once med reconciliation completed    #Asymptomatic Covid swab  - Covid 19 test is pending     Diet: NPO per Anesthesia Guidelines for Procedure/Surgery Except for: Meds    DVT Prophylaxis: Low Risk/Ambulatory with no VTE prophylaxis indicated  Thompson Catheter: not present  Code Status: Full code        Disposition Plan   Expected discharge: Today vs tomorrow pending resolution kidney stone & pain, recommended to prior living arrangement once adequate pain management/ tolerating PO medications and urology has completed stent placement.  Entered: Luz Marina Cho PA-C 08/06/2020, 11:29 AM     The patient's care was discussed with the Patient.    Luz Marina Cho PA-C  [facility]    ______________________________________________________________________    Chief Complaint   Flank Pain    History is obtained from the patient    History of Present Illness   Khurram Reynoso is a 75 year old male with medical history significant for asthma, cardiac dysrhythmia, chronic pain, gout, kidney stones, and renal disease. He was admitted on 8/6/2020 with left flank pain left lower quadrant pain which began this morning at approximately 5 AM.  He tried eating some toast and tea but was extremely nauseous.  "One episode vomitus. Endorses quite a bit of belching. Reports pain gradually got worse to a level of 9.5-10/10. Described pain as \"intense\" and that he was unable to sit still with pain.  States this feels similar to prior kidney stones in March of 2001. Stated he had similar pain but less intense in his flank area a few weeks ago but it seemed to improve. Pain is located primarily in left flank area and extends around to left side anterior abdomen. Does not radiate to groin. Denies hematuria, dysuria, fever, chills, palpitations, shortness of breath, and constipation/diarrhea.    In the emergency department, patient underwent CT scan which showed 0.6 x 0.8 cm left proximal ureteral stone at the ureteropelvic junction resulting in moderate left hydronephrosis with morteza-nephric stranding.  There is also a tiny 1 cm nonobstructing stone in the lower pole of left kidney and tiny right upper pole nonobstructing kidney stones. Final report is not completed.  Urology was consulted and recommended stent placement today. Creatinine 1.52.  WBC 10.0.  Hemoglobin 16.2.  Urine analysis with trace blood, negative leukocyte esterase, negative nitrites, 2 WBC, and 8 RBC.  Received 1 L normal saline and Toradol in the emergency department.    Review of Systems    The 10 point Review of Systems is negative other than noted in the HPI or here.     Past Medical History    I have reviewed this patient's medical history and updated it with pertinent information if needed.   Past Medical History:   Diagnosis Date     Acute bronchitis 10/17/2005     Cardiac arrest (H) 6/2006    V-Fib arrest during heart cath     CARDIAC DYSRHYTHMIA NOS 7/27/2005     Degeneration of lumbar or lumbosacral intervertebral disc      Other chronic pain     back     PAC (premature atrial contraction)      PONV (postoperative nausea and vomiting)      PRIM CARDIOMYOPATHY NEC 7/18/2006     PVC (premature ventricular contraction)      Renal disease     kidney " stones     Uncomplicated asthma      Past Surgical History   I have reviewed this patient's surgical history and updated it with pertinent information if needed.  Past Surgical History:   Procedure Laterality Date     C NONSPECIFIC PROCEDURE      knee     CARDIAC SURGERY      Ablation     DECOMPRESS DISC RF LUMBAR  3/2001     OPTICAL TRACKING SYSTEM FUSION SPINE POSTERIOR LUMBAR THREE+ LEVELS N/A 6/27/2016    Procedure: OPTICAL TRACKING SYSTEM FUSION SPINE POSTERIOR LUMBAR THREE+ LEVELS;  Surgeon: Hieu Araiza MD;  Location: [facility] OR     ORTHOPEDIC SURGERY Bilateral     total knee replacements     ORTHOPEDIC SURGERY Right     Total Hipe Replacement     SOFT TISSUE SURGERY Right     right wrist ganglion surgery       Social History   I have reviewed this patient's social history and updated it with pertinent information if needed.  Social History     Tobacco Use     Smoking status: Never Smoker     Smokeless tobacco: Never Used   Substance Use Topics     Alcohol use: No     Alcohol/week: 0.0 standard drinks     Drug use: No   Lives at home in [place] with his wife.     Family History   I have reviewed this patient's family history and updated it with pertinent information if needed.  Family History   Problem Relation Age of Onset     C.A.D. Father      Hypertension Father      Heart Surgery Father         bypass     Cancer Brother         bone ca      Family History Negative Mother      Other - See Comments Sister         polio     Unknown/Adopted Maternal Grandmother      Unknown/Adopted Maternal Grandfather      Unknown/Adopted Paternal Grandmother      Unknown/Adopted Paternal Grandfather      Family History Negative Sister    Endorses father with history of nephrolithiasis.    Prior to Admission Medications   Prior to Admission Medications   Prescriptions Last Dose Informant Patient Reported? Taking?   BREO ELLIPTA 200-25 MCG/INH Inhaler   Yes No   Sig: INHALE 1 PUFF PO ONCE QAM.   EPINEPHrine  (EPIPEN/ADRENACLICK/OR ANY BX GENERIC EQUIV) 0.3 MG/0.3ML injection 2-pack   No No   Sig: Inject 0.3 mLs (0.3 mg) into the muscle as needed for anaphylaxis   Patient not taking: Reported on 7/14/2020   albuterol (PROVENTIL) (2.5 MG/3ML) 0.083% neb solution   Yes No   albuterol (VENTOLIN HFA) 108 (90 Base) MCG/ACT inhaler   No No   Sig: INHALE 2 PUFFS BY MOUTH FOUR TIMES DAILY IF NEEDED   fluticasone (FLONASE) 50 MCG/ACT nasal spray   No No   Sig: SHAKE LIQUID AND USE 1 TO 2 SPRAYS IN EACH NOSTRIL DAILY   indomethacin (INDOCIN) 50 MG capsule   No No   Sig: Take 1 capsule (50 mg) by mouth 3 times daily (with meals) for 14 days   methocarbamol (ROBAXIN) 750 MG tablet   No No   Sig: Take 1 tablet (750 mg) by mouth 4 times daily as needed for muscle spasms   Patient not taking: Reported on 7/14/2020   omeprazole (PRILOSEC) 10 MG DR capsule   Yes No   Sig: Take 20 mg by mouth daily      Facility-Administered Medications Last Administration Doses Remaining   diphenhydrAMINE (BENADRYL) capsule 50 mg None recorded 1        Allergies   No Known Allergies    Physical Exam   Vital Signs: Temp: 97.8  F (36.6  C) Temp src: Oral BP: (!) 142/93 Pulse: 73   Resp: 20 SpO2: 99 %      Weight: 240 lbs 0 oz    Constitutional: Awake, alert, cooperative, no apparent distress.  Eyes: Conjunctiva and pupils examined and normal.  HEENT: Moist mucous membranes, normal dentition.  Respiratory: Bilateral expiratory wheeze and rhonchi that cleared with cough.   Cardiovascular: Regular rate and rhythm, normal S1 and S2, and no murmur noted.  GI: Soft, non-distended, mildly-tender to palpation left lower quadrant and flank area, no rebound tenderness, normal bowel sounds.  Lymph/Hematologic: No anterior cervical or supraclavicular adenopathy.  Skin: No rashes, no cyanosis, nonpitting peripheral edema worse on right than left  Musculoskeletal: No joint swelling, erythema or tenderness.  Neurologic: Cranial nerves 2-12 grossly intact, normal strength  and sensation.  Psychiatric: Alert, oriented to person, place and time, no obvious anxiety or depression.    Data   Data reviewed today: I reviewed all medications, new labs and imaging results over the last 24 hours. I personally reviewed the abdominal CT image(s) showing nephrolithiasis.    Recent Labs   Lab 08/06/20  1005   WBC 10.0   HGB 16.2   MCV 94         POTASSIUM 4.1   CHLORIDE 108   CO2 25   BUN 19   CR 1.52*   ANIONGAP 6   SHERRON 8.9   *

## 2020-08-07 LAB — INTERPRETATION ECG - MUSE: NORMAL

## 2020-08-17 DIAGNOSIS — Z11.59 ENCOUNTER FOR SCREENING FOR OTHER VIRAL DISEASES: ICD-10-CM

## 2020-08-17 PROCEDURE — U0003 INFECTIOUS AGENT DETECTION BY NUCLEIC ACID (DNA OR RNA); SEVERE ACUTE RESPIRATORY SYNDROME CORONAVIRUS 2 (SARS-COV-2) (CORONAVIRUS DISEASE [COVID-19]), AMPLIFIED PROBE TECHNIQUE, MAKING USE OF HIGH THROUGHPUT TECHNOLOGIES AS DESCRIBED BY CMS-2020-01-R: HCPCS | Performed by: UROLOGY

## 2020-08-18 LAB
SARS-COV-2 RNA SPEC QL NAA+PROBE: NOT DETECTED
SPECIMEN SOURCE: NORMAL

## 2020-08-20 ENCOUNTER — PREP FOR PROCEDURE (OUTPATIENT)
Dept: SURGERY | Facility: CLINIC | Age: 75
End: 2020-08-20

## 2020-08-20 ENCOUNTER — HOSPITAL ENCOUNTER (OUTPATIENT)
Facility: CLINIC | Age: 75
Discharge: HOME | End: 2020-08-20
Attending: UROLOGY | Admitting: UROLOGY
Payer: COMMERCIAL

## 2020-08-20 ENCOUNTER — APPOINTMENT (OUTPATIENT)
Dept: GENERAL RADIOLOGY | Facility: CLINIC | Age: 75
End: 2020-08-20
Attending: UROLOGY
Payer: COMMERCIAL

## 2020-08-20 ENCOUNTER — ANESTHESIA (OUTPATIENT)
Dept: SURGERY | Facility: CLINIC | Age: 75
End: 2020-08-20
Payer: COMMERCIAL

## 2020-08-20 ENCOUNTER — ANESTHESIA EVENT (OUTPATIENT)
Dept: SURGERY | Facility: CLINIC | Age: 75
End: 2020-08-20
Payer: COMMERCIAL

## 2020-08-20 VITALS
OXYGEN SATURATION: 98 % | HEART RATE: 70 BPM | TEMPERATURE: 97.3 F | WEIGHT: 242 LBS | BODY MASS INDEX: 32.78 KG/M2 | HEIGHT: 72 IN | RESPIRATION RATE: 16 BRPM | DIASTOLIC BLOOD PRESSURE: 85 MMHG | SYSTOLIC BLOOD PRESSURE: 128 MMHG

## 2020-08-20 DIAGNOSIS — N20.0 CALCULUS OF KIDNEY: Primary | ICD-10-CM

## 2020-08-20 DIAGNOSIS — N20.1 URETERAL CALCULUS: Primary | ICD-10-CM

## 2020-08-20 DIAGNOSIS — N20.1 URETERAL CALCULUS: ICD-10-CM

## 2020-08-20 LAB
COPATH REPORT: NORMAL
CREAT SERPL-MCNC: 1.27 MG/DL (ref 0.66–1.25)
GFR SERPL CREATININE-BSD FRML MDRD: 55 ML/MIN/{1.73_M2}
POTASSIUM SERPL-SCNC: 4.3 MMOL/L (ref 3.4–5.3)

## 2020-08-20 PROCEDURE — 71000014 ZZH RECOVERY PHASE 1 LEVEL 2 FIRST HR: Performed by: UROLOGY

## 2020-08-20 PROCEDURE — 82365 CALCULUS SPECTROSCOPY: CPT | Performed by: UROLOGY

## 2020-08-20 PROCEDURE — 25800025 ZZH RX 258: Performed by: UROLOGY

## 2020-08-20 PROCEDURE — 37000009 ZZH ANESTHESIA TECHNICAL FEE, EACH ADDTL 15 MIN: Performed by: UROLOGY

## 2020-08-20 PROCEDURE — 25000128 H RX IP 250 OP 636: Performed by: UROLOGY

## 2020-08-20 PROCEDURE — C1769 GUIDE WIRE: HCPCS | Performed by: UROLOGY

## 2020-08-20 PROCEDURE — 37000008 ZZH ANESTHESIA TECHNICAL FEE, 1ST 30 MIN: Performed by: UROLOGY

## 2020-08-20 PROCEDURE — 40000306 ZZH STATISTIC PRE PROC ASSESS II: Performed by: UROLOGY

## 2020-08-20 PROCEDURE — 84132 ASSAY OF SERUM POTASSIUM: CPT | Performed by: ANESTHESIOLOGY

## 2020-08-20 PROCEDURE — 36000058 ZZH SURGERY LEVEL 3 EA 15 ADDTL MIN: Performed by: UROLOGY

## 2020-08-20 PROCEDURE — 36415 COLL VENOUS BLD VENIPUNCTURE: CPT | Performed by: ANESTHESIOLOGY

## 2020-08-20 PROCEDURE — 52353 CYSTOURETERO W/LITHOTRIPSY: CPT | Mod: LT | Performed by: UROLOGY

## 2020-08-20 PROCEDURE — 88300 SURGICAL PATH GROSS: CPT | Performed by: UROLOGY

## 2020-08-20 PROCEDURE — 27210794 ZZH OR GENERAL SUPPLY STERILE: Performed by: UROLOGY

## 2020-08-20 PROCEDURE — 25000125 ZZHC RX 250: Performed by: NURSE ANESTHETIST, CERTIFIED REGISTERED

## 2020-08-20 PROCEDURE — 25000128 H RX IP 250 OP 636: Performed by: NURSE ANESTHETIST, CERTIFIED REGISTERED

## 2020-08-20 PROCEDURE — 40000277 XR SURGERY CARM FLUORO LESS THAN 5 MIN W STILLS: Mod: TC

## 2020-08-20 PROCEDURE — 88300 SURGICAL PATH GROSS: CPT | Mod: 26 | Performed by: UROLOGY

## 2020-08-20 PROCEDURE — 36000060 ZZH SURGERY LEVEL 3 W FLUORO 1ST 30 MIN: Performed by: UROLOGY

## 2020-08-20 PROCEDURE — 25800030 ZZH RX IP 258 OP 636: Performed by: ANESTHESIOLOGY

## 2020-08-20 PROCEDURE — 71000027 ZZH RECOVERY PHASE 2 EACH 15 MINS: Performed by: UROLOGY

## 2020-08-20 PROCEDURE — 82565 ASSAY OF CREATININE: CPT | Performed by: ANESTHESIOLOGY

## 2020-08-20 RX ORDER — KETOROLAC TROMETHAMINE 30 MG/ML
INJECTION, SOLUTION INTRAMUSCULAR; INTRAVENOUS PRN
Status: DISCONTINUED | OUTPATIENT
Start: 2020-08-20 | End: 2020-08-20

## 2020-08-20 RX ORDER — EPHEDRINE SULFATE 50 MG/ML
INJECTION, SOLUTION INTRAVENOUS PRN
Status: DISCONTINUED | OUTPATIENT
Start: 2020-08-20 | End: 2020-08-20

## 2020-08-20 RX ORDER — ALBUTEROL SULFATE 0.83 MG/ML
2.5 SOLUTION RESPIRATORY (INHALATION) EVERY 4 HOURS PRN
Status: DISCONTINUED | OUTPATIENT
Start: 2020-08-20 | End: 2020-08-20 | Stop reason: HOSPADM

## 2020-08-20 RX ORDER — HYDROMORPHONE HYDROCHLORIDE 1 MG/ML
.3-.5 INJECTION, SOLUTION INTRAMUSCULAR; INTRAVENOUS; SUBCUTANEOUS EVERY 10 MIN PRN
Status: DISCONTINUED | OUTPATIENT
Start: 2020-08-20 | End: 2020-08-20 | Stop reason: HOSPADM

## 2020-08-20 RX ORDER — PROPOFOL 10 MG/ML
INJECTION, EMULSION INTRAVENOUS PRN
Status: DISCONTINUED | OUTPATIENT
Start: 2020-08-20 | End: 2020-08-20

## 2020-08-20 RX ORDER — MEPERIDINE HYDROCHLORIDE 25 MG/ML
12.5 INJECTION INTRAMUSCULAR; INTRAVENOUS; SUBCUTANEOUS
Status: DISCONTINUED | OUTPATIENT
Start: 2020-08-20 | End: 2020-08-20 | Stop reason: HOSPADM

## 2020-08-20 RX ORDER — ONDANSETRON 2 MG/ML
INJECTION INTRAMUSCULAR; INTRAVENOUS PRN
Status: DISCONTINUED | OUTPATIENT
Start: 2020-08-20 | End: 2020-08-20

## 2020-08-20 RX ORDER — ONDANSETRON 2 MG/ML
4 INJECTION INTRAMUSCULAR; INTRAVENOUS EVERY 30 MIN PRN
Status: DISCONTINUED | OUTPATIENT
Start: 2020-08-20 | End: 2020-08-20 | Stop reason: HOSPADM

## 2020-08-20 RX ORDER — SODIUM CHLORIDE, SODIUM LACTATE, POTASSIUM CHLORIDE, CALCIUM CHLORIDE 600; 310; 30; 20 MG/100ML; MG/100ML; MG/100ML; MG/100ML
500 INJECTION, SOLUTION INTRAVENOUS CONTINUOUS
Status: DISCONTINUED | OUTPATIENT
Start: 2020-08-20 | End: 2020-08-20 | Stop reason: HOSPADM

## 2020-08-20 RX ORDER — CEFAZOLIN SODIUM 2 G/100ML
2 INJECTION, SOLUTION INTRAVENOUS
Status: COMPLETED | OUTPATIENT
Start: 2020-08-20 | End: 2020-08-20

## 2020-08-20 RX ORDER — DEXAMETHASONE SODIUM PHOSPHATE 4 MG/ML
INJECTION, SOLUTION INTRA-ARTICULAR; INTRALESIONAL; INTRAMUSCULAR; INTRAVENOUS; SOFT TISSUE PRN
Status: DISCONTINUED | OUTPATIENT
Start: 2020-08-20 | End: 2020-08-20

## 2020-08-20 RX ORDER — FENTANYL CITRATE 50 UG/ML
25-50 INJECTION, SOLUTION INTRAMUSCULAR; INTRAVENOUS
Status: DISCONTINUED | OUTPATIENT
Start: 2020-08-20 | End: 2020-08-20 | Stop reason: HOSPADM

## 2020-08-20 RX ORDER — SODIUM CHLORIDE, SODIUM LACTATE, POTASSIUM CHLORIDE, CALCIUM CHLORIDE 600; 310; 30; 20 MG/100ML; MG/100ML; MG/100ML; MG/100ML
INJECTION, SOLUTION INTRAVENOUS CONTINUOUS
Status: DISCONTINUED | OUTPATIENT
Start: 2020-08-20 | End: 2020-08-20 | Stop reason: HOSPADM

## 2020-08-20 RX ORDER — HYDRALAZINE HYDROCHLORIDE 20 MG/ML
2.5-5 INJECTION INTRAMUSCULAR; INTRAVENOUS EVERY 10 MIN PRN
Status: DISCONTINUED | OUTPATIENT
Start: 2020-08-20 | End: 2020-08-20 | Stop reason: HOSPADM

## 2020-08-20 RX ORDER — FENTANYL CITRATE 50 UG/ML
INJECTION, SOLUTION INTRAMUSCULAR; INTRAVENOUS PRN
Status: DISCONTINUED | OUTPATIENT
Start: 2020-08-20 | End: 2020-08-20

## 2020-08-20 RX ORDER — OXYCODONE HYDROCHLORIDE 5 MG/1
5 TABLET ORAL EVERY 4 HOURS PRN
Status: DISCONTINUED | OUTPATIENT
Start: 2020-08-20 | End: 2020-08-20 | Stop reason: HOSPADM

## 2020-08-20 RX ORDER — METOPROLOL TARTRATE 1 MG/ML
1-2 INJECTION, SOLUTION INTRAVENOUS EVERY 5 MIN PRN
Status: DISCONTINUED | OUTPATIENT
Start: 2020-08-20 | End: 2020-08-20 | Stop reason: HOSPADM

## 2020-08-20 RX ORDER — CIPROFLOXACIN 500 MG/1
500 TABLET, FILM COATED ORAL ONCE
Qty: 1 TABLET | Refills: 0 | Status: SHIPPED | OUTPATIENT
Start: 2020-08-20 | End: 2020-08-20

## 2020-08-20 RX ORDER — ONDANSETRON 4 MG/1
4 TABLET, ORALLY DISINTEGRATING ORAL EVERY 30 MIN PRN
Status: DISCONTINUED | OUTPATIENT
Start: 2020-08-20 | End: 2020-08-20 | Stop reason: HOSPADM

## 2020-08-20 RX ORDER — FENTANYL CITRATE 50 UG/ML
25-50 INJECTION, SOLUTION INTRAMUSCULAR; INTRAVENOUS EVERY 5 MIN PRN
Status: DISCONTINUED | OUTPATIENT
Start: 2020-08-20 | End: 2020-08-20 | Stop reason: HOSPADM

## 2020-08-20 RX ORDER — LIDOCAINE HYDROCHLORIDE 10 MG/ML
INJECTION, SOLUTION INFILTRATION; PERINEURAL PRN
Status: DISCONTINUED | OUTPATIENT
Start: 2020-08-20 | End: 2020-08-20

## 2020-08-20 RX ORDER — PHENYLEPHRINE HYDROCHLORIDE 10 MG/ML
INJECTION INTRAVENOUS PRN
Status: DISCONTINUED | OUTPATIENT
Start: 2020-08-20 | End: 2020-08-20

## 2020-08-20 RX ORDER — NALOXONE HYDROCHLORIDE 0.4 MG/ML
.1-.4 INJECTION, SOLUTION INTRAMUSCULAR; INTRAVENOUS; SUBCUTANEOUS
Status: DISCONTINUED | OUTPATIENT
Start: 2020-08-20 | End: 2020-08-20 | Stop reason: HOSPADM

## 2020-08-20 RX ORDER — CEFAZOLIN SODIUM 1 G/3ML
1 INJECTION, POWDER, FOR SOLUTION INTRAMUSCULAR; INTRAVENOUS SEE ADMIN INSTRUCTIONS
Status: DISCONTINUED | OUTPATIENT
Start: 2020-08-20 | End: 2020-08-20 | Stop reason: HOSPADM

## 2020-08-20 RX ADMIN — EPHEDRINE SULFATE 5 MG: 50 INJECTION, SOLUTION INTRAVENOUS at 14:07

## 2020-08-20 RX ADMIN — MIDAZOLAM 2 MG: 1 INJECTION INTRAMUSCULAR; INTRAVENOUS at 13:49

## 2020-08-20 RX ADMIN — SODIUM CHLORIDE, SODIUM LACTATE, POTASSIUM CHLORIDE, CALCIUM CHLORIDE: 600; 310; 30; 20 INJECTION, SOLUTION INTRAVENOUS at 14:54

## 2020-08-20 RX ADMIN — PROPOFOL 100 MG: 10 INJECTION, EMULSION INTRAVENOUS at 14:00

## 2020-08-20 RX ADMIN — ONDANSETRON HYDROCHLORIDE 4 MG: 2 INJECTION, SOLUTION INTRAVENOUS at 14:05

## 2020-08-20 RX ADMIN — EPHEDRINE SULFATE 5 MG: 50 INJECTION, SOLUTION INTRAVENOUS at 14:00

## 2020-08-20 RX ADMIN — FENTANYL CITRATE 100 MCG: 50 INJECTION, SOLUTION INTRAMUSCULAR; INTRAVENOUS at 14:00

## 2020-08-20 RX ADMIN — KETOROLAC TROMETHAMINE 15 MG: 30 INJECTION, SOLUTION INTRAMUSCULAR at 14:48

## 2020-08-20 RX ADMIN — PHENYLEPHRINE HYDROCHLORIDE 100 MCG: 10 INJECTION INTRAVENOUS at 14:00

## 2020-08-20 RX ADMIN — LIDOCAINE HYDROCHLORIDE 50 MG: 10 INJECTION, SOLUTION INFILTRATION; PERINEURAL at 14:00

## 2020-08-20 RX ADMIN — CEFAZOLIN SODIUM 2 G: 2 INJECTION, SOLUTION INTRAVENOUS at 13:49

## 2020-08-20 RX ADMIN — SODIUM CHLORIDE, SODIUM LACTATE, POTASSIUM CHLORIDE, CALCIUM CHLORIDE: 600; 310; 30; 20 INJECTION, SOLUTION INTRAVENOUS at 13:22

## 2020-08-20 RX ADMIN — DEXAMETHASONE SODIUM PHOSPHATE 4 MG: 4 INJECTION, SOLUTION INTRA-ARTICULAR; INTRALESIONAL; INTRAMUSCULAR; INTRAVENOUS; SOFT TISSUE at 14:00

## 2020-08-20 ASSESSMENT — MIFFLIN-ST. JEOR: SCORE: 1875.7

## 2020-08-20 NOTE — BRIEF OP NOTE
Diagnosis: Left renal calculus  Procedure: Video cystoscopy, removal of left double-J stent, rigid ureteroscopy, flexible renoscopy with holmium laser lithotripsy and stone extraction of  Surgeon: Guille  Anesthesia: General laryngeal mask  EBL: 5 ml  Findings: Large stone left kidney that was not amenable to basketing.  Stone dusted in lower pole calyx and 2 fragments removed and sent for stone analysis

## 2020-08-20 NOTE — ANESTHESIA PREPROCEDURE EVALUATION
Anesthesia Pre-Procedure Evaluation    Patient: Khurram Reynoso   MRN: [id number] : 1945          Preoperative Diagnosis: Ureteral calculus [N20.1]    Procedure(s):  CYSTOURETEROSCOPY, WITH LITHOTRIPSY USING LASER AND URETERAL STENT REMOVAL    Past Medical History:   Diagnosis Date     Acute bronchitis 10/17/2005     Cardiac arrest (H) 2006    V-Fib arrest during heart cath     CARDIAC DYSRHYTHMIA NOS 2005     Degeneration of lumbar or lumbosacral intervertebral disc      Gastroesophageal reflux disease      Other chronic pain     back     PAC (premature atrial contraction)      PONV (postoperative nausea and vomiting)      PRIM CARDIOMYOPATHY NEC 2006     PVC (premature ventricular contraction)      Renal disease     kidney stones     Uncomplicated asthma      Past Surgical History:   Procedure Laterality Date     C NONSPECIFIC PROCEDURE      knee     CARDIAC SURGERY      Ablation     COMBINED CYSTOSCOPY, INSERT STENT URETER(S) Left 2020    Procedure: Video cystoscopy, left double-J stent placement and exam under anesthesia;  Surgeon: Dank Han MD;  Location: [facility] OR     DECOMPRESS DISC RF LUMBAR  3/2001    lumbar fusion L2-5     OPTICAL TRACKING SYSTEM FUSION SPINE POSTERIOR LUMBAR THREE+ LEVELS N/A 2016    Procedure: OPTICAL TRACKING SYSTEM FUSION SPINE POSTERIOR LUMBAR THREE+ LEVELS;  Surgeon: Hieu Araiza MD;  Location: [facility] OR     ORTHOPEDIC SURGERY Bilateral     total knee replacements     ORTHOPEDIC SURGERY Right     Total Hipe Replacement     SOFT TISSUE SURGERY Right     right wrist ganglion surgery     Anesthesia Evaluation     . Pt has had prior anesthetic.     History of anesthetic complications   - PONV        ROS/MED HX    ENT/Pulmonary:     (+)asthma , . .    Neurologic:       Cardiovascular:     (+) ----. : . CHF . . :. .       METS/Exercise Tolerance:     Hematologic:         Musculoskeletal:         GI/Hepatic:     (+) GERD        Renal/Genitourinary:     (+) Nephrolithiasis ,       Endo:     (+) Other Endocrine Disorder gout.      Psychiatric:         Infectious Disease:         Malignancy:         Other:    (+) H/O Chronic Pain,                        Physical Exam      Airway   Mallampati: II  TM distance: >3 FB  Neck ROM: full    Dental     Cardiovascular   Rhythm and rate: regular and normal      Pulmonary    breath sounds clear to auscultation            Lab Results   Component Value Date    WBC 10.0 08/06/2020    HGB 16.2 08/06/2020    HCT 48.2 08/06/2020     08/06/2020    SED 7 07/16/2020     08/06/2020    POTASSIUM 4.1 08/06/2020    CHLORIDE 108 08/06/2020    CO2 25 08/06/2020    BUN 19 08/06/2020    CR 1.52 (H) 08/06/2020     (H) 08/06/2020    SHERRON 8.9 08/06/2020    ALBUMIN 3.5 03/02/2020    PROTTOTAL 7.8 03/02/2020    ALT 31 03/02/2020    AST 26 03/02/2020    ALKPHOS 122 03/02/2020    BILITOTAL 0.4 03/02/2020    PTT 31 06/27/2006    INR 0.94 06/27/2006    TSH 5.33 (H) 03/02/2020    T4 1.11 03/02/2020       Preop Vitals  BP Readings from Last 3 Encounters:   08/06/20 (!) 144/90   06/12/20 126/81   03/02/20 112/66    Pulse Readings from Last 3 Encounters:   08/06/20 71   06/12/20 90   03/02/20 91      Resp Readings from Last 3 Encounters:   08/06/20 16   06/12/20 18   03/02/20 12    SpO2 Readings from Last 3 Encounters:   08/06/20 95%   06/12/20 96%   03/02/20 95%      Temp Readings from Last 1 Encounters:   08/06/20 97.4  F (36.3  C) (Temporal)    Ht Readings from Last 1 Encounters:   08/06/20 1.829 m (6')      Wt Readings from Last 1 Encounters:   08/06/20 110.4 kg (243 lb 6.4 oz)    Estimated body mass index is 33.01 kg/m  as calculated from the following:    Height as of 8/6/20: 1.829 m (6').    Weight as of 8/6/20: 110.4 kg (243 lb 6.4 oz).       Anesthesia Plan      History & Physical Review  History and physical reviewed and following examination; no interval change.    ASA Status:  3 .    NPO Status:  > 8  hours    Plan for General with Intravenous and Propofol induction. Maintenance will be Balanced.    PONV prophylaxis:  Ondansetron (or other 5HT-3) and Dexamethasone or Solumedrol         Postoperative Care  Postoperative pain management:  IV analgesics, Oral pain medications and Multi-modal analgesia.      Consents  Anesthetic plan, risks, benefits and alternatives discussed with:  Patient..                 David Lopez MD                    .

## 2020-08-20 NOTE — DISCHARGE INSTRUCTIONS
CYSTOSCOPY DISCHARGE INSTRUCTIONS  [facility] UROLOGY  LUIGI JENSEN HULBERT & CHRISTINA  [phone]    YOU MAY GO BACK TO YOUR NORMAL DIET AND ACTIVITY, UNLESS YOUR DOCTOR TELLS YOU NOT TO.    FOR THE NEXT TWO DAYS, YOU MAY NOTICE:    SOME BLOOD IN YOUR URINE.  SOME BURNING WHEN YOU URINATE.  AN URGE TO URINATE MORE OFTEN.  BLADDER SPASMS.    THESE ARE NORMAL AFTER THE PROCEDURE.  THEY SHOULD GO AWAY AFTER A DAY OR TWO.  TO RELIEVE THESE PROBLEMS:     DRINK 6 TO 8 LARGE GLASSES OF WATER EACH DAY (INCLUDES DRINKS AT MEALS).  THIS WILL HELP CLEAR THE URINE.    TAKE WARM BATHS TO RELIEVE PAIN AND BLADDER SPASMS.  DO NOT ADD ANYTHING TO THE BATH WATER.    YOUR DOCTOR MAY PRESCRIBE PAIN MEDICINE.  YOU MAY ALSO TAKE TYLENOL (ACETAMINOPHEN) FOR PAIN.    CALL YOUR SURGEON IF YOU HAVE:    A FEVER OVER 101 DEGREES.  CHECK YOUR TEMPERATURE UNDER YOUR TONGUE.    CHILLS.    FAILURE TO URINATE (NO URINE COMES OUT WHEN YOU TRY TO USE THE TOILET).  TRY SOAKING IN A BATHTUB FULL OF WARM WATER.  IF STILL NO URINE, CALL YOUR DOCTOR.    A LOT OF BLOOD IN THE URINE, OR BLOOD CLOTS LARGER THAN A NICKEL.      PAIN IN THE BACK OR BELLY AREA (ABDOMEN).    PAIN OR SPASMS THAT ARE NOT RELIEVED BY WARM TUB BATHS AND PAIN MEDICINE.      SEVERE PAIN, BURNING OR OTHER PROBLEMS WHILE PASSING URINE.    PAIN THAT GETS WORSE AFTER TWO DAYS.     GENERAL ANESTHESIA OR SEDATION ADULT DISCHARGE INSTRUCTIONS   SPECIAL PRECAUTIONS FOR 24 HOURS AFTER SURGERY    IT IS NOT UNUSUAL TO FEEL LIGHT-HEADED OR FAINT, UP TO 24 HOURS AFTER SURGERY OR WHILE TAKING PAIN MEDICATION.  IF YOU HAVE THESE SYMPTOMS; SIT FOR A FEW MINUTES BEFORE STANDING AND HAVE SOMEONE ASSIST YOU WHEN YOU GET UP TO WALK OR USE THE BATHROOM.    YOU SHOULD REST AND RELAX FOR THE NEXT 24 HOURS AND YOU MUST MAKE ARRANGEMENTS TO HAVE SOMEONE STAY WITH YOU FOR AT LEAST 24 HOURS AFTER YOUR DISCHARGE.  AVOID HAZARDOUS AND STRENUOUS ACTIVITIES.  DO NOT MAKE IMPORTANT DECISIONS FOR 24  HOURS.    DO NOT DRIVE ANY VEHICLE OR OPERATE MECHANICAL EQUIPMENT FOR 24 HOURS FOLLOWING THE END OF YOUR SURGERY.  EVEN THOUGH YOU MAY FEEL NORMAL, YOUR REACTIONS MAY BE AFFECTED BY THE MEDICATION YOU HAVE RECEIVED.    DO NOT DRINK ALCOHOLIC BEVERAGES FOR 24 HOURS FOLLOWING YOUR SURGERY.    DRINK CLEAR LIQUIDS (APPLE JUICE, GINGER ALE, 7-UP, BROTH, ETC.).  PROGRESS TO YOUR REGULAR DIET AS YOU FEEL ABLE.    YOU MAY HAVE A DRY MOUTH, A SORE THROAT, MUSCLES ACHES OR TROUBLE SLEEPING.  THESE SHOULD GO AWAY AFTER 24 HOURS.    CALL YOUR DOCTOR FOR ANY OF THE FOLLOWING:  SIGNS OF INFECTION (FEVER, GROWING TENDERNESS AT THE SURGERY SITE, A LARGE AMOUNT OF DRAINAGE OR BLEEDING, SEVERE PAIN, FOUL-SMELLING DRAINAGE, REDNESS OR SWELLING.    IT HAS BEEN OVER 8 TO 10 HOURS SINCE SURGERY AND YOU ARE STILL NOT ABLE TO URINATE (PASS WATER).       You received Toradol, an IV form of ibuprofen (Motrin) at  2:45.  Do not take any ibuprofen products until 8:45 PM.    EXTRACORPOREAL SHOCK LITHOTRIPSY (ESWL) DISCHARGE INSTRUCTIONS  [facility] UROLOGY  LARRY JENSEN BENNETT & CHRISTINA  [phone]      Your stone(s) has been fragmented into many tiny pieces, which must now pass in your urine.  Usually this process is uneventful.  Most fragments pass in the first one or two weeks, but some may continue to pass for three months or more.  Some pain or discomfort may accompany the passage of these fragments.    To aid in the passage of fragments, drink lots of fluid.  Aim for 2 liters of water daily for the next one to two weeks. Most patients will benefit from a continued high fluid intake and urine output indefinitely, even after the fragments are gone.  This helps prevent new stone formation.    Strain your urine.  Take the stone fragments to your urologist.  They will have them analyzed to help determine the cause of your stone(s).    You should walk around and resume every day activities.  Activity may help the stone fragments pass.   You should, however, avoid sports or really strenuous exercise for about one week, or at least until there is no more blood in your urine.    You may resume regular diet.    Call your urologist if you have:  a. Persistent severe pain not relieved by oral medications.  b. Fever (over 101 ).  c. Persistent vomiting.    See your urologist as directed.  They will need to take an x-ray to check your progress.  Take your stone fragments to your follow-up appointment.

## 2020-08-20 NOTE — ANESTHESIA POSTPROCEDURE EVALUATION
Patient: Khurram Reynoso    Procedure(s):  CYSTOURETEROSCOPY, WITH HOLMIUM LASER LITHOTRIPSY USING LASER, URETERAL STENT REMOVAL, FLEXIBLE AND RIGID URETEROSCOPY, STONE EXTRACTION    Diagnosis:Ureteral calculus [N20.1]  Diagnosis Additional Information: No value filed.    Anesthesia Type:  General    Note:  Anesthesia Post Evaluation    Patient location during evaluation: PACU  Patient participation: Able to fully participate in evaluation  Level of consciousness: awake  Pain management: adequate  Airway patency: patent  Cardiovascular status: acceptable  Respiratory status: acceptable  Hydration status: acceptable  PONV: controlled     Anesthetic complications: None          Last vitals:  Vitals:    08/20/20 1550 08/20/20 1600 08/20/20 1615   BP: 125/79 126/75 131/81   Pulse: 67 70    Resp: 12 21 16   Temp:   97.3  F (36.3  C)   SpO2: 97% 99% 96%         Electronically Signed By: David Lopez MD  August 20, 2020  4:41 PM

## 2020-08-21 NOTE — OP NOTE
Procedure Date: 08/20/2020      PREOPERATIVE DIAGNOSIS:  Large left renal calculus.      POSTOPERATIVE DIAGNOSIS:  Large left renal calculus.      PROCEDURE:  Video cystoscopy, left double-J stent removal, rigid ureteroscopy, flexible renoscopy with holmium laser lithotripsy and stone extraction.      SURGEON:  Dank Han MD      ANESTHESIA:  General laryngeal mask.      ESTIMATED BLOOD LOSS:  5 mL      INDICATIONS:  The patient is a 75-year-old male with a previous history of stones.  He was admitted 08/06 with left flank pain and a large stone obstructing his proximal left ureter.  He underwent cystoscopy and stent placement and now presents for stent removal and stone extraction.  The patient has normal serum calcium levels.  On 08/06 his creatinine was 1.52.  Today his creatinine is down to 1.27.      DESCRIPTION OF THE PROCEDURE:  The patient received 2 grams of IV Ancef on-call to the operating suite.  He was placed supine on the cystoscopy table and administered a general laryngeal mask anesthetic.  He was then placed in lithotomy and his genitalia were prepped and draped in a sterile fashion.  A #22 French Storz cystoscope with 30-degree lens and video were used to visualize the urethra and bladder, using water as an irrigant.  The urethra appeared normal and the prostatic fossa revealed moderate enlargement of the lateral lobes, no middle lobe.  The bladder revealed his stent in good position.  His stent was grasped and brought out to the urethral meatus and I passed a Glidewire up the old stent until it curled under fluoroscopy in the left renal pelvis.  His stone was not seen on fluoroscopy.  The old stent was removed and the Glidewire was left as a safety wire.  I then passed the long 6.9 French ureteroscope into the bladder and up the left ureter without any difficulty.  No stone was seen.  I entered the left renal pelvis and a stone was seen resting there.  I used a [employer] helical basket to basket  the stone, but I could not pull it past the ureteropelvic junction.  I released the stone and removed the stone basket and the rigid ureteroscope.  I then passed a flexible [employer] scope up the ureter and saw the stone present.  I used a 365 micron holmium laser fiber at 0.2 moran at a rate of 53 in order to dust the stone.  The stone fell into a lower pole eligio and I dusted it there, except for two 3 mm pieces that were easily extracted with a nitinol basket and sent for analysis.  The ureteroscope was removed under direct vision and there were normal ureteral findings.  The bladder was emptied with the cystoscope sheath and the patient went to the recovery room in stable condition.  He will be discharged on Cipro 500 mg x 1 dose to take in the morning.  He will be referred to [facility] for metabolic stone evaluation and followup with me and with a KUB in 6 months.         MICHELLE MARIANO JR, MD             D: 2020   T: 2020   MT: HUGO      Name:     SUMANTH HERNANDEZ   MRN:      [id number]        Account:        [id number]   :      1945           Procedure Date: 2020      Document: [id number]       cc: [facility]        Familia Mariano Jr, MD

## 2020-08-25 LAB
APPEARANCE STONE: NORMAL
COMPN STONE: NORMAL
NUMBER STONE: 3
SIZE STONE: NORMAL MM
WT STONE: 28 MG

## 2020-10-25 DIAGNOSIS — J45.30 MILD PERSISTENT ASTHMA WITHOUT COMPLICATION: ICD-10-CM

## 2020-10-25 DIAGNOSIS — R05.9 COUGH: ICD-10-CM

## 2020-10-27 RX ORDER — FLUTICASONE PROPIONATE 50 MCG
SPRAY, SUSPENSION (ML) NASAL
Qty: 16 G | Refills: 2 | Status: SHIPPED | OUTPATIENT
Start: 2020-10-27 | End: 2021-08-13

## 2020-11-09 ENCOUNTER — OFFICE VISIT (OUTPATIENT)
Dept: FAMILY MEDICINE | Facility: CLINIC | Age: 75
End: 2020-11-09
Payer: COMMERCIAL

## 2020-11-09 VITALS
HEIGHT: 72 IN | TEMPERATURE: 98.7 F | BODY MASS INDEX: 33.52 KG/M2 | SYSTOLIC BLOOD PRESSURE: 120 MMHG | OXYGEN SATURATION: 95 % | WEIGHT: 247.5 LBS | HEART RATE: 78 BPM | DIASTOLIC BLOOD PRESSURE: 78 MMHG

## 2020-11-09 DIAGNOSIS — M1A.00X0 IDIOPATHIC CHRONIC GOUT WITHOUT TOPHUS, UNSPECIFIED SITE: ICD-10-CM

## 2020-11-09 DIAGNOSIS — I42.9 PRIMARY CARDIOMYOPATHY (H): ICD-10-CM

## 2020-11-09 DIAGNOSIS — J45.40 MODERATE PERSISTENT ASTHMA WITHOUT COMPLICATION: Primary | ICD-10-CM

## 2020-11-09 DIAGNOSIS — R26.9 GAIT DIFFICULTY: ICD-10-CM

## 2020-11-09 DIAGNOSIS — Z23 NEED FOR PROPHYLACTIC VACCINATION AND INOCULATION AGAINST INFLUENZA: ICD-10-CM

## 2020-11-09 PROCEDURE — 90715 TDAP VACCINE 7 YRS/> IM: CPT | Performed by: FAMILY MEDICINE

## 2020-11-09 PROCEDURE — 90471 IMMUNIZATION ADMIN: CPT | Performed by: FAMILY MEDICINE

## 2020-11-09 PROCEDURE — 90662 IIV NO PRSV INCREASED AG IM: CPT | Performed by: FAMILY MEDICINE

## 2020-11-09 PROCEDURE — 90472 IMMUNIZATION ADMIN EACH ADD: CPT | Performed by: FAMILY MEDICINE

## 2020-11-09 PROCEDURE — 99214 OFFICE O/P EST MOD 30 MIN: CPT | Mod: 25 | Performed by: FAMILY MEDICINE

## 2020-11-09 RX ORDER — ALLOPURINOL 100 MG/1
100 TABLET ORAL DAILY
Qty: 90 TABLET | Refills: 0 | Status: SHIPPED | OUTPATIENT
Start: 2020-11-09 | End: 2021-02-17

## 2020-11-09 ASSESSMENT — MIFFLIN-ST. JEOR: SCORE: 1900.65

## 2020-11-09 NOTE — PROGRESS NOTES
Future Appointments   Date Time Provider Department Center   11/9/2020  2:00 PM Asif Moctezuma MD [facility]    1/13/2021  3:40 PM Familia Gillespie MD [facility]     Appointment Notes for this encounter:   back pain/ needs handicap certificate  updated     Health Maintenance Due   Topic Date Due     ANNUAL REVIEW OF HM ORDERS  1945     ASTHMA ACTION PLAN  1945     ZOSTER IMMUNIZATION (1 of 2) 06/02/1996     ASTHMA CONTROL TEST  01/10/2020     DTAP/TDAP/TD IMMUNIZATION (2 - Td) 07/11/2020     INFLUENZA VACCINE (1) 09/01/2020     Health Maintenance addressed:  Immunizations and ACT    PHQ9 / PRESTON / ACT Gave pt questionnaire to complete and Immunizations Pt will update today    MyChart Status:  Active and Using

## 2020-11-10 ASSESSMENT — ASTHMA QUESTIONNAIRES: ACT_TOTALSCORE: 20

## 2021-01-11 ENCOUNTER — COMMUNICATION - HEALTHEAST (OUTPATIENT)
Dept: CARDIOLOGY | Facility: CLINIC | Age: 76
End: 2021-01-11

## 2021-01-15 ENCOUNTER — TRANSFERRED RECORDS (OUTPATIENT)
Dept: HEALTH INFORMATION MANAGEMENT | Facility: CLINIC | Age: 76
End: 2021-01-15

## 2021-01-19 ENCOUNTER — TRANSFERRED RECORDS (OUTPATIENT)
Dept: HEALTH INFORMATION MANAGEMENT | Facility: CLINIC | Age: 76
End: 2021-01-19

## 2021-01-19 ENCOUNTER — OFFICE VISIT - HEALTHEAST (OUTPATIENT)
Dept: CARDIOLOGY | Facility: CLINIC | Age: 76
End: 2021-01-19

## 2021-01-19 DIAGNOSIS — I42.8 NONISCHEMIC CARDIOMYOPATHY (H): ICD-10-CM

## 2021-01-19 DIAGNOSIS — I48.0 PAF (PAROXYSMAL ATRIAL FIBRILLATION) (H): ICD-10-CM

## 2021-01-19 DIAGNOSIS — I77.810 AORTIC ROOT DILATATION (H): ICD-10-CM

## 2021-01-19 ASSESSMENT — MIFFLIN-ST. JEOR: SCORE: 1902.46

## 2021-01-28 ENCOUNTER — HOSPITAL ENCOUNTER (OUTPATIENT)
Dept: CARDIOLOGY | Facility: HOSPITAL | Age: 76
Discharge: HOME | End: 2021-01-28
Attending: INTERNAL MEDICINE

## 2021-01-28 LAB
AORTIC ROOT: 4.3 CM
AR DECEL SLOPE: 810 MM/S2
AR PEAK VELOCITY: 176 CM/S
ASCENDING AORTA: 4.1 CM
AV REGURGITANT PEAK GRADIENT: 12.4 MMHG
AV REGURGITATION PRESSURE HALF TIME: 669 MS
BSA FOR ECHO PROCEDURE: 2.39 M2
CV BLOOD PRESSURE: ABNORMAL MMHG
CV ECHO HEIGHT: 72 IN
CV ECHO WEIGHT: 249 LBS
DOP CALC LVOT AREA: 5.72 CM2
DOP CALC LVOT DIAMETER: 2.7 CM
DOP CALC LVOT PEAK VEL: 67.9 CM/S
DOP CALC LVOT STROKE VOLUME: 85.8 CM3
DOP CALCLVOT PEAK VEL VTI: 15 CM
ECHO EJECTION FRACTION ESTIMATED: 55 %
EJECTION FRACTION: 53 % (ref 55–75)
FRACTIONAL SHORTENING: 26.1 % (ref 28–44)
INTERVENTRICULAR SEPTUM IN END DIASTOLE: 1.2 CM (ref 0.6–1)
IVS/PW RATIO: 1
LA AREA 1: 22 CM2
LA AREA 2: 20.4 CM2
LEFT ATRIUM LENGTH: 5.6 CM
LEFT ATRIUM SIZE: 3.9 CM
LEFT ATRIUM TO AORTIC ROOT RATIO: 0.91 NO UNITS
LEFT ATRIUM VOLUME INDEX: 28.5 ML/M2
LEFT ATRIUM VOLUME: 68.1 ML
LEFT VENTRICLE CARDIAC INDEX: 2.8 L/MIN/M2
LEFT VENTRICLE CARDIAC OUTPUT: 6.6 L/MIN
LEFT VENTRICLE DIASTOLIC VOLUME INDEX: 46 CM3/M2 (ref 34–74)
LEFT VENTRICLE DIASTOLIC VOLUME: 110 CM3 (ref 62–150)
LEFT VENTRICLE HEART RATE: 77 BPM
LEFT VENTRICLE MASS INDEX: 96.3 G/M2
LEFT VENTRICLE SYSTOLIC VOLUME INDEX: 21.5 CM3/M2 (ref 11–31)
LEFT VENTRICLE SYSTOLIC VOLUME: 51.5 CM3 (ref 21–61)
LEFT VENTRICULAR INTERNAL DIMENSION IN DIASTOLE: 4.95 CM (ref 4.2–5.8)
LEFT VENTRICULAR INTERNAL DIMENSION IN SYSTOLE: 3.66 CM (ref 2.5–4)
LEFT VENTRICULAR MASS: 230 G
LEFT VENTRICULAR OUTFLOW TRACT MEAN GRADIENT: 1 MMHG
LEFT VENTRICULAR OUTFLOW TRACT MEAN VELOCITY: 50.1 CM/S
LEFT VENTRICULAR OUTFLOW TRACT PEAK GRADIENT: 2 MMHG
LEFT VENTRICULAR POSTERIOR WALL IN END DIASTOLE: 1.2 CM (ref 0.6–1)
LV STROKE VOLUME INDEX: 35.9 ML/M2
MITRAL VALVE DECELERATION SLOPE: 1830 MM/S2
MITRAL VALVE E/A RATIO: 0.7
MITRAL VALVE PRESSURE HALF-TIME: 85 MS
MV AVERAGE E/E' RATIO: 14.5 CM/S
MV DECELERATION TIME: 289 MS
MV E'TISSUE VEL-LAT: 3.94 CM/S
MV E'TISSUE VEL-MED: 3.33 CM/S
MV LATERAL E/E' RATIO: 13.4
MV MEDIAL E/E' RATIO: 15.9
MV PEAK A VELOCITY: 77.5 CM/S
MV PEAK E VELOCITY: 52.8 CM/S
MV VALVE AREA PRESSURE 1/2 METHOD: 2.6 CM2
NUC REST DIASTOLIC VOLUME INDEX: 3984 LBS
NUC REST SYSTOLIC VOLUME INDEX: 72 IN
PR MAX PG: 4 MMHG
PR PEAK VELOCITY: 114 CM/S
TRICUSPID VALVE ANULAR PLANE SYSTOLIC EXCURSION: 3 CM

## 2021-01-28 ASSESSMENT — MIFFLIN-ST. JEOR: SCORE: 1902.46

## 2021-02-16 DIAGNOSIS — M1A.00X0 IDIOPATHIC CHRONIC GOUT WITHOUT TOPHUS, UNSPECIFIED SITE: ICD-10-CM

## 2021-02-16 NOTE — TELEPHONE ENCOUNTER
Routing refill request to provider for review/approval because:  Labs out of range:  Uric acid and CR  Andree Catherine RN, BSN

## 2021-02-17 RX ORDER — ALLOPURINOL 100 MG/1
TABLET ORAL
Qty: 90 TABLET | Refills: 0 | Status: SHIPPED | OUTPATIENT
Start: 2021-02-17 | End: 2021-03-24

## 2021-02-17 NOTE — TELEPHONE ENCOUNTER
He should have a follow-up uric acid level to make sure this is lowering the level into desired range otherwise we would increase the dose.  Can do this as a lab only visit.    Should also have repeat creatinine.    Please set him up for lab visit.    Schedule for Medicare wellness visit in about 2 months with primary care provider.

## 2021-03-22 DIAGNOSIS — M1A.00X0 IDIOPATHIC CHRONIC GOUT WITHOUT TOPHUS, UNSPECIFIED SITE: ICD-10-CM

## 2021-03-22 LAB
BASOPHILS # BLD AUTO: 0 10E9/L (ref 0–0.2)
BASOPHILS NFR BLD AUTO: 0.4 %
DIFFERENTIAL METHOD BLD: NORMAL
EOSINOPHIL # BLD AUTO: 0.3 10E9/L (ref 0–0.7)
EOSINOPHIL NFR BLD AUTO: 3.2 %
ERYTHROCYTE [DISTWIDTH] IN BLOOD BY AUTOMATED COUNT: 13.6 % (ref 10–15)
HCT VFR BLD AUTO: 46.4 % (ref 40–53)
HGB BLD-MCNC: 15.9 G/DL (ref 13.3–17.7)
LYMPHOCYTES # BLD AUTO: 2.3 10E9/L (ref 0.8–5.3)
LYMPHOCYTES NFR BLD AUTO: 28.4 %
MCH RBC QN AUTO: 32.1 PG (ref 26.5–33)
MCHC RBC AUTO-ENTMCNC: 34.3 G/DL (ref 31.5–36.5)
MCV RBC AUTO: 94 FL (ref 78–100)
MONOCYTES # BLD AUTO: 0.9 10E9/L (ref 0–1.3)
MONOCYTES NFR BLD AUTO: 11 %
NEUTROPHILS # BLD AUTO: 4.6 10E9/L (ref 1.6–8.3)
NEUTROPHILS NFR BLD AUTO: 57 %
PLATELET # BLD AUTO: 305 10E9/L (ref 150–450)
RBC # BLD AUTO: 4.95 10E12/L (ref 4.4–5.9)
WBC # BLD AUTO: 8 10E9/L (ref 4–11)

## 2021-03-22 PROCEDURE — 84550 ASSAY OF BLOOD/URIC ACID: CPT | Performed by: FAMILY MEDICINE

## 2021-03-22 PROCEDURE — 36415 COLL VENOUS BLD VENIPUNCTURE: CPT | Performed by: FAMILY MEDICINE

## 2021-03-22 PROCEDURE — 80048 BASIC METABOLIC PNL TOTAL CA: CPT | Performed by: FAMILY MEDICINE

## 2021-03-22 PROCEDURE — 85025 COMPLETE CBC W/AUTO DIFF WBC: CPT | Performed by: FAMILY MEDICINE

## 2021-03-23 LAB
ANION GAP SERPL CALCULATED.3IONS-SCNC: 3 MMOL/L (ref 3–14)
BUN SERPL-MCNC: 17 MG/DL (ref 7–30)
CALCIUM SERPL-MCNC: 9.2 MG/DL (ref 8.5–10.1)
CHLORIDE SERPL-SCNC: 105 MMOL/L (ref 94–109)
CO2 SERPL-SCNC: 29 MMOL/L (ref 20–32)
CREAT SERPL-MCNC: 1.16 MG/DL (ref 0.66–1.25)
GFR SERPL CREATININE-BSD FRML MDRD: 61 ML/MIN/{1.73_M2}
GLUCOSE SERPL-MCNC: 60 MG/DL (ref 70–99)
POTASSIUM SERPL-SCNC: 4.5 MMOL/L (ref 3.4–5.3)
SODIUM SERPL-SCNC: 137 MMOL/L (ref 133–144)
URATE SERPL-MCNC: 7.9 MG/DL (ref 3.5–7.2)

## 2021-03-24 ENCOUNTER — TELEPHONE (OUTPATIENT)
Dept: FAMILY MEDICINE | Facility: CLINIC | Age: 76
End: 2021-03-24

## 2021-03-24 DIAGNOSIS — M1A.00X0 IDIOPATHIC CHRONIC GOUT WITHOUT TOPHUS, UNSPECIFIED SITE: ICD-10-CM

## 2021-03-24 RX ORDER — ALLOPURINOL 100 MG/1
200 TABLET ORAL DAILY
Qty: 180 TABLET | Refills: 0 | Status: SHIPPED | OUTPATIENT
Start: 2021-03-24 | End: 2021-08-13

## 2021-03-24 NOTE — TELEPHONE ENCOUNTER
----- Message from Asif Moctezuma MD sent at 3/24/2021  1:36 PM CDT -----  Uric acid better with allopurinol but not quite at goal (<6).  Recommend increase to 200 mg daily and recheck in 2 months.  Follow-up with me as planned.  Please change med list and ok to refill 200 mg (take 2 of 100 mg tabs same time daily).

## 2021-03-24 NOTE — RESULT ENCOUNTER NOTE
Called and spoke with patient, relayed message per provider. See telephone encounter 3/24/21.     Ciro IBARRA RN

## 2021-03-24 NOTE — TELEPHONE ENCOUNTER
Called and spoke with patient. Patient verbalized agreement with plan. Scheduled f/u lab only in 2 months for uric acid recheck 5/26/21. Patient is scheduled to see Dr. Moctezuma 4/16/21, will keep appointment as planned per provider note. Reordered medication to reflect new sig.     Ciro IBARRA RN

## 2021-04-10 ENCOUNTER — HEALTH MAINTENANCE LETTER (OUTPATIENT)
Age: 76
End: 2021-04-10

## 2021-04-21 ENCOUNTER — VIRTUAL VISIT (OUTPATIENT)
Dept: FAMILY MEDICINE | Facility: CLINIC | Age: 76
End: 2021-04-21
Payer: COMMERCIAL

## 2021-04-21 DIAGNOSIS — I42.9 PRIMARY CARDIOMYOPATHY (H): ICD-10-CM

## 2021-04-21 DIAGNOSIS — M1A.00X0 IDIOPATHIC CHRONIC GOUT WITHOUT TOPHUS, UNSPECIFIED SITE: Primary | ICD-10-CM

## 2021-04-21 DIAGNOSIS — M79.89 LEG SWELLING: ICD-10-CM

## 2021-04-21 PROCEDURE — 99214 OFFICE O/P EST MOD 30 MIN: CPT | Mod: 95 | Performed by: FAMILY MEDICINE

## 2021-04-21 NOTE — PROGRESS NOTES
Khurram is a 75 year old who is being evaluated via a billable video visit.      How would you like to obtain your AVS? MyChart  If the video visit is dropped, the invitation should be resent by: Text to cell phone: [phone]  Will anyone else be joining your video visit? No      Video Start Time: 2:39 PM    Assessment & Plan     Idiopathic chronic gout without tophus, unspecified site  With elevated uric acid levels and gout flares in the past recommend continue allopurinol.    Primary cardiomyopathy (H)  Stable and recently seen by cardiology with improvement in EF.    Leg swelling  With single leg swelling and stable heart disease with other normal labs this appears to be likely secondary to chronic venous insufficiency.  Discussed wearing compression socks, elevating leg, walking as able.  Trial of diuretic could be considered if significant swelling.  Follow-up with vascular provider or lymphedema clinic could be considered in future if worsening.               BMI:   Estimated body mass index is 33.57 kg/m  as calculated from the following:    Height as of 11/9/20: 1.829 m (6').    Weight as of 11/9/20: 112.3 kg (247 lb 8 oz).           Return in about 6 months (around 10/21/2021) for medicare wellness check, medication recheck.    Asif Moctezuma MD  [facility] is a 75 year old who presents for the following health issues     History of Present Illness       He eats 2-3 servings of fruits and vegetables daily.He consumes 2 sweetened beverage(s) daily.He exercises with enough effort to increase his heart rate 9 or less minutes per day.  He exercises with enough effort to increase his heart rate 3 or less days per week.   He is taking medications regularly.       Medication Followup of allopurinol    Taking Medication as prescribed: yes    Side Effects:  None    Medication Helping Symptoms:  Not working  - swelling not going down      Patient with ongoing  intermittent leg swelling now states he is having swelling more in the calf and leg than just the foot.  Has had D-dimer in the past with subsequent ultrasound showing no DVT.  Has had prior orthopedic surgery on that leg.  Has history of cardiomyopathy but has seen cardiology recently and no concerns with fluid overload and EF has improved to normal.    History of gout with flares occasionally.  Uric acid was quite elevated and reduce with allopurinol.  Tolerating medication without difficulty.    Review of Systems         Objective           Vitals:  No vitals were obtained today due to virtual visit.    Physical Exam   GENERAL: Healthy, alert and no distress  EYES: Eyes grossly normal to inspection.  No discharge or erythema, or obvious scleral/conjunctival abnormalities.  RESP: No audible wheeze, cough, or visible cyanosis.  No visible retractions or increased work of breathing.    SKIN: Visible skin clear. No significant rash, abnormal pigmentation or lesions.  NEURO: Cranial nerves grossly intact.  Mentation and speech appropriate for age.  PSYCH: Mentation appears normal, affect normal/bright, judgement and insight intact, normal speech and appearance well-groomed.    Echocardiogram 1/28/2021:   Left Ventricle: Normal left ventricular size.The estimated left   ventricular ejection fraction is 55%. This represents a normal ejection   fraction. Mild hypertrophy noted. Normal left atrial pressure. Left   ventricular diastolic function is indeterminate.    Normal right ventricular size and systolic function.    The aortic root is mildly dilated. The ascending aorta is mildly   dilated.    Aortic Valve: The valve is tricuspid. No aortic stenosis. Mild aortic   regurgitation.    When compared to the previous study dated 2/22/2019, LVEF is higher    Ultrasound lower extremity 7/22/2020:  FINDINGS:  Examination of the deep veins with graded compression and  color flow Doppler with spectral wave form analysis was  performed.   There is no evidence for DVT in the right lower extremity.                                                       IMPRESSION: No evidence of deep venous thrombosis.            Video-Visit Details    Type of service:  Video Visit    Video End Time: 2:58 PM    Originating Location (pt. Location): Home    Distant Location (provider location):  [facility]     Platform used for Video Visit: [employer]

## 2021-04-26 ENCOUNTER — IMMUNIZATION (OUTPATIENT)
Dept: NURSING | Facility: CLINIC | Age: 76
End: 2021-04-26
Payer: COMMERCIAL

## 2021-04-26 PROCEDURE — 0001A PR COVID VAC PFIZER DIL RECON 30 MCG/0.3 ML IM: CPT

## 2021-04-26 PROCEDURE — 91300 PR COVID VAC PFIZER DIL RECON 30 MCG/0.3 ML IM: CPT

## 2021-05-17 ENCOUNTER — IMMUNIZATION (OUTPATIENT)
Dept: NURSING | Facility: CLINIC | Age: 76
End: 2021-05-17
Attending: INTERNAL MEDICINE
Payer: COMMERCIAL

## 2021-05-17 PROCEDURE — 0002A PR COVID VAC PFIZER DIL RECON 30 MCG/0.3 ML IM: CPT

## 2021-05-17 PROCEDURE — 91300 PR COVID VAC PFIZER DIL RECON 30 MCG/0.3 ML IM: CPT

## 2021-05-18 ENCOUNTER — TRANSFERRED RECORDS (OUTPATIENT)
Dept: HEALTH INFORMATION MANAGEMENT | Facility: CLINIC | Age: 76
End: 2021-05-18

## 2021-05-19 ENCOUNTER — DOCUMENTATION ONLY (OUTPATIENT)
Dept: INTERNAL MEDICINE | Facility: CLINIC | Age: 76
End: 2021-05-19

## 2021-05-19 DIAGNOSIS — M1A.00X0 IDIOPATHIC CHRONIC GOUT WITHOUT TOPHUS, UNSPECIFIED SITE: ICD-10-CM

## 2021-05-19 DIAGNOSIS — M10.071 ACUTE IDIOPATHIC GOUT OF RIGHT FOOT: Primary | ICD-10-CM

## 2021-05-19 NOTE — PROGRESS NOTES
Khurram  has an upcoming lab only appt for uric acid recheck and currently has no future orders in his chart. Please place future orders as needed. Thanks!  Zuri

## 2021-05-30 ENCOUNTER — RECORDS - HEALTHEAST (OUTPATIENT)
Dept: ADMINISTRATIVE | Facility: CLINIC | Age: 76
End: 2021-05-30

## 2021-05-31 ENCOUNTER — RECORDS - HEALTHEAST (OUTPATIENT)
Dept: ADMINISTRATIVE | Facility: CLINIC | Age: 76
End: 2021-05-31

## 2021-06-01 ENCOUNTER — RECORDS - HEALTHEAST (OUTPATIENT)
Dept: ADMINISTRATIVE | Facility: CLINIC | Age: 76
End: 2021-06-01

## 2021-06-01 NOTE — TELEPHONE ENCOUNTER
Zestar Study Consent Visit    Study description: ECG and PPG Study: Zestar Study      Khurram Reynoso a 74 y.o. male , was contacted by today to discuss participation in the Zestar study.     The patient called the Clinical Trials Office to inquire about study participation.  The consent form was reviewed with the patient.     The review of the study included:    Study purpose     Conflict of interest    Device description      Study visits    Risks of participation    Benefits (if any)    Alternatives    Voluntary participation    Confidentiality     Compensation/costs of participation    Study stipends    Injury and legal rights    The subject was queried in regards to his willingness to continue and come in for scheduled appointment. his questions were answered to his satisfaction.   The patient has given his preliminary agreement to volunteer to participate in the above noted study.     Plan: Khurram Reynoso will come to [facility] on 10/4/2019 [date] to continue consent process. If he continues to agrees to participate, the study visit will be done on the same day.    Mackenzie Cooper RN

## 2021-06-02 ENCOUNTER — RECORDS - HEALTHEAST (OUTPATIENT)
Dept: ADMINISTRATIVE | Facility: CLINIC | Age: 76
End: 2021-06-02

## 2021-06-02 VITALS — WEIGHT: 232 LBS | BODY MASS INDEX: 31.42 KG/M2 | HEIGHT: 72 IN

## 2021-06-02 VITALS — HEIGHT: 72 IN | WEIGHT: 232 LBS | BODY MASS INDEX: 31.42 KG/M2

## 2021-06-03 VITALS
RESPIRATION RATE: 20 BRPM | HEIGHT: 71 IN | HEART RATE: 77 BPM | SYSTOLIC BLOOD PRESSURE: 110 MMHG | DIASTOLIC BLOOD PRESSURE: 78 MMHG | BODY MASS INDEX: 32.4 KG/M2 | WEIGHT: 231.4 LBS | TEMPERATURE: 97.9 F

## 2021-06-04 DIAGNOSIS — M10.071 ACUTE IDIOPATHIC GOUT OF RIGHT FOOT: ICD-10-CM

## 2021-06-04 DIAGNOSIS — M1A.00X0 IDIOPATHIC CHRONIC GOUT WITHOUT TOPHUS, UNSPECIFIED SITE: ICD-10-CM

## 2021-06-04 LAB — URATE SERPL-MCNC: 6.7 MG/DL (ref 3.5–7.2)

## 2021-06-04 PROCEDURE — 36415 COLL VENOUS BLD VENIPUNCTURE: CPT | Performed by: FAMILY MEDICINE

## 2021-06-04 PROCEDURE — 84550 ASSAY OF BLOOD/URIC ACID: CPT | Performed by: FAMILY MEDICINE

## 2021-06-05 VITALS
BODY MASS INDEX: 33.72 KG/M2 | WEIGHT: 249 LBS | DIASTOLIC BLOOD PRESSURE: 68 MMHG | HEIGHT: 72 IN | HEART RATE: 80 BPM | SYSTOLIC BLOOD PRESSURE: 120 MMHG | RESPIRATION RATE: 16 BRPM

## 2021-06-05 VITALS — HEIGHT: 72 IN | BODY MASS INDEX: 33.72 KG/M2 | WEIGHT: 249 LBS

## 2021-06-14 NOTE — TELEPHONE ENCOUNTER
----- Message from Phuong Sims sent at 1/11/2021 10:50 AM CST -----  Regarding: Essie- Question  Patient would like to speak with the nurse in regards to dizzy spells he has been having. Please call [phone]

## 2021-06-14 NOTE — PATIENT INSTRUCTIONS - HE
Khurram Reynoso    It was a pleasure to see you today for a clinic visit.    Continue current medications and activity  Cardiac echo has been ordered to reassess left ventricular function and to assess size of the aorta  Call if troublesome palpitations associated with irregular heart beating  Follow up appointment:   Dr. Fountain as needed    Contact [facility] at [phone] with questions or concerns.    Oh Fountain MD

## 2021-06-14 NOTE — TELEPHONE ENCOUNTER
Return call from patient, he states that over the weekend he had a brief episode of dizziness accompanied by vision changes.  He states it only lasted for a few minutes, he states he was reading something on his Ipad and all of a sudden he felt dizzy and the writing on the Ipad seemed to be scrolling past over and over again.  He states it spontaneously resolved and he has not had any symptoms since that time.      Pt does have a hx of afib, explained that if he does have symptoms like that again or any other neurological changes he should seek care in the ER right away and to not hesitate to go in.  He would like to follow up with DND and will ask our  to contact him for an apt time.  He states understanding.

## 2021-06-14 NOTE — TELEPHONE ENCOUNTER
Return call to patient.  Was last seen 2/11/19 with DND for PAF and directed to follow up as needed.  No answer and left message for return call.

## 2021-06-18 DIAGNOSIS — R06.02 SOB (SHORTNESS OF BREATH): ICD-10-CM

## 2021-06-18 RX ORDER — ALBUTEROL SULFATE 90 UG/1
AEROSOL, METERED RESPIRATORY (INHALATION)
Qty: 18 G | Refills: 3 | Status: SHIPPED | OUTPATIENT
Start: 2021-06-18 | End: 2022-08-01

## 2021-06-18 NOTE — TELEPHONE ENCOUNTER
Pending Prescriptions:                       Disp   Refills    albuterol (VENTOLIN HFA) 108 (90 Base) MCG*18 g   3        Sig: INHALE 2 PUFFS BY MOUTH FOUR TIMES DAILY IF NEEDED    Routing refill request to provider for review/approval because:  ACT Total Scores 7/10/2019 11/9/2020   ACT TOTAL SCORE (Goal Greater than or Equal to 20) 19 20   In the past 12 months, how many times did you visit the emergency room for your asthma without being admitted to the hospital? 1 0   In the past 12 months, how many times were you hospitalized overnight because of your asthma? 0 0

## 2021-06-23 NOTE — PATIENT INSTRUCTIONS - HE
Khurram Reynoso    It was a pleasure to see you at [facility] today.    Cardiac echo to reassess aortic root  Follow up appointment:   Dr. Fountain in 2 years    Contact Yashira at [phone] with questions or concerns.    Oh Fountain MD     Unna Boot Text: An Unna boot was placed to help immobilize the limb and facilitate more rapid healing.

## 2021-06-27 NOTE — PROGRESS NOTES
Progress Notes by Oh Fountain MD at 2/11/2019  4:30 PM     Author: Oh Fountain MD Service: -- Author Type: Physician    Filed: 2/11/2019  4:48 PM Encounter Date: 2/11/2019 Status: Addendum    : Oh Fountain MD (Physician)    Related Notes: Original Note by Oh Fountain MD (Physician) filed at 2/11/2019  4:47 PM           Click to link to [facility] Heart [facility] HEART [facility] ELECTROPHYSIOLOGY NOTE    Today I had the opportunity to see  Khurram Reynoso for follow-up evaluation of single episode of paroxysmal atrial fibrillation.      Assessment/Recommendations   Clinic Problem List:  1. PAF (paroxysmal atrial fibrillation) (H)     2. Aortic root dilatation (H)  Echo Complete       Assessment:    Single episode of paroxysmal atrial fibrillation 2016.  No clinical evidence for recurrence and low risk for cardioembolism.  No further evaluation needed.  Mild aortic root dilatation asymptomatic    Plan:  Cardiac echo to reassess aortic root  Follow up appointment:   Dr. Fountain in 2 years       History of Present Illness     Khurram Reynoso is a 73 y.o. male with nonischemic cardiomyopathy in 2006 with very frequent ventricular premature beats.  He had coronary angiography at [facility] which showed no coronary artery disease and there was incidental of ventricular fibrillation with left coronary injection.  Ejection fraction was felt to be approximately 30-35%.  Holter showed 15-21,000 ventricular premature beats with morphology consistent with outflow tract ectopy.  I successfully ablated this PVC focus on 4/3/2007 at [facility].  Symptoms of palpitations improved markedly.  Holter showed only 8 PVCs over 24 hours.  Left ventricular function normalized.  He did have a normal stress echo in 2010.    I saw him again in August 2016 after paroxysmal atrial fibrillation was detected on a patient activated ECG monitor this appeared to be an isolated episode on February 17, 2016 at 2  AM.  He had atrial premature beats and short runs of atrial tachycardia.  Cardiac echo at that time showed ejection fraction of 50-55% trivial aortic stenosis and moderate aortic root enlargement of 4.4 cm.  His chads 2 vas score was 1 related to age was treated transiently with controlled metoprolol which was stopped due to hypotension.  He is done well over the past 2 years.  He notes occasional extrasystoles when he has taken a sauna.  He takes his pulse regularly  and has not found it to be fast or irregular.  There is been no lightheadedness syncope or presyncope.  He denies exertional chest pain or pressure.  He has noted some mild peripheral edema.  He is taking no cardiac medications.  He is a non-smoker.         Physical Examination Review of Systems   Vitals:    02/11/19 1608   BP: 112/60   Pulse: 78   Resp: 14     Body mass index is 31.46 kg/m .  Wt Readings from Last 3 Encounters:   02/11/19 (!) 232 lb (105.2 kg)   08/26/16 215 lb (97.5 kg)        Appearance:   no distress,    HEENT:   no scleral icterus, normal conjunctivae    Neck: no carotid bruits or thyromegaly   Chest/Lungs:   lungs are clear to auscultation, no rales or wheezing,    Cardiovascular:   Jugular venous pressure 5 cm, Apical pulse is regular. Normal S1,S2 with no murmurs or gallops,   Abdomen:  no  Hepatosplenomegaly., nontender,  bowel sounds are present   Extremities: no cyanosis or clubbing, trace bilateral edema   Skin: no xanthelasma, warm.    Neurologic: No gross focal neurologic deficits   Mood/Affect: Alert, cooperative    General: WNL  Eyes: WNL  Ears/Nose/Throat: WNL  Lungs: Cough, Shortness of Breath, Snoring, Wheezing  Heart: Leg Swelling  Stomach: WNL  Bladder: WNL  Muscle/Joints: WNL  Skin: WNL  Nervous System: WNL  Mental Health: WNL     Blood: WNL     Medical History  Surgical History Family History Social History   No past medical history on file. Past Surgical History:   Procedure Laterality Date   ? ABLATION OF  DYSRHYTHMIC FOCUS  04/03/2007    RVOT PVCs   ? IN TOTAL HIP ARTHROPLASTY      Description: Total Hip Replacement;  Recorded: 09/24/2010;   ? IN TOTAL KNEE ARTHROPLASTY      Description: Total Knee Arthroplasty;  Recorded: 09/24/2010;  Comments: right    Family History   Problem Relation Age of Onset   ? CABG Father 77    Social History     Socioeconomic History   ? Marital status:      Spouse name: Not on file   ? Number of children: Not on file   ? Years of education: Not on file   ? Highest education level: Not on file   Social Needs   ? Financial resource strain: Not on file   ? Food insecurity - worry: Not on file   ? Food insecurity - inability: Not on file   ? Transportation needs - medical: Not on file   ? Transportation needs - non-medical: Not on file   Occupational History   ? Not on file   Tobacco Use   ? Smoking status: Never Smoker   Substance and Sexual Activity   ? Alcohol use: Not on file   ? Drug use: Not on file   ? Sexual activity: Not on file   Other Topics Concern   ? Not on file   Social History Narrative   ? Not on file          Medications  Allergies   Current Outpatient Medications   Medication Sig Dispense Refill   ? acetaminophen (TYLENOL) 500 MG tablet Take 1-2 tablets by mouth.     ? albuterol (PROVENTIL HFA;VENTOLIN HFA) 90 mcg/actuation inhaler Inhale 2 puffs.     ? fluticasone-salmeterol (ADVAIR DISKUS) 250-50 mcg/dose DISKUS Inhale 1 puff.     ? indomethacin (INDOCIN) 50 MG capsule TAKE 1 CAPSULE BY MOUTH THREE TIMES DAILY AS NEEDED FOR PAIN. TAKE WITH FOOD. 20 capsule 0   ? allopurinol (ZYLOPRIM) 100 MG tablet Take 100 mg by mouth.     ? aspirin 81 MG EC tablet Take 81 mg by mouth.     ? mometasone (NASONEX) 50 mcg/actuation nasal spray        No current facility-administered medications for this visit.       No Known Allergies

## 2021-06-28 NOTE — PROGRESS NOTES
Progress Notes by Roma Ace CNP at 10/4/2019  8:00 AM     Author: Roma Ace CNP Service: -- Author Type: Nurse Practitioner    Filed: 10/18/2019  2:55 PM Encounter Date: 10/4/2019 Status: Addendum    : Roma Ace CNP (Nurse Practitioner)    Related Notes: Original Note by Roma Ace CNP (Nurse Practitioner) filed at 10/4/2019 11:35 AM        Zestar Study    Physical Examination  For abnormal findings, please evaluate if the finding is Clinically Significant (by 'CS') or Not Clinically Significant (by 'NCS')  General Appearance Normal  Head and Neck  Normal  Lungs    Abnormal- Wheezing anterior and posterior CS  Cardiovascular  Normal  Abdomen   Normal  Musculoskeletal/Extremities Normal   Lymph Nodes  Normal  Skin    Normal  Neurological   Normal   Tremor absent     If present, evaluate severity on 1-10 scale    Roma Ace CNP

## 2021-06-28 NOTE — PROGRESS NOTES
Progress Notes by Eden Kumar RN at 10/4/2019  8:00 AM     Author: Eden Kumar RN Service: -- Author Type: Registered Nurse    Filed: 10/31/2019  3:51 PM Encounter Date: 10/4/2019 Status: Addendum    : Eden Kumar RN (Registered Nurse)    Related Notes: Original Note by Eden Kumar RN (Registered Nurse) filed at 10/4/2019 11:35 AM             Zestar Study Consent Visit    Study description: ECG and PPG Study: Zestar Study      Note time seated: 0800     Khurram MCPHERSON Angeles a 74 y.o. male , was seen in 2500 today to discuss participation in the Zestar study.   The consent discussion began on 02 Oct 2019.  Please refer to phone call note from Mackenzie LAND RN for more details.  The consent form was reviewed with the patient.     The review of the study included:    Study purpose     Conflict of interest    Device description      Study visits    Risks of participation    Benefits (if any)    Alternatives    Voluntary participation    Confidentiality     Compensation/costs of participation    Study stipends    Injury and legal rights    The subject was provided time to review the consent form and consider participation. his questions were answered to his satisfaction.   The patient has voluntarily agreed to participate in the above noted study.     The consent form version 06 Aug 2019 and HIPAA form version 11 Jun 2019 was signed 10/04/19 at 0827    The subject was provided with a copy of the consent form and HIPPA. A copy of the signed forms was forwarded to medical records.    No study procedures were done prior to Khurram Reynoso providing informed consent.     Eden Kumar RN    Subject Restrictions During Study -Confirmed with Subject prior to any study procedures completed    Restrictions on jewelry, recreational drugs, caffeine, and exercise few days prior and during study.   1. Subjects should not consume excessive amount of caffeine (6 or more 8-oz cups of coffee, or more than 570 mg  "of caffeine from energy drinks, pills or similar substance) during their participation in the study.   2. Subjects should not consume excessive amount of alcohol for the duration of their participation in the study. A typical moderate amount is allowed during stage 3.   3. Subjects should not take any recreational drugs (including, but not limited to methamphetamines, cocaine, opioids, cannabis, LSD) for the duration of their participation in the study.   4. Subjects should not wear underwire bra or jewelry during the in-lab study (to not interfere with electrode placement and ECG data recordings).   5. Subject will not be permitted to have their cell phone or any electronic recording device on or with them during the in-lab test session(s).   6. Subjects under 22 years old will not be permitted to take ECG recordings through the ECG ranjeet on the wrist-worn devices.     For study stage 3 only   1. Subjects should only do high intensity exercise (e.g. sprinting, heavy lifting, etc.) in the morning upon awakening or else not at all   2. Subjects should abstain from swimming during the time of the study   3. Subjects should only shower in the morning upon awakening (or else not at all)   4. Female subjects are strongly suggested to wear non-underwire bras throughout this stage of the study     Eden Kumar RN      Study Data collections   Vitals  (TPBP)   VS taken after 5 min rest  /78   Pulse 77   Temp 97.9  F (36.6  C)   Resp 20   Ht 5' 11\" (1.803 m)   Wt (!) 231 lb 6.4 oz (105 kg)   BMI 32.27 kg/m         MAP 1    88  MAP 2      84   MAP 3             80        male  1945  74 y.o.      Note time patient placed in supine position: 0844    Ethnicity   [x]   or    []  Not  or   Race   []   or    []    []  Black or   []   or Other   [x]  White  Physical Activity Level  per subject report:   []  " 0- Extremely Inactive []  1- Sedentary [x]  2- Moderately Active  []  3- Vigorously Active []  4- Extremely Active  Trained Athlete   [] Yes  [x] No     Darling's' Skin type   [] Type 1 [] Type 2 [x] Type 3    [] Type 4 [] Type 5 [] Type 6    Subject participated in previous ECG study at [facility]: [] Yes    [x] No    Past Medical History:   Diagnosis Date   ? Asthma 2018   ? Gout 2001   ? Paroxysmal A-fib (H) 2006   ? Skin Neoplasm Of Uncertain Behavior 2017    Created by Conversion        HISTORY OF HEART RHYTHM ABNORMALITIES   []  None   []  Atrial Flutter  [] Frequent PACs (>3 in 30 secs)  [] AF (permanent)  []  Tachycardia  [] Bigeminy, trigeminy, and/or quadgeminy  []  AF (persistent)  []  Heart Block   [x]  AF (paroxysmal)  [] PVCs    [] AF (other)    [] BBB    []  Other:   How many years? 14  Special interest allergies: active allergic skin reactions  No Known Allergies    Current Outpatient Medications:   ?  acetaminophen (TYLENOL) 500 MG tablet, Take 1-2 tablets by mouth., Disp: , Rfl:   ?  albuterol (PROVENTIL HFA;VENTOLIN HFA) 90 mcg/actuation inhaler, Inhale 2 puffs every 4 (four) hours as needed.    , Disp: , Rfl:   ?  allopurinol (ZYLOPRIM) 100 MG tablet, Take 100 mg by mouth., Disp: , Rfl:   ?  aspirin 81 MG EC tablet, Take 81 mg by mouth., Disp: , Rfl:   ?  fluticasone-salmeterol (ADVAIR DISKUS) 250-50 mcg/dose DISKUS, Inhale 1 puff daily.    , Disp: , Rfl:   ?  indomethacin (INDOCIN) 50 MG capsule, TAKE 1 CAPSULE BY MOUTH THREE TIMES DAILY AS NEEDED FOR PAIN. TAKE WITH FOOD., Disp: 20 capsule, Rfl: 0  ?  mometasone (NASONEX) 50 mcg/actuation nasal spray, 1 spray daily.    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   Predominately works outdoors  [] Yes    [x] No      Hours/week spent outdoors (total, not  "only for work): 14    Frequently participates in \"hand intensive\" activities [] Yes [x] No  Caffeine  []  Never  [] Occasionally        []  Daily (1 cup/day)     [x]  Daily (>1 cup/day)    Alcohol   [x]  Never  [] Light (drink or 2 occasionally) []  Moderate (a drink or 2 almost daily)   []  Occasional-heavy (more than a few drinks <2x / month)  [] Heavy (more than a few drinks >2x / month)    Tobacco/nicotine  [x]  Never  [] Rarely  []  Frequently/ Daily     Mattress Information  [] Subject did not participate in Stage 3  Mattress type:  [x]  Memory foam [] Gel  [] Innerspring (coil)  [] Airbed  [] Waterbed [] Shikibuton  [] Hybrid  [] No mattress  [] Other (comment):    Mattress foundation   [] Mattress on floor/ground    [] Mattress on foundation/box spring on floor/ground  [x] Mattress on foundation/box spring on bed frame  [] Mattress on tatami on floor/ground  [] Other (comment):    Mattress topper   [] No mattress topper  [] Pillow top  [] Foam top - flat style  [] Foam top - \"egg crate\" style  [x] Other (comment): Quilted Mattress cover    Co-sleeper    [x]  Yes  []  No    CPAP use   [] Yes  [x] No      Dominant hand [] left  [x] right [] ambidextrous  Preferred Wrist to wear band on   [x]  left   []  right   Were screening day & study day: [x]  same [] different   Same: wrist circumference:      180   mm   Device wearing wrist skin fold thickness:       4.4  mm  Wrist Band Size:     []  Flush Fit S/M  []  Non-Flush Fit S/M   [x]  Flush Fit M/L  []  Non-Flush Fit M/L  []  Flush Fit XL  []  Non-Flush Fit XL    Preferred/natural band notch: 4  Secure band notch: 4  Crown orientation:  []  left   [x]  right  Device wearing wrist hairiness:     []  Light []  Medium       [x]  Heavy  Spectophotometer    L  A  B   Reading #1  54.43 10.19 17.40   Reading #2 53.82 11.03 19.10   Reading #3 53.98 9.64 18.44     Pregnancy test  for WOCBP    [x] n/a male or female not child bearing potential  Room Temperature ( C): " 22  CS Laptop ID: [id number]  CS Cam ID:[id number]  Device Set ID: [id number]  Wrist Device ID:[id number]  Subject has now completed their in-house participation in the Zestar study. Subject will complete Stage 3 at home for the next 3 days and return the equipment on Monday 10/7/2019 at 10:00.  Eden Kumar RN

## 2021-06-28 NOTE — PROGRESS NOTES
Progress Notes by Eden Kumar RN at 10/4/2019  8:00 AM     Author: Eden Kumar RN Service: -- Author Type: Registered Nurse    Filed: 10/26/2019  7:25 PM Encounter Date: 10/4/2019 Status: Signed    : Sourav Lauren MD (Physician)    Related Notes: Original Note by Eden Kumar RN (Registered Nurse) filed at 10/4/2019 11:35 AM         [facility] Study Inclusion / Exclusion Criteria  Protocol Version 1    Inclusion Criteria    Yes No Criteria # Subject must meet all inclusion criteria:      [x]    []  1 At least 18 years old for NSR and 22 years old for Non-NSR. Inclusive for both cohorts, at time of screening, with no upper limit on age.        [x]      []  2 To be enrolled as a non-NSR subject the volunteer must have one of the following conditions: Permanent/Persistent AF, Hx of paroxysmal AF, Hx of High-rate AF, AF + rate control medication, Hx Atrial Flutter, PVC burden >1%, Frequent PACs, Hx BBB, Hx 2nd degree block (any type), Hx Bigeminy/Trigeminy/Quadgeminy, Hx Tachycardia. For subjects with any of the following diagnoses, the condition must be present at the time of screening:   ? Permanent/persistent AF  ? AF plus rate control medication  ? PVC [place]  ? Frequent PACs   Note: If not present at screening, subjects may not be enrolled as a non-NSR subject or NSR subject.         3  [x] N/A HE site For Subjects to be enrolled as NSR they must be in NSR at the time of screening as determined by the investigator. For subjects with occasional ectopic beats (<1% burden during screening), they should still qualify as individuals in the NSR cohort as long as they don't have a medical history/diagnosis of significant ectopic burden.    [x]  []  4 Able to read and understand a written ICF    [x]  []  5 Willing and able to participate in the study procedures and comply with its restrictions    [x]  []  6 Able to communicate effectively with study staff as well as understand and follow directions     All must be Yes    Exclusion Criteria-all must be no  Yes No Criteria # Subject must not meet any exclusion criteria:    []  [x]  1 Physical disability that prevents safe and adequate testing.   []  [x]  2 Pregnant women or women planning to become pregnant   []  [x]  3 Any acute illness or condition that may interfere with study procedures (e.g. cough, fever, sore throat, headache, sunburn, etc.)   []  [x]  4 Clinically significant hand tremors, as judged by the Investigator   []  [x]  5 Resting hypertension with systolic blood pressure ?161 mmHg or diastolic blood pressure ?101 mmHg (if at least 2 of 3 measurements meet this criteria)   []  [x]  6 Subjects with implanted cardiac devices such as a pacemaker or an automated implantable cardioverter- defibrillator (AICD)   []  [x]  7 Acute myocardial infarction (MI) within 90 days from the screening visit   []  [x]  8 Other cardiovascular disease that increases the risk to the subject or would render the data uninterpretable in the opinion of the Investigator (e.g., recent or ongoing unstable angina, significant valvular heart disease or chronic heart failure, myocarditis or pericarditis)    []  [x]  9 Acute pulmonary embolism, pulmonary infarction, or deep vein thrombosis within 90 days from the screening visit    []  [x]  10 Stroke or transient ischemic attack within 90 days from the screening visit    []  [x]  11 Known untreated medical conditions as determined by the Investigator, such as but not limited to significant anemia, important electrolyte imbalance and untreated or uncontrolled thyroid disease.    []  [x]  12 Any history of wrist surgery with scarring in the area of the sensor location on the wrist where the subject will be wearing the watch;    []  [x]  13 Open wound(s) on the wrist and forearm where the subject will be wearing the watch    []  [x]  14 Severe symptomatic (or active) overly dry/injured skin, skin disorders, or allergic skin reactions  such as eczema, rosacea, impetigo, dermatomyositis or allergic contact dermatitis on wrist and locations where the electrodes will be placed (e.g. chest, forearms, stomach), as determined by the investigator.    []  [x]  15 Tattoos, scars or moles in the area of the sensor location on the wrist where the subject will be wearing the watch    []  [x]  16 Device wearing Wrist circumference ? 129 mm or ? 246 mm    []  [x]  17 Known significant sensitivity to medical adhesives or isopropyl alcohol (for ECG electrode placement)    []  [x]  18 Known allergy or sensitivity to fluorocarbon-based synthetic rubber, such as contact dermatitis with fluoroelastomer bands primarily used in wrist worn fitness devices    []  [x]  19 Subjects with any Medical History, Physical exam, vital sign or any other study procedure finding/assessment that in the opinion of the investigator could compromise subject safety during study participation or interfere with the study integrity and/or the accurate assessment of the study objectives    []  [x]  20 Subject works for a company that develops or sells medical and/or fitness devices (e.g., ECG monitors, wearable fitness bands, sleep monitors, etc.) or are technology journalists (e.g., professional bloggers, TV, magazine, newspaper reporters, etc.)    []  [x]  21 Weight > 181 kg for subjects using the stationary bike and/or treadmill. Weight of ?138 kg for NSR subjects.    []  [x]  22 Subject is employed in shift work, or otherwise does not maintain a reasonably consistent day/night schedule (e.g. Subjects who go to bed after 4am).    []  [x]  23 Overnight travel planned during data collection nights    []  [x]  24 Non-NSR subjects should not have partaken in strenuous physical activity within 12 hours prior to screening    []  [x]  25 Non-NSR subjects with Atrial fibrillation categories: Subjects taking Class 1 or Class 3 antiarrhythmic agents such as the following may not take part in any  stage of the study: amiodarone, sotalol, dronedarone, ibutilide, dofetilide, propafenone, quinidine, procainamide, disopyramide, flecainide (Subjects taking class 2, 4 or 5 antiarrhythmic agents may take part the study).      Patient meets inclusion criteria.  Patient has no exclusion criteria.    Eden Kumar RN

## 2021-06-28 NOTE — PROGRESS NOTES
Progress Notes by Mariam Velázquez at 10/7/2019 10:32 AM     Author: Mariam Velázquez Service: -- Author Type: Research Associate    Filed: 10/7/2019 10:33 AM Encounter Date: 10/7/2019 Status: Signed    : Mariam Velázquez (Research Associate)         Zestar Study Device Return    Khurram Reynoso returned all the devices for the Zestar study.  Khurram Reynoso denies medication changes or adverse events since last visit.    Khurram Reynoso has now completed their participation in the Zestar study.   Thank you for your gift of participation.    Mariam Velázquez

## 2021-06-30 NOTE — PROGRESS NOTES
Progress Notes by Oh Fountain MD at 1/19/2021 10:10 AM     Author: Oh Fountain MD Service: -- Author Type: Physician    Filed: 1/19/2021  1:02 PM Encounter Date: 1/19/2021 Status: Signed    : Oh Fountain MD (Physician)                ELECTROPHYSIOLOGY NOTE    Today I had the opportunity to see  Khurram Reynoso for follow-up evaluation of nonischemic cardiomyopathy.      Assessment/Recommendations   Clinic Problem List:  1. PAF (paroxysmal atrial fibrillation) (H)     2. Nonischemic cardiomyopathy (H)  Echo Complete   3. Aortic root dilatation (H)  Echo Complete       Assessment:    Single brief episode of paroxysmal atrial fibrillation.  No evidence for clinical recurrence. The patient's [id number] Score for stroke risk is 1, age.  Nonischemic cardiomyopathy, asymptomatic, question accuracy of last echo ejection fraction  History of mild aortic root dilatation asymptomatic    Plan:  Continue current medications and activity  Cardiac echo has been ordered to reassess left ventricular function and to assess size of the aorta  Call if troublesome palpitations associated with irregular heart beating  Follow up appointment:   Dr. Fountain as needed       History of Present Illness     Khurram Reynoso is a 75 y.o. male with nonischemic cardiomyopathy in 2006 with very frequent ventricular premature beats.  He had coronary angiography at [facility] which showed no coronary artery disease and there was incidental of ventricular fibrillation with left coronary injection.  Ejection fraction was felt to be approximately 30-35%.  Holter showed 15-21,000 ventricular premature beats with morphology consistent with outflow tract ectopy.  I successfully ablated this PVC focus on 4/3/2007 at [facility].  Symptoms of palpitations improved markedly.  Holter showed only 8 PVCs over 24 hours.  Left ventricular function normalized.  He did have a normal stress echo in 2010.    I saw him again in August  2016 after paroxysmal atrial fibrillation was detected on a patient activated ECG monitor this appeared to be an isolated episode on February 17, 2016 at 2 AM.  He had atrial premature beats and short runs of atrial tachycardia.  Cardiac echo at that time showed ejection fraction of 50-55% trivial aortic stenosis and moderate aortic root enlargement of 4.4 cm.  His chads 2 vas score was 1 related to age was treated transiently with controlled metoprolol which was stopped due to hypotension.  Follow-up cardiac echo 2/22/2019 showed an ejection fraction of 43%, mildly dilated aortic root 4.1 cm but normal ascending aorta 3.5 cm.  There were no significant valvular abnormalities.  This winter he has noted a quivering sensation across his upper chest each time lasting less than a minute over 2 occasions.  When he checked his pulse he found it to be regular and not particularly fast.  Last week he also noted vertigo while reading lasting approximately 10 seconds without nausea or imbalance but with a spinning sensation of his visual fields.  This is not recurred.  There is no associated palpitations.  He states when he checks his pulse is usually quite regular.  He remains active without symptoms of chest pain or shortness of breath.           Physical Examination Review of Systems   Vitals:    01/19/21 1029   BP: 120/68   Pulse: 80   Resp: 16     Body mass index is 33.77 kg/m .  Wt Readings from Last 3 Encounters:   01/19/21 (!) 249 lb (112.9 kg)   10/04/19 (!) 231 lb 6.4 oz (105 kg)   02/22/19 (!) 232 lb (105.2 kg)        Appearance:   no distress, healthy appearing   HEENT:   no scleral icterus, normal conjunctivae    Neck: no carotid bruits or thyromegaly   Chest/Lungs:   lungs are clear to auscultation, no rales or wheezing,    Cardiovascular:   Jugular venous pressure 5 cm, Apical pulse is quite regular at 80 bpm. Normal S1,S2 with murmurs or gallops,   Abdomen:  no  Hepatosplenomegaly., nontender,  bowel sounds  are present   Extremities: no cyanosis or clubbing, No edema   Skin: no xanthelasma, warm.    Neurologic: No gross focal neurologic deficits   Mood/Affect: Alert, cooperative    General: WNL  Eyes: Visual Distubance  Ears/Nose/Throat: WNL  Lungs: WNL  Heart: Leg Swelling, Fainting  Stomach: WNL  Bladder: WNL  Muscle/Joints: WNL  Skin: WNL  Nervous System: WNL  Mental Health: WNL     Blood: WNL     Medical History  Surgical History Family History Social History   Past Medical History:   Diagnosis Date   ? Asthma 2018   ? Gout 2001   ? Paroxysmal A-fib (H) 2006   ? Skin Neoplasm Of Uncertain Behavior 2017    Created by Conversion     Past Surgical History:   Procedure Laterality Date   ? ABLATION OF DYSRHYTHMIC FOCUS  04/03/2007    RVOT PVCs   ? OR TOTAL HIP ARTHROPLASTY      Description: Total Hip Replacement;  Recorded: 09/24/2010;   ? OR TOTAL KNEE ARTHROPLASTY      Description: Total Knee Arthroplasty;  Recorded: 09/24/2010;  Comments: right    Family History   Problem Relation Age of Onset   ? CABG Father 77    Social History     Socioeconomic History   ? Marital status:      Spouse name: Not on file   ? Number of children: Not on file   ? Years of education: Not on file   ? Highest education level: Not on file   Occupational History   ? Not on file   Social Needs   ? Financial resource strain: Not on file   ? Food insecurity     Worry: Not on file     Inability: Not on file   ? Transportation needs     Medical: Not on file     Non-medical: Not on file   Tobacco Use   ? Smoking status: Never Smoker   ? Smokeless tobacco: Never Used   Substance and Sexual Activity   ? Alcohol use: Not on file   ? Drug use: Not on file   ? Sexual activity: Not on file   Lifestyle   ? Physical activity     Days per week: Not on file     Minutes per session: Not on file   ? Stress: Not on file   Relationships   ? Social connections     Talks on phone: Not on file     Gets together: Not on file     Attends Rastafari service:  Not on file     Active member of club or organization: Not on file     Attends meetings of clubs or organizations: Not on file     Relationship status: Not on file   ? Intimate partner violence     Fear of current or ex partner: Not on file     Emotionally abused: Not on file     Physically abused: Not on file     Forced sexual activity: Not on file   Other Topics Concern   ? Not on file   Social History Narrative   ? Not on file          Medications  Allergies   Current Outpatient Medications   Medication Sig Dispense Refill   ? acetaminophen (TYLENOL) 500 MG tablet Take 1-2 tablets by mouth.     ? albuterol (PROVENTIL HFA;VENTOLIN HFA) 90 mcg/actuation inhaler Inhale 2 puffs every 4 (four) hours as needed.            ? allopurinol (ZYLOPRIM) 100 MG tablet Take 100 mg by mouth.     ? fluticasone-salmeterol (ADVAIR DISKUS) 250-50 mcg/dose DISKUS Inhale 1 puff daily.            ? indomethacin (INDOCIN) 50 MG capsule TAKE 1 CAPSULE BY MOUTH THREE TIMES DAILY AS NEEDED FOR PAIN. TAKE WITH FOOD. 20 capsule 0   ? mometasone (NASONEX) 50 mcg/actuation nasal spray 1 spray daily.            ? montelukast (SINGULAIR) 10 mg tablet Take 10 mg by mouth every evening.     ? aspirin 81 MG EC tablet Take 81 mg by mouth.       No current facility-administered medications for this visit.       No Known Allergies

## 2021-08-12 DIAGNOSIS — R05.9 COUGH: ICD-10-CM

## 2021-08-12 DIAGNOSIS — M1A.00X0 IDIOPATHIC CHRONIC GOUT WITHOUT TOPHUS, UNSPECIFIED SITE: ICD-10-CM

## 2021-08-12 DIAGNOSIS — J45.30 MILD PERSISTENT ASTHMA WITHOUT COMPLICATION: ICD-10-CM

## 2021-08-13 RX ORDER — FLUTICASONE PROPIONATE 50 MCG
SPRAY, SUSPENSION (ML) NASAL
Qty: 16 G | Refills: 2 | Status: SHIPPED | OUTPATIENT
Start: 2021-08-13 | End: 2022-02-14

## 2021-08-13 RX ORDER — ALLOPURINOL 100 MG/1
TABLET ORAL
Qty: 180 TABLET | Refills: 0 | Status: ON HOLD | OUTPATIENT
Start: 2021-08-13 | End: 2022-07-13

## 2021-08-13 NOTE — TELEPHONE ENCOUNTER
Routing refill request to provider for review/approval because:  Labs out of range:  alt, uric acid  Andree Catherine RN, BSN

## 2021-09-19 ENCOUNTER — HEALTH MAINTENANCE LETTER (OUTPATIENT)
Age: 76
End: 2021-09-19

## 2021-11-04 ENCOUNTER — TELEPHONE (OUTPATIENT)
Dept: INTERNAL MEDICINE | Facility: CLINIC | Age: 76
End: 2021-11-04

## 2021-11-04 DIAGNOSIS — E79.0 HYPERURICEMIA: ICD-10-CM

## 2021-11-04 DIAGNOSIS — Z12.5 SCREENING FOR PROSTATE CANCER: ICD-10-CM

## 2021-11-04 DIAGNOSIS — Z00.00 ENCOUNTER FOR PREVENTATIVE ADULT HEALTH CARE EXAMINATION: Primary | ICD-10-CM

## 2021-11-04 NOTE — TELEPHONE ENCOUNTER
Reason for call:  Other   Patient called regarding (reason for call): appointment and call back  Additional comments: Patient has a physical scheduled 12/8/21 with Dr. Gillespie, but is hoping to do the lab work beforehand on 12/1/21. He is requesting appropriate orders be placed for his lab visit. Please advise.     Phone number to reach patient:  Home number on file [phone] (home)    Best Time: Anytime    Can we leave a detailed message on this number?  YES    Travel screening: Not Applicable

## 2021-11-14 ENCOUNTER — HEALTH MAINTENANCE LETTER (OUTPATIENT)
Age: 76
End: 2021-11-14

## 2021-12-01 ENCOUNTER — LAB (OUTPATIENT)
Dept: LAB | Facility: CLINIC | Age: 76
End: 2021-12-01
Payer: COMMERCIAL

## 2021-12-01 DIAGNOSIS — E79.0 HYPERURICEMIA: ICD-10-CM

## 2021-12-01 DIAGNOSIS — Z00.00 ENCOUNTER FOR PREVENTATIVE ADULT HEALTH CARE EXAMINATION: ICD-10-CM

## 2021-12-01 DIAGNOSIS — Z12.5 SCREENING FOR PROSTATE CANCER: ICD-10-CM

## 2021-12-01 LAB
ALBUMIN UR-MCNC: NEGATIVE MG/DL
APPEARANCE UR: CLEAR
BACTERIA #/AREA URNS HPF: NORMAL /HPF
BILIRUB UR QL STRIP: NEGATIVE
COLOR UR AUTO: YELLOW
ERYTHROCYTE [DISTWIDTH] IN BLOOD BY AUTOMATED COUNT: 13.3 % (ref 10–15)
GLUCOSE UR STRIP-MCNC: NEGATIVE MG/DL
HCT VFR BLD AUTO: 45.7 % (ref 40–53)
HGB BLD-MCNC: 15.5 G/DL (ref 13.3–17.7)
HGB UR QL STRIP: NEGATIVE
KETONES UR STRIP-MCNC: NEGATIVE MG/DL
LEUKOCYTE ESTERASE UR QL STRIP: NEGATIVE
MCH RBC QN AUTO: 32.3 PG (ref 26.5–33)
MCHC RBC AUTO-ENTMCNC: 33.9 G/DL (ref 31.5–36.5)
MCV RBC AUTO: 95 FL (ref 78–100)
NITRATE UR QL: NEGATIVE
PH UR STRIP: 7 [PH] (ref 5–7)
PLATELET # BLD AUTO: 354 10E3/UL (ref 150–450)
RBC # BLD AUTO: 4.8 10E6/UL (ref 4.4–5.9)
RBC #/AREA URNS AUTO: NORMAL /HPF
SP GR UR STRIP: 1.02 (ref 1–1.03)
UROBILINOGEN UR STRIP-ACNC: 0.2 E.U./DL
WBC # BLD AUTO: 8 10E3/UL (ref 4–11)
WBC #/AREA URNS AUTO: NORMAL /HPF

## 2021-12-01 PROCEDURE — 85027 COMPLETE CBC AUTOMATED: CPT

## 2021-12-01 PROCEDURE — G0103 PSA SCREENING: HCPCS

## 2021-12-01 PROCEDURE — 81001 URINALYSIS AUTO W/SCOPE: CPT

## 2021-12-01 PROCEDURE — 80061 LIPID PANEL: CPT

## 2021-12-01 PROCEDURE — 84439 ASSAY OF FREE THYROXINE: CPT

## 2021-12-01 PROCEDURE — 80053 COMPREHEN METABOLIC PANEL: CPT

## 2021-12-01 PROCEDURE — 84550 ASSAY OF BLOOD/URIC ACID: CPT

## 2021-12-01 PROCEDURE — 84443 ASSAY THYROID STIM HORMONE: CPT

## 2021-12-01 PROCEDURE — 36415 COLL VENOUS BLD VENIPUNCTURE: CPT

## 2021-12-02 LAB
ALBUMIN SERPL-MCNC: 3.6 G/DL (ref 3.4–5)
ALP SERPL-CCNC: 189 U/L (ref 40–150)
ALT SERPL W P-5'-P-CCNC: 26 U/L (ref 0–70)
ANION GAP SERPL CALCULATED.3IONS-SCNC: 7 MMOL/L (ref 3–14)
AST SERPL W P-5'-P-CCNC: 22 U/L (ref 0–45)
BILIRUB SERPL-MCNC: 0.6 MG/DL (ref 0.2–1.3)
BUN SERPL-MCNC: 16 MG/DL (ref 7–30)
CALCIUM SERPL-MCNC: 9.3 MG/DL (ref 8.5–10.1)
CHLORIDE BLD-SCNC: 106 MMOL/L (ref 94–109)
CHOLEST SERPL-MCNC: 193 MG/DL
CO2 SERPL-SCNC: 28 MMOL/L (ref 20–32)
CREAT SERPL-MCNC: 1.22 MG/DL (ref 0.66–1.25)
FASTING STATUS PATIENT QL REPORTED: YES
GFR SERPL CREATININE-BSD FRML MDRD: 58 ML/MIN/1.73M2
GLUCOSE BLD-MCNC: 82 MG/DL (ref 70–99)
HDLC SERPL-MCNC: 43 MG/DL
LDLC SERPL CALC-MCNC: 127 MG/DL
NONHDLC SERPL-MCNC: 150 MG/DL
POTASSIUM BLD-SCNC: 4.4 MMOL/L (ref 3.4–5.3)
PROT SERPL-MCNC: 7.7 G/DL (ref 6.8–8.8)
PSA SERPL-MCNC: 3.04 UG/L (ref 0–4)
SODIUM SERPL-SCNC: 141 MMOL/L (ref 133–144)
T4 FREE SERPL-MCNC: 1.07 NG/DL (ref 0.76–1.46)
TRIGL SERPL-MCNC: 117 MG/DL
TSH SERPL DL<=0.005 MIU/L-ACNC: 6.1 MU/L (ref 0.4–4)
URATE SERPL-MCNC: 8.6 MG/DL (ref 3.5–7.2)

## 2021-12-02 ASSESSMENT — ENCOUNTER SYMPTOMS
ARTHRALGIAS: 1
MYALGIAS: 0
JOINT SWELLING: 1
SORE THROAT: 0
DYSURIA: 0
EYE PAIN: 0
PARESTHESIAS: 0
CONSTIPATION: 0
PALPITATIONS: 0
DIZZINESS: 1
WEAKNESS: 0
HEMATOCHEZIA: 1
FREQUENCY: 0
HEADACHES: 0
HEMATURIA: 0
HEARTBURN: 0
COUGH: 1
CHILLS: 0
NAUSEA: 0
ABDOMINAL PAIN: 0
DIARRHEA: 1
NERVOUS/ANXIOUS: 0
FEVER: 0
SHORTNESS OF BREATH: 0

## 2021-12-02 ASSESSMENT — ACTIVITIES OF DAILY LIVING (ADL): CURRENT_FUNCTION: NO ASSISTANCE NEEDED

## 2021-12-08 ENCOUNTER — OFFICE VISIT (OUTPATIENT)
Dept: INTERNAL MEDICINE | Facility: CLINIC | Age: 76
End: 2021-12-08
Payer: COMMERCIAL

## 2021-12-08 VITALS
HEART RATE: 94 BPM | DIASTOLIC BLOOD PRESSURE: 64 MMHG | TEMPERATURE: 98 F | SYSTOLIC BLOOD PRESSURE: 106 MMHG | HEIGHT: 72 IN | OXYGEN SATURATION: 96 % | BODY MASS INDEX: 32.64 KG/M2 | WEIGHT: 241 LBS

## 2021-12-08 DIAGNOSIS — Z00.00 ENCOUNTER FOR PREVENTATIVE ADULT HEALTH CARE EXAMINATION: Primary | ICD-10-CM

## 2021-12-08 DIAGNOSIS — J45.30 MILD PERSISTENT ASTHMA WITHOUT COMPLICATION: ICD-10-CM

## 2021-12-08 PROCEDURE — 99397 PER PM REEVAL EST PAT 65+ YR: CPT | Performed by: INTERNAL MEDICINE

## 2021-12-08 ASSESSMENT — ENCOUNTER SYMPTOMS
FREQUENCY: 0
HEARTBURN: 0
SHORTNESS OF BREATH: 0
DYSURIA: 0
CHILLS: 0
ABDOMINAL PAIN: 0
JOINT SWELLING: 1
MYALGIAS: 0
DIZZINESS: 1
ARTHRALGIAS: 1
PARESTHESIAS: 0
COUGH: 1
HEMATURIA: 0
CONSTIPATION: 0
PALPITATIONS: 0
WEAKNESS: 0
SORE THROAT: 0
HEADACHES: 0
FEVER: 0
HEMATOCHEZIA: 1
EYE PAIN: 0
DIARRHEA: 1
NAUSEA: 0
NERVOUS/ANXIOUS: 0

## 2021-12-08 ASSESSMENT — MIFFLIN-ST. JEOR: SCORE: 1866.17

## 2021-12-08 ASSESSMENT — ACTIVITIES OF DAILY LIVING (ADL): CURRENT_FUNCTION: NO ASSISTANCE NEEDED

## 2021-12-08 NOTE — PROGRESS NOTES
"SUBJECTIVE:   Khurram Reynoso is a 76 year old male who presents for Preventive Visit.      Patient has been advised of split billing requirements and indicates understanding: Yes   Are you in the first 12 months of your Medicare coverage?  No    Healthy Habits:     In general, how would you rate your overall health?  Good    Frequency of exercise:  None    Do you usually eat at least 4 servings of fruit and vegetables a day, include whole grains    & fiber and avoid regularly eating high fat or \"junk\" foods?  Yes    Ability to successfully perform activities of daily living:  No assistance needed    Home Safety:  No safety concerns identified    Hearing Impairment:  No hearing concerns    In the past 6 months, have you been bothered by leaking of urine?  No    In general, how would you rate your overall mental or emotional health?  Excellent      PHQ-2 Total Score: 0    Additional concerns today:  Yes    Do you feel safe in your environment? Yes    Have you ever done Advance Care Planning? (For example, a Health Directive, POLST, or a discussion with a medical provider or your loved ones about your wishes): Yes, advance care planning is on file.       Fall risk  Fallen 2 or more times in the past year?: No  Any fall with injury in the past year?: No    Cognitive Screening   1) Repeat 3 items (Leader, Season, Table)    2) Clock draw: NORMAL  3) 3 item recall: Recalls 2 objects   Results: NORMAL clock, 2 items recalled: COGNITIVE IMPAIRMENT LESS LIKELY    Mini-CogTM Copyright DA Sprague. Licensed by the author for use in [facility]; reprinted with permission (jolanta@.[facility]). All rights reserved.      Do you have sleep apnea, excessive snoring or daytime drowsiness?: no    Reviewed and updated as needed this visit by clinical staff  Tobacco  Allergies  Meds   Med Hx  Surg Hx  Fam Hx  Soc Hx       Reviewed and updated as needed this visit by Provider               Social History     Tobacco Use     " Smoking status: Never Smoker     Smokeless tobacco: Never Used   Substance Use Topics     Alcohol use: No     Alcohol/week: 0.0 standard drinks     If you drink alcohol do you typically have >3 drinks per day or >7 drinks per week? No    Alcohol Use 12/8/2021   Prescreen: >3 drinks/day or >7 drinks/week? -   Prescreen: >3 drinks/day or >7 drinks/week? No           PROBLEMS TO ADD ON...  Has h/o asthma, controlled. On inhalers. Has stopped the Breo ellipta, uses only PRN Albuterol. Has chronic wheezing and congestion.   Has h/o gout, kidney stones, no flare ups past year. Has been off Allopurinol, uric acid is elevated.   Has had recent lab work , had elevated AP.   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 Uses a cane.      Current providers sharing in care for this patient include:   Patient Care Team:  Asif Moctezuma MD as PCP - General (Family Medicine)  [facility] as PCP - Internal Medicine  Lavern Potts MD as MD (Otolaryngology)  Asif Moctezuma MD as Assigned PCP  Oh Fountain MD as Assigned Heart and Vascular Provider    The following health maintenance items are reviewed in Epic and correct as of today:  Health Maintenance Due   Topic Date Due     ASTHMA ACTION PLAN  Never done     ZOSTER IMMUNIZATION (2 of 3) 03/05/2015     ASTHMA CONTROL TEST  05/09/2021     FALL RISK ASSESSMENT  07/14/2021     INFLUENZA VACCINE (1) 09/01/2021     ANNUAL REVIEW OF HM ORDERS  11/09/2021     COVID-19 Vaccine (3 - Booster for Pfizer series) 11/17/2021     Lab work is in process  Labs reviewed in EPIC          Review of Systems   Constitutional: Negative for chills and fever.   HENT: Positive for hearing loss. Negative for congestion, ear pain and sore throat.    Eyes: Negative for pain and visual disturbance.   Respiratory: Positive for cough. Negative for shortness of breath.    Cardiovascular: Positive for peripheral edema. Negative for chest pain and palpitations.    Gastrointestinal: Positive for diarrhea and hematochezia. Negative for abdominal pain, constipation, heartburn and nausea.   Genitourinary: Positive for impotence. Negative for dysuria, frequency, genital sores, hematuria, penile discharge and urgency.   Musculoskeletal: Positive for arthralgias and joint swelling. Negative for myalgias.   Skin: Negative for rash.   Neurological: Positive for dizziness. Negative for weakness, headaches and paresthesias.   Psychiatric/Behavioral: Negative for mood changes. The patient is not nervous/anxious.          OBJECTIVE:   /64 (BP Location: Right arm, Patient Position: Sitting, Cuff Size: Adult Large)   Pulse 94   Temp 98  F (36.7  C) (Oral)   Ht 1.829 m (6')   Wt 109.3 kg (241 lb)   SpO2 96%   BMI 32.69 kg/m   Estimated body mass index is 32.69 kg/m  as calculated from the following:    Height as of this encounter: 1.829 m (6').    Weight as of this encounter: 109.3 kg (241 lb).  Physical Exam  GENERAL: healthy, alert and no distress  EYES: Eyes grossly normal to inspection, PERRL and conjunctivae and sclerae normal  HENT: ear canals and TM's normal, nose and mouth without ulcers or lesions  NECK: no adenopathy, no asymmetry, masses, or scars and thyroid normal to palpation  RESP: lungs clear to auscultation - no rales, + rhonchi and course wheezes  CV: regular rate and rhythm, normal S1 S2, no S3 or S4, no murmur, click or rub, no peripheral edema and peripheral pulses strong  ABDOMEN: soft, nontender, no hepatosplenomegaly, no masses and bowel sounds normal  MS: no gross musculoskeletal defects noted, no edema  SKIN: no suspicious lesions or rashes  NEURO: Normal strength and tone, mentation intact and speech normal  PSYCH: mentation appears normal, affect normal/bright    Diagnostic Test Results:  Labs reviewed in Epic    ASSESSMENT / PLAN:       ICD-10-CM    1. Encounter for preventative adult health care examination  Z00.00    2. Mild persistent asthma  without complication  J45.30      Resume Breo \  Resume Allopurinol  Follow up in 2 months to assess lungs, lab work for uric acid and AP    Patient has been advised of split billing requirements and indicates understanding: Yes  COUNSELING:  Reviewed preventive health counseling, as reflected in patient instructions       Regular exercise       Healthy diet/nutrition       Vision screening       Hearing screening       Colon cancer screening       Prostate cancer screening    Estimated body mass index is 32.69 kg/m  as calculated from the following:    Height as of this encounter: 1.829 m (6').    Weight as of this encounter: 109.3 kg (241 lb).        He reports that he has never smoked. He has never used smokeless tobacco.      Appropriate preventive services were discussed with this patient, including applicable screening as appropriate for cardiovascular disease, diabetes, osteopenia/osteoporosis, and glaucoma.  As appropriate for age/gender, discussed screening for colorectal cancer, prostate cancer, breast cancer, and cervical cancer. Checklist reviewing preventive services available has been given to the patient.    Reviewed patients plan of care and provided an AVS. The Intermediate Care Plan ( asthma action plan, low back pain action plan, and migraine action plan) for Khurram meets the Care Plan requirement. This Care Plan has been established and reviewed with the Patient.    Counseling Resources:  ATP IV Guidelines  Pooled Cohorts Equation Calculator  Breast Cancer Risk Calculator  Breast Cancer: Medication to Reduce Risk  FRAX Risk Assessment  ICSI Preventive Guidelines  Dietary Guidelines for Americans, 2010  Social[employer]'s MyPlate  ASA Prophylaxis  Lung CA Screening    Familia Gillespie MD  [facility]    Identified Health Risks:

## 2021-12-09 ASSESSMENT — ASTHMA QUESTIONNAIRES: ACT_TOTALSCORE: 21

## 2022-02-13 DIAGNOSIS — R05.9 COUGH: ICD-10-CM

## 2022-02-13 DIAGNOSIS — J45.30 MILD PERSISTENT ASTHMA WITHOUT COMPLICATION: ICD-10-CM

## 2022-02-14 RX ORDER — FLUTICASONE PROPIONATE 50 MCG
SPRAY, SUSPENSION (ML) NASAL
Qty: 16 G | Refills: 2 | Status: SHIPPED | OUTPATIENT
Start: 2022-02-14

## 2022-03-20 ENCOUNTER — OFFICE VISIT (OUTPATIENT)
Dept: URGENT CARE | Facility: URGENT CARE | Age: 77
End: 2022-03-20
Payer: COMMERCIAL

## 2022-03-20 VITALS
DIASTOLIC BLOOD PRESSURE: 64 MMHG | HEART RATE: 95 BPM | SYSTOLIC BLOOD PRESSURE: 128 MMHG | BODY MASS INDEX: 31.87 KG/M2 | OXYGEN SATURATION: 95 % | TEMPERATURE: 100.3 F | WEIGHT: 235 LBS

## 2022-03-20 DIAGNOSIS — R05.9 COUGH: Primary | ICD-10-CM

## 2022-03-20 LAB
FLUAV AG SPEC QL IA: NEGATIVE
FLUBV AG SPEC QL IA: NEGATIVE

## 2022-03-20 PROCEDURE — U0005 INFEC AGEN DETEC AMPLI PROBE: HCPCS | Performed by: PHYSICIAN ASSISTANT

## 2022-03-20 PROCEDURE — 99213 OFFICE O/P EST LOW 20 MIN: CPT | Performed by: PHYSICIAN ASSISTANT

## 2022-03-20 PROCEDURE — U0003 INFECTIOUS AGENT DETECTION BY NUCLEIC ACID (DNA OR RNA); SEVERE ACUTE RESPIRATORY SYNDROME CORONAVIRUS 2 (SARS-COV-2) (CORONAVIRUS DISEASE [COVID-19]), AMPLIFIED PROBE TECHNIQUE, MAKING USE OF HIGH THROUGHPUT TECHNOLOGIES AS DESCRIBED BY CMS-2020-01-R: HCPCS | Performed by: PHYSICIAN ASSISTANT

## 2022-03-20 PROCEDURE — 87804 INFLUENZA ASSAY W/OPTIC: CPT | Performed by: PHYSICIAN ASSISTANT

## 2022-03-20 NOTE — PROGRESS NOTES
Assessment & Plan     Cough  Acute problem. Lungs are clear on exam. No respiratory distress. Rapid influenza test is negative.  Supportive care measures advised. Mucinex prn cough. Ok to continue albuterol neb treatments prn mild wheezing. Symptomatic Covid PCR swab done in clinic today.  Awaiting COVID-19 PCR result. Follow up if any worsening symptoms. Patient agrees.    - Influenza A & B Antigen - Clinic Collect  - Symptomatic; Yes; 3/18/2022 COVID-19 Virus (Coronavirus) by PCR Nose         Return in about 1 week (around 3/27/2022) for Symptoms failing to improve.    Corrine Chisholm PA-C  [facility] URGENT [facility]ARE GWENDOLYN Paulson is a 76 year old male who presents to clinic today for the following health issues:  Chief Complaint   Patient presents with     Urgent Care     Cough     cough getting worse, sinus      HPI      URI Adult    Onset of symptoms was 1 day(s) ago.  Course of illness is same.    Severity moderate  Current and Associated symptoms: cough - slightly productive, LGF  Treatment measures tried include albuterol neb treatment  Denies CP/SOB/bodyaches/HA  Predisposing factors include Hx of asthma  No obvious exposure to anyone with Covid or influenza.  He is vaccinated and boosted for COVID.      Review of Systems  Constitutional, HEENT, cardiovascular, pulmonary, GI, , musculoskeletal, neuro, skin, endocrine and psych systems are negative, except as otherwise noted.      Objective    /64 (BP Location: Right arm)   Pulse 95   Temp 100.3  F (37.9  C) (Tympanic)   Wt 106.6 kg (235 lb)   SpO2 95%   BMI 31.87 kg/m    Physical Exam   GENERAL: healthy, alert and no distress  HENT: ear canals and TM's normal,  mouth without ulcers or lesions  RESP: lungs clear to auscultation - no rales, rhonchi, slight expiratory wheezes  CV: regular rate and rhythm, normal S1 S2  MS: no gross musculoskeletal defects noted, no edema  SKIN: no suspicious lesions or rashes    Results for  orders placed or performed in visit on 03/20/22 (from the past 24 hour(s))   Influenza A & B Antigen - Clinic Collect    Specimen: Nose; Swab   Result Value Ref Range    Influenza A antigen Negative Negative    Influenza B antigen Negative Negative    Narrative    Test results must be correlated with clinical data. If necessary, results should be confirmed by a molecular assay or viral culture.

## 2022-03-20 NOTE — PATIENT INSTRUCTIONS

## 2022-03-21 LAB — SARS-COV-2 RNA RESP QL NAA+PROBE: NEGATIVE

## 2022-07-05 ENCOUNTER — TELEPHONE (OUTPATIENT)
Dept: CARDIOLOGY | Facility: CLINIC | Age: 77
End: 2022-07-05

## 2022-07-05 DIAGNOSIS — I42.8 NONISCHEMIC CARDIOMYOPATHY (H): Primary | ICD-10-CM

## 2022-07-05 DIAGNOSIS — I48.0 PAF (PAROXYSMAL ATRIAL FIBRILLATION) (H): ICD-10-CM

## 2022-07-05 NOTE — TELEPHONE ENCOUNTER
"[facility] Call Center    Phone Message    May a detailed message be left on voicemail: yes     Reason for Call: Appointment Intake    Referring Provider Name: Dr. Fountain  Diagnosis and/or Symptoms: Irregular heart beat/swelling in right leg/ankle    Pt called to schedule a cardiology appt, pt is a prev  pt and was told he should see Dr. Lauren. Pt is having \"an irregular heart beat and swelling in my right leg and ankle\". Schedule would not let me book an appointment under return provider change or new card due to Dr. Lauren being an EP provider. Please review and call pt back to discuss if he is able to be seen by Dr. Lauren for dx listed above. Thank you!    Action Taken: Other: Cardiology    Travel Screening: Not Applicable                                                                      "

## 2022-07-05 NOTE — TELEPHONE ENCOUNTER
Review of chart- pt was as needed with Dr. Fountain.  As Dr. Lauren scheduling out to August/Sept, pt appropriate to establish care with general cardiologist.    Order entered and sent to scheduling.    Vicki

## 2022-07-11 ENCOUNTER — APPOINTMENT (OUTPATIENT)
Dept: GENERAL RADIOLOGY | Facility: CLINIC | Age: 77
DRG: 280 | End: 2022-07-11
Attending: EMERGENCY MEDICINE
Payer: COMMERCIAL

## 2022-07-11 ENCOUNTER — HOSPITAL ENCOUNTER (INPATIENT)
Facility: CLINIC | Age: 77
LOS: 2 days | Discharge: HOME | DRG: 280 | End: 2022-07-13
Attending: EMERGENCY MEDICINE | Admitting: STUDENT IN AN ORGANIZED HEALTH CARE EDUCATION/TRAINING PROGRAM
Payer: COMMERCIAL

## 2022-07-11 ENCOUNTER — APPOINTMENT (OUTPATIENT)
Dept: ULTRASOUND IMAGING | Facility: CLINIC | Age: 77
DRG: 280 | End: 2022-07-11
Attending: STUDENT IN AN ORGANIZED HEALTH CARE EDUCATION/TRAINING PROGRAM
Payer: COMMERCIAL

## 2022-07-11 DIAGNOSIS — I50.9 ACUTE HEART FAILURE, UNSPECIFIED HEART FAILURE TYPE (H): ICD-10-CM

## 2022-07-11 DIAGNOSIS — R79.89 ELEVATED TROPONIN: ICD-10-CM

## 2022-07-11 DIAGNOSIS — J45.30 MILD PERSISTENT ASTHMA WITHOUT COMPLICATION: ICD-10-CM

## 2022-07-11 DIAGNOSIS — I48.20 CHRONIC ATRIAL FIBRILLATION (H): Primary | ICD-10-CM

## 2022-07-11 LAB
ANION GAP SERPL CALCULATED.3IONS-SCNC: 5 MMOL/L (ref 3–14)
BASOPHILS # BLD AUTO: 0.1 10E3/UL (ref 0–0.2)
BASOPHILS NFR BLD AUTO: 1 %
BUN SERPL-MCNC: 19 MG/DL (ref 7–30)
CALCIUM SERPL-MCNC: 8.8 MG/DL (ref 8.5–10.1)
CHLORIDE BLD-SCNC: 111 MMOL/L (ref 94–109)
CO2 SERPL-SCNC: 23 MMOL/L (ref 20–32)
CREAT SERPL-MCNC: 1.28 MG/DL (ref 0.66–1.25)
CRP SERPL-MCNC: 4.4 MG/L (ref 0–8)
D DIMER PPP FEU-MCNC: 1.93 UG/ML FEU (ref 0–0.5)
EOSINOPHIL # BLD AUTO: 0.2 10E3/UL (ref 0–0.7)
EOSINOPHIL NFR BLD AUTO: 3 %
ERYTHROCYTE [DISTWIDTH] IN BLOOD BY AUTOMATED COUNT: 13.7 % (ref 10–15)
GFR SERPL CREATININE-BSD FRML MDRD: 58 ML/MIN/1.73M2
GLUCOSE BLD-MCNC: 90 MG/DL (ref 70–99)
HCT VFR BLD AUTO: 45.8 % (ref 40–53)
HGB BLD-MCNC: 14.8 G/DL (ref 13.3–17.7)
HOLD SPECIMEN: NORMAL
IMM GRANULOCYTES # BLD: 0 10E3/UL
IMM GRANULOCYTES NFR BLD: 0 %
LYMPHOCYTES # BLD AUTO: 2.5 10E3/UL (ref 0.8–5.3)
LYMPHOCYTES NFR BLD AUTO: 28 %
MCH RBC QN AUTO: 31.4 PG (ref 26.5–33)
MCHC RBC AUTO-ENTMCNC: 32.3 G/DL (ref 31.5–36.5)
MCV RBC AUTO: 97 FL (ref 78–100)
MONOCYTES # BLD AUTO: 0.7 10E3/UL (ref 0–1.3)
MONOCYTES NFR BLD AUTO: 8 %
NEUTROPHILS # BLD AUTO: 5.4 10E3/UL (ref 1.6–8.3)
NEUTROPHILS NFR BLD AUTO: 60 %
NRBC # BLD AUTO: 0 10E3/UL
NRBC BLD AUTO-RTO: 0 /100
NT-PROBNP SERPL-MCNC: 1850 PG/ML (ref 0–1800)
PLATELET # BLD AUTO: 332 10E3/UL (ref 150–450)
POTASSIUM BLD-SCNC: 4.3 MMOL/L (ref 3.4–5.3)
RBC # BLD AUTO: 4.71 10E6/UL (ref 4.4–5.9)
SODIUM SERPL-SCNC: 139 MMOL/L (ref 133–144)
TROPONIN I SERPL HS-MCNC: 293 NG/L
WBC # BLD AUTO: 8.9 10E3/UL (ref 4–11)

## 2022-07-11 PROCEDURE — 99223 1ST HOSP IP/OBS HIGH 75: CPT | Mod: AI | Performed by: STUDENT IN AN ORGANIZED HEALTH CARE EDUCATION/TRAINING PROGRAM

## 2022-07-11 PROCEDURE — 86140 C-REACTIVE PROTEIN: CPT | Performed by: STUDENT IN AN ORGANIZED HEALTH CARE EDUCATION/TRAINING PROGRAM

## 2022-07-11 PROCEDURE — 96375 TX/PRO/DX INJ NEW DRUG ADDON: CPT

## 2022-07-11 PROCEDURE — 250N000011 HC RX IP 250 OP 636: Performed by: EMERGENCY MEDICINE

## 2022-07-11 PROCEDURE — 84484 ASSAY OF TROPONIN QUANT: CPT | Performed by: EMERGENCY MEDICINE

## 2022-07-11 PROCEDURE — 250N000013 HC RX MED GY IP 250 OP 250 PS 637: Performed by: EMERGENCY MEDICINE

## 2022-07-11 PROCEDURE — 85025 COMPLETE CBC W/AUTO DIFF WBC: CPT | Performed by: EMERGENCY MEDICINE

## 2022-07-11 PROCEDURE — 85379 FIBRIN DEGRADATION QUANT: CPT | Performed by: STUDENT IN AN ORGANIZED HEALTH CARE EDUCATION/TRAINING PROGRAM

## 2022-07-11 PROCEDURE — 120N000001 HC R&B MED SURG/OB

## 2022-07-11 PROCEDURE — 83880 ASSAY OF NATRIURETIC PEPTIDE: CPT | Performed by: EMERGENCY MEDICINE

## 2022-07-11 PROCEDURE — 36415 COLL VENOUS BLD VENIPUNCTURE: CPT | Performed by: EMERGENCY MEDICINE

## 2022-07-11 PROCEDURE — 93005 ELECTROCARDIOGRAM TRACING: CPT

## 2022-07-11 PROCEDURE — 80048 BASIC METABOLIC PNL TOTAL CA: CPT | Performed by: EMERGENCY MEDICINE

## 2022-07-11 PROCEDURE — 71046 X-RAY EXAM CHEST 2 VIEWS: CPT

## 2022-07-11 PROCEDURE — 99285 EMERGENCY DEPT VISIT HI MDM: CPT | Mod: 25

## 2022-07-11 PROCEDURE — U0005 INFEC AGEN DETEC AMPLI PROBE: HCPCS | Performed by: EMERGENCY MEDICINE

## 2022-07-11 PROCEDURE — 93970 EXTREMITY STUDY: CPT

## 2022-07-11 PROCEDURE — 96376 TX/PRO/DX INJ SAME DRUG ADON: CPT

## 2022-07-11 PROCEDURE — 96365 THER/PROPH/DIAG IV INF INIT: CPT

## 2022-07-11 PROCEDURE — C9803 HOPD COVID-19 SPEC COLLECT: HCPCS

## 2022-07-11 RX ORDER — FUROSEMIDE 10 MG/ML
20 INJECTION INTRAMUSCULAR; INTRAVENOUS ONCE
Status: COMPLETED | OUTPATIENT
Start: 2022-07-11 | End: 2022-07-11

## 2022-07-11 RX ORDER — HEPARIN SODIUM 10000 [USP'U]/100ML
0-5000 INJECTION, SOLUTION INTRAVENOUS CONTINUOUS
Status: DISCONTINUED | OUTPATIENT
Start: 2022-07-11 | End: 2022-07-12

## 2022-07-11 RX ORDER — ASPIRIN 81 MG/1
324 TABLET, CHEWABLE ORAL ONCE
Status: COMPLETED | OUTPATIENT
Start: 2022-07-11 | End: 2022-07-11

## 2022-07-11 RX ADMIN — ASPIRIN 81 MG CHEWABLE TABLET 324 MG: 81 TABLET CHEWABLE at 22:59

## 2022-07-11 RX ADMIN — FUROSEMIDE 20 MG: 10 INJECTION, SOLUTION INTRAVENOUS at 22:59

## 2022-07-11 RX ADMIN — HEPARIN SODIUM 1200 UNITS/HR: 10000 INJECTION, SOLUTION INTRAVENOUS at 22:48

## 2022-07-11 ASSESSMENT — ACTIVITIES OF DAILY LIVING (ADL): ADLS_ACUITY_SCORE: 35

## 2022-07-11 ASSESSMENT — ENCOUNTER SYMPTOMS: SHORTNESS OF BREATH: 1

## 2022-07-12 ENCOUNTER — APPOINTMENT (OUTPATIENT)
Dept: CARDIOLOGY | Facility: CLINIC | Age: 77
DRG: 280 | End: 2022-07-12
Attending: STUDENT IN AN ORGANIZED HEALTH CARE EDUCATION/TRAINING PROGRAM
Payer: COMMERCIAL

## 2022-07-12 LAB
ANION GAP SERPL CALCULATED.3IONS-SCNC: 7 MMOL/L (ref 3–14)
ATRIAL RATE - MUSE: 312 BPM
BUN SERPL-MCNC: 17 MG/DL (ref 7–30)
CALCIUM SERPL-MCNC: 9 MG/DL (ref 8.5–10.1)
CHLORIDE BLD-SCNC: 109 MMOL/L (ref 94–109)
CO2 SERPL-SCNC: 23 MMOL/L (ref 20–32)
CREAT SERPL-MCNC: 1.16 MG/DL (ref 0.66–1.25)
DIASTOLIC BLOOD PRESSURE - MUSE: NORMAL MMHG
ERYTHROCYTE [DISTWIDTH] IN BLOOD BY AUTOMATED COUNT: 13.8 % (ref 10–15)
ERYTHROCYTE [DISTWIDTH] IN BLOOD BY AUTOMATED COUNT: 13.8 % (ref 10–15)
GFR SERPL CREATININE-BSD FRML MDRD: 65 ML/MIN/1.73M2
GLUCOSE BLD-MCNC: 93 MG/DL (ref 70–99)
HCT VFR BLD AUTO: 44.8 % (ref 40–53)
HCT VFR BLD AUTO: 47.9 % (ref 40–53)
HGB BLD-MCNC: 14.5 G/DL (ref 13.3–17.7)
HGB BLD-MCNC: 15.7 G/DL (ref 13.3–17.7)
INTERPRETATION ECG - MUSE: NORMAL
LVEF ECHO: NORMAL
MCH RBC QN AUTO: 31.4 PG (ref 26.5–33)
MCH RBC QN AUTO: 31.4 PG (ref 26.5–33)
MCHC RBC AUTO-ENTMCNC: 32.4 G/DL (ref 31.5–36.5)
MCHC RBC AUTO-ENTMCNC: 32.8 G/DL (ref 31.5–36.5)
MCV RBC AUTO: 96 FL (ref 78–100)
MCV RBC AUTO: 97 FL (ref 78–100)
P AXIS - MUSE: NORMAL DEGREES
PLATELET # BLD AUTO: 296 10E3/UL (ref 150–450)
PLATELET # BLD AUTO: 330 10E3/UL (ref 150–450)
POTASSIUM BLD-SCNC: 4.1 MMOL/L (ref 3.4–5.3)
PR INTERVAL - MUSE: NORMAL MS
QRS DURATION - MUSE: 90 MS
QT - MUSE: 346 MS
QTC - MUSE: 478 MS
R AXIS - MUSE: 87 DEGREES
RBC # BLD AUTO: 4.62 10E6/UL (ref 4.4–5.9)
RBC # BLD AUTO: 5 10E6/UL (ref 4.4–5.9)
SARS-COV-2 RNA RESP QL NAA+PROBE: POSITIVE
SODIUM SERPL-SCNC: 139 MMOL/L (ref 133–144)
SYSTOLIC BLOOD PRESSURE - MUSE: NORMAL MMHG
T AXIS - MUSE: -20 DEGREES
TROPONIN I SERPL HS-MCNC: 241 NG/L
TROPONIN I SERPL HS-MCNC: 297 NG/L
TROPONIN I SERPL HS-MCNC: 310 NG/L
UFH PPP CHRO-ACNC: 0.15 IU/ML
UFH PPP CHRO-ACNC: >1.1 IU/ML
VENTRICULAR RATE- MUSE: 115 BPM
WBC # BLD AUTO: 7.5 10E3/UL (ref 4–11)
WBC # BLD AUTO: 8.2 10E3/UL (ref 4–11)

## 2022-07-12 PROCEDURE — 999N000157 HC STATISTIC RCP TIME EA 10 MIN

## 2022-07-12 PROCEDURE — 84484 ASSAY OF TROPONIN QUANT: CPT | Performed by: STUDENT IN AN ORGANIZED HEALTH CARE EDUCATION/TRAINING PROGRAM

## 2022-07-12 PROCEDURE — 93306 TTE W/DOPPLER COMPLETE: CPT | Mod: 26 | Performed by: INTERNAL MEDICINE

## 2022-07-12 PROCEDURE — 80048 BASIC METABOLIC PNL TOTAL CA: CPT | Performed by: STUDENT IN AN ORGANIZED HEALTH CARE EDUCATION/TRAINING PROGRAM

## 2022-07-12 PROCEDURE — 250N000013 HC RX MED GY IP 250 OP 250 PS 637: Performed by: STUDENT IN AN ORGANIZED HEALTH CARE EDUCATION/TRAINING PROGRAM

## 2022-07-12 PROCEDURE — 85014 HEMATOCRIT: CPT | Performed by: STUDENT IN AN ORGANIZED HEALTH CARE EDUCATION/TRAINING PROGRAM

## 2022-07-12 PROCEDURE — 36415 COLL VENOUS BLD VENIPUNCTURE: CPT | Performed by: STUDENT IN AN ORGANIZED HEALTH CARE EDUCATION/TRAINING PROGRAM

## 2022-07-12 PROCEDURE — 120N000001 HC R&B MED SURG/OB

## 2022-07-12 PROCEDURE — 250N000013 HC RX MED GY IP 250 OP 250 PS 637: Performed by: INTERNAL MEDICINE

## 2022-07-12 PROCEDURE — 85027 COMPLETE CBC AUTOMATED: CPT | Performed by: STUDENT IN AN ORGANIZED HEALTH CARE EDUCATION/TRAINING PROGRAM

## 2022-07-12 PROCEDURE — 99233 SBSQ HOSP IP/OBS HIGH 50: CPT | Performed by: STUDENT IN AN ORGANIZED HEALTH CARE EDUCATION/TRAINING PROGRAM

## 2022-07-12 PROCEDURE — 99222 1ST HOSP IP/OBS MODERATE 55: CPT | Performed by: INTERNAL MEDICINE

## 2022-07-12 PROCEDURE — 94640 AIRWAY INHALATION TREATMENT: CPT

## 2022-07-12 PROCEDURE — 85520 HEPARIN ASSAY: CPT | Performed by: EMERGENCY MEDICINE

## 2022-07-12 PROCEDURE — 85520 HEPARIN ASSAY: CPT | Performed by: STUDENT IN AN ORGANIZED HEALTH CARE EDUCATION/TRAINING PROGRAM

## 2022-07-12 PROCEDURE — 93306 TTE W/DOPPLER COMPLETE: CPT

## 2022-07-12 PROCEDURE — 250N000011 HC RX IP 250 OP 636: Performed by: STUDENT IN AN ORGANIZED HEALTH CARE EDUCATION/TRAINING PROGRAM

## 2022-07-12 PROCEDURE — 36415 COLL VENOUS BLD VENIPUNCTURE: CPT | Performed by: EMERGENCY MEDICINE

## 2022-07-12 PROCEDURE — 250N000011 HC RX IP 250 OP 636: Performed by: INTERNAL MEDICINE

## 2022-07-12 PROCEDURE — 250N000012 HC RX MED GY IP 250 OP 636 PS 637: Performed by: STUDENT IN AN ORGANIZED HEALTH CARE EDUCATION/TRAINING PROGRAM

## 2022-07-12 RX ORDER — FUROSEMIDE 10 MG/ML
20 INJECTION INTRAMUSCULAR; INTRAVENOUS
Status: DISCONTINUED | OUTPATIENT
Start: 2022-07-12 | End: 2022-07-13 | Stop reason: HOSPADM

## 2022-07-12 RX ORDER — PROCHLORPERAZINE 25 MG
12.5 SUPPOSITORY, RECTAL RECTAL EVERY 12 HOURS PRN
Status: DISCONTINUED | OUTPATIENT
Start: 2022-07-12 | End: 2022-07-13 | Stop reason: HOSPADM

## 2022-07-12 RX ORDER — ASPIRIN 81 MG/1
81 TABLET ORAL DAILY
Status: ON HOLD | COMMUNITY
End: 2022-07-12

## 2022-07-12 RX ORDER — LIDOCAINE 40 MG/G
CREAM TOPICAL
Status: DISCONTINUED | OUTPATIENT
Start: 2022-07-12 | End: 2022-07-13 | Stop reason: HOSPADM

## 2022-07-12 RX ORDER — FLUTICASONE PROPIONATE 110 UG/1
1 AEROSOL, METERED RESPIRATORY (INHALATION) EVERY 12 HOURS
Status: DISCONTINUED | OUTPATIENT
Start: 2022-07-12 | End: 2022-07-12 | Stop reason: CLARIF

## 2022-07-12 RX ORDER — HEPARIN SODIUM 10000 [USP'U]/100ML
0-5000 INJECTION, SOLUTION INTRAVENOUS CONTINUOUS
Status: DISCONTINUED | OUTPATIENT
Start: 2022-07-12 | End: 2022-07-12

## 2022-07-12 RX ORDER — HEPARIN SODIUM 10000 [USP'U]/100ML
0-5000 INJECTION, SOLUTION INTRAVENOUS CONTINUOUS
Status: DISCONTINUED | OUTPATIENT
Start: 2022-07-12 | End: 2022-07-13

## 2022-07-12 RX ORDER — DIGOXIN 0.25 MG/ML
125 INJECTION INTRAMUSCULAR; INTRAVENOUS ONCE
Status: COMPLETED | OUTPATIENT
Start: 2022-07-12 | End: 2022-07-12

## 2022-07-12 RX ORDER — AMOXICILLIN 250 MG
2 CAPSULE ORAL 2 TIMES DAILY PRN
Status: DISCONTINUED | OUTPATIENT
Start: 2022-07-12 | End: 2022-07-13 | Stop reason: HOSPADM

## 2022-07-12 RX ORDER — ACETAMINOPHEN 650 MG/1
650 SUPPOSITORY RECTAL EVERY 6 HOURS PRN
Status: DISCONTINUED | OUTPATIENT
Start: 2022-07-12 | End: 2022-07-13 | Stop reason: HOSPADM

## 2022-07-12 RX ORDER — AMOXICILLIN 250 MG
1 CAPSULE ORAL 2 TIMES DAILY PRN
Status: DISCONTINUED | OUTPATIENT
Start: 2022-07-12 | End: 2022-07-13 | Stop reason: HOSPADM

## 2022-07-12 RX ORDER — ACETAMINOPHEN 325 MG/1
650 TABLET ORAL EVERY 6 HOURS PRN
Status: DISCONTINUED | OUTPATIENT
Start: 2022-07-12 | End: 2022-07-13 | Stop reason: HOSPADM

## 2022-07-12 RX ORDER — LISINOPRIL 2.5 MG/1
2.5 TABLET ORAL DAILY
Status: DISCONTINUED | OUTPATIENT
Start: 2022-07-12 | End: 2022-07-13 | Stop reason: HOSPADM

## 2022-07-12 RX ORDER — PROCHLORPERAZINE MALEATE 5 MG
5 TABLET ORAL EVERY 6 HOURS PRN
Status: DISCONTINUED | OUTPATIENT
Start: 2022-07-12 | End: 2022-07-13 | Stop reason: HOSPADM

## 2022-07-12 RX ORDER — ONDANSETRON 2 MG/ML
4 INJECTION INTRAMUSCULAR; INTRAVENOUS EVERY 6 HOURS PRN
Status: DISCONTINUED | OUTPATIENT
Start: 2022-07-12 | End: 2022-07-13 | Stop reason: HOSPADM

## 2022-07-12 RX ORDER — FUROSEMIDE 20 MG
20 TABLET ORAL DAILY
Status: ON HOLD | COMMUNITY
End: 2022-07-13

## 2022-07-12 RX ORDER — DIGOXIN 125 MCG
125 TABLET ORAL DAILY
Status: DISCONTINUED | OUTPATIENT
Start: 2022-07-12 | End: 2022-07-13 | Stop reason: HOSPADM

## 2022-07-12 RX ORDER — METOPROLOL SUCCINATE 25 MG/1
25 TABLET, EXTENDED RELEASE ORAL DAILY
Status: DISCONTINUED | OUTPATIENT
Start: 2022-07-12 | End: 2022-07-13 | Stop reason: HOSPADM

## 2022-07-12 RX ORDER — ONDANSETRON 4 MG/1
4 TABLET, ORALLY DISINTEGRATING ORAL EVERY 6 HOURS PRN
Status: DISCONTINUED | OUTPATIENT
Start: 2022-07-12 | End: 2022-07-13 | Stop reason: HOSPADM

## 2022-07-12 RX ORDER — LEVALBUTEROL TARTRATE 45 UG/1
2 AEROSOL, METERED ORAL EVERY 6 HOURS
Status: DISCONTINUED | OUTPATIENT
Start: 2022-07-12 | End: 2022-07-13 | Stop reason: HOSPADM

## 2022-07-12 RX ORDER — PREDNISONE 20 MG/1
40 TABLET ORAL DAILY
Status: DISCONTINUED | OUTPATIENT
Start: 2022-07-12 | End: 2022-07-13 | Stop reason: HOSPADM

## 2022-07-12 RX ADMIN — DIGOXIN 125 MCG: 125 TABLET ORAL at 21:55

## 2022-07-12 RX ADMIN — FUROSEMIDE 20 MG: 10 INJECTION, SOLUTION INTRAMUSCULAR; INTRAVENOUS at 11:01

## 2022-07-12 RX ADMIN — PREDNISONE 40 MG: 20 TABLET ORAL at 11:02

## 2022-07-12 RX ADMIN — FUROSEMIDE 20 MG: 10 INJECTION, SOLUTION INTRAMUSCULAR; INTRAVENOUS at 17:27

## 2022-07-12 RX ADMIN — LEVALBUTEROL TARTRATE 2 PUFF: 45 AEROSOL, METERED ORAL at 20:31

## 2022-07-12 RX ADMIN — METOPROLOL SUCCINATE 25 MG: 25 TABLET, EXTENDED RELEASE ORAL at 11:02

## 2022-07-12 RX ADMIN — HEPARIN SODIUM 1350 UNITS/HR: 10000 INJECTION, SOLUTION INTRAVENOUS at 22:53

## 2022-07-12 RX ADMIN — LISINOPRIL 2.5 MG: 2.5 TABLET ORAL at 17:24

## 2022-07-12 RX ADMIN — DIGOXIN 125 MCG: 250 INJECTION, SOLUTION INTRAMUSCULAR; INTRAVENOUS; PARENTERAL at 17:36

## 2022-07-12 RX ADMIN — APIXABAN 5 MG: 5 TABLET, FILM COATED ORAL at 11:02

## 2022-07-12 ASSESSMENT — ACTIVITIES OF DAILY LIVING (ADL)
ADLS_ACUITY_SCORE: 24
ADLS_ACUITY_SCORE: 25
ADLS_ACUITY_SCORE: 25
ADLS_ACUITY_SCORE: 22
ADLS_ACUITY_SCORE: 24
ADLS_ACUITY_SCORE: 25
ADLS_ACUITY_SCORE: 25
ADLS_ACUITY_SCORE: 24
ADLS_ACUITY_SCORE: 25
ADLS_ACUITY_SCORE: 24
ADLS_ACUITY_SCORE: 25
ADLS_ACUITY_SCORE: 24

## 2022-07-12 NOTE — PROGRESS NOTES
Received Voicera page from lab with critical Hep 10A value.  Bedside RN heard notification and will notify primary MD.

## 2022-07-12 NOTE — PLAN OF CARE
/71 (BP Location: Right arm, Patient Position: Semi-Bowens's, Cuff Size: Adult Large)   Pulse 81   Temp 97.9  F (36.6  C) (Oral)   Resp 22   Ht 1.829 m (6')   Wt 109.4 kg (241 lb 3.2 oz)   SpO2 96%   BMI 32.71 kg/m      A/O x 4. SBA with a cane. PIV with Heparin running at 1350 an hour next recheck at 1030. NPO at 0000. Pt denies pain cp or sob. Pt has a congested cough. Trop peaked at 310. Continue with POC.

## 2022-07-12 NOTE — PROGRESS NOTES
"[facility]    Medicine Progress Note - Hospitalist Service    Date of Admission:  7/11/2022    Assessment & Plan          77-year-old male with history of asthma, ventricular fibrillation getting the heart cath and gout who presents with worsening shortness of breath, lower extremity edema, orthopnea and PND over the last 3 to 4 days.  Describes \"panic attacks\" on lying down flat.  He was recently diagnosed with COVID-19.    In the ER temperature was 97.9  F, heart rate of 106 (atrial fibrillation), blood pressure 125/86 and oxygen saturation 97% on room air.  N-terminal proBNP was elevated at 1850 and troponin was elevated at 293.  EKG showed atrial fibrillation with RVR of 115 and occasional PVCs.  Patient has paroxysmal atrial fibrillation listed in his chart since 2006 but he is not on any anticoagulation.  Patient also appears unaware of his diagnosis and thinks he thought he had atrial fibrillation because he occasionally gets palpitations.  X-ray shows some pulmonary vascular congestion.  Lower extremity ultrasound was negative for DVT.  He was wheezing on presentation.    Plan    1. Shortness of breath.    New onset systolic CHF, EF of 20 to 25% on echo. started on Lasix 20 mg IV twice daily consult cardiology for angiogram.  This could be tachycardia induced cardiomyopathy due to uncontrolled atrial fibrillation.    Patient has history of asthma and is wheezing, likely asthma exacerbation due to COVID.  Will change his inhaler to Xopenex due to A. fib and give fluticasone inhaler instead of Breo.  Will give prednisone 40 mg daily for 5 days    2. Atrial fibrillation with rapid ventricular rate.    His CHADS2 Vasc score is 2-3.  He was started on Eliquis inpatient but due to his low EF and probable need for an angiogram, will switch back to heparin tonight at 11 PM, 12 hours after the first dose of Eliquis.    Started on metoprolol succinate 25 mg daily.    3. COVID-19    First tested " positive on 7/4 at home.  He did not have any significant symptoms except some cough which she has on and off due to asthma.  As he is not needing oxygen, is not a candidate for remdesivir/dexamethasone.  He is vaccinated and boosted for COVID-19.    4. Elevated troponin    Likely due to demand ischemia.  Troponin remain elevated but flat and mid to high 200s range.    No chest pain, earful unlikely to be acute ischemic event but may have chronic ischemia.  Cardiology consulted due to low EF.    5. History of V. fib cardiac arrest during heart catheterization in 2006.    Started on metoprolol.    6. History of gout.    Resume allopurinol.             Diet: NPO for Medical/Clinical Reasons Except for: Meds    DVT Prophylaxis: Heparin  Thompson Catheter: Not present  Central Lines: None  Cardiac Monitoring: ACTIVE order. Indication: Acute decompensated heart failure (48 hours)  Code Status: Full Code      Disposition Plan      Expected Discharge Date: 07/13/2022                The patient's care was discussed with the Patient.    Saleem Junior MD  Hospitalist Service  [facility]  Securely message with the Vocera Web Console (learn more here)  Text page via [employer] Paging/Directory         Clinically Significant Risk Factors Present on Admission                    # Obesity: Estimated body mass index is 32.71 kg/m  as calculated from the following:    Height as of this encounter: 1.829 m (6').    Weight as of this encounter: 109.4 kg (241 lb 3.2 oz).        ______________________________________________________________________    Interval History   No chest pain.  Shortness of breath is better.  Still has mildly productive cough with wheezing.    Data reviewed today: I reviewed all medications, new labs and imaging results over the last 24 hours    Physical Exam   Vital Signs: Temp: 97.9  F (36.6  C) Temp src: Oral BP: 106/71 Pulse: 81   Resp: 22 SpO2: 96 % O2 Device: None (Room air)    Weight: 241 lbs  3.2 oz  General Appearance: Alert awake oriented x3..  Eyes: No icterus  HEENT: Moist mucosa  Respiratory: Bilateral wheezing.  Cardiovascular: S1 and S2 regular  GI: Soft and nontender  Lymph/Hematologic: Not examined  Genitourinary: Not examined  Skin: No rash  Musculoskeletal: Bilateral lower extremity edema.  Neurologic: Nonfocal  Psychiatric: Normal mood      Data   Recent Labs   Lab 07/12/22  0718 07/11/22  2103   WBC 7.5 8.9   HGB 14.5 14.8   MCV 97 97    332    139   POTASSIUM 4.1 4.3   CHLORIDE 109 111*   CO2 23 23   BUN 17 19   CR 1.16 1.28*   ANIONGAP 7 5   SHERRON 9.0 8.8   GLC 93 90     Recent Results (from the past 24 hour(s))   XR Chest 2 Views    Narrative    EXAM: XR CHEST 2 VW  LOCATION: [facility]  DATE/TIME: 7/11/2022 9:34 PM    INDICATION: sob  COMPARISON: 01/14/2020.       Impression    IMPRESSION: No pleural fluid or pneumothorax. Subtle pulmonary opacities could be atypical infection or overlying soft tissue. No interstitial edema. Normal size of the heart. There are degenerative changes in the spine. Surgical hardware in the lumbar   spine is partially included in the field-of-view.    US Lower Extremity Venous Duplex Bilateral    Narrative    EXAM: US LOWER EXTREMITY VENOUS DUPLEX BILATERAL  LOCATION: [facility]  DATE/TIME: 7/11/2022 11:21 PM    INDICATION: Bilateral lower extremity edema  COMPARISON: None.  TECHNIQUE: Venous Duplex ultrasound of bilateral lower extremities with and without compression, augmentation and duplex. Color flow and spectral Doppler with waveform analysis performed.    FINDINGS: Exam includes the common femoral, femoral, popliteal veins as well as segmentally visualized deep calf veins and greater saphenous vein.     RIGHT: No deep vein thrombosis. No superficial thrombophlebitis. No popliteal cyst.    LEFT: No deep vein thrombosis. No superficial thrombophlebitis. No popliteal cyst.      Impression     IMPRESSION:  1.  No deep venous thrombosis in the bilateral lower extremities.   Echocardiogram Complete   Result Value    LVEF  20-25%    [facility]    [id number]  [id number]  [id number]  [id number]^IRVIN^CARLY     [facility]  Echocardiography Laboratory  [address] [place] [address]     Name: SUMANTH HERNANDEZ  MRN: [id number]  : 1945  Study Date: 2022 08:44 AM  Age: 77 yrs  Gender: Male  Patient Location: [facility]  Reason For Study: Edema  Ordering Physician: CARLY BRYAN  Performed By: Maddie Rivas     BSA: 2.4 m2  Height: 72 in  Weight: 252 lb  HR: 84  BP: 125/86 mmHg  ______________________________________________________________________________  Procedure  Limited Portable Echo Adult.  ______________________________________________________________________________  Interpretation Summary     The visual ejection fraction is 20-25%.  Left ventricular systolic function is severely reduced.  There is moderate to mod-severe (2-3+) tricuspid regurgitation.  Right ventricular systolic pressure is elevated, consistent with mild  pulmonary hypertension.  There is trace aortic regurgitation.  Trivial pericardial effusion  The rhythm was atrial fibrillation.  Since 2016 there has been a marked deterioration in left ventricular and right  ventricular systolic function. There has naheed significant worsening of mitral  and tricuspid regurgitation and the rhythm has now changed from sinus to  atrial fibrillation.  ______________________________________________________________________________  Left Ventricle  The left ventricle is normal in size. There is moderate concentric left  ventricular hypertrophy. The visual ejection fraction is 20-25%. Left  ventricular systolic function is severely reduced. Diastolic function not  assessed due to atrial fibrillation. There is severe global hypokinesia of the  left ventricle. A false chord is noted (normal variant).     Right Ventricle  The  right ventricle is normal size. The right ventricular systolic function is  moderately reduced.     Atria  The left atrium is mild to moderately dilated. The right atrium is mild to  moderately dilated.     Mitral Valve  There is moderate (2+) mitral regurgitation.     Tricuspid Valve  There is moderate to mod-severe (2-3+) tricuspid regurgitation. IVC diameter  >2.1 cm collapsing <50% with sniff suggests a high RA pressure estimated at 15  mmHg or greater. The right ventricular systolic pressure is approximated at  23mmHg plus the right atrial pressure. Right ventricular systolic pressure is  elevated, consistent with mild pulmonary hypertension.     Aortic Valve  The aortic valve is trileaflet. There is trace aortic regurgitation. No  hemodynamically significant valvular aortic stenosis.     Pulmonic Valve  There is mild (1+) pulmonic valvular regurgitation.     Pericardium  Trivial pericardial effusion.     Rhythm  The rhythm was atrial fibrillation.     ______________________________________________________________________________  MMode/2D Measurements & Calculations  IVSd: 1.5 cm  LVIDd: 5.6 cm  LVIDs: 4.0 cm  LVPWd: 1.2 cm  FS: 27.7 %     LV mass(C)d: 315.0 grams  LV mass(C)dI: 134.0 grams/m2  RWT: 0.42     ______________________________________________________________________________  Report approved by: Rancho Geller 07/12/2022 10:47 AM           Medications     heparin         [Held by provider] apixaban ANTICOAGULANT  5 mg Oral BID     fluticasone  1 puff Inhalation Daily     furosemide  20 mg Intravenous BID     levalbuterol  2 puff Inhalation Q6H     metoprolol succinate ER  25 mg Oral Daily     predniSONE  40 mg Oral Daily     sodium chloride (PF)  3 mL Intracatheter Q8H

## 2022-07-12 NOTE — PLAN OF CARE
Goal Outcome Evaluation:    VSS. A&O x4. ISO-covid.  Pt denies CP. 95% RA. SOB with exertion. LS wheezes and diminished.  Heparin discontinued at 1000. Oral Eliquis given. Echo complete results show 20% EF. Cardio consulted angiogram possible. Heparin to be restarted in evenings. Lasix ordered. NPO at midnight for possible procedure. Potassium protocol. Tele: A-flutter with PVC's. Ambulate independent with cane in room. Will continue to monitor and follow POC

## 2022-07-12 NOTE — ED PROVIDER NOTES
History   Chief Complaint:  Leg Swelling     The history is provided by the patient.      Khurram Reynoso is a 77 year old male with history of atrial fibrillation, gout, primary cardiopathy and PVC who presents with leg swelling. The patient tells us that over the past month his breathing has become significantly worse and he noticed that he breaths better when sitting up opposed to laying down. In addition, over the past three to four days he has had significant bilateral swelling to his ankles and feet, worse on the right. He has a history of GOUT but tells us that this feels much different from GOUT as he is not getting the aches and pains. This prompted the patient to be seen at [facility] earlier today where he was subsequently referred here to the ED after receiving and elevated troponin of 0.24. The patient denies chest pain.     To note, to sleep at night the patient uses a nebulizer.    Review of Systems   Respiratory: Positive for shortness of breath.    Cardiovascular: Positive for leg swelling (Bilateral ). Negative for chest pain.   10 point review of systems performed and is negative except as above and in HPI.    Allergies:  The patient has no known allergies.     Medications:  Albuterol   Zyloprim   Flonase  Breo Ellipta     Past Medical History:     Cardiac dysrhythmia  Primary cardiopathy  Gait difficulty   PVC  PAC  ACP  Mild persistent asthma without complication   Calculus of kidney   Acute idiopathic gout of right foot  Left ureteral stone  Atrial fibrillation   Ureteral calculus     Past Surgical History:    Ablation of dysthymic focus  Cardiac ablation   Combined cystoscopy, insert stent ureter  Decompress disc right lumbar  Laser holmium lithotripsy ureters  Total right hip replacement  Right wrist ganglion surgery  Right total knee replacement      Family History:    CAD  Hypertension   Bone cancer  Polio  CABG    Social History:  The patient presents to the ED  alone    Physical Exam     Patient Vitals for the past 24 hrs:   BP Temp Pulse Resp SpO2 Weight   07/11/22 1937 125/86 97.9  F (36.6  C) 106 18 97 % 114.4 kg (252 lb 3.3 oz)     Physical Exam    General: Resting on the gurney, appears comfortable  Head:  The scalp, face, and head appear normal  Mouth/Throat: Mucus membranes are moist  CV:  Regular rate    Normal S1 and S2  No pathological murmur   Resp:  Breath sounds clear and equal bilaterally    Non-labored, no retractions or accessory muscle use    No coarseness    No wheezing   GI:  Abdomen is soft, non tender, no rebound, no guarding     No tenderness to palpation  MS:  Normal motor assessment of all extremities.    Good capillary refill noted.    Bilateral lower extremity swelling. Right slightly greater than left.   Skin:  No rash or lesions noted.  Neuro: Speech is normal and fluent. No apparent deficit.  Psych:  Awake. Alert.  Normal affect.      Appropriate interactions    Emergency Department Course     ECG:  ECG taken at 1957, ECG read at 1959  Atrial fibrillation with rapid ventricular response with premature ventricular or aberrantly conducted complexes. Low voltage QRS. Abnormal QRS-T angle, consider primary T wave abnormality. Abnormal ECG.      when compared with ECG from   Rate 115 bpm. NH interval * ms. QRS duration 90 ms. QT/QTc 346/478 ms. P-R-T axes * 87 -20.    ECG  ECG results from 07/11/22   EKG 12-lead, tracing only     Value    Systolic Blood Pressure     Diastolic Blood Pressure     Ventricular Rate 115    Atrial Rate 312    NH Interval     QRS Duration 90        QTc 478    P Axis     R AXIS 87    T Axis -20    Interpretation ECG      Atrial fibrillation with rapid ventricular response with premature ventricular or aberrantly conducted complexes  Low voltage QRS  Abnormal QRS-T angle, consider primary T wave abnormality  Abnormal ECG  When compared with ECG of 06-AUG-2020 13:30,  Atrial fibrillation has replaced Sinus rhythm  Vent.  rate has increased BY  53 BPM       Imaging:  XR Chest 2 Views    (Results Pending)   Echocardiogram Complete    (Results Pending)   US Lower Extremity Venous Duplex Bilateral    (Results Pending)     Report per radiology    Laboratory:  Labs Ordered and Resulted from Time of ED Arrival to Time of ED Departure   BASIC METABOLIC PANEL - Abnormal       Result Value    Sodium 139      Potassium 4.3      Chloride 111 (*)     Carbon Dioxide (CO2) 23      Anion Gap 5      Urea Nitrogen 19      Creatinine 1.28 (*)     Calcium 8.8      Glucose 90      GFR Estimate 58 (*)    TROPONIN I - Abnormal    Troponin I High Sensitivity 293 (*)    NT PROBNP INPATIENT - Abnormal    N terminal Pro BNP Inpatient 1,850 (*)    CBC WITH PLATELETS AND DIFFERENTIAL    WBC Count 8.9      RBC Count 4.71      Hemoglobin 14.8      Hematocrit 45.8      MCV 97      MCH 31.4      MCHC 32.3      RDW 13.7      Platelet Count 332      % Neutrophils 60      % Lymphocytes 28      % Monocytes 8      % Eosinophils 3      % Basophils 1      % Immature Granulocytes 0      NRBCs per 100 WBC 0      Absolute Neutrophils 5.4      Absolute Lymphocytes 2.5      Absolute Monocytes 0.7      Absolute Eosinophils 0.2      Absolute Basophils 0.1      Absolute Immature Granulocytes 0.0      Absolute NRBCs 0.0     COVID-19 VIRUS (CORONAVIRUS) BY PCR      Emergency Department Course:    Reviewed:  I reviewed nursing notes, vitals, past medical history, Care Everywhere and [facility]    Assessments:  1955 I obtained history and examined the patient as noted above.   2214 I rechecked the patient and explained findings.     Consults:  2231 I spoke with Dr. Cavazos, hospitalist, regarding this patient     Interventions:  2249 Heparin loading dose 6,000 units IV    Lasix 20 mg IV   Aspirin 324 mg PO     Disposition:  The patient was admitted to the hospital under the care of Dr. Cavazos.     Impression & Plan     Medical Decision Making:  Khurram Reynoso is a 77 year old male who  presents for evaluation of shortness of breath.  I considered a broad differential of their dyspnea including CHF exacerbation, PE, dissection, hemothorax, pleural effusion, pneumonia, acute coronary syndrome, reactive airway disease, bronchitis, upper airway obstruction, foreign body, etc.  The patient complains of increased shortness of breath when laying down, lower extremity edema, has crackles on pulmonary exam CHF is the most likely diagnosis.  Troponin obtained and is elevated to 293.  (This is most likely related to cardiac strain, however, should be trended.)  Given the history and exam plus laboratory findings I feel congestive heart failure is the most likely etiology and would not do extensive workup for other causes as mentioned above at this time.  The patient received Heparin, lasix and aspirin in the ED. Will admit at this time for further cares.     Diagnosis:    ICD-10-CM    1. Acute heart failure, unspecified heart failure type (H)  I50.9    2. Elevated troponin  R77.8      Scribe Disclosure:  I, Ellis Cardona, am serving as a scribe at 7:55 PM on 7/11/2022 to document services personally performed by Laney Zelaya MD, based on my observations and the provider's statements to me.              Laney Zelaya MD  07/11/22 [id number]

## 2022-07-12 NOTE — H&P
[facility]  Hospitalist Admission Note  Name: Khurram Reynoso    MRN: [id number]  YOB: 1945    Age: 77 year old  Date of admission: 7/11/2022  Primary care provider: Asif Moctezuma    Chief Complaint:  Orthopnea, congestion, BLE edema    Assessment and Plan:   Orthopnea/PND  Possible CHF exacerbation  Bilateral lower extreme edema:  Presents with a couple days of worsened bilateral lower extremity edema, increased orthopnea and PND.  The patient does have a known history of atrial fibrillation and is not on blood thinners for this given a low CHADSVASC in the past. He denies history of heart failure.  Last TTE that I can see was in January 2021 which was with a normal ejection fraction of 55%.  Chest x-ray is pending.  BNP only mildly elevated at 1800.  Lungs are with mild wheezes and rales.  Unclear if this is cardiac wheezing or wheezing related to asthma.  The patient was recently diagnosed with COVID-19 and therefore it is possible that his symptoms are related to COVID-19 pneumonia.  -Lasix IV 20 mg twice daily as he is Lasix naïve  -Follow-up chest x-ray  -Follow-up TTE  -We will obtain bilateral lower extremity ultrasound to rule out DVT as right lower extremity is more edematous than left  -Continuous oximetry    Troponin elevation:  His troponin is elevated at 293 on ED presentation.  EKG is without any obvious evidence for ischemia.  I suspect that his troponin is elevated in the setting of COVID-19 infection, possible CHF exacerbation as above.  He was placed on a heparin drip in the emergency department.  I think it is reasonable to continue this as we trend troponins.  -Continue heparin drip, anticipate that this could be discontinued in the morning of troponin trend is reassuring  -Trend troponins every 4 hours  -TTE as above  -Cardiac telemetry    Atrial fibrillation with rapid ventricular response:  Presents with atrial fibrillation.  Known history of paroxysmal  "atrial fibrillation in the past.  Is not on anticoagulation for this. His CHADSVASC is 2-3. There should be consideration for anticoagulation on discharge.   -Heparin gtt for now  -Consider discharge on anticoagulation    COVID-19:  Patient reports that him and his wife were just recently diagnosed with COVID-19.  Is unclear how much of this infection is causing the symptoms above.  -Repeat testing in the emergency department  -Airborne precautions for now until test result  -No indication for treatment as the patient is not requiring supplemental oxygen  -Heparin drip for now for anticoagulation    Gout: PTA meds once med rec complete      DVT Prophylaxis: Heparin gtt  Code Status: Full Code  Discharge Dispo: Pending improvement  Estimated Disch Date / # of Days until Disch:  Anticipate 2-3 days pending improvement in edema, breathing.      History of Present Illness:  Khurram Reynoso is a 77 year old male with PMH including asthma, vfib during heart cath, gout who presents with shortness of breath.    Patient presents with a recent history of shortness of breath, lower extremity edema, orthopnea, PND.  This acutely worsened over the past 3 to 4 days.  Swelling is worse on the right lower extremity.  The patient reports that he usually sleeps flat, but he recently has been sleeping in a recliner as he feels short of breath when lying flat.  He also endorses some episodes of a \"panic attack\" while he is lying flat trying to read a book.  This is unusual for him.  He denies any chest pain, pressure.  Denies any fevers, chills.  He does endorse that him and his wife are just diagnosed with COVID-19.  He also reports that he has allergies.  Therefore, he is unsure of his symptoms are related to those 2 things.    ED work-up is notable for mild tachycardia.  Oxygen saturation is normal, he is afebrile.  BMP is notable for a creatinine of 1.28.  BNP is 1850.  Troponin is 293.  His CBC is normal.  Chest x-ray is in " process.  EKG is with atrial fibrillation with RVR.     Past Medical History:  Past Medical History:   Diagnosis Date     Acute bronchitis 10/17/2005     Asthma 2018     Cardiac arrest (H) 6/2006    V-Fib arrest during heart cath     CARDIAC DYSRHYTHMIA NOS 7/27/2005     Degeneration of lumbar or lumbosacral intervertebral disc      Gastroesophageal reflux disease      Gout 2001     Neoplasm of uncertain behavior of skin 2017    Created by Conversion      Other chronic pain     back     PAC (premature atrial contraction)      Paroxysmal A-fib (H) 2006     PONV (postoperative nausea and vomiting)      PRIM CARDIOMYOPATHY NEC 7/18/2006     PVC (premature ventricular contraction)      Renal disease     kidney stones     Uncomplicated asthma      Past Surgical History:  Past Surgical History:   Procedure Laterality Date     ABLATION OF DYSRHYTHMIC FOCUS  04/03/2007    RVOT PVCs     CARDIAC SURGERY      Ablation     COMBINED CYSTOSCOPY, INSERT STENT URETER(S) Left 8/6/2020    Procedure: Video cystoscopy, left double-J stent placement and exam under anesthesia;  Surgeon: Dank Han MD;  Location: [facility] OR     DECOMPRESS DISC RF LUMBAR  3/2001    lumbar fusion L2-5     LASER HOLMIUM LITHOTRIPSY URETER(S), INSERT STENT, COMBINED Left 8/20/2020    Procedure: Video cystoscopy, left double-J stent removal, rigid ureteroscopy, flexible renoscopy with holmium laser lithotripsy and stone extraction.;  Surgeon: Dank Han MD;  Location: [facility] OR     OPTICAL TRACKING SYSTEM FUSION SPINE POSTERIOR LUMBAR THREE+ LEVELS N/A 6/27/2016    Procedure: OPTICAL TRACKING SYSTEM FUSION SPINE POSTERIOR LUMBAR THREE+ LEVELS;  Surgeon: Hieu Araiza MD;  Location: [facility] OR     ORTHOPEDIC SURGERY Bilateral     total knee replacements     ORTHOPEDIC SURGERY Right     Total Hipe Replacement     SOFT TISSUE SURGERY Right     right wrist ganglion surgery     ZZC NONSPECIFIC PROCEDURE      knee     ZZC TOTAL HIP ARTHROPLASTY       Description: Total Hip Replacement;  Recorded: 09/24/2010;     [facility] TOTAL KNEE ARTHROPLASTY      Description: Total Knee Arthroplasty;  Recorded: 09/24/2010;  Comments: right     Social History:  Social History     Tobacco Use     Smoking status: Never Smoker     Smokeless tobacco: Never Used   Substance Use Topics     Alcohol use: No     Alcohol/week: 0.0 standard drinks     Social History     Social History Narrative     Not on file     Family History:  Family History   Problem Relation Age of Onset     C.A.D. Father      Hypertension Father      Heart Surgery Father         bypass     Cancer Brother         bone ca      Family History Negative Mother      Other - See Comments Sister         polio     Unknown/Adopted Maternal Grandmother      Unknown/Adopted Maternal Grandfather      Unknown/Adopted Paternal Grandmother      Unknown/Adopted Paternal Grandfather      Family History Negative Sister      CABG Father 77.00     Allergies:  No Known Allergies     Medications:  No current facility-administered medications on file prior to encounter.  albuterol (PROVENTIL) (2.5 MG/3ML) 0.083% neb solution, Take by nebulization every 4 hours as needed   albuterol (VENTOLIN HFA) 108 (90 Base) MCG/ACT inhaler, INHALE 2 PUFFS BY MOUTH FOUR TIMES DAILY IF NEEDED  allopurinol (ZYLOPRIM) 100 MG tablet, TAKE 2 TABLETS(200 MG) BY MOUTH DAILY (Patient not taking: Reported on 12/8/2021)  BREO ELLIPTA 200-25 MCG/INH Inhaler, INHALE 1 PUFF PO ONCE QAM. (Patient not taking: Reported on 12/8/2021)  fluticasone (FLONASE) 50 MCG/ACT nasal spray, SHAKE LIQUID AND USE 1 TO 2 SPRAYS IN EACH NOSTRIL DAILY      Review of Systems:  A Comprehensive greater than 10 system review of systems was carried out.  Pertinent positives and negatives are noted above.  Otherwise negative for contributory information.     Physical Exam:  Blood pressure 125/86, pulse 106, temperature 97.9  F (36.6  C), resp. rate 18, weight 114.4 kg (252 lb 3.3 oz), SpO2 97  %.  Wt Readings from Last 1 Encounters:   07/11/22 114.4 kg (252 lb 3.3 oz)     Exam:  General: Alert, awake, no acute distress.  HEENT: NC/AT, eyes anicteric, external occular movements intact, face symmetric.  Dentition WNL, MM moist.  Cardiac: RRR, S1, S2.  No murmurs appreciated.  Pulmonary: Normal chest rise, normal work of breathing.  Lungs with mild wheezing and rales mostly in the bases.   Abdomen: soft, non-tender, non-distended.  Bowel Sounds Present.  No guarding.  Extremities: no deformities.  Warm, well perfused. RLE with 3+ pitting edema. Extends up to the knee. LLE with 1-2+ pitting edema.   Skin: no rashes or lesions noted.  Warm and Dry.  Neuro: No focal deficits noted.  Speech clear.  Coordination and strength grossly normal.  Psych: Appropriate affect.    Data:  EKG:  Afib with RVR  Imaging:  No results found for this or any previous visit (from the past 24 hour(s)).  Labs:  Recent Labs   Lab 07/11/22 2103   WBC 8.9   HGB 14.8   HCT 45.8   MCV 97             Lab Results   Component Value Date     07/11/2022     12/01/2021     03/22/2021     08/06/2020     03/02/2020    Lab Results   Component Value Date    CHLORIDE 111 07/11/2022    CHLORIDE 106 12/01/2021    CHLORIDE 105 03/22/2021    CHLORIDE 108 08/06/2020    CHLORIDE 107 03/02/2020    Lab Results   Component Value Date    BUN 19 07/11/2022    BUN 16 12/01/2021    BUN 17 03/22/2021    BUN 19 08/06/2020    BUN 19 03/02/2020      Lab Results   Component Value Date    POTASSIUM 4.3 07/11/2022    POTASSIUM 4.4 12/01/2021    POTASSIUM 4.5 03/22/2021    POTASSIUM 4.3 08/20/2020    POTASSIUM 4.1 08/06/2020    Lab Results   Component Value Date    CO2 23 07/11/2022    CO2 28 12/01/2021    CO2 29 03/22/2021    CO2 25 08/06/2020    CO2 24 03/02/2020    Lab Results   Component Value Date    CR 1.28 07/11/2022    CR 1.22 12/01/2021    CR 1.16 03/22/2021    CR 1.27 08/20/2020    CR 1.52 08/06/2020            Vinny  DO Dirk  Hospitalist  [facility]

## 2022-07-12 NOTE — ED NOTES
[facility]  ED Nurse Handoff Report    Khurram Reynoso is a 77 year old male   ED Chief complaint: Leg Swelling  . ED Diagnosis:   Final diagnoses:   Acute heart failure, unspecified heart failure type (H)   Elevated troponin     Allergies: No Known Allergies    Code Status: Full Code  Activity level - Baseline/Home:  Independent.   Activity Level - Current:   Stand by Assist.   Lift room needed: No.   Bariatric: No   Needed: No   Isolation: No.   Infection: Not Applicable.     Vital Signs:   Vitals:    07/11/22 1937   BP: 125/86   Pulse: 106   Resp: 18   Temp: 97.9  F (36.6  C)   SpO2: 97%   Weight: 114.4 kg (252 lb 3.3 oz)       Cardiac Rhythm:  ,      Pain level:    Patient confused: No.   Patient Falls Risk: No.   Elimination Status: Has voided   Patient Report - Initial Complaint: Leg sweling.   Focused Assessment: Khurram Reynoso is a 77 year old male with history of atrial fibrillation, gout, primary cardiopathy and PVC who presents with leg swelling. The patient tells us that over the past month his breathing has become significantly worse and he noticed that he breaths better when sitting up opposed to laying down. In addition, over the past three to four days he has had significant bilateral swelling to his ankles and feet, worse on the right. He has a history of GOUT but tells us that this feels much different from GOUT as he is not getting the aches and pains. This prompted the patient to be seen at [facility] earlier today where he was subsequently referred here to the ED after receiving and elevated troponin of 0.24. The patient denies chest pain.  Tests Performed:   Labs Ordered and Resulted from Time of ED Arrival to Time of ED Departure   BASIC METABOLIC PANEL - Abnormal       Result Value    Sodium 139      Potassium 4.3      Chloride 111 (*)     Carbon Dioxide (CO2) 23      Anion Gap 5      Urea Nitrogen 19      Creatinine 1.28 (*)     Calcium 8.8       Glucose 90      GFR Estimate 58 (*)    TROPONIN I - Abnormal    Troponin I High Sensitivity 293 (*)    NT PROBNP INPATIENT - Abnormal    N terminal Pro BNP Inpatient 1,850 (*)    CBC WITH PLATELETS AND DIFFERENTIAL    WBC Count 8.9      RBC Count 4.71      Hemoglobin 14.8      Hematocrit 45.8      MCV 97      MCH 31.4      MCHC 32.3      RDW 13.7      Platelet Count 332      % Neutrophils 60      % Lymphocytes 28      % Monocytes 8      % Eosinophils 3      % Basophils 1      % Immature Granulocytes 0      NRBCs per 100 WBC 0      Absolute Neutrophils 5.4      Absolute Lymphocytes 2.5      Absolute Monocytes 0.7      Absolute Eosinophils 0.2      Absolute Basophils 0.1      Absolute Immature Granulocytes 0.0      Absolute NRBCs 0.0     COVID-19 VIRUS (CORONAVIRUS) BY PCR   D DIMER QUANTITATIVE   CRP INFLAMMATION     XR Chest 2 Views    (Results Pending)   Echocardiogram Complete    (Results Pending)   US Lower Extremity Venous Duplex Bilateral    (Results Pending)     Treatments provided: See Epic  Family Comments: Wife at bedside  OBS brochure/video discussed/provided to patient:  No  ED Medications:   Medications   heparin infusion 25,000 units in D5W 250 mL ANTICOAGULANT ( Intravenous Canceled Entry 7/11/22 2300)   aspirin (ASA) chewable tablet 324 mg (324 mg Oral Given 7/11/22 2259)   heparin ANTICOAGULANT loading dose for  LOW INTENSITY TREATMENT* Give BEFORE starting heparin infusion (6,000 Units Intravenous Given 7/11/22 2249)   furosemide (LASIX) injection 20 mg (20 mg Intravenous Given 7/11/22 2259)     Drips infusing:  Yes  For the majority of the shift, the patient's behavior Green.   Interventions performed were NA.    Sepsis treatment initiated: No     Patient tested for COVID 19 prior to admission: YES    ED Nurse Name/Phone Number: Mckenzie Persaud RN,   11:08 PM    RECEIVING UNIT ED HANDOFF REVIEW    Above ED Nurse Handoff Report was reviewed: Yes  Reviewed by: Elisabeth Arrieta RN on July 11,  2022 at 11:57 PM

## 2022-07-12 NOTE — CONSULTS
[facility]    CARDIOLOGY CONSULT    Date of Admission:  7/11/2022  Date of Consult: July 12, 2022    ASSESSMENT:  77-year-old male seen for A. fib and cardiomyopathy.  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  He thinks he has been in A. fib for about 1 week.  He has global dysfunction with RV dysfunction, this typically fits a tachycardia induced pattern.  He does have a mild troponin elevation, but no Q waves on EKG and no anginal symptoms.  Obstructive CAD is unlikely.  COVID could be contributing as well to the cardiomyopathy.    Will start with rate control today by adding digoxin.  In the context of his COVID, this does somewhat change how he will be managed, in order to limit exposure to staff.  Ideally STEFANY cardioversion and possibly angiogram would be done.  However we will try to avoid these for now to minimize risk of staph.    RECOMMENDATIONS:  1.  Rapid A. Fib  -Continue Toprol  -Start digoxin 125 mcg IV 1 dose, then start 125 mcg orally, first dose this evening   -May consider STEFANY guided cardioversion and then amiodarone, will decide this based on heart rate response to above medications  -Continue heparin drip, will need oral anticoagulation if no procedures to be done    2.  Biventricular systolic dysfunction, suspect tachycardia induced  -Treat A. fib as above  -Probably will treat medically for now, if ejection fraction is not improving as an outpatient, then angiogram  -Start lisinopril 2.5 mg daily    Warren Ward MD  Cardiology - [facility]  Pager:  [phone]  Text Page  July 12, 2022    CODE STATUS:  Full Code    REASON FOR CONSULT: A. fib, cardiomyopathy    PRIMARY CARE PHYSICIAN:  Asif Moctezuma    HISTORY OF PRESENT ILLNESS:  77 year old male seen for rapid A. fib and cardiomyopathy.    He has a history of cardiomyopathy, EF has fluctuated in the past, typically between 40 and 55%.  Angiogram report from 2006 reported EF of 25% at that time.  He also  has paroxysmal A. fib, he is not on anticoagulation because of low [id number] score.    Overall has been feeling fairly well this summer.  He does some light to moderate activity with no chest pain.  He cannot really feel if and when he has A. fib.    For the past 1 week he has felt some intermittent palpitations and had a hard time feeling his radial pulse.  He denies chest pain, but had worsening lower extremity edema, he thinks he gained up to 10 pounds.  He tested positive for COVID.    Upon presentation he was in rapid A. fib.  He has received Lasix and his edema has already improved.  He is resting comfortably at the time of interview.    PAST MEDICAL HISTORY:  I have reviewed this patient's medical history and updated it with pertinent information if needed.   Past Medical History:   Diagnosis Date     Acute bronchitis 10/17/2005     Asthma 2018     Cardiac arrest (H) 6/2006    V-Fib arrest during heart cath     CARDIAC DYSRHYTHMIA NOS 7/27/2005     Degeneration of lumbar or lumbosacral intervertebral disc      Gastroesophageal reflux disease      Gout 2001     Neoplasm of uncertain behavior of skin 2017    Created by Conversion      Other chronic pain     back     PAC (premature atrial contraction)      Paroxysmal A-fib (H) 2006     PONV (postoperative nausea and vomiting)      PRIM CARDIOMYOPATHY NEC 7/18/2006     PVC (premature ventricular contraction)      Renal disease     kidney stones     Uncomplicated asthma        PAST SURGICAL HISTORY:  I have reviewed this patient's surgical history and updated it with pertinent information if needed.  Past Surgical History:   Procedure Laterality Date     ABLATION OF DYSRHYTHMIC FOCUS  04/03/2007    RVOT PVCs     CARDIAC SURGERY      Ablation     COMBINED CYSTOSCOPY, INSERT STENT URETER(S) Left 8/6/2020    Procedure: Video cystoscopy, left double-J stent placement and exam under anesthesia;  Surgeon: Dank Han MD;  Location: [facility] OR     DECOMPRESS DISC RF  LUMBAR  3/2001    lumbar fusion L2-5     LASER HOLMIUM LITHOTRIPSY URETER(S), INSERT STENT, COMBINED Left 8/20/2020    Procedure: Video cystoscopy, left double-J stent removal, rigid ureteroscopy, flexible renoscopy with holmium laser lithotripsy and stone extraction.;  Surgeon: Dank Han MD;  Location:  OR     OPTICAL TRACKING SYSTEM FUSION SPINE POSTERIOR LUMBAR THREE+ LEVELS N/A 6/27/2016    Procedure: OPTICAL TRACKING SYSTEM FUSION SPINE POSTERIOR LUMBAR THREE+ LEVELS;  Surgeon: Hieu Araiza MD;  Location:  OR     ORTHOPEDIC SURGERY Bilateral     total knee replacements     ORTHOPEDIC SURGERY Right     Total Hipe Replacement     SOFT TISSUE SURGERY Right     right wrist ganglion surgery     ZZC NONSPECIFIC PROCEDURE      knee     ZZC TOTAL HIP ARTHROPLASTY      Description: Total Hip Replacement;  Recorded: 09/24/2010;     ZZC TOTAL KNEE ARTHROPLASTY      Description: Total Knee Arthroplasty;  Recorded: 09/24/2010;  Comments: right       HOME MEDICATIONS:  Prior to Admission Medications   Prescriptions Last Dose Informant Patient Reported? Taking?   BREO ELLIPTA 200-25 MCG/INH Inhaler Past Month at Stopped 2 weeks ago due to sore mouth/throat per patient  Yes Yes   Sig: INHALE 1 PUFF PO ONCE QAM.   VITAMIN D PO Past Week at Unknown time  Yes Yes   Sig: Take one tablet by mouth daily   albuterol (PROVENTIL) (2.5 MG/3ML) 0.083% neb solution Past Week at prn  Yes Yes   Sig: Take by nebulization every 4 hours as needed    albuterol (VENTOLIN HFA) 108 (90 Base) MCG/ACT inhaler Past Week at prn  No Yes   Sig: INHALE 2 PUFFS BY MOUTH FOUR TIMES DAILY IF NEEDED   allopurinol (ZYLOPRIM) 100 MG tablet Not Taking at Stopped a couple weeks ago  No No   Sig: TAKE 2 TABLETS(200 MG) BY MOUTH DAILY   Patient not taking: No sig reported   amoxicillin-clavulanate (AUGMENTIN) 875-125 MG tablet  at Has not picked up yet  Yes No   Sig: Take 1 tablet by mouth 2 times daily for 10 days   fluticasone  (FLONASE) 50 MCG/ACT nasal spray Past Week at Unknown time  No Yes   Sig: SHAKE LIQUID AND USE 1 TO 2 SPRAYS IN EACH NOSTRIL DAILY   furosemide (LASIX) 20 MG tablet  at Has not picked up yet  Yes No   Sig: Take 20 mg by mouth daily for 10 days      Facility-Administered Medications: None       ALLERGIES:  No Known Allergies    SOCIAL HISTORY:  I have reviewed this patient's social history and updated it with pertinent information if needed. Khurram Reynoso  reports that he has never smoked. He has never used smokeless tobacco. He reports that he does not drink alcohol and does not use drugs.    FAMILY HISTORY:  I have reviewed this patient's family history and updated it with pertinent information if needed.   Family History   Problem Relation Age of Onset     C.A.D. Father      Hypertension Father      Heart Surgery Father         bypass     Cancer Brother         bone ca      Family History Negative Mother      Other - See Comments Sister         polio     Unknown/Adopted Maternal Grandmother      Unknown/Adopted Maternal Grandfather      Unknown/Adopted Paternal Grandmother      Unknown/Adopted Paternal Grandfather      Family History Negative Sister      CABG Father 77.00       REVIEW OF SYSTEMS:  Constitutional:  No weight loss, fever, chills  HEENT:  Eyes:  No visual loss, blurred vision, double vision or yellow sclerae. No hearing loss, sneezing, congestion, runny nose or sore throat.  Skin:  No rash or itching.  Cardiovascular: per HPI  Respiratory: per HPI  GI:  No anorexia, nausea, vomiting or diarrhea. No abdominal pain or blood.  :  No dysurea, hematuria  Neurologic:  No headache, paralysis, ataxia, numbness or tingling in the extremities. No change in bowel or bladder control.  Musculoskeletal:  No muscle pain  Hematologic:  No bleeding or bruising.  Lymphatics:  No enlarged nodes. No history of splenectomy.  Endocrine:  No reports of sweating, cold or heat intolerance. No polyuria or  polydipsia.  Allergies:  No history of asthma, hives, eczema or rhinitis.    PHYSICAL EXAM:  Temp: 97.9  F (36.6  C) Temp src: Oral BP: 129/85 Pulse: 94   Resp: 20 SpO2: 97 % O2 Device: None (Room air)    Vital Signs with Ranges  Temp:  [97.9  F (36.6  C)] 97.9  F (36.6  C)  Pulse:  [] 94  Resp:  [11-36] 20  BP: (106-129)/() 129/85  SpO2:  [94 %-97 %] 97 %  241 lbs 3.2 oz    Constitutional: awake, alert, no distress  Eyes: PERRL, sclera nonicteric  ENT: trachea midline  Respiratory: Few coarse sounds at the bases  Cardiovascular: Mild tachycardia, irregular, 1+ edema of the lower shins  GI: nondistended, nontender, bowel sounds present  Lymph/Hematologic: no lymphadenopathy  Skin: dry, no rash  Musculoskeletal: good muscle tone, strength 5/5 in upper and lower extremities  Neurologic: no focal deficits  Neuropsychiatric: appropriate affact      Intake/Output Summary (Last 24 hours) at 7/12/2022 1336  Last data filed at 7/12/2022 0817  Gross per 24 hour   Intake 0 ml   Output 2025 ml   Net -2025 ml       Clinically Significant Risk Factors Present on Admission                 # Obesity: Estimated body mass index is 32.71 kg/m  as calculated from the following:    Height as of this encounter: 1.829 m (6').    Weight as of this encounter: 109.4 kg (241 lb 3.2 oz).    Cardiovascular: Cardiac Arrhythmia: Atrial fibrillation: Paroxysmal    DATA:  Labs: Potassium 4.1, creatinine 1.1, NT proBNP 1800, serial troponin 290, 290, 240, WBC 8, hemoglobin 15, platelets 330  Recent Labs   Lab Test 12/01/21  0827 03/02/20  1637   CHOL 193 162   HDL 43 42   * 101*   TRIG 117 94     EKG: July 11: A. fib, rate 115, 2 PVCs, no ST elevation or depression    Tele: A. fib, rate low 100s    Echo: July 12: Normal LV size, EF 25%, global hypokinesis, moderate RV dysfunction, 2+ MR, 2-3+ TR    CXR: July 11: No pulmonary edema

## 2022-07-12 NOTE — ED TRIAGE NOTES
Pt presents with bilateral leg swelling and an elevated troponin of 0.27 from the clinic. Denies hx of CHF, chest pain, SOB.      Triage Assessment     Row Name 07/11/22 1935       Triage Assessment (Adult)    Airway WDL WDL       Respiratory WDL    Respiratory WDL WDL       Skin Circulation/Temperature WDL    Skin Circulation/Temperature WDL WDL       Cardiac WDL    Cardiac WDL WDL       Peripheral/Neurovascular WDL    Peripheral Neurovascular WDL WDL       Cognitive/Neuro/Behavioral WDL    Cognitive/Neuro/Behavioral WDL WDL

## 2022-07-12 NOTE — PROGRESS NOTES
Care Management Discharge Note    Discharge Date: 07/13/2022       Additional Information:  Patient has a [facility] PCP and is a SB# 3. Please consult care management if needs arise.     [place], [facility]  Emergency Room           [place]

## 2022-07-12 NOTE — PHARMACY-ADMISSION MEDICATION HISTORY
Admission medication history interview status for this patient is complete. See [facility] admission navigator for allergy information, prior to admission medications and immunization status.     Medication history interview done, indicate source(s): Patient  Medication history resources (including written lists, pill bottles, clinic record):Kathleenhank Angelika ([phone])  Pharmacy: [facility]    Changes made to PTA medication list:  Added: vitamin D, amoxicillin-clavulanate 875-125 mg (has not picked up), furosemide 20 mg (has not picked up)  Changed: none  Reported as Not Taking: allopurinol  Removed: none    Actions taken by pharmacist (provider contacted, etc):None     Additional medication history information:  - Patient reports he came to the urgent care yesterday and has some prescriptions.  I called [facility] and there are amoxicillin-clavulanate 875-125 mg x 10 days, furosemide 20 mg x 10 days ready.  Confirmed patient has not picked up yet.  - Patient stopped taking allopurinol a couple weeks ago because he did not see differences.  Reports he will have a doctor appointment the first week of August to have levels checked.  - He stopped Breo Ellipta 2 weeks ago due to sore mouth/throat.  He thinks stopping Breo may help.    Medication reconciliation/reorder completed by provider prior to medication history?  N   (Y/N)     Prior to Admission medications    Medication Sig Last Dose Taking? Auth Provider Long Term End Date   albuterol (PROVENTIL) (2.5 MG/3ML) 0.083% neb solution Take by nebulization every 4 hours as needed   Past week at prn Yes Reported, Patient Yes    albuterol (VENTOLIN HFA) 108 (90 Base) MCG/ACT inhaler INHALE 2 PUFFS BY MOUTH FOUR TIMES DAILY IF NEEDED  Past week at prn Yes Familia Gillespie MD Yes    BREO ELLIPTA 200-25 MCG/INH Inhaler INHALE 1 PUFF PO ONCE QAM. Past Month. Stopped 2 weeks ago due to sore mouth/throat per patient Yes Reported, Patient     fluticasone  (FLONASE) 50 MCG/ACT nasal spray SHAKE LIQUID AND USE 1 TO 2 SPRAYS IN EACH NOSTRIL DAILY Past Week at Unknown time Yes Asif Moctezuma MD     VITAMIN D PO Take one tablet by mouth daily Past Week at Unknown time Yes Unknown, Entered By History     allopurinol (ZYLOPRIM) 100 MG tablet TAKE 2 TABLETS(200 MG) BY MOUTH DAILY  Patient not taking: No sig reported Not Taking. Stopped a couple weeks ago  Asif Moctezuma MD     amoxicillin-clavulanate (AUGMENTIN) 875-125 MG tablet Take 1 tablet by mouth 2 times daily for 10 days  Has not picked up yet  Unknown, Entered By History     furosemide (LASIX) 20 MG tablet Take 20 mg by mouth daily for 10 days  Has not picked up yet  Unknown, Entered By History

## 2022-07-13 ENCOUNTER — TELEPHONE (OUTPATIENT)
Dept: CARDIOLOGY | Facility: CLINIC | Age: 77
End: 2022-07-13

## 2022-07-13 VITALS
WEIGHT: 235.7 LBS | BODY MASS INDEX: 31.92 KG/M2 | OXYGEN SATURATION: 97 % | TEMPERATURE: 97.3 F | DIASTOLIC BLOOD PRESSURE: 67 MMHG | HEIGHT: 72 IN | SYSTOLIC BLOOD PRESSURE: 106 MMHG | HEART RATE: 77 BPM | RESPIRATION RATE: 16 BRPM

## 2022-07-13 DIAGNOSIS — I48.0 PAF (PAROXYSMAL ATRIAL FIBRILLATION) (H): ICD-10-CM

## 2022-07-13 DIAGNOSIS — I43 TACHYCARDIA INDUCED CARDIOMYOPATHY (H): Primary | ICD-10-CM

## 2022-07-13 DIAGNOSIS — R00.0 TACHYCARDIA INDUCED CARDIOMYOPATHY (H): Primary | ICD-10-CM

## 2022-07-13 LAB
ANION GAP SERPL CALCULATED.3IONS-SCNC: 5 MMOL/L (ref 3–14)
APTT PPP: 45 SECONDS (ref 22–38)
BASOPHILS # BLD AUTO: 0 10E3/UL (ref 0–0.2)
BASOPHILS NFR BLD AUTO: 0 %
BUN SERPL-MCNC: 23 MG/DL (ref 7–30)
CALCIUM SERPL-MCNC: 9 MG/DL (ref 8.5–10.1)
CHLORIDE BLD-SCNC: 105 MMOL/L (ref 94–109)
CO2 SERPL-SCNC: 27 MMOL/L (ref 20–32)
CREAT SERPL-MCNC: 1.12 MG/DL (ref 0.66–1.25)
EOSINOPHIL # BLD AUTO: 0 10E3/UL (ref 0–0.7)
EOSINOPHIL NFR BLD AUTO: 0 %
ERYTHROCYTE [DISTWIDTH] IN BLOOD BY AUTOMATED COUNT: 13.5 % (ref 10–15)
GFR SERPL CREATININE-BSD FRML MDRD: 68 ML/MIN/1.73M2
GLUCOSE BLD-MCNC: 101 MG/DL (ref 70–99)
HCT VFR BLD AUTO: 46.3 % (ref 40–53)
HGB BLD-MCNC: 15 G/DL (ref 13.3–17.7)
IMM GRANULOCYTES # BLD: 0.1 10E3/UL
IMM GRANULOCYTES NFR BLD: 1 %
LYMPHOCYTES # BLD AUTO: 2.2 10E3/UL (ref 0.8–5.3)
LYMPHOCYTES NFR BLD AUTO: 22 %
MCH RBC QN AUTO: 31.1 PG (ref 26.5–33)
MCHC RBC AUTO-ENTMCNC: 32.4 G/DL (ref 31.5–36.5)
MCV RBC AUTO: 96 FL (ref 78–100)
MONOCYTES # BLD AUTO: 1 10E3/UL (ref 0–1.3)
MONOCYTES NFR BLD AUTO: 10 %
NEUTROPHILS # BLD AUTO: 6.8 10E3/UL (ref 1.6–8.3)
NEUTROPHILS NFR BLD AUTO: 67 %
NRBC # BLD AUTO: 0 10E3/UL
NRBC BLD AUTO-RTO: 0 /100
PLATELET # BLD AUTO: 325 10E3/UL (ref 150–450)
POTASSIUM BLD-SCNC: 3.9 MMOL/L (ref 3.4–5.3)
RBC # BLD AUTO: 4.83 10E6/UL (ref 4.4–5.9)
SODIUM SERPL-SCNC: 137 MMOL/L (ref 133–144)
WBC # BLD AUTO: 10.1 10E3/UL (ref 4–11)

## 2022-07-13 PROCEDURE — 99239 HOSP IP/OBS DSCHRG MGMT >30: CPT | Performed by: STUDENT IN AN ORGANIZED HEALTH CARE EDUCATION/TRAINING PROGRAM

## 2022-07-13 PROCEDURE — 85730 THROMBOPLASTIN TIME PARTIAL: CPT | Performed by: STUDENT IN AN ORGANIZED HEALTH CARE EDUCATION/TRAINING PROGRAM

## 2022-07-13 PROCEDURE — 94640 AIRWAY INHALATION TREATMENT: CPT

## 2022-07-13 PROCEDURE — 82310 ASSAY OF CALCIUM: CPT | Performed by: STUDENT IN AN ORGANIZED HEALTH CARE EDUCATION/TRAINING PROGRAM

## 2022-07-13 PROCEDURE — 250N000012 HC RX MED GY IP 250 OP 636 PS 637: Performed by: STUDENT IN AN ORGANIZED HEALTH CARE EDUCATION/TRAINING PROGRAM

## 2022-07-13 PROCEDURE — 36415 COLL VENOUS BLD VENIPUNCTURE: CPT | Performed by: STUDENT IN AN ORGANIZED HEALTH CARE EDUCATION/TRAINING PROGRAM

## 2022-07-13 PROCEDURE — 85025 COMPLETE CBC W/AUTO DIFF WBC: CPT | Performed by: STUDENT IN AN ORGANIZED HEALTH CARE EDUCATION/TRAINING PROGRAM

## 2022-07-13 PROCEDURE — 999N000157 HC STATISTIC RCP TIME EA 10 MIN

## 2022-07-13 PROCEDURE — 250N000013 HC RX MED GY IP 250 OP 250 PS 637: Performed by: INTERNAL MEDICINE

## 2022-07-13 PROCEDURE — 250N000013 HC RX MED GY IP 250 OP 250 PS 637: Performed by: STUDENT IN AN ORGANIZED HEALTH CARE EDUCATION/TRAINING PROGRAM

## 2022-07-13 PROCEDURE — 99232 SBSQ HOSP IP/OBS MODERATE 35: CPT | Performed by: INTERNAL MEDICINE

## 2022-07-13 RX ORDER — FUROSEMIDE 20 MG
20 TABLET ORAL DAILY
Qty: 30 TABLET | Refills: 1 | Status: SHIPPED | OUTPATIENT
Start: 2022-07-13 | End: 2022-09-13

## 2022-07-13 RX ORDER — PREDNISONE 20 MG/1
40 TABLET ORAL DAILY
Qty: 4 TABLET | Refills: 0 | Status: SHIPPED | OUTPATIENT
Start: 2022-07-13 | End: 2022-07-20

## 2022-07-13 RX ORDER — METOPROLOL SUCCINATE 25 MG/1
25 TABLET, EXTENDED RELEASE ORAL DAILY
Qty: 30 TABLET | Refills: 3 | Status: SHIPPED | OUTPATIENT
Start: 2022-07-13 | End: 2022-08-01

## 2022-07-13 RX ORDER — FUROSEMIDE 20 MG
20 TABLET ORAL DAILY
Qty: 30 TABLET | Refills: 1 | Status: SHIPPED | OUTPATIENT
Start: 2022-07-13 | End: 2022-07-13

## 2022-07-13 RX ORDER — LISINOPRIL 2.5 MG/1
2.5 TABLET ORAL DAILY
Qty: 30 TABLET | Refills: 3 | Status: SHIPPED | OUTPATIENT
Start: 2022-07-13 | End: 2022-11-28

## 2022-07-13 RX ORDER — DIGOXIN 125 MCG
125 TABLET ORAL DAILY
Qty: 30 TABLET | Refills: 1 | Status: SHIPPED | OUTPATIENT
Start: 2022-07-13 | End: 2022-09-13

## 2022-07-13 RX ORDER — LEVALBUTEROL TARTRATE 45 UG/1
2 AEROSOL, METERED ORAL EVERY 6 HOURS PRN
Qty: 18 G | Refills: 3 | Status: SHIPPED | OUTPATIENT
Start: 2022-07-13 | End: 2022-12-07

## 2022-07-13 RX ADMIN — LISINOPRIL 2.5 MG: 2.5 TABLET ORAL at 09:51

## 2022-07-13 RX ADMIN — DIGOXIN 125 MCG: 125 TABLET ORAL at 09:51

## 2022-07-13 RX ADMIN — LEVALBUTEROL TARTRATE 2 PUFF: 45 AEROSOL, METERED ORAL at 08:37

## 2022-07-13 RX ADMIN — FLUTICASONE FUROATE 1 PUFF: 100 POWDER RESPIRATORY (INHALATION) at 08:37

## 2022-07-13 RX ADMIN — METOPROLOL SUCCINATE 25 MG: 25 TABLET, EXTENDED RELEASE ORAL at 09:51

## 2022-07-13 RX ADMIN — PREDNISONE 40 MG: 20 TABLET ORAL at 09:51

## 2022-07-13 ASSESSMENT — ACTIVITIES OF DAILY LIVING (ADL)
ADLS_ACUITY_SCORE: 24

## 2022-07-13 NOTE — TELEPHONE ENCOUNTER
Order placed for EKG, BMP and RED follow up post hospitalization per Dr. Ward. Message sent to scheduling to reach out to patient. OK to use Dr. Ward vascular reading time for follow up.    Niya MCPHERSON RN  07/13/22 at 9:34 AM

## 2022-07-13 NOTE — PROGRESS NOTES
[facility]    Cardiology Progress Note    ASSESSMENT:  77-year-old male seen for A. fib and cardiomyopathy.  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  He has probably had rapid A. fib for about 1 week.  He has no anginal symptoms and no Q waves on EKG.  COVID as well could be contributing to the cardiomyopathy, but could be incidental also.    His rates are now controlled.  He has diuresed well.  Will plan to continue rate control and discharge home today.  We will follow-up in clinic in 7 to 10 days.  If still in A. fib, tentative plan would be for STEFANY guided cardioversion.  If ejection fraction remains low despite rate control and restoration of sinus rhythm, would need ischemic work-up.    We talked about anticoagulation, he is agreeable.  He has no bleeding history.    RECOMMENDATIONS:  1.  Paroxysmal rapid A. Fib  - continue rate control with metoprolol and digoxin  -Start Eliquis 5 mg twice daily  -Outpatient STEFANY guided cardioversion remains in A. fib on follow-up    2.  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    Warren Ward MD  Cardiology - [facility]  Pager:  [phone]  Text Page    SUBJECTIVE: Feels good today, no symptoms.  Remains in A. fib with controlled rates.    MEDICATIONS:  Current Facility-Administered Medications   Medication     acetaminophen (TYLENOL) tablet 650 mg    Or     acetaminophen (TYLENOL) Suppository 650 mg     [Held by provider] apixaban ANTICOAGULANT (ELIQUIS) tablet 5 mg     digoxin (LANOXIN) tablet 125 mcg     fluticasone (ARNUITY ELLIPTA) 100 MCG/ACT inhaler 1 puff     furosemide (LASIX) injection 20 mg     heparin infusion 25,000 units in D5W 250 mL ANTICOAGULANT     levalbuterol (XOPENEX HFA) 45 MCG/ACT Inhaler 2 puff     lidocaine (LMX4) cream     lidocaine 1 % 0.1-1 mL     lisinopril  (ZESTRIL) tablet 2.5 mg     melatonin tablet 1 mg     metoprolol succinate ER (TOPROL XL) 24 hr tablet 25 mg     ondansetron (ZOFRAN ODT) ODT tab 4 mg    Or     ondansetron (ZOFRAN) injection 4 mg     predniSONE (DELTASONE) tablet 40 mg     prochlorperazine (COMPAZINE) injection 5 mg    Or     prochlorperazine (COMPAZINE) tablet 5 mg    Or     prochlorperazine (COMPAZINE) suppository 12.5 mg     senna-docusate (SENOKOT-S/PERICOLACE) 8.6-50 MG per tablet 1 tablet    Or     senna-docusate (SENOKOT-S/PERICOLACE) 8.6-50 MG per tablet 2 tablet     sodium chloride (PF) 0.9% PF flush 3 mL     sodium chloride (PF) 0.9% PF flush 3 mL       ALLERGIES:  No Known Allergies    REVIEW OF SYSTEMS:  General: no fatigue, weight loss  Cardiac: per HPI  Respiratory: per HPI  GI: no nausea, emesis, melena  Musculoskeletal: no weakness  Neurologic: no aphasia, paraesthesias  Neuropsychiatric: no depression    PHYSICAL EXAM:  Temp: 97.3  F (36.3  C) Temp src: Oral BP: 102/72 Pulse: 74   Resp: 18 SpO2: 95 % O2 Device: None (Room air)    Vital Signs with Ranges  Temp:  [97.3  F (36.3  C)-98.1  F (36.7  C)] 97.3  F (36.3  C)  Pulse:  [74-97] 74  Resp:  [18-20] 18  BP: ()/(49-82) 102/72  SpO2:  [94 %-97 %] 95 %  235 lbs 11.2 oz    Constitutional: awake, alert, Ox3  Eyes: PERRL, sclera nonicteric  ENT: trachea midline  Respiratory: Lungs clear  Cardiovascular: Regular rate, totally irregular rhythm  GI: nondistended, nontender, bowel sounds present  Lymph/Hematologic: no lymphadenopathy  Skin: dry, no rash  Musculoskeletal: good muscle tone, strength 5/5 in upper and lower extremities  Neurologic: no focal deficits  Neuropsychiatric: appropriate affact      Intake/Output Summary (Last 24 hours) at 7/13/2022 0818  Last data filed at 7/13/2022 0722  Gross per 24 hour   Intake 1055 ml   Output 2175 ml   Net -1120 ml       DATA:  Labs: Potassium 2.9, creatinine 1.2    EKG: July 11: A. fib, rate 115, 2 PVCs, no ST elevation or  depression     Tele: A. fib, rate 70s to 80s     Echo: July 12: Normal LV size, EF 25%, global hypokinesis, moderate RV dysfunction, 2+ MR, 2-3+ TR     CXR: July 11: No pulmonary edema

## 2022-07-13 NOTE — PLAN OF CARE
A/Ox4. VSS on RA. Up Independent. Tele Afib CVR. Heparin 1350 units/hr. PTT AM check. NPO on nights for possible angiogram in the morning. K+ 4.1  Denies CP/SOB. Cards following. Continue POC.     Safety checks completed and call light within reach.

## 2022-07-13 NOTE — TELEPHONE ENCOUNTER
----- Message from Warren Ward MD sent at 7/13/2022  9:28 AM CDT -----  Regarding: Hospital follow-up  This patient is discharging today with A. fib and what is probably tachycardia induced cardiomyopathy.    Can you arrange a follow-up in 7 to 10 days with EKG and BMP?  He could see an RED, or you could fit him into one of my open slots or vascular reading time.    Thanks,  Marcial

## 2022-07-13 NOTE — PLAN OF CARE
Vital signs stable. Denies pain, CP or SOB. Alert and orientated x4. On potassium protocol, K: 4.1 with a recheck in the morning. Up independently with cane in room. Regular diet until midnight and will be NPO at that time. IV lasix given per MAR. Urinal at bedside, pt reports good urine output. PIV in left arm Infusing Heparin at 1350 units/hr. Heparin was started at 2253 this evening. Plan for potential angiogram tomorrow. Will continue to monitor and follow POC.

## 2022-07-13 NOTE — PLAN OF CARE
/67 (BP Location: Left arm)   Pulse 77   Temp 97.3  F (36.3  C) (Oral)   Resp 16   Ht 1.829 m (6')   Wt 106.9 kg (235 lb 11.2 oz)   SpO2 97%   BMI 31.97 kg/m      NEURO:A/Ox4  VS:BPs on the softer side, Lasix held per MD  PAIN:Denies  TELE:Afib CVR  IV:Hep gtt dc'd  RESPIRATORY:LS- diminished/expiratory wheezes, 97% on RA, infrequent productive cough  GI:+BS  :Voids appropriately  SKIN:Bruised, trace edema BLE  ACTIVITY:Independent  DIET:Cardiac   PLAN: Plan to discharge back home this morning and follow up outpatient with cardiology

## 2022-07-14 ENCOUNTER — TELEPHONE (OUTPATIENT)
Dept: CARDIOLOGY | Facility: CLINIC | Age: 77
End: 2022-07-14

## 2022-07-14 NOTE — TELEPHONE ENCOUNTER
Patient was evaluated by cardiology while inpatient for A. Fib and CM-possible tachy induced. Pt also COVID positive. PMH: Echo showed EF of 20% with global dysfunction and RV dysfunction. Cardiology consulted and will plan to continue rate control with Metoprolol and Digoxin and follow-up in clinic in 7 to 10 days. If still in A. fib, tentative plan would be for STEFANY guided cardioversion. If ejection fraction remains low despite rate control and restoration of sinus rhythm, would need ischemic work-up. Pt was started on Eliquis, Digoxin, Zestril, Metoprolol and Lasix at time of discharge. Writer attempted to call patient to discuss any post hospital d/c questions, review discharge medication, and confirm f/u appts, but pt is not home and phone answered by his wife. Writer reviewed each of pt's medications with his wife. Patient has not had any c/o SOB, chest pain, edema, or light headedness. RN confirmed with wife that patient has labs scheduled on 7/22/22 at 0900 and an OV on 8/5/22 at 0730 scheduled with RED Vilma Griffiths at our [facility]. Reminded wife that patient should weigh himself every AM, after waking and using the restroom, but before breakfast and medications. Call clinic for a weight gain of 2 lbs overnight or 5 lbs in a week. Low Na+ diet encouraged. Instructed to bring daily wt/BP diary and medications with to f/u OV. Wife advised to call clinic with any cardiac related questions or concerns prior to his appt, and she verbalized understanding and agreed with plan. Dr. Ward Team RN phone number provided. GUILLE Hoyt RN.

## 2022-07-15 ENCOUNTER — PATIENT OUTREACH (OUTPATIENT)
Dept: CARE COORDINATION | Facility: CLINIC | Age: 77
End: 2022-07-15

## 2022-07-15 NOTE — PROGRESS NOTES
[facility] Care Resource Center Contact    Background: Care Coordination referral placed from [facility] discharge report for reason of patient meeting criteria for a TCM outreach call by Connected Care Resource Center team.    Assessment: Upon chart review, CCRC Team member will cancel/close the referral for TCM outreach due to reason below:    Patient has been contacted by a clinic RN or provider within [facility] for reason of discussing hospital follow up plan of care and answering questions patient may have related to discharge instructions.     Plan: Care Coordination referral for TCM outreach canceled to minimize duplicative efforts.    Jessica Felix RN  Connected Care Resource Center, [facility]  *Connected Care Resource Team does NOT follow patient ongoing. Referrals are identified based on internal discharge reports and the outreach is to ensure patient has an understanding of their discharge instructions.

## 2022-07-19 ENCOUNTER — HOSPITAL ENCOUNTER (EMERGENCY)
Facility: CLINIC | Age: 77
Discharge: HOME | End: 2022-07-20
Attending: EMERGENCY MEDICINE | Admitting: EMERGENCY MEDICINE
Payer: COMMERCIAL

## 2022-07-19 ENCOUNTER — TELEPHONE (OUTPATIENT)
Dept: CARDIOLOGY | Facility: CLINIC | Age: 77
End: 2022-07-19

## 2022-07-19 ENCOUNTER — APPOINTMENT (OUTPATIENT)
Dept: GENERAL RADIOLOGY | Facility: CLINIC | Age: 77
End: 2022-07-19
Attending: EMERGENCY MEDICINE
Payer: COMMERCIAL

## 2022-07-19 DIAGNOSIS — F41.9 ANXIETY: ICD-10-CM

## 2022-07-19 DIAGNOSIS — R06.02 SHORTNESS OF BREATH: ICD-10-CM

## 2022-07-19 LAB
ANION GAP SERPL CALCULATED.3IONS-SCNC: 5 MMOL/L (ref 3–14)
BASOPHILS # BLD AUTO: 0.1 10E3/UL (ref 0–0.2)
BASOPHILS NFR BLD AUTO: 1 %
BUN SERPL-MCNC: 30 MG/DL (ref 7–30)
CALCIUM SERPL-MCNC: 8.9 MG/DL (ref 8.5–10.1)
CHLORIDE BLD-SCNC: 109 MMOL/L (ref 94–109)
CO2 SERPL-SCNC: 27 MMOL/L (ref 20–32)
CREAT SERPL-MCNC: 1.15 MG/DL (ref 0.66–1.25)
EOSINOPHIL # BLD AUTO: 0.2 10E3/UL (ref 0–0.7)
EOSINOPHIL NFR BLD AUTO: 3 %
ERYTHROCYTE [DISTWIDTH] IN BLOOD BY AUTOMATED COUNT: 13.9 % (ref 10–15)
GFR SERPL CREATININE-BSD FRML MDRD: 66 ML/MIN/1.73M2
GLUCOSE BLD-MCNC: 106 MG/DL (ref 70–99)
HCT VFR BLD AUTO: 49.8 % (ref 40–53)
HGB BLD-MCNC: 16.3 G/DL (ref 13.3–17.7)
IMM GRANULOCYTES # BLD: 0.1 10E3/UL
IMM GRANULOCYTES NFR BLD: 1 %
LYMPHOCYTES # BLD AUTO: 2.4 10E3/UL (ref 0.8–5.3)
LYMPHOCYTES NFR BLD AUTO: 27 %
MCH RBC QN AUTO: 31.6 PG (ref 26.5–33)
MCHC RBC AUTO-ENTMCNC: 32.7 G/DL (ref 31.5–36.5)
MCV RBC AUTO: 97 FL (ref 78–100)
MONOCYTES # BLD AUTO: 0.8 10E3/UL (ref 0–1.3)
MONOCYTES NFR BLD AUTO: 9 %
NEUTROPHILS # BLD AUTO: 5.5 10E3/UL (ref 1.6–8.3)
NEUTROPHILS NFR BLD AUTO: 59 %
NRBC # BLD AUTO: 0 10E3/UL
NRBC BLD AUTO-RTO: 0 /100
NT-PROBNP SERPL-MCNC: 1683 PG/ML (ref 0–1800)
PLATELET # BLD AUTO: 367 10E3/UL (ref 150–450)
POTASSIUM BLD-SCNC: 4.5 MMOL/L (ref 3.4–5.3)
RBC # BLD AUTO: 5.16 10E6/UL (ref 4.4–5.9)
SODIUM SERPL-SCNC: 141 MMOL/L (ref 133–144)
TROPONIN I SERPL HS-MCNC: 31 NG/L
WBC # BLD AUTO: 9.1 10E3/UL (ref 4–11)

## 2022-07-19 PROCEDURE — 85025 COMPLETE CBC W/AUTO DIFF WBC: CPT | Performed by: EMERGENCY MEDICINE

## 2022-07-19 PROCEDURE — 84484 ASSAY OF TROPONIN QUANT: CPT | Performed by: EMERGENCY MEDICINE

## 2022-07-19 PROCEDURE — 93005 ELECTROCARDIOGRAM TRACING: CPT

## 2022-07-19 PROCEDURE — 80048 BASIC METABOLIC PNL TOTAL CA: CPT | Performed by: EMERGENCY MEDICINE

## 2022-07-19 PROCEDURE — 71046 X-RAY EXAM CHEST 2 VIEWS: CPT

## 2022-07-19 PROCEDURE — 99285 EMERGENCY DEPT VISIT HI MDM: CPT | Mod: 25

## 2022-07-19 PROCEDURE — 83880 ASSAY OF NATRIURETIC PEPTIDE: CPT | Performed by: EMERGENCY MEDICINE

## 2022-07-19 PROCEDURE — 36415 COLL VENOUS BLD VENIPUNCTURE: CPT | Performed by: EMERGENCY MEDICINE

## 2022-07-19 NOTE — TELEPHONE ENCOUNTER
Reviewed recommendations with patient from PCP and Dr. Ward. Pt has an appointment with Dr. Gillespie 8/1/22 to discuss switching from Eliquis to Xarelto.    Future Appointments   Date Time Provider Department Center   7/22/2022  9:00 AM RU LAB [facility]   8/1/2022  8:30 AM Familia Gillespie MD [facility]   8/5/2022  7:30 AM Vilma Griffiths, APRN CNP [facility] MCPHERSON RN  07/19/22 at 3:11 PM

## 2022-07-19 NOTE — TELEPHONE ENCOUNTER
Pt called and reported feeling anxious, irritable and does not want to do anything since starting Eliquis. Pt was recently put on Eliquis in hospital due to paroxysmal rapid A fib.  Explained to patient that this is not a common side effect of Eliquis.     Referred patient to his PCP to address any non-cardic reasons for anxiety. Dr. Ward is unavailable for the next week to address. Will route to both PCP and Dr. Ward for further recommendations.     Niya MCPHERSON RN  07/19/22 at 11:49 AM

## 2022-07-19 NOTE — TELEPHONE ENCOUNTER
These would be unusual symptoms for Eliquis.  He could try Xarelto, recommend follow-up with primary physician first.    Marcial

## 2022-07-20 ENCOUNTER — VIRTUAL VISIT (OUTPATIENT)
Dept: CARDIOLOGY | Facility: CLINIC | Age: 77
End: 2022-07-20
Payer: COMMERCIAL

## 2022-07-20 ENCOUNTER — TELEPHONE (OUTPATIENT)
Dept: CARDIOLOGY | Facility: CLINIC | Age: 77
End: 2022-07-20

## 2022-07-20 VITALS
WEIGHT: 232 LBS | HEART RATE: 81 BPM | TEMPERATURE: 97.8 F | DIASTOLIC BLOOD PRESSURE: 74 MMHG | BODY MASS INDEX: 31.42 KG/M2 | OXYGEN SATURATION: 97 % | SYSTOLIC BLOOD PRESSURE: 112 MMHG | HEIGHT: 72 IN | RESPIRATION RATE: 16 BRPM

## 2022-07-20 DIAGNOSIS — I48.0 PAF (PAROXYSMAL ATRIAL FIBRILLATION) (H): Primary | ICD-10-CM

## 2022-07-20 LAB
ATRIAL RATE - MUSE: 87 BPM
DIASTOLIC BLOOD PRESSURE - MUSE: NORMAL MMHG
HOLD SPECIMEN: NORMAL
INTERPRETATION ECG - MUSE: NORMAL
P AXIS - MUSE: NORMAL DEGREES
PR INTERVAL - MUSE: NORMAL MS
QRS DURATION - MUSE: 86 MS
QT - MUSE: 380 MS
QTC - MUSE: 446 MS
R AXIS - MUSE: 50 DEGREES
SYSTOLIC BLOOD PRESSURE - MUSE: NORMAL MMHG
T AXIS - MUSE: -31 DEGREES
VENTRICULAR RATE- MUSE: 83 BPM

## 2022-07-20 PROCEDURE — 99214 OFFICE O/P EST MOD 30 MIN: CPT | Mod: TEL | Performed by: PHYSICIAN ASSISTANT

## 2022-07-20 NOTE — DISCHARGE INSTRUCTIONS
Please return to the ED if you have active chest pain, shortness of breath, nausea, sweatiness, or other acute changes.  Please follow up with your PCP in the next 2-3 days.      Please follow up with your PCP and cardiologist

## 2022-07-20 NOTE — TELEPHONE ENCOUNTER
"Called Khurram to review symptoms. He states he has been experiencing moments of frustration, anxiousness and fidgety. This is not usual for him. He believes this is from his afib. 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 I have many people in my family who are RNs.\"    He is not feeling palpitations and will occasionally feel fluttering in his chest at random times. No chest pain. He admits to feeling occasionally dizzy or lightheaded.   His ankles are swollen, right more than left. His toes were swollen yesterday, seem a little better today.   He does have a high salt diet: brats, kielbasa, potato salad, french fries. We did discuss to limit his sodium. He will need more education.     Recent tests:   Echo 7/12/2022  EKGs 7/19, 7/13 and 7/11  CXR 7/18 and 7/11  US LE  7/11    Labs: NT pro BNP, BMP and trops 7/18      Recent weights:    7/20 - 229.2 lbs  7/19  - 232.0 lbs  7/18  -  233.4 lbs       He does not have a blood pressure cuff at home.     We did discuss his virtual visit today with Alex.     Future Appointments   Date Time Provider Department Center   7/20/2022  1:50 PM [facility]   7/22/2022  9:00 AM RU LAB [facility]   8/1/2022  8:30 AM Familia Gillespie MD [facility]   8/5/2022  7:30 AM [facility]s APRN C[facility]     Salome Soria, ANDI 11:53 AM 07/20/22      "

## 2022-07-20 NOTE — ED TRIAGE NOTES
Here for concern of anxiety and frustration started this morning that went away on its own. Then symptoms started again around 4pm after taking a nap. Denies any sob or chest pain. ABCs intact.     Triage Assessment     Row Name 07/19/22 2025       Triage Assessment (Adult)    Airway WDL WDL       Respiratory WDL    Respiratory WDL WDL       Cardiac WDL    Cardiac WDL WDL               Nostril Rim Text: The closure involved the nostril rim.

## 2022-07-20 NOTE — ED NOTES
Ambulated Pt. With pulse ox in room, Pt. Tolerated well. Pulse remained between , O2 saturation remained between 94%-98%. Pt. Did not complain of dizziness, weakness, or nausea.

## 2022-07-20 NOTE — PROGRESS NOTES
CARDIOLOGY CLINIC VIDEO VISIT  This visit is being conducted as a virtual visit due to the emphasis on mitigation of the COVID-19 virus pandemic. The clinician has decided that the risk of an in-office visit outweighs the benefit for this patient. The rest of the comprehensive physical examination is deferred due to public [facility] emergency video visit restrictions.      Khurram Reynoso is a 77 year old male who is being evaluated via a billable video visit.      The patient has been notified of following:     This video visit will be conducted via a call between you and your physician/provider. We have found that certain health care needs can be provided without the need for an in-person physical exam.  This service lets us provide the care you need with a video conversation.  If a prescription is necessary we can send it directly to your pharmacy.  If lab work is needed we can place an order for that and you can then stop by our lab to have the test done at a later time.    Virtual visits are billed at different rates depending on your insurance coverage. During this emergency period, for some insurers they may be billed the same as an in-person visit.  Please reach out to your insurance provider with any questions.    If during the course of the call the physician/provider feels a video visit is not appropriate, you will not be charged for this service.      Physician has received verbal consent for a Video Visit from the patient? Yes        Khurram is a 77 year old who is being evaluated via a billable video visit.      How would you like to obtain your AVS? MyChart  If the video visit is dropped, the invitation should be resent by: Text to cell phone: [phone]  Will anyone else be joining your video visit? No     Review Of Systems  Skin: NEGATIVE  Eyes:Ears/Nose/Throat: NEGATIVE  Respiratory: NEGATIVE  Cardiovascular:NEGATIVE  Gastrointestinal: NEGATIVE  Genitourinary:NEGATIVE  Musculoskeletal:  "NEGATIVE  Neurologic: NEGATIVE  Psychiatric: NEGATIVE  Hematologic/Lymphatic/Immunologic: NEGATIVE  Endocrine:  NEGATIVE      Vitals - Patient Reported  Systolic (Patient Reported): 126  Diastolic (Patient Reported): 65  Weight (Patient Reported): 103.9 kg (229 lb)  Height (Patient Reported): 181.6 cm (5' 11.5\")  BMI (Based on Pt Reported Ht/Wt): 31.49  Pulse (Patient Reported): 82      TBotsford, CMA(AAMA) 7/20/22    Telephone number of patient:               HPI:  Khurram Reynoso is a 77 year old male with past medical history including asthma, vfib during heart cath, gout.      7/11 presented to the ER with a recent history of shortness of breath, lower extremity edema, orthopnea, PND.  He was just diagnosed with COVID.   ED work-up notable for mild tachycardia with EKG showing afib with RVR.  Oxygen saturation is normal, he is afebrile.  BMP is notable for a creatinine of 1.28.  BNP is 1850.  Troponin is 293.  His CBC is normal.  Chest x-ray is in process.    Echo done 7/12/22 showing LVEF 20-25%, 2-3+ TR, 2+ MR.   Started on Eliquis, digoxin, lisinopril, metoprolol XL, Xopenex and prednisone. Stopped: albuterol inhaler and nebs, allopurinol and Augmentin.     7/19/22 Pt called reporting feeling anxious and irritable, thought It was from Eliquis. Dr. Ward and PCP reviewed. Appt made with PCP.     7/19/22 ER for anxiety. EKG showed afib with CVR (83).  No changes.     7/20/22 Pt called reporting symptoms worse today. Urgent appt made with me.     He was on prednisone, last dose 7/16/22.   He tells me that he was doing ok during his admission, then started to have a feeling of restlessness and fidgety on 7/18/22.   He is down 4 lbs.  His edema that he had is nearly resolved.  His breathing is much improved.  He does not have orthopnea nor PND.   He has never had anxiety before, but his wife thinks this is anxiety - that he is nervous over the new diagnosis.   No significant palpitations, but his heart rate " is variable with BP cuff. Again,r ate in ER 80s.  Rate at home today 80s.   No chest pain.           I have reviewed and updated the patient's Past Medical History, Social History, Family History and Medication List.    PROBLEM LIST:  Patient Active Problem List   Diagnosis     Cardiac dysrhythmia     Primary cardiomyopathy (H)     Gait difficulty     PVC (premature ventricular contraction)     PAC (premature atrial contraction)     ACP (advance care planning)     Mild persistent asthma without complication     Calculus of kidney     Acute idiopathic gout of right foot     Left ureteral stone     Ureteral calculus     Elevated troponin     Acute heart failure, unspecified heart failure type (H)       MEDICATIONS:  Current Outpatient Medications   Medication Sig Dispense Refill     apixaban ANTICOAGULANT (ELIQUIS) 5 MG tablet Take 1 tablet (5 mg) by mouth 2 times daily 60 tablet 3     digoxin (LANOXIN) 125 MCG tablet Take 1 tablet (125 mcg) by mouth daily 30 tablet 1     fluticasone (FLONASE) 50 MCG/ACT nasal spray SHAKE LIQUID AND USE 1 TO 2 SPRAYS IN EACH NOSTRIL DAILY 16 g 2     fluticasone-vilanterol (BREO ELLIPTA) 200-25 MCG/INH inhaler Inhale 1 puff into the lungs daily 1 each 3     furosemide (LASIX) 20 MG tablet Take 1 tablet (20 mg) by mouth daily 30 tablet 1     levalbuterol (XOPENEX HFA) 45 MCG/ACT inhaler Inhale 2 puffs into the lungs every 6 hours as needed for wheezing 18 g 3     lisinopril (ZESTRIL) 2.5 MG tablet Take 1 tablet (2.5 mg) by mouth daily 30 tablet 3     metoprolol succinate ER (TOPROL XL) 25 MG 24 hr tablet Take 1 tablet (25 mg) by mouth daily 30 tablet 3     VITAMIN D PO Take one tablet by mouth daily (Patient not taking: Reported on 7/20/2022)           ALLERGIES:   No Known Allergies        EXAM:  A limited exam was conducted via video.  General: Patient is pleasant, alert, in no distress. Normal body habitus. Upright.    Eyes: No scleral icterus, no apparent redness or discharge. EOM's  appear intact.   Chest/Lungs: No labored breathing, no cough during exam or audible wheezing.   Cardiovascular: No evidence of elevated JVP.   Abdomen: No evidence of abdominal distention. No observed jaundice.  Skin: No rashes or lesions appreciated on visualized skin, normal skin color.  Neuro: No obvious focal defects or tremors.   Psych: Alert and oriented. A little anxious.    The rest of a comprehensive physical examination is deferred due to public health emergency video visit restrictions.       DIAGNOSTICS:  Echo 7/12/22: Normal LV size, EF 25%, global hypokinesis, moderate RV dysfunction, 2+ MR, 2-3+ TR      EKG 7/19/22 11:04 PM: afib, VR 83        ASSESSMENT/PLAN:  1.  Paroxysmal rapid A. Fib  - continue rate control with metoprolol and digoxin, rate seems to be decently controlled  - Continue Eliquis 5 mg twice daily.  We discussed that Eliquis would be very unlikely to cause feeling of anxiousness.  Additionally, this sx started just 7/18/22.  If he would like, in follow up, Xarelto 20 mg could be trialed  - He has follow up with Vilma 8/5/22 already scheduled.  hf he remains in afib, would consider DCCV +/- STEFANY depending on timing     2.  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  He will trial lisinopril in the evening.    - continue low-dose Lasix 20 mg.  He does not seem to be retaining fluid now. Weight down 4 lbs, edema improved, orthopnea and PND resolved.  K is WNL.  Does not need KCL at this time   - Will need repeat assessment of LVEF      3.  Anxiousness  - Could be related to recent prednisone burst. Last dose 7/16/22  - Considered CHF but CHF sxs are better, weight down.  This is unlikely  - Could be the afib itself, but he did not have this during admission, and now rate is even better controlled  - Suspect this is some anxiety.  He will talk to PCP  - He will call if continues              Follow up:   PCP in about 1 week  Vilma  8/5/22      Video-Visit Details  Type of service:  Video Visit  Video Start Time: 1418  Video End Time (time video stopped): 1437  Originating Location (pt. Location): Home  Distant Location (provider location):  [facility]  Mode of Communication:  Video Conference via Amisha Hansen PA-C  [facility]

## 2022-07-20 NOTE — LETTER
7/20/2022    Familia Gillespie MD  [address] [facility] [address]    RE: Khurram Reynoso       Dear Colleague,     I had the pleasure of seeing Khurram Reynoso in the [facility].  .    CARDIOLOGY CLINIC VIDEO VISIT  This visit is being conducted as a virtual visit due to the emphasis on mitigation of the COVID-19 virus pandemic. The clinician has decided that the risk of an in-office visit outweighs the benefit for this patient. The rest of the comprehensive physical examination is deferred due to public health emergency video visit restrictions.      Khurram Reynoso is a 77 year old male who is being evaluated via a billable video visit.      The patient has been notified of following:     This video visit will be conducted via a call between you and your physician/provider. We have found that certain health care needs can be provided without the need for an in-person physical exam.  This service lets us provide the care you need with a video conversation.  If a prescription is necessary we can send it directly to your pharmacy.  If lab work is needed we can place an order for that and you can then stop by our lab to have the test done at a later time.    Virtual visits are billed at different rates depending on your insurance coverage. During this emergency period, for some insurers they may be billed the same as an in-person visit.  Please reach out to your insurance provider with any questions.    If during the course of the call the physician/provider feels a video visit is not appropriate, you will not be charged for this service.      Physician has received verbal consent for a Video Visit from the patient? Yes        Khurram is a 77 year old who is being evaluated via a billable video visit.      How would you like to obtain your AVS? MyChart  If the video visit is dropped, the invitation should be resent by: Text to cell phone: [phone]  Will anyone else be joining  "your video visit? No     Review Of Systems  Skin: NEGATIVE  Eyes:Ears/Nose/Throat: NEGATIVE  Respiratory: NEGATIVE  Cardiovascular:NEGATIVE  Gastrointestinal: NEGATIVE  Genitourinary:NEGATIVE  Musculoskeletal: NEGATIVE  Neurologic: NEGATIVE  Psychiatric: NEGATIVE  Hematologic/Lymphatic/Immunologic: NEGATIVE  Endocrine:  NEGATIVE      Vitals - Patient Reported  Systolic (Patient Reported): 126  Diastolic (Patient Reported): 65  Weight (Patient Reported): 103.9 kg (229 lb)  Height (Patient Reported): 181.6 cm (5' 11.5\")  BMI (Based on Pt Reported Ht/Wt): 31.49  Pulse (Patient Reported): 82      [place], CMA(AAMA) 7/20/22    Telephone number of patient:               HPI:  Khurram Reynoso is a 77 year old male with past medical history including asthma, vfib during heart cath, gout.      7/11 presented to the ER with a recent history of shortness of breath, lower extremity edema, orthopnea, PND.  He was just diagnosed with COVID.   ED work-up notable for mild tachycardia with EKG showing afib with RVR.  Oxygen saturation is normal, he is afebrile.  BMP is notable for a creatinine of 1.28.  BNP is 1850.  Troponin is 293.  His CBC is normal.  Chest x-ray is in process.    Echo done 7/12/22 showing LVEF 20-25%, 2-3+ TR, 2+ MR.   Started on Eliquis, digoxin, lisinopril, metoprolol XL, Xopenex and prednisone. Stopped: albuterol inhaler and nebs, allopurinol and Augmentin.     7/19/22 Pt called reporting feeling anxious and irritable, thought It was from Eliquis. Dr. Ward and PCP reviewed. Appt made with PCP.     7/19/22 ER for anxiety. EKG showed afib with CVR (83).  No changes.     7/20/22 Pt called reporting symptoms worse today. Urgent appt made with me.     He was on prednisone, last dose 7/16/22.   He tells me that he was doing ok during his admission, then started to have a feeling of restlessness and fidgety on 7/18/22.   He is down 4 lbs.  His edema that he had is nearly resolved.  His breathing is much " improved.  He does not have orthopnea nor PND.   He has never had anxiety before, but his wife thinks this is anxiety - that he is nervous over the new diagnosis.   No significant palpitations, but his heart rate is variable with BP cuff. Again,r ate in ER 80s.  Rate at home today 80s.   No chest pain.           I have reviewed and updated the patient's Past Medical History, Social History, Family History and Medication List.    PROBLEM LIST:  Patient Active Problem List   Diagnosis     Cardiac dysrhythmia     Primary cardiomyopathy (H)     Gait difficulty     PVC (premature ventricular contraction)     PAC (premature atrial contraction)     ACP (advance care planning)     Mild persistent asthma without complication     Calculus of kidney     Acute idiopathic gout of right foot     Left ureteral stone     Ureteral calculus     Elevated troponin     Acute heart failure, unspecified heart failure type (H)       MEDICATIONS:  Current Outpatient Medications   Medication Sig Dispense Refill     apixaban ANTICOAGULANT (ELIQUIS) 5 MG tablet Take 1 tablet (5 mg) by mouth 2 times daily 60 tablet 3     digoxin (LANOXIN) 125 MCG tablet Take 1 tablet (125 mcg) by mouth daily 30 tablet 1     fluticasone (FLONASE) 50 MCG/ACT nasal spray SHAKE LIQUID AND USE 1 TO 2 SPRAYS IN EACH NOSTRIL DAILY 16 g 2     fluticasone-vilanterol (BREO ELLIPTA) 200-25 MCG/INH inhaler Inhale 1 puff into the lungs daily 1 each 3     furosemide (LASIX) 20 MG tablet Take 1 tablet (20 mg) by mouth daily 30 tablet 1     levalbuterol (XOPENEX HFA) 45 MCG/ACT inhaler Inhale 2 puffs into the lungs every 6 hours as needed for wheezing 18 g 3     lisinopril (ZESTRIL) 2.5 MG tablet Take 1 tablet (2.5 mg) by mouth daily 30 tablet 3     metoprolol succinate ER (TOPROL XL) 25 MG 24 hr tablet Take 1 tablet (25 mg) by mouth daily 30 tablet 3     VITAMIN D PO Take one tablet by mouth daily (Patient not taking: Reported on 7/20/2022)           ALLERGIES:   No Known  Allergies        EXAM:  A limited exam was conducted via video.  General: Patient is pleasant, alert, in no distress. Normal body habitus. Upright.    Eyes: No scleral icterus, no apparent redness or discharge. EOM's appear intact.   Chest/Lungs: No labored breathing, no cough during exam or audible wheezing.   Cardiovascular: No evidence of elevated JVP.   Abdomen: No evidence of abdominal distention. No observed jaundice.  Skin: No rashes or lesions appreciated on visualized skin, normal skin color.  Neuro: No obvious focal defects or tremors.   Psych: Alert and oriented. A little anxious.    The rest of a comprehensive physical examination is deferred due to public health emergency video visit restrictions.       DIAGNOSTICS:  Echo 7/12/22: Normal LV size, EF 25%, global hypokinesis, moderate RV dysfunction, 2+ MR, 2-3+ TR      EKG 7/19/22 11:04 PM: afib, VR 83        ASSESSMENT/PLAN:  1.  Paroxysmal rapid A. Fib  - continue rate control with metoprolol and digoxin, rate seems to be decently controlled  - Continue Eliquis 5 mg twice daily.  We discussed that Eliquis would be very unlikely to cause feeling of anxiousness.  Additionally, this sx started just 7/18/22.  If he would like, in follow up, Xarelto 20 mg could be trialed  - He has follow up with Vilma 8/5/22 already scheduled.  hf he remains in afib, would consider DCCV +/- STEFANY depending on timing     2.  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  He will trial lisinopril in the evening.    - continue low-dose Lasix 20 mg.  He does not seem to be retaining fluid now. Weight down 4 lbs, edema improved, orthopnea and PND resolved.  K is WNL.  Does not need KCL at this time   - Will need repeat assessment of LVEF      3.  Anxiousness  - Could be related to recent prednisone burst. Last dose 7/16/22  - Considered CHF but CHF sxs are better, weight down.  This is unlikely  - Could be the afib itself, but he  did not have this during admission, and now rate is even better controlled  - Suspect this is some anxiety.  He will talk to PCP  - He will call if continues    Follow up:   PCP in about 1 week  Vilma 8/5/22      Video-Visit Details  Type of service:  Video Visit  Video Start Time: 1418  Video End Time (time video stopped): 1437  Originating Location (pt. Location): Home  Distant Location (provider location):  [facility]  Mode of Communication:  Video Conference via Probity     FELICITAS Barrios [facility]    Thank you for allowing me to participate in the care of your patient.      Sincerely,     FELICITAS [facility]  cc:   No referring provider defined for this encounter.

## 2022-07-20 NOTE — ED PROVIDER NOTES
History     Chief Complaint:  Shortness of breath    HPI   Khurram Reynoso is a 77 year old male who presents with shortness of breath.  Patient reports taking twice daily eliquis and feeling short of breath.  He states that he had been feeling well but noticing that his right lower extremity has been more swollen.  He also was not able to sleep last night because he was up pacing.   Feeling short of breath.  Also feels like his heart rate is become more irregular again.  He notes some tension between his shoulders.  He denies any chest pain otherwise.  He reports chronic asthma and congestion.  Patient also notes some mild abdominal distention.    Review of Systems   All other systems reviewed and are negative.    Allergies:  No Known Allergies      Medications:    apixaban ANTICOAGULANT (ELIQUIS) 5 MG tablet  digoxin (LANOXIN) 125 MCG tablet  fluticasone (FLONASE) 50 MCG/ACT nasal spray  fluticasone-vilanterol (BREO ELLIPTA) 200-25 MCG/INH inhaler  furosemide (LASIX) 20 MG tablet  levalbuterol (XOPENEX HFA) 45 MCG/ACT inhaler  lisinopril (ZESTRIL) 2.5 MG tablet  metoprolol succinate ER (TOPROL XL) 25 MG 24 hr tablet  predniSONE (DELTASONE) 20 MG tablet  VITAMIN D PO        Past Medical History:    Past Medical History:   Diagnosis Date     Acute bronchitis 10/17/2005     Asthma 2018     Cardiac arrest (H) 6/2006     CARDIAC DYSRHYTHMIA NOS 7/27/2005     Degeneration of lumbar or lumbosacral intervertebral disc      Gastroesophageal reflux disease      Gout 2001     Neoplasm of uncertain behavior of skin 2017     Other chronic pain      PAC (premature atrial contraction)      Paroxysmal A-fib (H) 2006     PONV (postoperative nausea and vomiting)      PRIM CARDIOMYOPATHY NEC 7/18/2006     PVC (premature ventricular contraction)      Renal disease      Uncomplicated asthma      Patient Active Problem List    Diagnosis Date Noted     Elevated troponin 07/11/2022     Priority: Medium     Acute heart failure,  unspecified heart failure type (H) 07/11/2022     Priority: Medium     Left ureteral stone 08/06/2020     Priority: Medium     Added automatically from request for surgery [id number]       Ureteral calculus 08/06/2020     Priority: Medium     Added automatically from request for surgery [id number]       Calculus of kidney 11/05/2018     Priority: Medium     Acute idiopathic gout of right foot 11/05/2018     Priority: Medium     Mild persistent asthma without complication 07/20/2018     Priority: Medium     ACP (advance care planning) 02/01/2017     Priority: Medium     Advance Care Planning 2/1/2017: Receipt of ACP document:  Received: Health Care Directive which was witnessed or notarized on 8/2/16.  Document previously scanned on 11/28/16.  Validation form completed and sent to be scanned.  Code Status reflects choices in most recent ACP document.  Confirmed/documented designated decision maker(s).  Added by Stefany Wilson RN, Advance Care Planning Liaison.         PVC (premature ventricular contraction)      Priority: Medium     PAC (premature atrial contraction)      Priority: Medium     Gait difficulty 12/30/2009     Priority: Medium     Primary cardiomyopathy (H) 07/18/2006     Priority: Medium     Problem list name updated by automated process. Provider to review       Cardiac dysrhythmia 07/27/2005     Priority: Medium     Problem list name updated by automated process. Provider to review          Past Surgical History:    Past Surgical History:   Procedure Laterality Date     ABLATION OF DYSRHYTHMIC FOCUS  04/03/2007    RVOT PVCs     CARDIAC SURGERY      Ablation     COMBINED CYSTOSCOPY, INSERT STENT URETER(S) Left 8/6/2020    Procedure: Video cystoscopy, left double-J stent placement and exam under anesthesia;  Surgeon: Dank Han MD;  Location: [facility] OR     DECOMPRESS DISC RF LUMBAR  3/2001    lumbar fusion L2-5     LASER HOLMIUM LITHOTRIPSY URETER(S), INSERT STENT, COMBINED Left 8/20/2020    Procedure:  Video cystoscopy, left double-J stent removal, rigid ureteroscopy, flexible renoscopy with holmium laser lithotripsy and stone extraction.;  Surgeon: Dank Han MD;  Location:  OR     OPTICAL TRACKING SYSTEM FUSION SPINE POSTERIOR LUMBAR THREE+ LEVELS N/A 6/27/2016    Procedure: OPTICAL TRACKING SYSTEM FUSION SPINE POSTERIOR LUMBAR THREE+ LEVELS;  Surgeon: Hieu Araiza MD;  Location:  OR     ORTHOPEDIC SURGERY Bilateral     total knee replacements     ORTHOPEDIC SURGERY Right     Total Hipe Replacement     SOFT TISSUE SURGERY Right     right wrist ganglion surgery     ZZC NONSPECIFIC PROCEDURE      knee     ZZC TOTAL HIP ARTHROPLASTY      Description: Total Hip Replacement;  Recorded: 09/24/2010;     ZZC TOTAL KNEE ARTHROPLASTY      Description: Total Knee Arthroplasty;  Recorded: 09/24/2010;  Comments: right       Family History:    Family History   Problem Relation Age of Onset     C.A.D. Father      Hypertension Father      Heart Surgery Father         bypass     Cancer Brother         bone ca      Family History Negative Mother      Other - See Comments Sister         polio     Unknown/Adopted Maternal Grandmother      Unknown/Adopted Maternal Grandfather      Unknown/Adopted Paternal Grandmother      Unknown/Adopted Paternal Grandfather      Family History Negative Sister      CABG Father 77.00       Social History:  RhiannaAsif  The patient presents to the ED with wife    Physical Exam     Patient Vitals for the past 24 hrs:   BP Temp Temp src Pulse Resp SpO2 Height Weight   07/20/22 0115 112/74 -- -- 81 -- 97 % -- --   07/20/22 0100 -- -- -- -- -- 100 % -- --   07/20/22 0045 -- -- -- -- -- 100 % -- --   07/20/22 0030 -- -- -- -- -- 98 % -- --   07/20/22 0015 -- -- -- -- -- 93 % -- --   07/20/22 0000 -- -- -- -- -- 98 % -- --   07/19/22 2315 98/69 -- -- 81 16 98 % -- --   07/19/22 2300 113/70 -- -- 80 15 99 % -- --   07/19/22 2245 -- -- -- 75 9 97 % -- --   07/19/22 2230 104/63  -- -- -- -- 97 % -- --   07/19/22 2026 125/78 97.8  F (36.6  C) Temporal 100 18 96 % 1.829 m (6') 105.2 kg (232 lb)       Physical Exam  General: Resting on the bed.  Head: No obvious trauma to head.  Ears, Nose, Throat:  External ears normal.  Nose normal.    Eyes:  Conjunctivae clear.  Pupils are equal, round, and reactive.   Neck: Normal range of motion.  Neck supple.   CV: tachycardic rate and rhythm.  No murmurs.      Respiratory: Effort normal and breath sounds congestion with scattered wheezing.   Gastrointestinal: Soft. + distension. There is no tenderness.   Musculoskeletal: bilateral leg edema, right worse than left.    Neuro: Alert. Moving all extremities appropriately.  Normal speech.    Skin: Skin is warm and dry.  No rash noted.       Emergency Department Course     ECG results from 07/19/22   EKG 12-lead, tracing only     Value    Systolic Blood Pressure     Diastolic Blood Pressure     Ventricular Rate 83    Atrial Rate 87    MA Interval     QRS Duration 86        QTc 446    P Axis     R AXIS 50    T Axis -31    Interpretation ECG      Atrial fibrillation  Abnormal QRS-T angle, consider primary T wave abnormality  Abnormal ECG  When compared with ECG of 11-JUL-2022 19:57,  No significant change was found         Imaging:  XR Chest 2 Views   Final Result   IMPRESSION: No evidence of active cardiopulmonary disease.         Report per radiology    Laboratory:  Labs Ordered and Resulted from Time of ED Arrival to Time of ED Departure   BASIC METABOLIC PANEL - Abnormal       Result Value    Sodium 141      Potassium 4.5      Chloride 109      Carbon Dioxide (CO2) 27      Anion Gap 5      Urea Nitrogen 30      Creatinine 1.15      Calcium 8.9      Glucose 106 (*)     GFR Estimate 66     TROPONIN I - Normal    Troponin I High Sensitivity 31     NT PROBNP INPATIENT - Normal    N terminal Pro BNP Inpatient 1,683     CBC WITH PLATELETS AND DIFFERENTIAL    WBC Count 9.1      RBC Count 5.16      Hemoglobin  16.3      Hematocrit 49.8      MCV 97      MCH 31.6      MCHC 32.7      RDW 13.9      Platelet Count 367      % Neutrophils 59      % Lymphocytes 27      % Monocytes 9      % Eosinophils 3      % Basophils 1      % Immature Granulocytes 1      NRBCs per 100 WBC 0      Absolute Neutrophils 5.5      Absolute Lymphocytes 2.4      Absolute Monocytes 0.8      Absolute Eosinophils 0.2      Absolute Basophils 0.1      Absolute Immature Granulocytes 0.1      Absolute NRBCs 0.0         Procedures:      Emergency Department Course:             Reviewed:  I reviewed nursing notes, vitals, past medical history and Care Everywhere    Assessments:   I obtained history and examined the patient as noted above.    I rechecked the patient and explained findings.       Interventions:  Medications - No data to display    Disposition:  The patient was discharged to home.     Impression & Plan      Medical Decision Makin-year-old male with history of atrial fibrillation, cardiomyopathy presents with Shortness of breath and anxiety.  Broad differential was pursued including but not limited to anemia, electrolyte, metabolic, renal dysfunction, heart failure, ACS arrhythmia, PE, pneumonia, pneumothorax, effusion, etc.  CBC without leukocytosis or anemia.  BMP without acute electrolyte, metabolic or renal dysfunction.  EKG shows atrial fibrillation, rate controlled, no acute ischemic changes noted.  No changes from prior.  Patient has no chest pain.  Do not suspect ACS at this time.  BNP within normal limits.  Improved from prior check.  Chest x-ray without acute pneumonia, pneumothorax or effusion.  Patient is able to ambulate without hypoxia, rates were controlled.  He was discharged from the hospital with rate controlled atrial fibrillation and is due to follow up with cardiology.  He has some mild lower extremity edema, improved from prior.  He is on a diuretic.  He is on a anticoagulant, making PE unlikely.  He reports  compliance.  At this point suspect ongoing atrial fibrillation.  Offered admission for symptom control but patient wishes to go home.  Advise close follow-up with cardiology.  Given his rates are controlled, no hypoxia, stable vital signs from prior admission, patient seems appropriate for discharge home.  We also discussed that he may have some underlying anxiety related to his new medical diagnoses.  Recommend following up with his primary doctor regarding this.  Return precautions given.  Patient discharged home.    Covid-19  Khurram Reynoso was evaluated during a global COVID-19 pandemic, which necessitated consideration that the patient might be at risk for infection with the SARS-CoV-2 virus that causes COVID-19.   Applicable protocols for evaluation were followed during the patient's care.   COVID-19 was considered as part of the patient's evaluation.    Diagnosis:    ICD-10-CM    1. Anxiety  F41.9    2. Shortness of breath  R06.02        Discharge Medications:  Discharge Medication List as of 7/20/2022  1:15 AM             Jennie Belcher MD  07/20/22 0317

## 2022-07-20 NOTE — TELEPHONE ENCOUNTER
Telephone encounter from 7/19/22 - patient called with anxiety and was concerned it was from the Eliquis that was started in the hospital. Encounter routed to PCP and Dr. Ward where it was recommended he see his PCP to discuss non-cardiac related reasons for anxiety.     PT called this AM stating he was in the ED last night regarding SOB and anxiety. EKG showed atrial fib, rate controlled. MD recommended that pt be admitted but patient declined and wanted to be discharged home.     Today pt states symptoms have worsened. He feels anxious and fidgety and is looking for guidance as to how to proceed. Reviewed with Alex Hansen PA-C who will see pt virtually today.    Medication list reviewed. Pt stated he is not taking Prednisone which can cause anxiety. Stopped 7/16/22.    Pt scheduled for 1:50 pm with Alex Hansen PA-C virtual appt due to recent positive COVID test. Instructed pt to check vitals prior to appt to have ready for Diana.     Future Appointments   Date Time Provider Department Center   7/20/2022  1:50 PM Alex Hansen PA-C [facility]   7/22/2022  9:00 AM  [facility]   8/1/2022  8:30 AM Familia Gillespie MD [facility]   8/5/2022  7:30 AM Vilma Griffiths APRN CNP [facility]     Niya MCPHERSON RN  07/20/22 at 11:12 AM

## 2022-07-21 ENCOUNTER — PATIENT OUTREACH (OUTPATIENT)
Dept: CARE COORDINATION | Facility: CLINIC | Age: 77
End: 2022-07-21

## 2022-07-21 DIAGNOSIS — Z71.89 OTHER SPECIFIED COUNSELING: ICD-10-CM

## 2022-07-21 NOTE — PROGRESS NOTES
Clinic Care Coordination Contact  [facility]: Post-Discharge Note  SITUATION                                                      Admission:    Admission Date: 07/19/22   Reason for Admission: Shortness of breath  Discharge:   Discharge Date: 07/20/22  Discharge Diagnosis: Shortness of breath    BACKGROUND                                                      77-year-old male with history of atrial fibrillation, cardiomyopathy presents with Shortness of breath and anxiety.  Broad differential was pursued including but not limited to anemia, electrolyte, metabolic, renal dysfunction, heart failure, ACS arrhythmia, PE, pneumonia, pneumothorax, effusion, etc.  CBC without leukocytosis or anemia.  BMP without acute electrolyte, metabolic or renal dysfunction.  EKG shows atrial fibrillation, rate controlled, no acute ischemic changes noted.  No changes from prior.  Patient has no chest pain.  Do not suspect ACS at this time.  BNP within normal limits.  Improved from prior check.  Chest x-ray without acute pneumonia, pneumothorax or effusion.  Patient is able to ambulate without hypoxia, rates were controlled.  He was discharged from the hospital with rate controlled atrial fibrillation and is due to follow up with cardiology.  He has some mild lower extremity edema, improved from prior.  He is on a diuretic.  He is on a anticoagulant, making PE unlikely.  He reports compliance.  At this point suspect ongoing atrial fibrillation.  Offered admission for symptom control but patient wishes to go home.  Advise close follow-up with cardiology.  Given his rates are controlled, no hypoxia, stable vital signs from prior admission, patient seems appropriate for discharge home.  We also discussed that he may have some underlying anxiety related to his new medical diagnoses.  Recommend following up with his primary doctor regarding this.  Return precautions given.  Patient discharged home.     Covid-19  Khurram Reynoso was  evaluated during a global COVID-19 pandemic, which necessitated consideration that the patient might be at risk for infection with the SARS-CoV-2 virus that causes COVID-19.   Applicable protocols for evaluation were followed during the patient's care.   COVID-19 was considered as part of the patient's evaluation.       ASSESSMENT           Discharge Assessment  How are you doing now that you are home?: Patient shares that he is feeling better. However, he is still having anxiousness and plans to speak with his PCP about this at upcoming appt. SW offered to message PCP to see if he could prescribe something to take as needed. Patient states he would appreciate this. No other concerns/needs at this time  How are your symptoms? (Red Flag symptoms escalate to triage hotline per guidelines): Improved  Do you feel your condition is stable enough to be safe at home until your provider visit?: Yes  Does the patient have their discharge instructions? : Yes  Does the patient have questions regarding their discharge instructions? : No  Were you started on any new medications or were there changes to any of your previous medications? : Yes  Does the patient have all of their medications?: Yes  Do you have questions regarding any of your medications? : No  Do you have all of your needed medical supplies or equipment (DME)?  (i.e. oxygen tank, CPAP, cane, etc.): Yes  Discharge follow-up appointment scheduled within 14 calendar days? : Yes  Discharge Follow Up Appointment Date: 08/01/22  Discharge Follow Up Appointment Scheduled with?: Primary Care Provider    Post-op ([facility] Only)  If the patient had a surgery or procedure, do they have any questions for a nurse?: No    Post-op (Clinicians Only)  Did the patient have surgery or a procedure: No  Fever: No  Chills: No      PLAN                                                       Outpatient Plan:  Follow-Ups: Schedule an appointment with Asif Moctezuma MD (Family Medicine)  in 2 days (7/22/2022        Future Appointments   Date Time Provider Department Center   7/22/2022  9:00 AM [facility]   8/1/2022  8:30 AM Familia Gillespie MD [facility]   8/5/2022  7:30 AM Vilma Griffiths, APRN CNP [facility]         For any urgent concerns, please contact our 24 hour nurse triage line: [phone] ([phone])         THERESA Angel

## 2022-07-22 ENCOUNTER — LAB (OUTPATIENT)
Dept: LAB | Facility: CLINIC | Age: 77
End: 2022-07-22
Payer: COMMERCIAL

## 2022-07-22 DIAGNOSIS — I43 TACHYCARDIA INDUCED CARDIOMYOPATHY (H): ICD-10-CM

## 2022-07-22 DIAGNOSIS — R00.0 TACHYCARDIA INDUCED CARDIOMYOPATHY (H): ICD-10-CM

## 2022-07-22 DIAGNOSIS — I48.0 PAF (PAROXYSMAL ATRIAL FIBRILLATION) (H): ICD-10-CM

## 2022-07-22 LAB
ANION GAP SERPL CALCULATED.3IONS-SCNC: 3 MMOL/L (ref 3–14)
BUN SERPL-MCNC: 30 MG/DL (ref 7–30)
CALCIUM SERPL-MCNC: 9 MG/DL (ref 8.5–10.1)
CHLORIDE BLD-SCNC: 109 MMOL/L (ref 94–109)
CO2 SERPL-SCNC: 26 MMOL/L (ref 20–32)
CREAT SERPL-MCNC: 1.24 MG/DL (ref 0.66–1.25)
GFR SERPL CREATININE-BSD FRML MDRD: 60 ML/MIN/1.73M2
GLUCOSE BLD-MCNC: 108 MG/DL (ref 70–99)
POTASSIUM BLD-SCNC: 4.4 MMOL/L (ref 3.4–5.3)
SODIUM SERPL-SCNC: 138 MMOL/L (ref 133–144)

## 2022-07-22 PROCEDURE — 80048 BASIC METABOLIC PNL TOTAL CA: CPT

## 2022-07-22 PROCEDURE — 36415 COLL VENOUS BLD VENIPUNCTURE: CPT

## 2022-07-25 NOTE — DISCHARGE SUMMARY
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    Acute systolic CHF.    Elevated troponin due to type II MI, demand ischemia.    Positive COVID-19 test, presentation likely unrelated to COVID.    History of V. fib cardiac arrest during heart catheterization in 2016    History of gout.    CKD stage II.      Follow-ups Needed After Discharge   Follow-up Appointments     Follow-up and recommended labs and tests       Follow up with primary care provider, Asif Moctezuma, within 7 days for   hospital follow- up.  The following labs/tests are recommended: CBC and   BMP.    Follow up with cardiology in one week             Discharge Disposition   Discharged to home  Condition at discharge: Stable    Hospital Course   77-year-old male with history of asthma, ventricular fibrillation getting the heart cath and gout who presents with worsening shortness of breath, lower extremity edema, orthopnea and PND over the last 3 to 4 days.  Describes \"panic attacks\" on lying down flat.  He was recently diagnosed with COVID-19.     In the ER temperature was 97.9  F, heart rate of 106 (atrial fibrillation), blood pressure 125/86 and oxygen saturation 97% on room air.  N-terminal proBNP was elevated at 1850 and troponin was elevated at 293.  EKG showed atrial fibrillation with RVR of 115 and occasional PVCs.  Patient has paroxysmal atrial fibrillation listed in his chart since 2006 but he is not on any anticoagulation.  Patient also appears unaware of his diagnosis and thinks he thought he had atrial fibrillation because he occasionally gets palpitations.  X-ray shows some pulmonary vascular congestion.  Lower extremity " ultrasound was negative for DVT.  He was wheezing on presentation.     Plan:      1. Shortness of breath.    New onset systolic CHF, EF of 20 to 25% on echo. started on Lasix 20 mg IV twice daily consult cardiology for angiogram.  This is likely tachycardia induced cardiomyopathy due to uncontrolled atrial fibrillation, recheck as an outpatient in a month.  If it has still not improved, will do coronary angiogram.    Patient has history of asthma and is wheezing, likely asthma exacerbation due to COVID.  Will change his inhaler to Xopenex due to A. fib and give fluticasone inhaler instead of Breo.  Will give prednisone 40 mg daily for 5 days     2. Atrial fibrillation with rapid ventricular rate.    His CHADS2 Vasc score is 2-3.    Started on Eliquis    Started on metoprolol succinate 25 mg daily.     3. COVID-19    First tested positive on 7/4 at home.  He did not have any significant symptoms except some cough which he has on and off due to asthma.  As he is not needing oxygen, is not a candidate for remdesivir/dexamethasone.  He is vaccinated and boosted for COVID-19.     4. Elevated troponin    Likely due to demand ischemia.  Troponin remain elevated but flat and mid to high 200s range.    No chest pain, earful unlikely to be acute ischemic event but may have chronic ischemia.  Cardiology consulted due to low EF.     5. History of V. fib cardiac arrest during heart catheterization in 2006.    Started on metoprolol.     6. History of gout.    Resume allopurinol.      Consultations This Hospital Stay   PHARMACY IP CONSULT  PHARMACY IP CONSULT  PHARMACY IP CONSULT  PHARMACY LIAISON FOR MEDICATION COVERAGE CONSULT  CARDIOLOGY IP CONSULT  PHARMACY IP CONSULT  PHARMACY IP CONSULT    Code Status   Prior    Time Spent on this Encounter   I, Saleem Junior MD, personally saw the patient today and spent greater than 30 minutes discharging this patient.         Saleem Junior MD  [address]  SURGICAL  [address]  [facility] [address]  Phone: [phone]  Fax: [phone]  ______________________________________________________________________    Physical Exam   Vital Signs:                    Weight: 235 lbs 11.2 oz  General Appearance: Alert awake oriented x3..  Eyes: No icterus  HEENT: Moist mucosa  Respiratory: Bilateral wheezing.  Cardiovascular: S1 and S2 irregular  GI: Soft and nontender  Lymph/Hematologic: Not examined  Genitourinary: Not examined  Skin: No rash  Musculoskeletal: Bilateral lower extremity edema.  Neurologic: Nonfocal  Psychiatric: Normal mood          Primary Care Physician   Familia Gillespie    Discharge Orders      Primary Care - Care Coordination Referral      Reason for your hospital stay    Atrial fibrillation and CHF     Activity    Your activity upon discharge: activity as tolerated     Follow-up and recommended labs and tests     Follow up with primary care provider, Asif Moctezuma, within 7 days for hospital follow- up.  The following labs/tests are recommended: CBC and BMP.    Follow up with cardiology in one week     Diet    Follow this diet upon discharge: Orders Placed This Encounter  Low salt diet.       Significant Results and Procedures   Most Recent 3 CBC's:Recent Labs   Lab Test 07/19/22 2251 07/13/22  0741 07/12/22  1311   WBC 9.1 10.1 8.2   HGB 16.3 15.0 15.7   MCV 97 96 96    325 330     Most Recent 3 BMP's:Recent Labs   Lab Test 07/22/22  0913 07/19/22  2251 07/13/22  0741    141 137   POTASSIUM 4.4 4.5 3.9   CHLORIDE 109 109 105   CO2 26 27 27   BUN 30 30 23   CR 1.24 1.15 1.12   ANIONGAP 3 5 5   SHERRON 9.0 8.9 9.0   * 106* 101*     Most Recent 2 LFT's:Recent Labs   Lab Test 12/01/21  0827 03/02/20  1637   AST 22 26   ALT 26 31   ALKPHOS 189* 122   BILITOTAL 0.6 0.4   ,   Results for orders placed or performed during the hospital encounter of 07/11/22   XR Chest 2 Views    Narrative    EXAM: XR CHEST 2 VW  LOCATION: [facility]  [facility]  DATE/TIME: 2022 9:34 PM    INDICATION: sob  COMPARISON: 2020.       Impression    IMPRESSION: No pleural fluid or pneumothorax. Subtle pulmonary opacities could be atypical infection or overlying soft tissue. No interstitial edema. Normal size of the heart. There are degenerative changes in the spine. Surgical hardware in the lumbar   spine is partially included in the field-of-view.    US Lower Extremity Venous Duplex Bilateral    Narrative    EXAM: US LOWER EXTREMITY VENOUS DUPLEX BILATERAL  LOCATION: [facility]  DATE/TIME: 2022 11:21 PM    INDICATION: Bilateral lower extremity edema  COMPARISON: None.  TECHNIQUE: Venous Duplex ultrasound of bilateral lower extremities with and without compression, augmentation and duplex. Color flow and spectral Doppler with waveform analysis performed.    FINDINGS: Exam includes the common femoral, femoral, popliteal veins as well as segmentally visualized deep calf veins and greater saphenous vein.     RIGHT: No deep vein thrombosis. No superficial thrombophlebitis. No popliteal cyst.    LEFT: No deep vein thrombosis. No superficial thrombophlebitis. No popliteal cyst.      Impression    IMPRESSION:  1.  No deep venous thrombosis in the bilateral lower extremities.   Echocardiogram Complete     Value    LVEF  20-25%    Narrative    [id number]  [id number]  [id number]  [id number]^IRVIN^CARLY     [facility]  Echocardiography Laboratory  [address] [place] [address]     Name: SUMANTH HERNANDEZ  MRN: [id number]  : 1945  Study Date: 2022 08:44 AM  Age: 77 yrs  Gender: Male  Patient Location: [facility]  Reason For Study: Edema  Ordering Physician: CARLY BRYAN  Performed By: Maddie Rivas     BSA: 2.4 m2  Height: 72 in  Weight: 252 lb  HR: 84  BP: 125/86 mmHg  ______________________________________________________________________________  Procedure  Limited Portable Echo  Adult.  ______________________________________________________________________________  Interpretation Summary     The visual ejection fraction is 20-25%.  Left ventricular systolic function is severely reduced.  There is moderate to mod-severe (2-3+) tricuspid regurgitation.  Right ventricular systolic pressure is elevated, consistent with mild  pulmonary hypertension.  There is trace aortic regurgitation.  Trivial pericardial effusion  The rhythm was atrial fibrillation.  Since 2016 there has been a marked deterioration in left ventricular and right  ventricular systolic function. There has naheed significant worsening of mitral  and tricuspid regurgitation and the rhythm has now changed from sinus to  atrial fibrillation.  ______________________________________________________________________________  Left Ventricle  The left ventricle is normal in size. There is moderate concentric left  ventricular hypertrophy. The visual ejection fraction is 20-25%. Left  ventricular systolic function is severely reduced. Diastolic function not  assessed due to atrial fibrillation. There is severe global hypokinesia of the  left ventricle. A false chord is noted (normal variant).     Right Ventricle  The right ventricle is normal size. The right ventricular systolic function is  moderately reduced.     Atria  The left atrium is mild to moderately dilated. The right atrium is mild to  moderately dilated.     Mitral Valve  There is moderate (2+) mitral regurgitation.     Tricuspid Valve  There is moderate to mod-severe (2-3+) tricuspid regurgitation. IVC diameter  >2.1 cm collapsing <50% with sniff suggests a high RA pressure estimated at 15  mmHg or greater. The right ventricular systolic pressure is approximated at  23mmHg plus the right atrial pressure. Right ventricular systolic pressure is  elevated, consistent with mild pulmonary hypertension.     Aortic Valve  The aortic valve is trileaflet. There is trace aortic  regurgitation. No  hemodynamically significant valvular aortic stenosis.     Pulmonic Valve  There is mild (1+) pulmonic valvular regurgitation.     Pericardium  Trivial pericardial effusion.     Rhythm  The rhythm was atrial fibrillation.     ______________________________________________________________________________  MMode/2D Measurements & Calculations  IVSd: 1.5 cm  LVIDd: 5.6 cm  LVIDs: 4.0 cm  LVPWd: 1.2 cm  FS: 27.7 %     LV mass(C)d: 315.0 grams  LV mass(C)dI: 134.0 grams/m2  RWT: 0.42     ______________________________________________________________________________  Report approved by: Rancho Geller 07/12/2022 10:47 AM               Discharge Medications   Discharge Medication List as of 7/13/2022 11:01 AM      START taking these medications    Details   apixaban ANTICOAGULANT (ELIQUIS) 5 MG tablet Take 1 tablet (5 mg) by mouth 2 times daily, Disp-60 tablet, R-3, E-Prescribe      digoxin (LANOXIN) 125 MCG tablet Take 1 tablet (125 mcg) by mouth daily, Disp-30 tablet, R-1, E-Prescribe      levalbuterol (XOPENEX HFA) 45 MCG/ACT inhaler Inhale 2 puffs into the lungs every 6 hours as needed for wheezing, Disp-18 g, R-3, E-Prescribe      lisinopril (ZESTRIL) 2.5 MG tablet Take 1 tablet (2.5 mg) by mouth daily, Disp-30 tablet, R-3, E-Prescribe      metoprolol succinate ER (TOPROL XL) 25 MG 24 hr tablet Take 1 tablet (25 mg) by mouth daily, Disp-30 tablet, R-3, E-Prescribe      predniSONE (DELTASONE) 20 MG tablet Take 2 tablets (40 mg) by mouth daily, Disp-4 tablet, R-0, E-Prescribe         CONTINUE these medications which have CHANGED    Details   fluticasone-vilanterol (BREO ELLIPTA) 200-25 MCG/INH inhaler Inhale 1 puff into the lungs daily, Disp-1 each, R-3, E-Prescribe      furosemide (LASIX) 20 MG tablet Take 1 tablet (20 mg) by mouth daily, Disp-30 tablet, R-1, E-Prescribe         CONTINUE these medications which have NOT CHANGED    Details   fluticasone (FLONASE) 50 MCG/ACT nasal spray SHAKE  LIQUID AND USE 1 TO 2 SPRAYS IN EACH NOSTRIL DAILY, Disp-16 g, R-2, E-Prescribe      VITAMIN D PO Take one tablet by mouth daily, Historical         STOP taking these medications       albuterol (PROVENTIL) (2.5 MG/3ML) 0.083% neb solution Comments:   Reason for Stopping:         albuterol (VENTOLIN HFA) 108 (90 Base) MCG/ACT inhaler Comments:   Reason for Stopping:         allopurinol (ZYLOPRIM) 100 MG tablet Comments:   Reason for Stopping:         amoxicillin-clavulanate (AUGMENTIN) 875-125 MG tablet Comments:   Reason for Stopping:         fluticasone (ARNUITY ELLIPTA) 100 MCG/ACT inhaler Comments:   Reason for Stopping:             Allergies   No Known Allergies

## 2022-07-29 ASSESSMENT — ENCOUNTER SYMPTOMS
CHILLS: 0
HEARTBURN: 0
JOINT SWELLING: 0
HEMATURIA: 0
DIARRHEA: 0
FEVER: 0
NAUSEA: 0
CONSTIPATION: 0
DIZZINESS: 0
EYE PAIN: 0
MYALGIAS: 1
COUGH: 1
ABDOMINAL PAIN: 0
HEMATOCHEZIA: 1
WEAKNESS: 0
ARTHRALGIAS: 1
SHORTNESS OF BREATH: 1
HEADACHES: 0
SORE THROAT: 0
PALPITATIONS: 0
NERVOUS/ANXIOUS: 1
FREQUENCY: 1
PARESTHESIAS: 0
DYSURIA: 0

## 2022-07-29 ASSESSMENT — ACTIVITIES OF DAILY LIVING (ADL): CURRENT_FUNCTION: NO ASSISTANCE NEEDED

## 2022-08-01 ENCOUNTER — OFFICE VISIT (OUTPATIENT)
Dept: INTERNAL MEDICINE | Facility: CLINIC | Age: 77
End: 2022-08-01
Payer: COMMERCIAL

## 2022-08-01 VITALS
HEIGHT: 72 IN | RESPIRATION RATE: 18 BRPM | BODY MASS INDEX: 31.86 KG/M2 | OXYGEN SATURATION: 96 % | DIASTOLIC BLOOD PRESSURE: 68 MMHG | TEMPERATURE: 97.7 F | WEIGHT: 235.2 LBS | HEART RATE: 106 BPM | SYSTOLIC BLOOD PRESSURE: 110 MMHG

## 2022-08-01 DIAGNOSIS — M1A.00X0 IDIOPATHIC CHRONIC GOUT WITHOUT TOPHUS, UNSPECIFIED SITE: ICD-10-CM

## 2022-08-01 DIAGNOSIS — I77.810 AORTIC ROOT DILATATION (H): ICD-10-CM

## 2022-08-01 DIAGNOSIS — R05.9 COUGH: ICD-10-CM

## 2022-08-01 DIAGNOSIS — Z09 HOSPITAL DISCHARGE FOLLOW-UP: Primary | ICD-10-CM

## 2022-08-01 DIAGNOSIS — J45.30 MILD PERSISTENT ASTHMA WITHOUT COMPLICATION: ICD-10-CM

## 2022-08-01 DIAGNOSIS — Z51.81 MEDICATION MONITORING ENCOUNTER: ICD-10-CM

## 2022-08-01 DIAGNOSIS — I50.9 ACUTE HEART FAILURE, UNSPECIFIED HEART FAILURE TYPE (H): ICD-10-CM

## 2022-08-01 DIAGNOSIS — I48.20 CHRONIC ATRIAL FIBRILLATION (H): ICD-10-CM

## 2022-08-01 LAB — DIGOXIN SERPL-MCNC: 0.9 NG/ML (ref 0.6–2)

## 2022-08-01 PROCEDURE — 80048 BASIC METABOLIC PNL TOTAL CA: CPT | Performed by: INTERNAL MEDICINE

## 2022-08-01 PROCEDURE — 80162 ASSAY OF DIGOXIN TOTAL: CPT | Performed by: INTERNAL MEDICINE

## 2022-08-01 PROCEDURE — 99214 OFFICE O/P EST MOD 30 MIN: CPT | Performed by: INTERNAL MEDICINE

## 2022-08-01 PROCEDURE — 36415 COLL VENOUS BLD VENIPUNCTURE: CPT | Performed by: INTERNAL MEDICINE

## 2022-08-01 RX ORDER — ALLOPURINOL 100 MG/1
TABLET ORAL
Qty: 180 TABLET | Refills: 3 | Status: ON HOLD | OUTPATIENT
Start: 2022-08-01 | End: 2022-10-28

## 2022-08-01 RX ORDER — FLUTICASONE FUROATE 100 UG/1
POWDER RESPIRATORY (INHALATION)
COMMUNITY
Start: 2022-07-13 | End: 2022-12-07

## 2022-08-01 RX ORDER — METOPROLOL SUCCINATE 50 MG/1
50 TABLET, EXTENDED RELEASE ORAL DAILY
Qty: 90 TABLET | Refills: 1 | Status: SHIPPED | OUTPATIENT
Start: 2022-08-01 | End: 2022-11-28

## 2022-08-01 ASSESSMENT — ENCOUNTER SYMPTOMS
DYSURIA: 0
COUGH: 1
PARESTHESIAS: 0
ARTHRALGIAS: 1
PALPITATIONS: 0
SHORTNESS OF BREATH: 1
HEMATURIA: 0
CONSTIPATION: 0
DIARRHEA: 0
DIZZINESS: 0
WEAKNESS: 0
HEADACHES: 0
FREQUENCY: 1
ABDOMINAL PAIN: 0
SORE THROAT: 0
HEARTBURN: 0
NERVOUS/ANXIOUS: 1
MYALGIAS: 1
HEMATOCHEZIA: 1
NAUSEA: 0
JOINT SWELLING: 0
EYE PAIN: 0
FEVER: 0
CHILLS: 0

## 2022-08-01 ASSESSMENT — ASTHMA QUESTIONNAIRES: ACT_TOTALSCORE: 15

## 2022-08-01 ASSESSMENT — ACTIVITIES OF DAILY LIVING (ADL): CURRENT_FUNCTION: NO ASSISTANCE NEEDED

## 2022-08-01 NOTE — PROGRESS NOTES
"  Assessment & Plan     Medication monitoring encounter  Medications reviewed     Idiopathic chronic gout without tophus, unspecified site  Continue Allopurinol for prevention of gout flare ups   - allopurinol (ZYLOPRIM) 100 MG tablet; TAKE 2 TABLETS(200 MG) BY MOUTH DAILY    Acute heart failure, unspecified heart failure type (H)  Assess renal panel and digoxin level  Follow up with cardiology   - Digoxin level  - Basic metabolic panel  (Ca, Cl, CO2, Creat, Gluc, K, Na, BUN)  - metoprolol succinate ER (TOPROL XL) 50 MG 24 hr tablet; Take 1 tablet (50 mg) by mouth daily  - XR Chest 2 Views; Future    Chronic atrial fibrillation (H)  Not well controlled heart rate, increase Metoprolol to 50 mg daily. Monitor for possible increased wheezing with history of asthma. Consider Ca channel blocker   - Digoxin level  - Basic metabolic panel  (Ca, Cl, CO2, Creat, Gluc, K, Na, BUN)  - XR Chest 2 Views; Future    Cough  Assess XCR   - XR Chest 2 Views; Future    Hospital discharge follow-up  Continue Eliquis, BB and diuretic. Slowly improved edema, SOB, heart rate    Mild persistent asthma without complication  Continue inhalers, follow up with pulmonary     History of aortic root enlargement, no change by echo, mild      BMI:   Estimated body mass index is 31.9 kg/m  as calculated from the following:    Height as of this encounter: 1.829 m (6').    Weight as of this encounter: 106.7 kg (235 lb 3.2 oz).       See Patient Instructions    Return in about 4 months (around 12/1/2022) for Routine Visit.    Familia Gillespie MD  [facility] OSCAR Paulson is a 77 year old, presenting for the following health issues:  Hospital F/U      Healthy Habits:     In general, how would you rate your overall health?  Good    Frequency of exercise:  None    Do you usually eat at least 4 servings of fruit and vegetables a day, include whole grains    & fiber and avoid regularly eating high fat or \"junk\" foods?  " Yes    Taking medications regularly:  Yes    Medication side effects:  None    Ability to successfully perform activities of daily living:  No assistance needed    Home Safety:  No safety concerns identified    Hearing Impairment:  Difficulty following a conversation in a noisy restaurant or crowded room, feel that people are mumbling or not speaking clearly and difficulty understanding soft or whispered speech    In the past 6 months, have you been bothered by leaking of urine?  No    In general, how would you rate your overall mental or emotional health?  Excellent      PHQ-2 Total Score: 0    Additional concerns today:  No       Post Discharge Outreach 7/21/2022   Admission Date 7/19/2022   Reason for Admission Shortness of breath   Discharge Date 7/20/2022   Discharge Diagnosis Shortness of breath   How are you doing now that you are home? Patient shares that he is feeling better. However, he is still having anxiousness and plans to speak with his PCP about this at upcoming appt. SW offered to message PCP to see if he could prescribe something to take as needed. Patient states he would appreciate this. No other concerns/needs at this time   How are your symptoms? (Red Flag symptoms escalate to triage hotline per guidelines) Improved   Do you feel your condition is stable enough to be safe at home until your provider visit? Yes   Does the patient have their discharge instructions?  Yes   Does the patient have questions regarding their discharge instructions?  No   Were you started on any new medications or were there changes to any of your previous medications?  Yes   Does the patient have all of their medications? Yes   Do you have questions regarding any of your medications?  No   Do you have all of your needed medical supplies or equipment (DME)?  (i.e. oxygen tank, CPAP, cane, etc.) Yes   Discharge follow-up appointment scheduled within 14 calendar days?  Yes   Discharge Follow Up Appointment Date 8/1/2022    Discharge Follow Up Appointment Scheduled with? Primary Care Provider     Hospital Follow-up Visit:    Hospital/Nursing Home/IP Rehab Facility: [facility]  Date of Admission: 7/19  Date of Discharge: 7/20  Reason(s) for Admission: SOB, CHF, a fib     Was your hospitalization related to COVID-19? No   Problems taking medications regularly:  None  Medication changes since discharge: None  Problems adhering to non-medication therapy:  None    Summary of hospitalization:  [facility] discharge summary reviewed  Diagnostic Tests/Treatments reviewed.  Follow up needed: clinic follow up   Other Healthcare Providers Involved in Patient s Care:         Specialist appointment - cardiology   Update since discharge: improved. Post Medication Reconciliation Status:        Plan of care communicated with patient     Recently hospitalized for SOB , edema of the legs.   Found to be in A fib, decreased ED - 20-25%, new for him.   Started on Eliquis, Lasix, Metoprolol, Lisinopril.   Had elevated troponin. Has h/o aortic root enlargement, no change. No angiogram was done, has follow up with cardiology.   No chest pain reported. Feels improved regarding edema and SOB.   Has h/o COPD. Uses steroid inhalers with PRN Albuterol.   Has been wheezing/ rattling in the chest. No fever, chills, has cough with scarce phlegm.         Review of Systems   Constitutional: Negative for chills and fever.   HENT: Positive for congestion and hearing loss. Negative for ear pain and sore throat.    Eyes: Positive for visual disturbance. Negative for pain.   Respiratory: Positive for cough and shortness of breath.    Cardiovascular: Positive for chest pain and peripheral edema. Negative for palpitations.   Gastrointestinal: Positive for hematochezia. Negative for abdominal pain, constipation, diarrhea, heartburn and nausea.   Genitourinary: Positive for frequency and impotence. Negative for dysuria, genital sores,  hematuria, penile discharge and urgency.   Musculoskeletal: Positive for arthralgias and myalgias. Negative for joint swelling.   Skin: Negative for rash.   Neurological: Negative for dizziness, weakness, headaches and paresthesias.   Psychiatric/Behavioral: Negative for mood changes. The patient is nervous/anxious.             Objective    /68 (BP Location: Left arm, Patient Position: Chair, Cuff Size: Adult Large)   Pulse 106   Temp 97.7  F (36.5  C) (Tympanic)   Resp 18   Ht 1.829 m (6')   Wt 106.7 kg (235 lb 3.2 oz)   SpO2 96%   BMI 31.90 kg/m    Body mass index is 31.9 kg/m .  Physical Exam   GENERAL: healthy, alert and no distress  EYES: Eyes grossly normal to inspection, PERRL and conjunctivae and sclerae normal  HENT: ear canals and TM's normal, nose and mouth without ulcers or lesions  NECK: no adenopathy, no asymmetry, masses, or scars and thyroid normal to palpation  RESP: lungs clear to auscultation - no rales, rhonchi or wheezes  CV: irregular rate and rhythm, normal S1 S2, no S3 or S4, 3/6 systolic murmur, no click or rub, 1+  peripheral edema and peripheral pulses strong  ABDOMEN: soft, nontender, no hepatosplenomegaly, no masses and bowel sounds normal  MS: no gross musculoskeletal defects noted  SKIN: no suspicious lesions or rashes  NEURO: Normal strength and tone, mentation intact and speech normal  PSYCH: mentation appears normal, affect normal/bright    Lab on 07/22/2022   Component Date Value Ref Range Status     Sodium 07/22/2022 138  133 - 144 mmol/L Final     Potassium 07/22/2022 4.4  3.4 - 5.3 mmol/L Final     Chloride 07/22/2022 109  94 - 109 mmol/L Final     Carbon Dioxide (CO2) 07/22/2022 26  20 - 32 mmol/L Final     Anion Gap 07/22/2022 3  3 - 14 mmol/L Final     Urea Nitrogen 07/22/2022 30  7 - 30 mg/dL Final     Creatinine 07/22/2022 1.24  0.66 - 1.25 mg/dL Final     Calcium 07/22/2022 9.0  8.5 - 10.1 mg/dL Final     Glucose 07/22/2022 108 (A) 70 - 99 mg/dL Final     GFR  Estimate 07/22/2022 60 (A) >60 mL/min/1.73m2 Final    Effective December 21, 2021 eGFRcr in adults is calculated using the 2021 CKD-EPI creatinine equation which includes age and gender (Raoul et al., NEJM, DOI: 10.1056/NRZLxk0262871)                   .  ..

## 2022-08-01 NOTE — LETTER
August 3, 2022      Khurram Reynoso  [id number]  [facility] [address]        Dear ,    We are writing to inform you of your test results.      Normal result reviewed.    Resulted Orders   Digoxin level   Result Value Ref Range    Digoxin 0.9 0.6 - 2.0 ng/mL      Comment:      Therapeutic Range:  Adults: 0.6-1.2 ng/mL    The reference range for infants (less than 3 mo) is thought to be 3.0-4.0 ng/mL; infants tolerate more digoxin because they have more binding sites and an increased metabolic rate.   Basic metabolic panel  (Ca, Cl, CO2, Creat, Gluc, K, Na, BUN)   Result Value Ref Range    Sodium 140 133 - 144 mmol/L    Potassium 4.5 3.4 - 5.3 mmol/L    Chloride 108 94 - 109 mmol/L    Carbon Dioxide (CO2) 26 20 - 32 mmol/L    Anion Gap 6 3 - 14 mmol/L    Urea Nitrogen 25 7 - 30 mg/dL    Creatinine 1.18 0.66 - 1.25 mg/dL    Calcium 9.4 8.5 - 10.1 mg/dL    Glucose 91 70 - 99 mg/dL    GFR Estimate 64 >60 mL/min/1.73m2      Comment:      Effective December 21, 2021 eGFRcr in adults is calculated using the 2021 CKD-EPI creatinine equation which includes age and gender ( et al., NEJM, DOI: 10.1056/USRSpn0713171)       If you have any questions or concerns, please call the clinic at the number listed above.       Sincerely,      Familia Gillespie MD

## 2022-08-02 LAB
ANION GAP SERPL CALCULATED.3IONS-SCNC: 6 MMOL/L (ref 3–14)
BUN SERPL-MCNC: 25 MG/DL (ref 7–30)
CALCIUM SERPL-MCNC: 9.4 MG/DL (ref 8.5–10.1)
CHLORIDE BLD-SCNC: 108 MMOL/L (ref 94–109)
CO2 SERPL-SCNC: 26 MMOL/L (ref 20–32)
CREAT SERPL-MCNC: 1.18 MG/DL (ref 0.66–1.25)
GFR SERPL CREATININE-BSD FRML MDRD: 64 ML/MIN/1.73M2
GLUCOSE BLD-MCNC: 91 MG/DL (ref 70–99)
POTASSIUM BLD-SCNC: 4.5 MMOL/L (ref 3.4–5.3)
SODIUM SERPL-SCNC: 140 MMOL/L (ref 133–144)

## 2022-08-05 ENCOUNTER — HOSPITAL ENCOUNTER (OUTPATIENT)
Facility: CLINIC | Age: 77
End: 2022-08-05
Attending: INTERNAL MEDICINE | Admitting: INTERNAL MEDICINE
Payer: COMMERCIAL

## 2022-08-05 ENCOUNTER — OFFICE VISIT (OUTPATIENT)
Dept: CARDIOLOGY | Facility: CLINIC | Age: 77
End: 2022-08-05
Attending: INTERNAL MEDICINE
Payer: COMMERCIAL

## 2022-08-05 VITALS
BODY MASS INDEX: 32.1 KG/M2 | HEART RATE: 86 BPM | HEIGHT: 72 IN | SYSTOLIC BLOOD PRESSURE: 108 MMHG | WEIGHT: 237 LBS | DIASTOLIC BLOOD PRESSURE: 73 MMHG

## 2022-08-05 DIAGNOSIS — I48.0 PAF (PAROXYSMAL ATRIAL FIBRILLATION) (H): Primary | ICD-10-CM

## 2022-08-05 DIAGNOSIS — I43 TACHYCARDIA INDUCED CARDIOMYOPATHY (H): ICD-10-CM

## 2022-08-05 DIAGNOSIS — R00.0 TACHYCARDIA INDUCED CARDIOMYOPATHY (H): ICD-10-CM

## 2022-08-05 PROCEDURE — 93000 ELECTROCARDIOGRAM COMPLETE: CPT | Performed by: NURSE PRACTITIONER

## 2022-08-05 PROCEDURE — 99213 OFFICE O/P EST LOW 20 MIN: CPT | Performed by: NURSE PRACTITIONER

## 2022-08-05 NOTE — PATIENT INSTRUCTIONS
Today's Recommendations    Outpatient cardioversion to attempt to restore sinus rhythm  Repeat echocardiogram ~1 month after cardioversion and follow up in clinic  Continue all medications without changes.    Please send a [employer] message or call [phone] to the RN team with questions or concerns.     Scheduling number [phone]    MICKEY Alanis, CNP

## 2022-08-05 NOTE — PROGRESS NOTES
Cardiology Clinic Progress Note  Khurram Reynoso MRN# [id number]   YOB: 1945 Age: 77 year old     Primary cardiologist: Dr. Holcomb    Reason for visit: Paroxysmal atrial fibrillation    History of presenting illness:    Khurram Reynoso is a pleasant 77 year old patient with past medical history significant for:  1. Asthma  2. V. Fib arrest during heart cath in 2006  3. Gout  4. COVID 7/4/2021  5. 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 Anxiety  8. Non-ischemic cardiomyopathy  9. PVC s/p PVC ablation in 2007 by Dr. Fountain       The patient presented to [facility] on 7/11/2022 with shortness of breath, orthopnea and PND after he was just diagnosed with COVID in 7/4/2022.  His EKG noted atrial fibrillation with RVR. BNP showed creatinine 1.28, BNP 1850 and troponin 293.  His echocardiogram revealed LVEF of 20 to 25% (previously normal from 2021) with 2-3+ TR and 1+ MR.  He was placed on Eliquis, digoxin, lisinopril, metoprolol XL as well as Xopenex and prednisone.     He was seen in a virtual visit with my colleague on Alex Linder PA-C due to concerns with anxiety and irritability that he thought was secondary to Eliquis.  No medication changes were made and was recommended that he follow-up with his PCP and keep today's visit.    Today he presents with his wife.  His EKG notes that he remains in A. fib with controlled ventricular response (heart rate of 77 bpm).  He continues to have some lower extremity edema for which he was started on allopurinol by his PCP due to concerns for recurrence of gout.  The patient has not started the medication as of yet.  We discussed proceeding with a cardioversion and the patient is agreeable. I have reviewed the risks of the procedure including reactions to conscious sedation, skin irritation, stroke, and bradycardia.    Diagnostic Studies:    EKG (8/5/2022):  Atrial fibrillation with controlled ventricular response    CXR (8/10/2022): Tortuous calcified thoracic aorta otherwise negative.    Echocardiogram (7/12/2022): LVEF of 20 to 25% with moderate to severe (2-3+) TR.  RVSP was elevated consistent with mild pulmonary hypertension.    Echocardiogram (1/28/2021): LVEF was 55% with mild LVH and mild aortic root and ascending aortic dilatation           Assessment and Plan:     ASSESSMENT:    1. Paroxysmal atrial fibrillation RVR    Anticoagulated on Eliquis 5 mg twice daily patient reports he has not missed any dosing since being discharged from the hospital    Rate control with metoprolol XL with dose recently increased from 25 mg to 50 mg daily and digoxin 125 mcg daily    2. New cardiomyopathy, suspected tachycardia mediated     LVEF 20 to 25% from 7/12/2021.  Previously LVEF was 55% 2021    Currently on metoprolol XL 50 mg daily, lisinopril 2.5 mg daily and Lasix 20 mg daily    Discharge weight was 252 pounds and patient remained stable around 235 to 237 pounds with some mild lower extremity edema.  No concerns with PND, orthopnea, shortness of breath on exertion or chest discomfort on exertion    3. PVCs     S/p PVC ablation in 2007 by Dr. Fountain    4. Anxiety    PLAN:     1. Proceed with outpatient DCCV  2. Limited echocardiogram approximately 1 month post restoration of sinus rhythm and follow-up with cardiology clinic       Orders this Visit:  Orders Placed This Encounter   Procedures     Follow-Up with Cardiology [facility]     EKG 12-lead complete w/read - Clinics (performed today)     Cardioversion     Echocardiogram Limited     No orders of the defined types were placed in this encounter.    There are no discontinued medications.    Today's clinic visit entailed:  Review of the result(s) of each unique test - Echo, EKG, CXR  Assessment requiring an independent historian(s) - family - Wife  Ordering of each unique test  20 minutes spent on the date of the encounter  doing chart review, history and exam, documentation and further activities per the note  Provider  Link to [facility] Help Grid     The level of medical decision making during this visit was of moderate complexity.           Review of Systems:     Review of Systems:  Skin:  not assessed     Eyes:  not assessed    ENT:  not assessed    Respiratory:  Positive for dyspnea at rest  Cardiovascular:    palpitations;Positive for  Gastroenterology: not assessed    Genitourinary:  not assessed    Musculoskeletal:  not assessed    Neurologic:  not assessed    Psychiatric:  not assessed    Heme/Lymph/Imm:  Negative    Endocrine:  Negative              Physical Exam:     Vitals: /73 (BP Location: Left arm, Cuff Size: Adult Large)   Pulse 86   Ht 1.829 m (6')   Wt 107.5 kg (237 lb)   BMI 32.14 kg/m    Constitutional: Well nourished and in no apparent distress.  Eyes: Pupils equal, round. Sclerae anicteric.   HEENT: Normocephalic, atraumatic.   Neck: Supple. JVD   Respiratory: Breathing non-labored. Lungs clear to auscultation bilaterally. No crackles, wheezes, rhonchi, or rales.  Cardiovascular: Irregular irregular rate and rhythm, normal S1 and S2. No murmur, rub, or gallop.  Skin: Warm, dry. No rashes, cyanosis, or xanthelasma.  Extremities: Bilateral LE edema R>L  Neurologic: No gross motor deficits. Alert, awake, and oriented to person, place and time.  Psychiatric: Affect appropriate.        Orders Placed This Encounter   Procedures     Follow-Up with Cardiology [facility]     EKG 12-lead complete w/read - Clinics (performed today)     Cardioversion     Echocardiogram Limited     No orders of the defined types were placed in this encounter.    There are no discontinued medications.      Encounter Diagnoses   Name Primary?     Tachycardia induced cardiomyopathy (H)      PAF (paroxysmal atrial fibrillation) (H) Yes       CURRENT MEDICATIONS:  Current Outpatient Medications   Medication Sig Dispense Refill     apixaban  ANTICOAGULANT (ELIQUIS) 5 MG tablet Take 1 tablet (5 mg) by mouth 2 times daily 60 tablet 3     ARNUITY ELLIPTA 100 MCG/ACT inhaler INHALE 1 PUFF INTO THE LUNGS DAILY       digoxin (LANOXIN) 125 MCG tablet Take 1 tablet (125 mcg) by mouth daily 30 tablet 1     fluticasone (FLONASE) 50 MCG/ACT nasal spray SHAKE LIQUID AND USE 1 TO 2 SPRAYS IN EACH NOSTRIL DAILY 16 g 2     furosemide (LASIX) 20 MG tablet Take 1 tablet (20 mg) by mouth daily 30 tablet 1     levalbuterol (XOPENEX HFA) 45 MCG/ACT inhaler Inhale 2 puffs into the lungs every 6 hours as needed for wheezing 18 g 3     lisinopril (ZESTRIL) 2.5 MG tablet Take 1 tablet (2.5 mg) by mouth daily 30 tablet 3     metoprolol succinate ER (TOPROL XL) 50 MG 24 hr tablet Take 1 tablet (50 mg) by mouth daily 90 tablet 1     VITAMIN D PO Take one tablet by mouth daily       allopurinol (ZYLOPRIM) 100 MG tablet TAKE 2 TABLETS(200 MG) BY MOUTH DAILY (Patient not taking: Reported on 8/5/2022) 180 tablet 3       ALLERGIES  No Known Allergies      PAST MEDICAL HISTORY:  Past Medical History:   Diagnosis Date     Acute bronchitis 10/17/2005     Asthma 2018     Cardiac arrest (H) 6/2006    V-Fib arrest during heart cath     CARDIAC DYSRHYTHMIA NOS 7/27/2005     Degeneration of lumbar or lumbosacral intervertebral disc      Gastroesophageal reflux disease      Gout 2001     Neoplasm of uncertain behavior of skin 2017    Created by Conversion      Other chronic pain     back     PAC (premature atrial contraction)      Paroxysmal A-fib (H) 2006     PONV (postoperative nausea and vomiting)      PRIM CARDIOMYOPATHY NEC 7/18/2006     PVC (premature ventricular contraction)      Renal disease     kidney stones     Uncomplicated asthma        PAST SURGICAL HISTORY:  Past Surgical History:   Procedure Laterality Date     ABLATION OF DYSRHYTHMIC FOCUS  04/03/2007    RVOT PVCs     CARDIAC SURGERY      Ablation     COMBINED CYSTOSCOPY, INSERT STENT URETER(S) Left 8/6/2020    Procedure:  Video cystoscopy, left double-J stent placement and exam under anesthesia;  Surgeon: Dank Han MD;  Location: [facility] OR     DECOMPRESS DISC RF LUMBAR  3/2001    lumbar fusion L2-5     LASER HOLMIUM LITHOTRIPSY URETER(S), INSERT STENT, COMBINED Left 8/20/2020    Procedure: Video cystoscopy, left double-J stent removal, rigid ureteroscopy, flexible renoscopy with holmium laser lithotripsy and stone extraction.;  Surgeon: Dank Han MD;  Location: [facility] OR     OPTICAL TRACKING SYSTEM FUSION SPINE POSTERIOR LUMBAR THREE+ LEVELS N/A 6/27/2016    Procedure: OPTICAL TRACKING SYSTEM FUSION SPINE POSTERIOR LUMBAR THREE+ LEVELS;  Surgeon: Hieu Araiza MD;  Location: [facility] OR     ORTHOPEDIC SURGERY Bilateral     total knee replacements     ORTHOPEDIC SURGERY Right     Total Hipe Replacement     SOFT TISSUE SURGERY Right     right wrist ganglion surgery     ZZC NONSPECIFIC PROCEDURE      knee     ZZC TOTAL HIP ARTHROPLASTY      Description: Total Hip Replacement;  Recorded: 09/24/2010;     ZZC TOTAL KNEE ARTHROPLASTY      Description: Total Knee Arthroplasty;  Recorded: 09/24/2010;  Comments: right       FAMILY HISTORY:  Family History   Problem Relation Age of Onset     C.A.D. Father      Hypertension Father      Heart Surgery Father         bypass     Cancer Brother         bone ca      Family History Negative Mother      Other - See Comments Sister         polio     Unknown/Adopted Maternal Grandmother      Unknown/Adopted Maternal Grandfather      Unknown/Adopted Paternal Grandmother      Unknown/Adopted Paternal Grandfather      Family History Negative Sister      CABG Father 77.00       SOCIAL HISTORY:  Social History     Socioeconomic History     Marital status:      Spouse name: None     Number of children: None     Years of education: None     Highest education level: None   Tobacco Use     Smoking status: Never Smoker     Smokeless tobacco: Never Used   Substance and Sexual Activity      Alcohol use: No     Alcohol/week: 0.0 standard drinks     Drug use: No     Sexual activity: Not Currently   Other Topics Concern     Caffeine Concern No     Comment: 20oz coffee a day.  occ pop     Sleep Concern No     Stress Concern No     Weight Concern No     Special Diet No     Exercise No     Comment: back issue     Seat Belt Yes

## 2022-08-05 NOTE — LETTER
8/5/2022    Familia Gillespie MD  [address] [facility] [address]    RE: Khurram Reynoso       Dear Colleague,     I had the pleasure of seeing Khurram Reynoso in the [facility].    Cardiology Clinic Progress Note  Khurram Reynoso MRN# [id number]   YOB: 1945 Age: 77 year old     Primary cardiologist: Dr. Holcomb    Reason for visit: Paroxysmal atrial fibrillation    History of presenting illness:    Khurram Reynoso is a pleasant 77 year old patient with past medical history significant for:  1. Asthma  2. V. Fib arrest during heart cath in 2006  3. Gout  4. COVID 7/4/2021  5. 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 Anxiety  8. Non-ischemic cardiomyopathy  9. PVC s/p PVC ablation in 2007 by Dr. Fountain       The patient presented to [facility] on 7/11/2022 with shortness of breath, orthopnea and PND after he was just diagnosed with COVID in 7/4/2022.  His EKG noted atrial fibrillation with RVR. BNP showed creatinine 1.28, BNP 1850 and troponin 293.  His echocardiogram revealed LVEF of 20 to 25% (previously normal from 2021) with 2-3+ TR and 1+ MR.  He was placed on Eliquis, digoxin, lisinopril, metoprolol XL as well as Xopenex and prednisone.     He was seen in a virtual visit with my colleague on Alex Linder PA-C due to concerns with anxiety and irritability that he thought was secondary to Eliquis.  No medication changes were made and was recommended that he follow-up with his PCP and keep today's visit.    Today he presents with his wife.  His EKG notes that he remains in A. fib with controlled ventricular response (heart rate of 77 bpm).  He continues to have some lower extremity edema for which he was started on allopurinol by his PCP due to concerns for recurrence of gout.  The patient has not started the medication as of yet.  We discussed proceeding with a  cardioversion and the patient is agreeable. I have reviewed the risks of the procedure including reactions to conscious sedation, skin irritation, stroke, and bradycardia.    Diagnostic Studies:    EKG (8/5/2022): Atrial fibrillation with controlled ventricular response    CXR (8/10/2022): Tortuous calcified thoracic aorta otherwise negative.    Echocardiogram (7/12/2022): LVEF of 20 to 25% with moderate to severe (2-3+) TR.  RVSP was elevated consistent with mild pulmonary hypertension.    Echocardiogram (1/28/2021): LVEF was 55% with mild LVH and mild aortic root and ascending aortic dilatation           Assessment and Plan:     ASSESSMENT:    1. Paroxysmal atrial fibrillation RVR    Anticoagulated on Eliquis 5 mg twice daily patient reports he has not missed any dosing since being discharged from the hospital    Rate control with metoprolol XL with dose recently increased from 25 mg to 50 mg daily and digoxin 125 mcg daily    2. New cardiomyopathy, suspected tachycardia mediated     LVEF 20 to 25% from 7/12/2021.  Previously LVEF was 55% 2021    Currently on metoprolol XL 50 mg daily, lisinopril 2.5 mg daily and Lasix 20 mg daily    Discharge weight was 252 pounds and patient remained stable around 235 to 237 pounds with some mild lower extremity edema.  No concerns with PND, orthopnea, shortness of breath on exertion or chest discomfort on exertion    3. PVCs     S/p PVC ablation in 2007 by Dr. Fountain    4. Anxiety    PLAN:     1. Proceed with outpatient DCCV  2. Limited echocardiogram approximately 1 month post restoration of sinus rhythm and follow-up with cardiology clinic       Orders this Visit:  Orders Placed This Encounter   Procedures     Follow-Up with Cardiology RED     EKG 12-lead complete w/read - Clinics (performed today)     Cardioversion     Echocardiogram Limited     No orders of the defined types were placed in this encounter.    There are no discontinued medications.    Today's clinic visit  entailed:  Review of the result(s) of each unique test - Echo, EKG, CXR  Assessment requiring an independent historian(s) - family - Wife  Ordering of each unique test  20 minutes spent on the date of the encounter doing chart review, history and exam, documentation and further activities per the note  Provider  Link to [facility] Help Grid     The level of medical decision making during this visit was of moderate complexity.           Review of Systems:     Review of Systems:  Skin:  not assessed     Eyes:  not assessed    ENT:  not assessed    Respiratory:  Positive for dyspnea at rest  Cardiovascular:    palpitations;Positive for  Gastroenterology: not assessed    Genitourinary:  not assessed    Musculoskeletal:  not assessed    Neurologic:  not assessed    Psychiatric:  not assessed    Heme/Lymph/Imm:  Negative    Endocrine:  Negative              Physical Exam:     Vitals: /73 (BP Location: Left arm, Cuff Size: Adult Large)   Pulse 86   Ht 1.829 m (6')   Wt 107.5 kg (237 lb)   BMI 32.14 kg/m    Constitutional: Well nourished and in no apparent distress.  Eyes: Pupils equal, round. Sclerae anicteric.   HEENT: Normocephalic, atraumatic.   Neck: Supple. JVD   Respiratory: Breathing non-labored. Lungs clear to auscultation bilaterally. No crackles, wheezes, rhonchi, or rales.  Cardiovascular: Irregular irregular rate and rhythm, normal S1 and S2. No murmur, rub, or gallop.  Skin: Warm, dry. No rashes, cyanosis, or xanthelasma.  Extremities: Bilateral LE edema R>L  Neurologic: No gross motor deficits. Alert, awake, and oriented to person, place and time.  Psychiatric: Affect appropriate.        Orders Placed This Encounter   Procedures     Follow-Up with Cardiology RED     EKG 12-lead complete w/read - Clinics (performed today)     Cardioversion     Echocardiogram Limited     No orders of the defined types were placed in this encounter.    There are no discontinued medications.      Encounter Diagnoses   Name  Primary?     Tachycardia induced cardiomyopathy (H)      PAF (paroxysmal atrial fibrillation) (H) Yes       CURRENT MEDICATIONS:  Current Outpatient Medications   Medication Sig Dispense Refill     apixaban ANTICOAGULANT (ELIQUIS) 5 MG tablet Take 1 tablet (5 mg) by mouth 2 times daily 60 tablet 3     ARNUITY ELLIPTA 100 MCG/ACT inhaler INHALE 1 PUFF INTO THE LUNGS DAILY       digoxin (LANOXIN) 125 MCG tablet Take 1 tablet (125 mcg) by mouth daily 30 tablet 1     fluticasone (FLONASE) 50 MCG/ACT nasal spray SHAKE LIQUID AND USE 1 TO 2 SPRAYS IN EACH NOSTRIL DAILY 16 g 2     furosemide (LASIX) 20 MG tablet Take 1 tablet (20 mg) by mouth daily 30 tablet 1     levalbuterol (XOPENEX HFA) 45 MCG/ACT inhaler Inhale 2 puffs into the lungs every 6 hours as needed for wheezing 18 g 3     lisinopril (ZESTRIL) 2.5 MG tablet Take 1 tablet (2.5 mg) by mouth daily 30 tablet 3     metoprolol succinate ER (TOPROL XL) 50 MG 24 hr tablet Take 1 tablet (50 mg) by mouth daily 90 tablet 1     VITAMIN D PO Take one tablet by mouth daily       allopurinol (ZYLOPRIM) 100 MG tablet TAKE 2 TABLETS(200 MG) BY MOUTH DAILY (Patient not taking: Reported on 8/5/2022) 180 tablet 3       ALLERGIES  No Known Allergies      PAST MEDICAL HISTORY:  Past Medical History:   Diagnosis Date     Acute bronchitis 10/17/2005     Asthma 2018     Cardiac arrest (H) 6/2006    V-Fib arrest during heart cath     CARDIAC DYSRHYTHMIA NOS 7/27/2005     Degeneration of lumbar or lumbosacral intervertebral disc      Gastroesophageal reflux disease      Gout 2001     Neoplasm of uncertain behavior of skin 2017    Created by Conversion      Other chronic pain     back     PAC (premature atrial contraction)      Paroxysmal A-fib (H) 2006     PONV (postoperative nausea and vomiting)      PRIM CARDIOMYOPATHY NEC 7/18/2006     PVC (premature ventricular contraction)      Renal disease     kidney stones     Uncomplicated asthma        PAST SURGICAL HISTORY:  Past Surgical  History:   Procedure Laterality Date     ABLATION OF DYSRHYTHMIC FOCUS  04/03/2007    RVOT PVCs     CARDIAC SURGERY      Ablation     COMBINED CYSTOSCOPY, INSERT STENT URETER(S) Left 8/6/2020    Procedure: Video cystoscopy, left double-J stent placement and exam under anesthesia;  Surgeon: Dank Han MD;  Location: [facility] OR     DECOMPRESS DISC RF LUMBAR  3/2001    lumbar fusion L2-5     LASER HOLMIUM LITHOTRIPSY URETER(S), INSERT STENT, COMBINED Left 8/20/2020    Procedure: Video cystoscopy, left double-J stent removal, rigid ureteroscopy, flexible renoscopy with holmium laser lithotripsy and stone extraction.;  Surgeon: Dank Han MD;  Location: [facility] OR     OPTICAL TRACKING SYSTEM FUSION SPINE POSTERIOR LUMBAR THREE+ LEVELS N/A 6/27/2016    Procedure: OPTICAL TRACKING SYSTEM FUSION SPINE POSTERIOR LUMBAR THREE+ LEVELS;  Surgeon: Hieu Araiza MD;  Location: [facility] OR     ORTHOPEDIC SURGERY Bilateral     total knee replacements     ORTHOPEDIC SURGERY Right     Total Hipe Replacement     SOFT TISSUE SURGERY Right     right wrist ganglion surgery     ZZC NONSPECIFIC PROCEDURE      knee     ZZC TOTAL HIP ARTHROPLASTY      Description: Total Hip Replacement;  Recorded: 09/24/2010;     ZZC TOTAL KNEE ARTHROPLASTY      Description: Total Knee Arthroplasty;  Recorded: 09/24/2010;  Comments: right       FAMILY HISTORY:  Family History   Problem Relation Age of Onset     C.A.D. Father      Hypertension Father      Heart Surgery Father         bypass     Cancer Brother         bone ca      Family History Negative Mother      Other - See Comments Sister         polio     Unknown/Adopted Maternal Grandmother      Unknown/Adopted Maternal Grandfather      Unknown/Adopted Paternal Grandmother      Unknown/Adopted Paternal Grandfather      Family History Negative Sister      CABG Father 77.00       SOCIAL HISTORY:  Social History     Socioeconomic History     Marital status:      Spouse name: None      Number of children: None     Years of education: None     Highest education level: None   Tobacco Use     Smoking status: Never Smoker     Smokeless tobacco: Never Used   Substance and Sexual Activity     Alcohol use: No     Alcohol/week: 0.0 standard drinks     Drug use: No     Sexual activity: Not Currently   Other Topics Concern     Caffeine Concern No     Comment: 20oz coffee a day.  occ pop     Sleep Concern No     Stress Concern No     Weight Concern No     Special Diet No     Exercise No     Comment: back issue     Seat Belt Yes       Thank you for allowing me to participate in the care of your patient.      Sincerely,     MICKEY BURROUGHS [facility]  cc:   Warren Ward MD  [facility]  [address]  [place] [address]

## 2022-08-09 ENCOUNTER — TELEPHONE (OUTPATIENT)
Dept: CARDIOLOGY | Facility: CLINIC | Age: 77
End: 2022-08-09

## 2022-08-09 NOTE — TELEPHONE ENCOUNTER
----- Message from Jennifer Caba RN sent at 8/5/2022  8:52 AM CDT -----  Regarding: FW: DCCV on 8/11/22  Lets do this together next week!   ----- Message -----  From: Sherry Reaves  Sent: 8/5/2022   8:34 AM CDT  To: Cora Guerrero Jennifer Caba RN  Subject: DCCV on 8/11/22                                  MRN: [id number]    Location: [facility]    Procedure: DCCV    Diagnosis: PAF(Paroxysmal atrial fibrillation)    Procedure Date: 8/11/22    Procedure Time: 11:30am    Patient Arrival Time: 9:30am    Ordering Cardiologist: Dr. Ward    Performing Cardiologist: Dr. Hernandez    Cardiac Assessment Completed: Yes  Date: 8/5/22  Provider: Casey Griffiths    Patient on Coumadin/Warfarin: No  Patient on Pradaxa/Xarelto/Eliquis:  Yes-Eliquis    Previous procedure records requested by MD? No    Additional labs needed at check in? No    COVID Test Scheduled: Pt will do at home test    Appointment was scheduled:  Face to Face    Special instructions:  NA    If pt is having a Cardioverion: Yes  Does the pt have a device:No  If yes, what company:   Was Rep Contacted:

## 2022-08-09 NOTE — TELEPHONE ENCOUNTER
Called patient to review Cardioversion procedure prep instructions below.   Will review the following instructions:    Patient is scheduled for a Cardioversion (DCCV) at [facility] on 8/11/22, check-in time: 9:30 AM    Patient should have no food or drinks after midnight on 8/10/22 (the night before the procedure).     Patient is taking (Lasix) and is taking digoxin. Patient will not take lasix or digoxin the morning of procedure, but can take when they get home.     Patient is not taking insulin.     Patient is taking Eliquis and has been taking daily uninterrupted for at least 3 weeks.      Patient can take their other daily medications the morning of the procedure with small sips of water.     Patient has a responsible adult to drive them home and stay with them for 24 hours after the procedure.     Confirmed follow-up appointment scheduled 9/16/22 at 10:00 am with Alex Hansen PA-C at the [facility]     Patient had no questions about the instructions.     Reminded patient to bring covid test done 1-2 days prior to procedure and bring a picture of negative results. Patient verbalized understanding.     Niya MCPHERSON RN  08/09/22 at 2:13 PM

## 2022-08-11 ENCOUNTER — HOSPITAL ENCOUNTER (OUTPATIENT)
Facility: CLINIC | Age: 77
Discharge: HOME | End: 2022-08-11
Attending: INTERNAL MEDICINE | Admitting: INTERNAL MEDICINE
Payer: COMMERCIAL

## 2022-08-11 ENCOUNTER — ANESTHESIA (OUTPATIENT)
Dept: SURGERY | Facility: CLINIC | Age: 77
End: 2022-08-11
Payer: COMMERCIAL

## 2022-08-11 ENCOUNTER — ANESTHESIA EVENT (OUTPATIENT)
Dept: SURGERY | Facility: CLINIC | Age: 77
End: 2022-08-11
Payer: COMMERCIAL

## 2022-08-11 ENCOUNTER — APPOINTMENT (OUTPATIENT)
Dept: CARDIOLOGY | Facility: CLINIC | Age: 77
End: 2022-08-11
Attending: NURSE PRACTITIONER
Payer: COMMERCIAL

## 2022-08-11 VITALS
TEMPERATURE: 97.9 F | HEART RATE: 73 BPM | DIASTOLIC BLOOD PRESSURE: 63 MMHG | SYSTOLIC BLOOD PRESSURE: 112 MMHG | OXYGEN SATURATION: 98 % | RESPIRATION RATE: 20 BRPM

## 2022-08-11 DIAGNOSIS — I48.0 PAF (PAROXYSMAL ATRIAL FIBRILLATION) (H): ICD-10-CM

## 2022-08-11 LAB
DIGOXIN SERPL-MCNC: 0.5 NG/ML (ref 0.6–2)
MAGNESIUM SERPL-MCNC: 2.1 MG/DL (ref 1.7–2.3)
POTASSIUM SERPL-SCNC: 4.4 MMOL/L (ref 3.4–5.3)

## 2022-08-11 PROCEDURE — 370N000017 HC ANESTHESIA TECHNICAL FEE, PER MIN

## 2022-08-11 PROCEDURE — 93010 ELECTROCARDIOGRAM REPORT: CPT | Mod: XU | Performed by: INTERNAL MEDICINE

## 2022-08-11 PROCEDURE — 250N000009 HC RX 250: Performed by: NURSE ANESTHETIST, CERTIFIED REGISTERED

## 2022-08-11 PROCEDURE — 92960 CARDIOVERSION ELECTRIC EXT: CPT | Performed by: INTERNAL MEDICINE

## 2022-08-11 PROCEDURE — 84132 ASSAY OF SERUM POTASSIUM: CPT | Performed by: INTERNAL MEDICINE

## 2022-08-11 PROCEDURE — 250N000011 HC RX IP 250 OP 636: Performed by: NURSE ANESTHETIST, CERTIFIED REGISTERED

## 2022-08-11 PROCEDURE — 80162 ASSAY OF DIGOXIN TOTAL: CPT | Performed by: INTERNAL MEDICINE

## 2022-08-11 PROCEDURE — 92960 CARDIOVERSION ELECTRIC EXT: CPT

## 2022-08-11 PROCEDURE — 83735 ASSAY OF MAGNESIUM: CPT | Performed by: INTERNAL MEDICINE

## 2022-08-11 PROCEDURE — 258N000003 HC RX IP 258 OP 636: Performed by: INTERNAL MEDICINE

## 2022-08-11 PROCEDURE — 36415 COLL VENOUS BLD VENIPUNCTURE: CPT | Performed by: INTERNAL MEDICINE

## 2022-08-11 RX ORDER — NALOXONE HYDROCHLORIDE 0.4 MG/ML
0.2 INJECTION, SOLUTION INTRAMUSCULAR; INTRAVENOUS; SUBCUTANEOUS
Status: DISCONTINUED | OUTPATIENT
Start: 2022-08-11 | End: 2022-08-11 | Stop reason: HOSPADM

## 2022-08-11 RX ORDER — LIDOCAINE 40 MG/G
CREAM TOPICAL
Status: CANCELLED | OUTPATIENT
Start: 2022-08-11

## 2022-08-11 RX ORDER — FLUMAZENIL 0.1 MG/ML
0.2 INJECTION, SOLUTION INTRAVENOUS
Status: DISCONTINUED | OUTPATIENT
Start: 2022-08-11 | End: 2022-08-11 | Stop reason: HOSPADM

## 2022-08-11 RX ORDER — BENZOCAINE/MENTHOL 6 MG-10 MG
LOZENGE MUCOUS MEMBRANE 3 TIMES DAILY PRN
Status: DISCONTINUED | OUTPATIENT
Start: 2022-08-11 | End: 2022-08-11 | Stop reason: HOSPADM

## 2022-08-11 RX ORDER — LIDOCAINE HYDROCHLORIDE 10 MG/ML
INJECTION, SOLUTION INFILTRATION; PERINEURAL PRN
Status: DISCONTINUED | OUTPATIENT
Start: 2022-08-11 | End: 2022-08-11

## 2022-08-11 RX ORDER — POTASSIUM CHLORIDE 1500 MG/1
20 TABLET, EXTENDED RELEASE ORAL
Status: DISCONTINUED | OUTPATIENT
Start: 2022-08-11 | End: 2022-08-11 | Stop reason: HOSPADM

## 2022-08-11 RX ORDER — ATROPINE SULFATE 0.1 MG/ML
.5-1 INJECTION INTRAVENOUS
Status: DISCONTINUED | OUTPATIENT
Start: 2022-08-11 | End: 2022-08-11 | Stop reason: HOSPADM

## 2022-08-11 RX ORDER — POTASSIUM CHLORIDE 1500 MG/1
20 TABLET, EXTENDED RELEASE ORAL
Status: CANCELLED | OUTPATIENT
Start: 2022-08-11 | End: 2022-08-11

## 2022-08-11 RX ORDER — NALOXONE HYDROCHLORIDE 0.4 MG/ML
0.4 INJECTION, SOLUTION INTRAMUSCULAR; INTRAVENOUS; SUBCUTANEOUS
Status: DISCONTINUED | OUTPATIENT
Start: 2022-08-11 | End: 2022-08-11 | Stop reason: HOSPADM

## 2022-08-11 RX ORDER — SODIUM CHLORIDE 9 MG/ML
INJECTION, SOLUTION INTRAVENOUS CONTINUOUS
Status: DISCONTINUED | OUTPATIENT
Start: 2022-08-11 | End: 2022-08-11 | Stop reason: HOSPADM

## 2022-08-11 RX ORDER — MAGNESIUM SULFATE HEPTAHYDRATE 40 MG/ML
2 INJECTION, SOLUTION INTRAVENOUS
Status: CANCELLED | OUTPATIENT
Start: 2022-08-11

## 2022-08-11 RX ORDER — MAGNESIUM SULFATE HEPTAHYDRATE 40 MG/ML
2 INJECTION, SOLUTION INTRAVENOUS
Status: DISCONTINUED | OUTPATIENT
Start: 2022-08-11 | End: 2022-08-11 | Stop reason: HOSPADM

## 2022-08-11 RX ORDER — PROPOFOL 10 MG/ML
INJECTION, EMULSION INTRAVENOUS PRN
Status: DISCONTINUED | OUTPATIENT
Start: 2022-08-11 | End: 2022-08-11

## 2022-08-11 RX ORDER — POTASSIUM CHLORIDE 1500 MG/1
40 TABLET, EXTENDED RELEASE ORAL
Status: DISCONTINUED | OUTPATIENT
Start: 2022-08-11 | End: 2022-08-11 | Stop reason: HOSPADM

## 2022-08-11 RX ADMIN — LIDOCAINE HYDROCHLORIDE 50 MG: 10 INJECTION, SOLUTION INFILTRATION; PERINEURAL at 11:29

## 2022-08-11 RX ADMIN — SODIUM CHLORIDE: 9 INJECTION, SOLUTION INTRAVENOUS at 11:15

## 2022-08-11 RX ADMIN — PROPOFOL 50 MG: 10 INJECTION, EMULSION INTRAVENOUS at 11:29

## 2022-08-11 ASSESSMENT — ENCOUNTER SYMPTOMS: DYSRHYTHMIAS: 1

## 2022-08-11 ASSESSMENT — ACTIVITIES OF DAILY LIVING (ADL)
ADLS_ACUITY_SCORE: 35
ADLS_ACUITY_SCORE: 35

## 2022-08-11 NOTE — DISCHARGE INSTRUCTIONS
Taking Care of Yourself after Cardioversion - Outpatient    Patient's Name: Khurram Reynoso  Today's Date: August 11, 2022    You had a: elective cardioversion to treat atrial fibrillation  Your doctor was Dr. Hernandez    Activity and diet  Do not drive, drink alcohol or make major decisions for 24 hours. The medicines you received may make you dizzy, sleepy or forgetful.  An adult should stay with you for the first six hours at home. Take it easy for the rest of the day.  Return to your usual diet.    Medicines  You may start taking your usual medicines again. Always follow your doctor s orders.  You may take Tylenol (acetaminophen) if your chest is sore.  You may take antacids if you have heartburn. Take as directed.  If you had cardioversion:  The skin on your chest or back may feel tender for 48 hours. If your skin is tender, you may:  Use cold packs.  Apply 1% hydrocortisone cream to the skin (sold at drug stores).  Take Advil (ibuprofen) or Tylenol (acetaminophen). Follow your doctor s orders.  Call your doctor right a way if you have an irregular heartbeat, shortness of breath or feel dizzy.  Follow-up visits:  {FOLLOW-UP VISITS:[id number]}

## 2022-08-11 NOTE — PROGRESS NOTES
External cardioversion done- pt awake and alert- VSS- discharged to home with wife and discharge instructions done

## 2022-08-11 NOTE — ANESTHESIA PREPROCEDURE EVALUATION
Anesthesia Pre-Procedure Evaluation    Patient: Khurram Reynoso   MRN: [id number] : 1945        Procedure : Procedure(s):  ANESTHESIA, FOR CARDIOVERSION          Past Medical History:   Diagnosis Date     Acute bronchitis 10/17/2005     Asthma 2018     Cardiac arrest (H) 2006    V-Fib arrest during heart cath     CARDIAC DYSRHYTHMIA NOS 2005     Degeneration of lumbar or lumbosacral intervertebral disc      Gastroesophageal reflux disease      Gout      Neoplasm of uncertain behavior of skin 2017    Created by Conversion      Other chronic pain     back     PAC (premature atrial contraction)      Paroxysmal A-fib (H)      PONV (postoperative nausea and vomiting)      PRIM CARDIOMYOPATHY NEC 2006     PVC (premature ventricular contraction)      Renal disease     kidney stones     Uncomplicated asthma       Past Surgical History:   Procedure Laterality Date     ABLATION OF DYSRHYTHMIC FOCUS  2007    RVOT PVCs     CARDIAC SURGERY      Ablation     COMBINED CYSTOSCOPY, INSERT STENT URETER(S) Left 2020    Procedure: Video cystoscopy, left double-J stent placement and exam under anesthesia;  Surgeon: Dank Han MD;  Location:  OR     DECOMPRESS DISC RF LUMBAR  3/2001    lumbar fusion L2-5     LASER HOLMIUM LITHOTRIPSY URETER(S), INSERT STENT, COMBINED Left 2020    Procedure: Video cystoscopy, left double-J stent removal, rigid ureteroscopy, flexible renoscopy with holmium laser lithotripsy and stone extraction.;  Surgeon: Dank Han MD;  Location:  OR     OPTICAL TRACKING SYSTEM FUSION SPINE POSTERIOR LUMBAR THREE+ LEVELS N/A 2016    Procedure: OPTICAL TRACKING SYSTEM FUSION SPINE POSTERIOR LUMBAR THREE+ LEVELS;  Surgeon: Hieu Araiza MD;  Location:  OR     ORTHOPEDIC SURGERY Bilateral     total knee replacements     ORTHOPEDIC SURGERY Right     Total Hipe Replacement     SOFT TISSUE SURGERY Right     right wrist ganglion surgery     [facility]  NONSPECIFIC PROCEDURE      knee     ZC TOTAL HIP ARTHROPLASTY      Description: Total Hip Replacement;  Recorded: 09/24/2010;     ZZC TOTAL KNEE ARTHROPLASTY      Description: Total Knee Arthroplasty;  Recorded: 09/24/2010;  Comments: right      No Known Allergies   Social History     Tobacco Use     Smoking status: Never Smoker     Smokeless tobacco: Never Used   Substance Use Topics     Alcohol use: No     Alcohol/week: 0.0 standard drinks      Wt Readings from Last 1 Encounters:   08/05/22 107.5 kg (237 lb)        Anesthesia Evaluation            ROS/MED HX  ENT/Pulmonary:     (+) asthma Treatment: Inhaler prn,      Neurologic:       Cardiovascular:     (+) -----Taking blood thinners dysrhythmias, congenital heart disease Previous cardiac testing     METS/Exercise Tolerance:     Hematologic:       Musculoskeletal:       GI/Hepatic:     (+) GERD,     Renal/Genitourinary:       Endo:     (+) Obesity,     Psychiatric/Substance Use:       Infectious Disease:       Malignancy:       Other:               OUTSIDE LABS:  CBC:   Lab Results   Component Value Date    WBC 9.1 07/19/2022    WBC 10.1 07/13/2022    HGB 16.3 07/19/2022    HGB 15.0 07/13/2022    HCT 49.8 07/19/2022    HCT 46.3 07/13/2022     07/19/2022     07/13/2022     BMP:   Lab Results   Component Value Date     08/01/2022     07/22/2022    POTASSIUM 4.4 08/11/2022    POTASSIUM 4.5 08/01/2022    CHLORIDE 108 08/01/2022    CHLORIDE 109 07/22/2022    CO2 26 08/01/2022    CO2 26 07/22/2022    BUN 25 08/01/2022    BUN 30 07/22/2022    CR 1.18 08/01/2022    CR 1.24 07/22/2022    GLC 91 08/01/2022     (H) 07/22/2022     COAGS:   Lab Results   Component Value Date    PTT 45 (H) 07/13/2022    INR 0.94 06/27/2006     POC:   Lab Results   Component Value Date     (H) 06/28/2016     HEPATIC:   Lab Results   Component Value Date    ALBUMIN 3.6 12/01/2021    PROTTOTAL 7.7 12/01/2021    ALT 26 12/01/2021    AST 22 12/01/2021     ALKPHOS 189 (H) 12/01/2021    BILITOTAL 0.6 12/01/2021     OTHER:   Lab Results   Component Value Date    LACT 1.2 05/12/2019    SHERRON 9.4 08/01/2022    MAG 2.1 08/11/2022    TSH 6.10 (H) 12/01/2021    T4 1.07 12/01/2021    CRP 4.4 07/11/2022    SED 7 07/16/2020       Anesthesia Plan    ASA Status:  3   NPO Status:  NPO Appropriate    Anesthesia Type: MAC.     - Reason for MAC: straight local not clinically adequate   Induction: Intravenous.           Consents            Postoperative Care            Comments:                MICKEY Sow CRNA

## 2022-08-11 NOTE — PROCEDURES
[facility]    Procedure: Cardioversion External    Date/Time: 8/11/2022 11:36 AM  Performed by: Chalino Hernandez MD  Authorized by: Chalino Hernandez MD       UNIVERSAL PROTOCOL   Site Marked: Yes  Prior Images Obtained and Reviewed:  Yes  Required items: Required blood products, implants, devices and special equipment available    Patient identity confirmed:  Verbally with patient and arm band  Patient was reevaluated immediately before administering moderate or deep sedation or anesthesia  Confirmation Checklist:  Patient's identity using two indicators, relevant allergies, procedure was appropriate and matched the consent or emergent situation and correct equipment/implants were available  Time out: Immediately prior to the procedure a time out was called    Universal Protocol: the Joint Commission Universal Protocol was followed    Preparation: Patient was prepped and draped in usual sterile fashion       ANESTHESIA  Anesthesia was administered and monitored by anesthesiology.  See anesthesia documentation for details.    SEDATION  Patient Sedated: Yes    Sedation:  Propofol  Vital signs: Vital signs monitored during sedation      PROCEDURE DETAILS  Cardioversion basis: elective  Pre-procedure rhythm: atrial fibrillation  Patient position: patient was placed in a supine position  Chest area: chest area exposed  Electrodes: pads  Electrodes placed: anterior-posterior  Number of attempts: 1    Details of Attempts:  Single 150 J synchronized biphasic shock administered which converted the patient back into sinus rhythm with occasional PVCs at a  rate of 65 bpm.  Post-procedure rhythm: normal sinus rhythm  Complications: no complications      PROCEDURE  Describe Procedure: The risks and benefits of the procedure were explained to the patient including the risk of stroke, redness on the chest and back, bradyarrhythmias, tachyarrhythmias and aspiration.  Patient told me he  understood why redoing the procedure, how the procedure was performed, the risks associate with the procedure and the alternatives to the procedure.  Patient told that he wished to proceed.  I confirmed with the patient that he has missed no doses of Eliquis over the last 4 weeks and that the patient has taken his Eliquis this morning.  I also confirmed that the patient's electrolytes are within normal limits for successful electrical cardioversion.    The patient was then administered propofol by the nurse anesthesiologist.  When it was determined by the nurse anesthesiologist the patient was under general anesthesia a single 150 J synchronized biphasic shock was administered using anteroposterior pads.  Patient immediately converted into sinus rhythm with a rate of 65 beats per minute.  No complications occurred during the procedure.  Occasional PVCs were noted as well as the sinus rhythm.  The patient will continue all of his medications and he will follow-up with his primary cardiologist.  Patient Tolerance:  Patient tolerated the procedure well with no immediate complications

## 2022-08-11 NOTE — ANESTHESIA CARE TRANSFER NOTE
Patient: Khurram Reynoso    Procedure: Procedure(s):  ANESTHESIA, FOR CARDIOVERSION       Diagnosis: Afib (H) [I48.91]  Diagnosis Additional Information: No value filed.    Anesthesia Type:   MAC     Note:      Level of Consciousness: drowsy  Oxygen Supplementation: nasal cannula    Independent Airway: airway patency satisfactory and stable  Dentition: dentition unchanged  Vital Signs Stable: post-procedure vital signs reviewed and stable  Report to RN Given: handoff report given  Patient transferred to: Cardiac Special Care          Vitals:  Vitals Value Taken Time   /67 08/11/22 1136   Temp     Pulse 66 08/11/22 1136   Resp 12 08/11/22 1136   SpO2 95 % 08/11/22 1136       Electronically Signed By: MICKEY Sow CRNA  August 11, 2022  11:49 AM

## 2022-08-15 LAB
ATRIAL RATE - MUSE: 75 BPM
DIASTOLIC BLOOD PRESSURE - MUSE: NORMAL MMHG
INTERPRETATION ECG - MUSE: NORMAL
P AXIS - MUSE: NORMAL DEGREES
PR INTERVAL - MUSE: NORMAL MS
QRS DURATION - MUSE: 88 MS
QT - MUSE: 376 MS
QTC - MUSE: 436 MS
R AXIS - MUSE: 71 DEGREES
SYSTOLIC BLOOD PRESSURE - MUSE: NORMAL MMHG
T AXIS - MUSE: -18 DEGREES
VENTRICULAR RATE- MUSE: 81 BPM

## 2022-08-26 LAB
ATRIAL RATE - MUSE: 62 BPM
DIASTOLIC BLOOD PRESSURE - MUSE: NORMAL MMHG
INTERPRETATION ECG - MUSE: NORMAL
P AXIS - MUSE: 59 DEGREES
PR INTERVAL - MUSE: 194 MS
QRS DURATION - MUSE: 88 MS
QT - MUSE: 396 MS
QTC - MUSE: 401 MS
R AXIS - MUSE: 65 DEGREES
SYSTOLIC BLOOD PRESSURE - MUSE: NORMAL MMHG
T AXIS - MUSE: 1 DEGREES
VENTRICULAR RATE- MUSE: 62 BPM

## 2022-09-12 ENCOUNTER — HOSPITAL ENCOUNTER (OUTPATIENT)
Dept: CARDIOLOGY | Facility: CLINIC | Age: 77
Discharge: HOME | End: 2022-09-12
Attending: NURSE PRACTITIONER | Admitting: NURSE PRACTITIONER
Payer: COMMERCIAL

## 2022-09-12 DIAGNOSIS — I48.0 PAF (PAROXYSMAL ATRIAL FIBRILLATION) (H): ICD-10-CM

## 2022-09-12 DIAGNOSIS — I43 TACHYCARDIA INDUCED CARDIOMYOPATHY (H): ICD-10-CM

## 2022-09-12 DIAGNOSIS — R00.0 TACHYCARDIA INDUCED CARDIOMYOPATHY (H): ICD-10-CM

## 2022-09-12 LAB
BI-PLANE LVEF ECHO: NORMAL
LVEF ECHO: NORMAL

## 2022-09-12 PROCEDURE — 93325 DOPPLER ECHO COLOR FLOW MAPG: CPT | Mod: 26 | Performed by: INTERNAL MEDICINE

## 2022-09-12 PROCEDURE — 93321 DOPPLER ECHO F-UP/LMTD STD: CPT

## 2022-09-12 PROCEDURE — 93308 TTE F-UP OR LMTD: CPT | Mod: 26 | Performed by: INTERNAL MEDICINE

## 2022-09-12 PROCEDURE — 93321 DOPPLER ECHO F-UP/LMTD STD: CPT | Mod: 26 | Performed by: INTERNAL MEDICINE

## 2022-09-12 PROCEDURE — 93325 DOPPLER ECHO COLOR FLOW MAPG: CPT

## 2022-09-13 ENCOUNTER — TELEPHONE (OUTPATIENT)
Dept: CARDIOLOGY | Facility: CLINIC | Age: 77
End: 2022-09-13

## 2022-09-13 DIAGNOSIS — I48.20 CHRONIC ATRIAL FIBRILLATION (H): ICD-10-CM

## 2022-09-13 DIAGNOSIS — I50.9 ACUTE HEART FAILURE, UNSPECIFIED HEART FAILURE TYPE (H): ICD-10-CM

## 2022-09-13 RX ORDER — FUROSEMIDE 20 MG
20 TABLET ORAL DAILY
Qty: 90 TABLET | Refills: 3 | Status: SHIPPED | OUTPATIENT
Start: 2022-09-13 | End: 2022-11-28

## 2022-09-13 RX ORDER — DIGOXIN 125 MCG
125 TABLET ORAL DAILY
Qty: 90 TABLET | Refills: 3 | Status: SHIPPED | OUTPATIENT
Start: 2022-09-13 | End: 2022-10-12

## 2022-09-13 NOTE — TELEPHONE ENCOUNTER
[facility] Call Center    Phone Message    May a detailed message be left on voicemail: yes     Reason for Call: Medication Refill Request    Has the patient contacted the pharmacy for the refill? Yes   Name of medication being requested: digoxin (LANOXIN) 125 MCG tablet  furosemide (LASIX) 20 MG tablet  Provider who prescribed the medication: Dr Junior  Pharmacy: [id number] [place], [place] - [address]    Date medication is needed: 9/14  Pharmacy said he should call clinic as not sure who to send this too  PT seeing Diana on Friday 9/16         Action Taken: Other: Cardiology    Travel Screening: Not Applicable

## 2022-10-12 ENCOUNTER — OFFICE VISIT (OUTPATIENT)
Dept: CARDIOLOGY | Facility: CLINIC | Age: 77
End: 2022-10-12
Payer: COMMERCIAL

## 2022-10-12 VITALS
RESPIRATION RATE: 16 BRPM | WEIGHT: 210 LBS | BODY MASS INDEX: 28.44 KG/M2 | SYSTOLIC BLOOD PRESSURE: 110 MMHG | HEIGHT: 72 IN | DIASTOLIC BLOOD PRESSURE: 56 MMHG | HEART RATE: 84 BPM

## 2022-10-12 DIAGNOSIS — I48.19 PERSISTENT ATRIAL FIBRILLATION (H): Primary | ICD-10-CM

## 2022-10-12 DIAGNOSIS — I43 TACHYCARDIA INDUCED CARDIOMYOPATHY (H): ICD-10-CM

## 2022-10-12 DIAGNOSIS — R00.0 TACHYCARDIA INDUCED CARDIOMYOPATHY (H): ICD-10-CM

## 2022-10-12 DIAGNOSIS — I50.22 CHRONIC SYSTOLIC HEART FAILURE (H): ICD-10-CM

## 2022-10-12 DIAGNOSIS — I48.19 PERSISTENT ATRIAL FIBRILLATION (H): ICD-10-CM

## 2022-10-12 DIAGNOSIS — I48.20 CHRONIC ATRIAL FIBRILLATION (H): ICD-10-CM

## 2022-10-12 LAB
ALT SERPL W P-5'-P-CCNC: 31 U/L (ref 10–50)
AST SERPL W P-5'-P-CCNC: 38 U/L (ref 10–50)
T4 FREE SERPL-MCNC: 1.18 NG/DL (ref 0.9–1.7)
TSH SERPL DL<=0.005 MIU/L-ACNC: 4.88 UIU/ML (ref 0.3–4.2)

## 2022-10-12 PROCEDURE — 99417 PROLNG OP E/M EACH 15 MIN: CPT | Performed by: INTERNAL MEDICINE

## 2022-10-12 PROCEDURE — 84460 ALANINE AMINO (ALT) (SGPT): CPT | Performed by: INTERNAL MEDICINE

## 2022-10-12 PROCEDURE — 99215 OFFICE O/P EST HI 40 MIN: CPT | Performed by: INTERNAL MEDICINE

## 2022-10-12 PROCEDURE — 84443 ASSAY THYROID STIM HORMONE: CPT | Performed by: INTERNAL MEDICINE

## 2022-10-12 PROCEDURE — 84450 TRANSFERASE (AST) (SGOT): CPT | Performed by: INTERNAL MEDICINE

## 2022-10-12 PROCEDURE — 84439 ASSAY OF FREE THYROXINE: CPT | Performed by: INTERNAL MEDICINE

## 2022-10-12 PROCEDURE — 36415 COLL VENOUS BLD VENIPUNCTURE: CPT | Performed by: INTERNAL MEDICINE

## 2022-10-12 RX ORDER — AMIODARONE HYDROCHLORIDE 200 MG/1
200 TABLET ORAL 2 TIMES DAILY
Qty: 138 TABLET | Refills: 1 | Status: ON HOLD | OUTPATIENT
Start: 2022-10-12 | End: 2022-10-28

## 2022-10-12 RX ORDER — AMIODARONE HYDROCHLORIDE 200 MG/1
200 TABLET ORAL 2 TIMES DAILY
Qty: 60 TABLET | Refills: 3 | Status: SHIPPED | OUTPATIENT
Start: 2022-10-12 | End: 2022-10-12

## 2022-10-12 RX ORDER — DIGOXIN 125 MCG
125 TABLET ORAL EVERY OTHER DAY
Qty: 90 TABLET | Refills: 3 | Status: ON HOLD | OUTPATIENT
Start: 2022-10-12 | End: 2022-10-28

## 2022-10-12 NOTE — H&P (VIEW-ONLY)
[facility]  Cardiac Electrophysiology     Consultation Note: Sourav Lauren MD    Primary Care: Familia Gillespie MD    Primary Cardiology: Formerly Oh Fountain MD (new primary will be assigned)      Thank you, Familia Singletary, for asking the [facility] Cardiac Arrhythmia care team to see Mr. Khurram Reynoso to evaluate paroxysmal atrial fibrillation.    Assessment/Recommendations   Assessment:    Persistent atrial fibrillation: Formerly paroxysmal, but patient had persistent episode in July 2022 with subsequent cardioversion in August.  The patient reports that he reverted back to atrial fibrillation shortly after the cardioversion and has been persistent since that time.  The patient does have symptoms and clearly had significant reduction in left ventricular systolic function as a result of his arrhythmia (see below).  The underlying pathophysiology of his condition was discussed with the patient and his wife in detail.  Treatment options including medical therapy as well as mapping/ablation were discussed with the patient in detail.  The patient is a candidate for initiation of antiarrhythmic drug therapy with amiodarone now to try and restore sinus rhythm and pursue scheduling of his ablation procedure.  The patient and his wife are in agreement and will be contacted to start that process.  Tachycardia induced cardiomyopathy: Patient's left ventricular systolic performance when he presented in July of this year was severely depressed (LVEF 20-25%) which recovered following cardioversion and decrease in ventricular rate (LVEF 45-50% Sept 2022).  At this point aggressive attempts to maintain AV synchrony are warranted and supported in the guidelines for atrial fibrillation treatment.  JASE: Patient has a history of remote sleep study which apparently was positive but details are unknown.  The patient's wife and apparently son who works in the sleep clinic at [place]  [facility] have strongly suggested that he undergo repeat sleep testing due to sleep disordered breathing.  The relevance of diagnosing and treating obstructive sleep apnea was reinforced regarding the propensity of sleep apnea contributing to his risk of recurrent arrhythmia.  Aortic root dilatation: Mild aortic root dilatation which is unchanged.    Plan:  Holter monitor to assess the patient's ventricular response to activities of daily living.      Amiodarone 200 mg twice daily for 3 weeks then 200 mg daily  Follow-up in the arrhythmia clinic with the EP RED in 3 to 4 weeks with planned cardioversion to follow if the patient has not spontaneously converted.  Amiodarone laboratories (TSH, T4, AST, ALT).  Patient will be contacted by the EP nurse clinician later this week or early next week to begin the scheduling process for mapping/ablation of his atrial fibrillation.  Referral to the sleep clinic for testing diagnosis, and management if JASE is confirmed.              History of Present Illness/Subjective    Mr. Khurram Reynoso is a 77 year old male with history of atrial flutter previously ablated in 2006 by Dr. Fountain.  The patient subsequently developed paroxysmal atrial fibrillation and then more recently persistent atrial fibrillation this year.  He underwent cardioversion but reports that he was back in atrial fibrillation within days.  Since that time he has continued to demonstrate atrial fibrillation by pulse check and symptoms which he describes as \"flutters\".  The patient reports that his overall energy and stamina are diminished in atrial fibrillation.  He had a sleep study in the remote past and was never placed on therapy.  The patient's description of the study suggest that he did have apnea during the course of the study but did not tolerate the initial trial of CPAP.  He never returned to the sleep clinic.  Patient reports no exertional chest pain or pressure.  He is not having any orthopnea, PND " or ankle edema.    ECG:   Personally reviewed.  Multiple tracings reviewed.  Demonstrates atrial fibrillation with RVR as well as sinus rhythm.    ECHO:   Dated: 9/12/2022  1. The left ventricle is normal in size. Left ventricular systolic function is  mildly reduced. The visual ejection fraction is 45-50%. Left ventricular  diastolic function is abnormal. Biplane LVEF is 46%. There is mild global  hypokinesia of the left ventricle.  2. The right ventricle is normal size. The right ventricular systolic function  is normal.  3. There is mild-moderate biatrial enlargement.  4. Trace to mild mitral and tricuspid regurgitation.  5. Mild to moderate aortic regurgitation.  6. No pericardial effusion.  7. In direct comparison to the previous study dated 07/12/2022, there has been  an interval improvement in bi-ventricular systolic function.    Dated: 7/12/2022  The visual ejection fraction is 20-25%.  Left ventricular systolic function is severely reduced.  There is moderate to mod-severe (2-3+) tricuspid regurgitation.  Right ventricular systolic pressure is elevated, consistent with mild  pulmonary hypertension.  There is trace aortic regurgitation.  Trivial pericardial effusion  The rhythm was atrial fibrillation.  Since 2016 there has been a marked deterioration in left ventricular and right  ventricular systolic function. There has naheed significant worsening of mitral  and tricuspid regurgitation and the rhythm has now changed from sinus to  atrial fibrillation.      Other Studies:       Time spent: 70 minutes spent on the date of the encounter doing chart review, history and exam, documentation and further activities as noted above.       Physical Examination Review of Systems   There were no vitals taken for this visit.  There is no height or weight on file to calculate BMI.  Wt Readings from Last 3 Encounters:   08/05/22 107.5 kg (237 lb)   08/01/22 106.7 kg (235 lb 3.2 oz)   07/19/22 105.2 kg (232 lb)      [unfilled]    General     Appearance:   The patient is alert oriented to person place and situation.    The patient is in no acute distress at the time of my evaluation.   HEENT:  Pupils are equal, round, and reactive to light.  Conjunctiva and sclera are clear.  ENT: Oral mucosa is moist and without redness. No evident nasal discharge.   Neck: Without palpable thyroid or appreciable lymph nodes.   Chest: Symmetric, without deformity.   Lungs:   Clear bilaterally with no rales, rhonchi, or wheezes.     Cardiovascular:   Rhythm is irregular with rate in the 80s. S1 and S2 are normal. No significant murmur is present. JVP is normal. Lower extremities demonstrate 1+ pretibial edema bilaterally.  Distal pulses are intact bilaterally.   Abdomen:  Soft, non-tender, and without hepatosplenomegaly. Bowel sounds present.   Extremities: Without deformity.   Skin: Skin is warm, dry, and otherwise intact.   Neurologic: Gait is normal.           A 12 point comprehensive review of systems was negative except as noted.      Medical History  Surgical History Family History Social History   Past Medical History:   Diagnosis Date     Acute bronchitis 10/17/2005     Asthma 2018     Cardiac arrest (H) 6/2006    V-Fib arrest during heart cath     CARDIAC DYSRHYTHMIA NOS 7/27/2005     Degeneration of lumbar or lumbosacral intervertebral disc      Gastroesophageal reflux disease      Gout 2001     Neoplasm of uncertain behavior of skin 2017    Created by Conversion      Other chronic pain     back     PAC (premature atrial contraction)      Paroxysmal A-fib (H) 2006     PONV (postoperative nausea and vomiting)      PRIM CARDIOMYOPATHY NEC 7/18/2006     PVC (premature ventricular contraction)      Renal disease     kidney stones     Uncomplicated asthma     Past Surgical History:   Procedure Laterality Date     ABLATION OF DYSRHYTHMIC FOCUS  04/03/2007    RVOT PVCs     ANESTHESIA CARDIOVERSION N/A 8/11/2022    Procedure: ANESTHESIA, FOR  CARDIOVERSION;  Surgeon: GENERIC ANESTHESIA PROVIDER;  Location: [facility] OR     CARDIAC SURGERY      Ablation     COMBINED CYSTOSCOPY, INSERT STENT URETER(S) Left 8/6/2020    Procedure: Video cystoscopy, left double-J stent placement and exam under anesthesia;  Surgeon: Dank Han MD;  Location: [facility] OR     DECOMPRESS DISC RF LUMBAR  3/2001    lumbar fusion L2-5     LASER HOLMIUM LITHOTRIPSY URETER(S), INSERT STENT, COMBINED Left 8/20/2020    Procedure: Video cystoscopy, left double-J stent removal, rigid ureteroscopy, flexible renoscopy with holmium laser lithotripsy and stone extraction.;  Surgeon: Dank Han MD;  Location: [facility] OR     OPTICAL TRACKING SYSTEM FUSION SPINE POSTERIOR LUMBAR THREE+ LEVELS N/A 6/27/2016    Procedure: OPTICAL TRACKING SYSTEM FUSION SPINE POSTERIOR LUMBAR THREE+ LEVELS;  Surgeon: Hieu Araiza MD;  Location: [facility] OR     ORTHOPEDIC SURGERY Bilateral     total knee replacements     ORTHOPEDIC SURGERY Right     Total Hipe Replacement     SOFT TISSUE SURGERY Right     right wrist ganglion surgery     ZZC NONSPECIFIC PROCEDURE      knee     ZZC TOTAL HIP ARTHROPLASTY      Description: Total Hip Replacement;  Recorded: 09/24/2010;     ZZC TOTAL KNEE ARTHROPLASTY      Description: Total Knee Arthroplasty;  Recorded: 09/24/2010;  Comments: right    Family History   Problem Relation Age of Onset     C.A.D. Father      Hypertension Father      Heart Surgery Father         bypass     Cancer Brother         bone ca      Family History Negative Mother      Other - See Comments Sister         polio     Unknown/Adopted Maternal Grandmother      Unknown/Adopted Maternal Grandfather      Unknown/Adopted Paternal Grandmother      Unknown/Adopted Paternal Grandfather      Family History Negative Sister      CABG Father 77.00    Social History     Socioeconomic History     Marital status:      Spouse name: Not on file     Number of children: Not on file     Years of education: Not  on file     Highest education level: Not on file   Occupational History     Not on file   Tobacco Use     Smoking status: Never     Smokeless tobacco: Never   Substance and Sexual Activity     Alcohol use: No     Alcohol/week: 0.0 standard drinks     Drug use: No     Sexual activity: Not Currently   Other Topics Concern     Parent/sibling w/ CABG, MI or angioplasty before 65F 55M? Not Asked      Service Not Asked     Blood Transfusions Not Asked     Caffeine Concern No     Comment: 20oz coffee a day.  occ pop     Occupational Exposure Not Asked     Hobby Hazards Not Asked     Sleep Concern No     Stress Concern No     Weight Concern No     Special Diet No     Back Care Not Asked     Exercise No     Comment: back issue     Bike Helmet Not Asked     Seat Belt Yes     Self-Exams Not Asked   Social History Narrative     Not on file     Social Determinants of Health     Financial Resource Strain: Not on file   Food Insecurity: Not on file   Transportation Needs: Not on file   Physical Activity: Not on file   Stress: Not on file   Social Connections: Not on file   Intimate Partner Violence: Not on file   Housing Stability: Not on file          Medications  Allergies   Scheduled Meds:  Current Outpatient Medications   Medication Sig Dispense Refill     allopurinol (ZYLOPRIM) 100 MG tablet TAKE 2 TABLETS(200 MG) BY MOUTH DAILY 180 tablet 3     apixaban ANTICOAGULANT (ELIQUIS) 5 MG tablet Take 1 tablet (5 mg) by mouth 2 times daily 60 tablet 3     ARNUITY ELLIPTA 100 MCG/ACT inhaler INHALE 1 PUFF INTO THE LUNGS DAILY       digoxin (LANOXIN) 125 MCG tablet Take 1 tablet (125 mcg) by mouth daily 90 tablet 3     fluticasone (FLONASE) 50 MCG/ACT nasal spray SHAKE LIQUID AND USE 1 TO 2 SPRAYS IN EACH NOSTRIL DAILY 16 g 2     furosemide (LASIX) 20 MG tablet Take 1 tablet (20 mg) by mouth daily 90 tablet 3     levalbuterol (XOPENEX HFA) 45 MCG/ACT inhaler Inhale 2 puffs into the lungs every 6 hours as needed for wheezing 18  g 3     lisinopril (ZESTRIL) 2.5 MG tablet Take 1 tablet (2.5 mg) by mouth daily 30 tablet 3     metoprolol succinate ER (TOPROL XL) 50 MG 24 hr tablet Take 1 tablet (50 mg) by mouth daily 90 tablet 1     VITAMIN D PO Take one tablet by mouth daily      No Known Allergies      Lab Results    Chemistry/lipid CBC Cardiac Enzymes/BNP/TSH/INR   Lab Results   Component Value Date    CHOL 193 12/01/2021    HDL 43 12/01/2021    TRIG 117 12/01/2021    BUN 25 08/01/2022     08/01/2022    CO2 26 08/01/2022    Lab Results   Component Value Date    WBC 9.1 07/19/2022    HGB 16.3 07/19/2022    HCT 49.8 07/19/2022    MCV 97 07/19/2022     07/19/2022    Lab Results   Component Value Date    TSH 6.10 (H) 12/01/2021    INR 0.94 06/27/2006         Sourav Lauren MD  Clinical Cardiac Electrophysiologist  [facility]

## 2022-10-12 NOTE — PROGRESS NOTES
[facility]  Cardiac Electrophysiology     Consultation Note: Sourav Lauren MD    Primary Care: Familia Gillespie MD    Primary Cardiology: Formerly Oh Fountain MD (new primary will be assigned)      Thank you, Familia Singletary, for asking the [facility] Cardiac Arrhythmia care team to see Mr. Khurram Reynoso to evaluate paroxysmal atrial fibrillation.    Assessment/Recommendations   Assessment:    Persistent atrial fibrillation: Formerly paroxysmal, but patient had persistent episode in July 2022 with subsequent cardioversion in August.  The patient reports that he reverted back to atrial fibrillation shortly after the cardioversion and has been persistent since that time.  The patient does have symptoms and clearly had significant reduction in left ventricular systolic function as a result of his arrhythmia (see below).  The underlying pathophysiology of his condition was discussed with the patient and his wife in detail.  Treatment options including medical therapy as well as mapping/ablation were discussed with the patient in detail.  The patient is a candidate for initiation of antiarrhythmic drug therapy with amiodarone now to try and restore sinus rhythm and pursue scheduling of his ablation procedure.  The patient and his wife are in agreement and will be contacted to start that process.  Tachycardia induced cardiomyopathy: Patient's left ventricular systolic performance when he presented in July of this year was severely depressed (LVEF 20-25%) which recovered following cardioversion and decrease in ventricular rate (LVEF 45-50% Sept 2022).  At this point aggressive attempts to maintain AV synchrony are warranted and supported in the guidelines for atrial fibrillation treatment.  JASE: Patient has a history of remote sleep study which apparently was positive but details are unknown.  The patient's wife and apparently son who works in the sleep clinic at [place]  [facility] have strongly suggested that he undergo repeat sleep testing due to sleep disordered breathing.  The relevance of diagnosing and treating obstructive sleep apnea was reinforced regarding the propensity of sleep apnea contributing to his risk of recurrent arrhythmia.  Aortic root dilatation: Mild aortic root dilatation which is unchanged.    Plan:  Holter monitor to assess the patient's ventricular response to activities of daily living.      Amiodarone 200 mg twice daily for 3 weeks then 200 mg daily  Follow-up in the arrhythmia clinic with the EP RED in 3 to 4 weeks with planned cardioversion to follow if the patient has not spontaneously converted.  Amiodarone laboratories (TSH, T4, AST, ALT).  Patient will be contacted by the EP nurse clinician later this week or early next week to begin the scheduling process for mapping/ablation of his atrial fibrillation.  Referral to the sleep clinic for testing diagnosis, and management if JASE is confirmed.              History of Present Illness/Subjective    Mr. Khurram Reynoso is a 77 year old male with history of atrial flutter previously ablated in 2006 by Dr. Fountain.  The patient subsequently developed paroxysmal atrial fibrillation and then more recently persistent atrial fibrillation this year.  He underwent cardioversion but reports that he was back in atrial fibrillation within days.  Since that time he has continued to demonstrate atrial fibrillation by pulse check and symptoms which he describes as \"flutters\".  The patient reports that his overall energy and stamina are diminished in atrial fibrillation.  He had a sleep study in the remote past and was never placed on therapy.  The patient's description of the study suggest that he did have apnea during the course of the study but did not tolerate the initial trial of CPAP.  He never returned to the sleep clinic.  Patient reports no exertional chest pain or pressure.  He is not having any orthopnea, PND " or ankle edema.    ECG:   Personally reviewed.  Multiple tracings reviewed.  Demonstrates atrial fibrillation with RVR as well as sinus rhythm.    ECHO:   Dated: 9/12/2022  1. The left ventricle is normal in size. Left ventricular systolic function is  mildly reduced. The visual ejection fraction is 45-50%. Left ventricular  diastolic function is abnormal. Biplane LVEF is 46%. There is mild global  hypokinesia of the left ventricle.  2. The right ventricle is normal size. The right ventricular systolic function  is normal.  3. There is mild-moderate biatrial enlargement.  4. Trace to mild mitral and tricuspid regurgitation.  5. Mild to moderate aortic regurgitation.  6. No pericardial effusion.  7. In direct comparison to the previous study dated 07/12/2022, there has been  an interval improvement in bi-ventricular systolic function.    Dated: 7/12/2022  The visual ejection fraction is 20-25%.  Left ventricular systolic function is severely reduced.  There is moderate to mod-severe (2-3+) tricuspid regurgitation.  Right ventricular systolic pressure is elevated, consistent with mild  pulmonary hypertension.  There is trace aortic regurgitation.  Trivial pericardial effusion  The rhythm was atrial fibrillation.  Since 2016 there has been a marked deterioration in left ventricular and right  ventricular systolic function. There has naheed significant worsening of mitral  and tricuspid regurgitation and the rhythm has now changed from sinus to  atrial fibrillation.      Other Studies:       Time spent: 70 minutes spent on the date of the encounter doing chart review, history and exam, documentation and further activities as noted above.       Physical Examination Review of Systems   There were no vitals taken for this visit.  There is no height or weight on file to calculate BMI.  Wt Readings from Last 3 Encounters:   08/05/22 107.5 kg (237 lb)   08/01/22 106.7 kg (235 lb 3.2 oz)   07/19/22 105.2 kg (232 lb)      [unfilled]    General     Appearance:   The patient is alert oriented to person place and situation.    The patient is in no acute distress at the time of my evaluation.   HEENT:  Pupils are equal, round, and reactive to light.  Conjunctiva and sclera are clear.  ENT: Oral mucosa is moist and without redness. No evident nasal discharge.   Neck: Without palpable thyroid or appreciable lymph nodes.   Chest: Symmetric, without deformity.   Lungs:   Clear bilaterally with no rales, rhonchi, or wheezes.     Cardiovascular:   Rhythm is irregular with rate in the 80s. S1 and S2 are normal. No significant murmur is present. JVP is normal. Lower extremities demonstrate 1+ pretibial edema bilaterally.  Distal pulses are intact bilaterally.   Abdomen:  Soft, non-tender, and without hepatosplenomegaly. Bowel sounds present.   Extremities: Without deformity.   Skin: Skin is warm, dry, and otherwise intact.   Neurologic: Gait is normal.           A 12 point comprehensive review of systems was negative except as noted.      Medical History  Surgical History Family History Social History   Past Medical History:   Diagnosis Date     Acute bronchitis 10/17/2005     Asthma 2018     Cardiac arrest (H) 6/2006    V-Fib arrest during heart cath     CARDIAC DYSRHYTHMIA NOS 7/27/2005     Degeneration of lumbar or lumbosacral intervertebral disc      Gastroesophageal reflux disease      Gout 2001     Neoplasm of uncertain behavior of skin 2017    Created by Conversion      Other chronic pain     back     PAC (premature atrial contraction)      Paroxysmal A-fib (H) 2006     PONV (postoperative nausea and vomiting)      PRIM CARDIOMYOPATHY NEC 7/18/2006     PVC (premature ventricular contraction)      Renal disease     kidney stones     Uncomplicated asthma     Past Surgical History:   Procedure Laterality Date     ABLATION OF DYSRHYTHMIC FOCUS  04/03/2007    RVOT PVCs     ANESTHESIA CARDIOVERSION N/A 8/11/2022    Procedure: ANESTHESIA, FOR  CARDIOVERSION;  Surgeon: GENERIC ANESTHESIA PROVIDER;  Location: [facility] OR     CARDIAC SURGERY      Ablation     COMBINED CYSTOSCOPY, INSERT STENT URETER(S) Left 8/6/2020    Procedure: Video cystoscopy, left double-J stent placement and exam under anesthesia;  Surgeon: Dank Han MD;  Location: [facility] OR     DECOMPRESS DISC RF LUMBAR  3/2001    lumbar fusion L2-5     LASER HOLMIUM LITHOTRIPSY URETER(S), INSERT STENT, COMBINED Left 8/20/2020    Procedure: Video cystoscopy, left double-J stent removal, rigid ureteroscopy, flexible renoscopy with holmium laser lithotripsy and stone extraction.;  Surgeon: Dank Han MD;  Location: [facility] OR     OPTICAL TRACKING SYSTEM FUSION SPINE POSTERIOR LUMBAR THREE+ LEVELS N/A 6/27/2016    Procedure: OPTICAL TRACKING SYSTEM FUSION SPINE POSTERIOR LUMBAR THREE+ LEVELS;  Surgeon: Hieu Araiza MD;  Location: [facility] OR     ORTHOPEDIC SURGERY Bilateral     total knee replacements     ORTHOPEDIC SURGERY Right     Total Hipe Replacement     SOFT TISSUE SURGERY Right     right wrist ganglion surgery     ZZC NONSPECIFIC PROCEDURE      knee     ZZC TOTAL HIP ARTHROPLASTY      Description: Total Hip Replacement;  Recorded: 09/24/2010;     ZZC TOTAL KNEE ARTHROPLASTY      Description: Total Knee Arthroplasty;  Recorded: 09/24/2010;  Comments: right    Family History   Problem Relation Age of Onset     C.A.D. Father      Hypertension Father      Heart Surgery Father         bypass     Cancer Brother         bone ca      Family History Negative Mother      Other - See Comments Sister         polio     Unknown/Adopted Maternal Grandmother      Unknown/Adopted Maternal Grandfather      Unknown/Adopted Paternal Grandmother      Unknown/Adopted Paternal Grandfather      Family History Negative Sister      CABG Father 77.00    Social History     Socioeconomic History     Marital status:      Spouse name: Not on file     Number of children: Not on file     Years of education: Not  on file     Highest education level: Not on file   Occupational History     Not on file   Tobacco Use     Smoking status: Never     Smokeless tobacco: Never   Substance and Sexual Activity     Alcohol use: No     Alcohol/week: 0.0 standard drinks     Drug use: No     Sexual activity: Not Currently   Other Topics Concern     Parent/sibling w/ CABG, MI or angioplasty before 65F 55M? Not Asked      Service Not Asked     Blood Transfusions Not Asked     Caffeine Concern No     Comment: 20oz coffee a day.  occ pop     Occupational Exposure Not Asked     Hobby Hazards Not Asked     Sleep Concern No     Stress Concern No     Weight Concern No     Special Diet No     Back Care Not Asked     Exercise No     Comment: back issue     Bike Helmet Not Asked     Seat Belt Yes     Self-Exams Not Asked   Social History Narrative     Not on file     Social Determinants of Health     Financial Resource Strain: Not on file   Food Insecurity: Not on file   Transportation Needs: Not on file   Physical Activity: Not on file   Stress: Not on file   Social Connections: Not on file   Intimate Partner Violence: Not on file   Housing Stability: Not on file          Medications  Allergies   Scheduled Meds:  Current Outpatient Medications   Medication Sig Dispense Refill     allopurinol (ZYLOPRIM) 100 MG tablet TAKE 2 TABLETS(200 MG) BY MOUTH DAILY 180 tablet 3     apixaban ANTICOAGULANT (ELIQUIS) 5 MG tablet Take 1 tablet (5 mg) by mouth 2 times daily 60 tablet 3     ARNUITY ELLIPTA 100 MCG/ACT inhaler INHALE 1 PUFF INTO THE LUNGS DAILY       digoxin (LANOXIN) 125 MCG tablet Take 1 tablet (125 mcg) by mouth daily 90 tablet 3     fluticasone (FLONASE) 50 MCG/ACT nasal spray SHAKE LIQUID AND USE 1 TO 2 SPRAYS IN EACH NOSTRIL DAILY 16 g 2     furosemide (LASIX) 20 MG tablet Take 1 tablet (20 mg) by mouth daily 90 tablet 3     levalbuterol (XOPENEX HFA) 45 MCG/ACT inhaler Inhale 2 puffs into the lungs every 6 hours as needed for wheezing 18  g 3     lisinopril (ZESTRIL) 2.5 MG tablet Take 1 tablet (2.5 mg) by mouth daily 30 tablet 3     metoprolol succinate ER (TOPROL XL) 50 MG 24 hr tablet Take 1 tablet (50 mg) by mouth daily 90 tablet 1     VITAMIN D PO Take one tablet by mouth daily      No Known Allergies      Lab Results    Chemistry/lipid CBC Cardiac Enzymes/BNP/TSH/INR   Lab Results   Component Value Date    CHOL 193 12/01/2021    HDL 43 12/01/2021    TRIG 117 12/01/2021    BUN 25 08/01/2022     08/01/2022    CO2 26 08/01/2022    Lab Results   Component Value Date    WBC 9.1 07/19/2022    HGB 16.3 07/19/2022    HCT 49.8 07/19/2022    MCV 97 07/19/2022     07/19/2022    Lab Results   Component Value Date    TSH 6.10 (H) 12/01/2021    INR 0.94 06/27/2006         Sourav Lauren MD  Clinical Cardiac Electrophysiologist  [facility]

## 2022-10-12 NOTE — LETTER
10/12/2022    Familia Gillespie MD  [address] [facility] [address]    RE: Khurram Reynoso       Dear Colleague,     I had the pleasure of seeing Khurram Reynoso in the [facility].    [facility]  Cardiac Electrophysiology     Consultation Note: Sourav Lauren MD    Primary Care: Familia Gillespie MD    Primary Cardiology: Formerly Oh Fountain MD (new primary will be assigned)      Thank you, Familia Singletary, for asking the [facility] Cardiac Arrhythmia care team to see Mr. Khurram Reynoso to evaluate paroxysmal atrial fibrillation.    Assessment/Recommendations   Assessment:      Persistent atrial fibrillation: Formerly paroxysmal, but patient had persistent episode in July 2022 with subsequent cardioversion in August.  The patient reports that he reverted back to atrial fibrillation shortly after the cardioversion and has been persistent since that time.  The patient does have symptoms and clearly had significant reduction in left ventricular systolic function as a result of his arrhythmia (see below).  The underlying pathophysiology of his condition was discussed with the patient and his wife in detail.  Treatment options including medical therapy as well as mapping/ablation were discussed with the patient in detail.  The patient is a candidate for initiation of antiarrhythmic drug therapy with amiodarone now to try and restore sinus rhythm and pursue scheduling of his ablation procedure.  The patient and his wife are in agreement and will be contacted to start that process.    Tachycardia induced cardiomyopathy: Patient's left ventricular systolic performance when he presented in July of this year was severely depressed (LVEF 20-25%) which recovered following cardioversion and decrease in ventricular rate (LVEF 45-50% Sept 2022).  At this point aggressive attempts to maintain AV synchrony are warranted and supported in the guidelines  "for atrial fibrillation treatment.    JASE: Patient has a history of remote sleep study which apparently was positive but details are unknown.  The patient's wife and apparently son who works in the sleep clinic at [facility] have strongly suggested that he undergo repeat sleep testing due to sleep disordered breathing.  The relevance of diagnosing and treating obstructive sleep apnea was reinforced regarding the propensity of sleep apnea contributing to his risk of recurrent arrhythmia.    Aortic root dilatation: Mild aortic root dilatation which is unchanged.    Plan:    Holter monitor to assess the patient's ventricular response to activities of daily living.        Amiodarone 200 mg twice daily for 3 weeks then 200 mg daily    Follow-up in the arrhythmia clinic with the EP RED in 3 to 4 weeks with planned cardioversion to follow if the patient has not spontaneously converted.    Amiodarone laboratories (TSH, T4, AST, ALT).    Patient will be contacted by the EP nurse clinician later this week or early next week to begin the scheduling process for mapping/ablation of his atrial fibrillation.    Referral to the sleep clinic for testing diagnosis, and management if JASE is confirmed.              History of Present Illness/Subjective    Mr. Khurram Reynoso is a 77 year old male with history of atrial flutter previously ablated in 2006 by Dr. Fountain.  The patient subsequently developed paroxysmal atrial fibrillation and then more recently persistent atrial fibrillation this year.  He underwent cardioversion but reports that he was back in atrial fibrillation within days.  Since that time he has continued to demonstrate atrial fibrillation by pulse check and symptoms which he describes as \"flutters\".  The patient reports that his overall energy and stamina are diminished in atrial fibrillation.  He had a sleep study in the remote past and was never placed on therapy.  The patient's description of the study suggest " that he did have apnea during the course of the study but did not tolerate the initial trial of CPAP.  He never returned to the sleep clinic.  Patient reports no exertional chest pain or pressure.  He is not having any orthopnea, PND or ankle edema.    ECG:   Personally reviewed.  Multiple tracings reviewed.  Demonstrates atrial fibrillation with RVR as well as sinus rhythm.    ECHO:   Dated: 9/12/2022  1. The left ventricle is normal in size. Left ventricular systolic function is  mildly reduced. The visual ejection fraction is 45-50%. Left ventricular  diastolic function is abnormal. Biplane LVEF is 46%. There is mild global  hypokinesia of the left ventricle.  2. The right ventricle is normal size. The right ventricular systolic function  is normal.  3. There is mild-moderate biatrial enlargement.  4. Trace to mild mitral and tricuspid regurgitation.  5. Mild to moderate aortic regurgitation.  6. No pericardial effusion.  7. In direct comparison to the previous study dated 07/12/2022, there has been  an interval improvement in bi-ventricular systolic function.    Dated: 7/12/2022  The visual ejection fraction is 20-25%.  Left ventricular systolic function is severely reduced.  There is moderate to mod-severe (2-3+) tricuspid regurgitation.  Right ventricular systolic pressure is elevated, consistent with mild  pulmonary hypertension.  There is trace aortic regurgitation.  Trivial pericardial effusion  The rhythm was atrial fibrillation.  Since 2016 there has been a marked deterioration in left ventricular and right  ventricular systolic function. There has naheed significant worsening of mitral  and tricuspid regurgitation and the rhythm has now changed from sinus to  atrial fibrillation.      Other Studies:       Time spent: 70 minutes spent on the date of the encounter doing chart review, history and exam, documentation and further activities as noted above.       Physical Examination Review of Systems   There  were no vitals taken for this visit.  There is no height or weight on file to calculate BMI.  Wt Readings from Last 3 Encounters:   08/05/22 107.5 kg (237 lb)   08/01/22 106.7 kg (235 lb 3.2 oz)   07/19/22 105.2 kg (232 lb)     [unfilled]    General     Appearance:   The patient is alert oriented to person place and situation.    The patient is in no acute distress at the time of my evaluation.   HEENT:  Pupils are equal, round, and reactive to light.  Conjunctiva and sclera are clear.  ENT: Oral mucosa is moist and without redness. No evident nasal discharge.   Neck: Without palpable thyroid or appreciable lymph nodes.   Chest: Symmetric, without deformity.   Lungs:   Clear bilaterally with no rales, rhonchi, or wheezes.     Cardiovascular:   Rhythm is irregular with rate in the 80s. S1 and S2 are normal. No significant murmur is present. JVP is normal. Lower extremities demonstrate 1+ pretibial edema bilaterally.  Distal pulses are intact bilaterally.   Abdomen:  Soft, non-tender, and without hepatosplenomegaly. Bowel sounds present.   Extremities: Without deformity.   Skin: Skin is warm, dry, and otherwise intact.   Neurologic: Gait is normal.           A 12 point comprehensive review of systems was negative except as noted.      Medical History  Surgical History Family History Social History   Past Medical History:   Diagnosis Date     Acute bronchitis 10/17/2005     Asthma 2018     Cardiac arrest (H) 6/2006    V-Fib arrest during heart cath     CARDIAC DYSRHYTHMIA NOS 7/27/2005     Degeneration of lumbar or lumbosacral intervertebral disc      Gastroesophageal reflux disease      Gout 2001     Neoplasm of uncertain behavior of skin 2017    Created by Conversion      Other chronic pain     back     PAC (premature atrial contraction)      Paroxysmal A-fib (H) 2006     PONV (postoperative nausea and vomiting)      PRIM CARDIOMYOPATHY NEC 7/18/2006     PVC (premature ventricular contraction)      Renal disease      kidney stones     Uncomplicated asthma     Past Surgical History:   Procedure Laterality Date     ABLATION OF DYSRHYTHMIC FOCUS  04/03/2007    RVOT PVCs     ANESTHESIA CARDIOVERSION N/A 8/11/2022    Procedure: ANESTHESIA, FOR CARDIOVERSION;  Surgeon: GENERIC ANESTHESIA PROVIDER;  Location: [facility] OR     CARDIAC SURGERY      Ablation     COMBINED CYSTOSCOPY, INSERT STENT URETER(S) Left 8/6/2020    Procedure: Video cystoscopy, left double-J stent placement and exam under anesthesia;  Surgeon: Dank Han MD;  Location: [facility] OR     DECOMPRESS DISC RF LUMBAR  3/2001    lumbar fusion L2-5     LASER HOLMIUM LITHOTRIPSY URETER(S), INSERT STENT, COMBINED Left 8/20/2020    Procedure: Video cystoscopy, left double-J stent removal, rigid ureteroscopy, flexible renoscopy with holmium laser lithotripsy and stone extraction.;  Surgeon: Dank Han MD;  Location: [facility] OR     OPTICAL TRACKING SYSTEM FUSION SPINE POSTERIOR LUMBAR THREE+ LEVELS N/A 6/27/2016    Procedure: OPTICAL TRACKING SYSTEM FUSION SPINE POSTERIOR LUMBAR THREE+ LEVELS;  Surgeon: Hieu Araiza MD;  Location: [facility] OR     ORTHOPEDIC SURGERY Bilateral     total knee replacements     ORTHOPEDIC SURGERY Right     Total Hipe Replacement     SOFT TISSUE SURGERY Right     right wrist ganglion surgery     ZZC NONSPECIFIC PROCEDURE      knee     ZZC TOTAL HIP ARTHROPLASTY      Description: Total Hip Replacement;  Recorded: 09/24/2010;     ZZC TOTAL KNEE ARTHROPLASTY      Description: Total Knee Arthroplasty;  Recorded: 09/24/2010;  Comments: right    Family History   Problem Relation Age of Onset     C.A.D. Father      Hypertension Father      Heart Surgery Father         bypass     Cancer Brother         bone ca      Family History Negative Mother      Other - See Comments Sister         polio     Unknown/Adopted Maternal Grandmother      Unknown/Adopted Maternal Grandfather      Unknown/Adopted Paternal Grandmother      Unknown/Adopted Paternal  Grandfather      Family History Negative Sister      CABG Father 77.00    Social History     Socioeconomic History     Marital status:      Spouse name: Not on file     Number of children: Not on file     Years of education: Not on file     Highest education level: Not on file   Occupational History     Not on file   Tobacco Use     Smoking status: Never     Smokeless tobacco: Never   Substance and Sexual Activity     Alcohol use: No     Alcohol/week: 0.0 standard drinks     Drug use: No     Sexual activity: Not Currently   Other Topics Concern     Parent/sibling w/ CABG, MI or angioplasty before 65F 55M? Not Asked      Service Not Asked     Blood Transfusions Not Asked     Caffeine Concern No     Comment: 20oz coffee a day.  occ pop     Occupational Exposure Not Asked     Hobby Hazards Not Asked     Sleep Concern No     Stress Concern No     Weight Concern No     Special Diet No     Back Care Not Asked     Exercise No     Comment: back issue     Bike Helmet Not Asked     Seat Belt Yes     Self-Exams Not Asked   Social History Narrative     Not on file     Social Determinants of Health     Financial Resource Strain: Not on file   Food Insecurity: Not on file   Transportation Needs: Not on file   Physical Activity: Not on file   Stress: Not on file   Social Connections: Not on file   Intimate Partner Violence: Not on file   Housing Stability: Not on file          Medications  Allergies   Scheduled Meds:  Current Outpatient Medications   Medication Sig Dispense Refill     allopurinol (ZYLOPRIM) 100 MG tablet TAKE 2 TABLETS(200 MG) BY MOUTH DAILY 180 tablet 3     apixaban ANTICOAGULANT (ELIQUIS) 5 MG tablet Take 1 tablet (5 mg) by mouth 2 times daily 60 tablet 3     ARNUITY ELLIPTA 100 MCG/ACT inhaler INHALE 1 PUFF INTO THE LUNGS DAILY       digoxin (LANOXIN) 125 MCG tablet Take 1 tablet (125 mcg) by mouth daily 90 tablet 3     fluticasone (FLONASE) 50 MCG/ACT nasal spray SHAKE LIQUID AND USE 1 TO 2  SPRAYS IN EACH NOSTRIL DAILY 16 g 2     furosemide (LASIX) 20 MG tablet Take 1 tablet (20 mg) by mouth daily 90 tablet 3     levalbuterol (XOPENEX HFA) 45 MCG/ACT inhaler Inhale 2 puffs into the lungs every 6 hours as needed for wheezing 18 g 3     lisinopril (ZESTRIL) 2.5 MG tablet Take 1 tablet (2.5 mg) by mouth daily 30 tablet 3     metoprolol succinate ER (TOPROL XL) 50 MG 24 hr tablet Take 1 tablet (50 mg) by mouth daily 90 tablet 1     VITAMIN D PO Take one tablet by mouth daily      No Known Allergies      Lab Results    Chemistry/lipid CBC Cardiac Enzymes/BNP/TSH/INR   Lab Results   Component Value Date    CHOL 193 12/01/2021    HDL 43 12/01/2021    TRIG 117 12/01/2021    BUN 25 08/01/2022     08/01/2022    CO2 26 08/01/2022    Lab Results   Component Value Date    WBC 9.1 07/19/2022    HGB 16.3 07/19/2022    HCT 49.8 07/19/2022    MCV 97 07/19/2022     07/19/2022    Lab Results   Component Value Date    TSH 6.10 (H) 12/01/2021    INR 0.94 06/27/2006         Sourav Lauren MD  Clinical Cardiac Electrophysiologist  [facility]    Thank you for allowing me to participate in the care of your patient.      Sincerely,     Sourav Lauren MD     [facility]  cc:   Oh Fountain MD  [address]  [facility] [place] [address]

## 2022-10-13 ENCOUNTER — TELEPHONE (OUTPATIENT)
Dept: CARDIOLOGY | Facility: CLINIC | Age: 77
End: 2022-10-13

## 2022-10-13 DIAGNOSIS — I48.19 PERSISTENT ATRIAL FIBRILLATION (H): Primary | ICD-10-CM

## 2022-10-13 NOTE — TELEPHONE ENCOUNTER
Noted.  PC note created.  Pt has Holter on 10/19.  Request to scheduling for EP RED follow-up in 3-4wks, as this apt is not set up.  DCCV order will be placed at EP RED follow-up if pt not in SR.  Postponed note to contact pt on 10/19, for pts decision for PVI  10/13/2022 9:23 AM  Debo Obrien RN      Per OV 10/12:  Plan:    Holter monitor to assess the patient's ventricular response to activities of daily living.        Amiodarone 200 mg twice daily for 3 weeks then 200 mg daily    Follow-up in the arrhythmia clinic with the EP RED in 3 to 4 weeks with planned cardioversion to follow if the patient has not spontaneously converted.    Amiodarone laboratories (TSH, T4, AST, ALT).    Patient will be contacted by the EP nurse clinician later this week or early next week to begin the scheduling process for mapping/ablation of his atrial fibrillation.    Referral to the sleep clinic for testing diagnosis, and management if JASE is confirmed.           ----- Message from Sourav Lauren MD sent at 10/12/2022  5:56 PM CDT -----  Hi team,  Please see my note from today.  Patient is started on amiodarone with plan for cardioversion.  Please call patient as he wants to move forward with mapping/ablation of his atrial fibrillation.  Persistent AF, prefer [employer] mapping system.  Patient to remain on amiodarone up to the time of procedure.  Thanks  Sourav

## 2022-10-19 ENCOUNTER — HOSPITAL ENCOUNTER (OUTPATIENT)
Dept: CARDIOLOGY | Facility: CLINIC | Age: 77
Discharge: HOME | End: 2022-10-19
Attending: INTERNAL MEDICINE | Admitting: INTERNAL MEDICINE
Payer: COMMERCIAL

## 2022-10-19 DIAGNOSIS — I50.22 CHRONIC SYSTOLIC HEART FAILURE (H): ICD-10-CM

## 2022-10-19 DIAGNOSIS — I43 TACHYCARDIA INDUCED CARDIOMYOPATHY (H): ICD-10-CM

## 2022-10-19 DIAGNOSIS — I48.19 PERSISTENT ATRIAL FIBRILLATION (H): ICD-10-CM

## 2022-10-19 DIAGNOSIS — R06.81 APNEA: ICD-10-CM

## 2022-10-19 DIAGNOSIS — R00.0 TACHYCARDIA INDUCED CARDIOMYOPATHY (H): ICD-10-CM

## 2022-10-19 DIAGNOSIS — R06.83 SNORING: Primary | ICD-10-CM

## 2022-10-19 PROCEDURE — 93226 XTRNL ECG REC<48 HR SCAN A/R: CPT

## 2022-10-20 ENCOUNTER — DOCUMENTATION ONLY (OUTPATIENT)
Dept: CARDIOLOGY | Facility: CLINIC | Age: 77
End: 2022-10-20

## 2022-10-20 DIAGNOSIS — I43 TACHYCARDIA INDUCED CARDIOMYOPATHY (H): ICD-10-CM

## 2022-10-20 DIAGNOSIS — R00.0 TACHYCARDIA INDUCED CARDIOMYOPATHY (H): ICD-10-CM

## 2022-10-20 DIAGNOSIS — I48.19 PERSISTENT ATRIAL FIBRILLATION (H): Primary | ICD-10-CM

## 2022-10-20 NOTE — TELEPHONE ENCOUNTER
Chart prepped, orders placed and discussed ablation time frame and that he will be scheduled as the time gets closer.

## 2022-10-20 NOTE — TELEPHONE ENCOUNTER
1st Attempt to contact pt, voicemail message was left with contact information and instructing pt to call back.   Will determine decision to proceed with PVI upon CB  Of note pt has apt with Joe Payne on 11/16, for rhythm assessment after starting Amio and if DCCV is needed  Holter results not avail at this time yet  10/20/2022 9:24 AM  Debo Obrien RN

## 2022-10-20 NOTE — PROGRESS NOTES
H&P []  Date: Teach []  Date: PVI  Clinic N [x]  Y [] STEFANY N [x] Y[]  Order [] CT  MRI N [x] Y[]  Order []   PVI  Order Case Req [x]  Order Set [] Lab/EKG   []  Orders Letter [] F/U RN [] Date:  Order NP follow-up [] Date:     COVID FV/EPIC []  Date:    Home [] AC Eliquis [x]   Xarelot []  Pradaxa []  Warfarin [] Start []  Cont [x]  Switch [] to:   INR Reminder EP [] & INR Msg to AC team []     AAD     Amidoarone    Hold x3 days []  Continue [x] PPI Protonix 40mg QD [x] 3 days, x 6wks  Pepcid 20mg BID []  Omeprazole 40mg QD []  Continue current PPI []  Increase [] :       1945  Home:[phone] (home) Cell:[phone] (mobile)  Emergency Contact: SOPHY HERNANDEZ [phone]  PCP: Familia Gillespie, [phone]    Important patient information for CSC/Cath Lab staff : None    [facility] EP Cath Lab Procedure Order   Ablation Type:  PVI- Atrial Fibrillation  Diagnosis:  AF  Ordering Provider: Dr Lauren  Ordering Date: 10/19/2022    Scheduling Information:  Anticipated Case Duration:  Standard   Scheduling Timeframe:  Next Available  Clinic Arrangements prior to PVI: Schedule pt directly for PVI procedure with designated MD listed below, pt to see EP NP only for H&P and EP RN for education prior  Scheduling Restrictions: None  Scheduling Contact: Pt is aware of scheduling limitations at this time due to schedule, please call pt when schedule is available  EP RN Follow Up Apt: Schedule telephone visit for post op follow up 3-4 days after abaltion  MD Preference: Scheduling with ordering provider  Current Device/Device Co Needed for Procedure: None NoneNone  Pre-Procedural Testing needed: Home COVID Test-Same day D/C None  Mapping System Required:  LENNY (Dr Lauren)  ICE Needed:  Yes  Anesthesia:General-Whole Case    [facility] EP Cath Lab Prep   H&P:  Schedule apt with EP NP to complete within 1 wk of scheduled PVI  Pre-op Labs: CBC, BMP, Beta HcG if appropriate, and INR if on Warfarin will be ordered AM of procedure  T&S  Pre-Procedure Review: T&S ordered AM of procedure- Pt does NOT have listed/reported blood antibodies and/or hx of blood transfusions without re-peat T&S after  Medical Records Pertinent for Procedure:  Cardioversion 8-11-22, Echo 9-12-22 EF 45-50% and EKG 8-11-22 SR @ 62  Important Past Medical Hx/Diagnosis: CHADS VASC 3     Patient Education:  Teach with Patient: Teach with pt will be completed same day as pre-op H&P-see additional clinic note    Risks Reviewed:   Pulmonary Vein/AF/Radiofrequency Ablation  In addition to standard risks for Radiofrequency Ablation, there is:    <2% for significant pulmonary vein stenosis    <2% risk for embolic events    <1% risk for esophageal fistula    <1% risk death      Pulmonary Vein Isolation / Cryoablation Risks:    1-2% risk for phrenic nerve paralysis    <1% risk for pulmonary vein stenosis    Risk of esophageal irritation with no incidence of atrial-esophageal fistula    Rare cryoballoon rupture    <1% risk death       Cardiac Catheter Ablation    <1% risk for the following: hypotension, hemorrhage, vascular injury including perforation of vein, artery or heart, thrombophlebitis, systemic or pulmonic emboli; cardiac perforation, (tamponade), infection, pneumothorax, arrhythmias, proarrhythmic effects of drugs, radiation exposure.    1-2% complete heart block (for AVNRT or septol accessory pathway).    <0.5% CVA or MI    <0.1% death    If external defibrillation is needed, 75% risk for superficial burn.    1-2% tamponade and aortic puncture with left sided transeptal approach for left side LENNY - increase risk of CVA to <2%.    Late arrhythmia recurrences depends upon the primary rhythm disturbance.      Pre-Procedure Instructions:  NPO after midnight, Remove all jewelry prior to coming in for procedure, Shower prior to arrival, Notified patient of time and date of procedure by CV , Transportation arrangements needed s/p procedure, Post-procedure follow up process,  Sedation plan/orders and Pre-procedure letter was sent to pt    Pre-Procedure Medication Instructions:  Instructions given to pt regarding anticoagulants: Eliquis- instructed to continue anticoagulation uninterrupted through their procedure  Instructions given to pt regarding antiarrhythmic medication: Amiodarone; Pt instructed to continue medication prior to procedure  Instructions given to pt regarding PPI medication:Start Protonix 40mg Daily 3 days prior, and will continue 6 wks s/p PVI  Instructions for medication, other than anticoagulants and antiarrhythmics listed above, given to pt: to hold all morning medications   Allergies: Reviewed allergies, no concerns regarding orders for procedure  No Known Allergies    Current Outpatient Medications:      allopurinol (ZYLOPRIM) 100 MG tablet, TAKE 2 TABLETS(200 MG) BY MOUTH DAILY, Disp: 180 tablet, Rfl: 3     amiodarone (PACERONE) 200 MG tablet, Take 1 tablet (200 mg) by mouth 2 times daily Take 200 mg twice daily for 3 weeks and then take 200 mg daily, Disp: 138 tablet, Rfl: 1     apixaban ANTICOAGULANT (ELIQUIS) 5 MG tablet, Take 1 tablet (5 mg) by mouth 2 times daily, Disp: 60 tablet, Rfl: 3     ARNUITY ELLIPTA 100 MCG/ACT inhaler, INHALE 1 PUFF INTO THE LUNGS DAILY, Disp: , Rfl:      digoxin (LANOXIN) 125 MCG tablet, Take 1 tablet (125 mcg) by mouth every other day, Disp: 90 tablet, Rfl: 3     fluticasone (FLONASE) 50 MCG/ACT nasal spray, SHAKE LIQUID AND USE 1 TO 2 SPRAYS IN EACH NOSTRIL DAILY, Disp: 16 g, Rfl: 2     furosemide (LASIX) 20 MG tablet, Take 1 tablet (20 mg) by mouth daily, Disp: 90 tablet, Rfl: 3     levalbuterol (XOPENEX HFA) 45 MCG/ACT inhaler, Inhale 2 puffs into the lungs every 6 hours as needed for wheezing, Disp: 18 g, Rfl: 3     lisinopril (ZESTRIL) 2.5 MG tablet, Take 1 tablet (2.5 mg) by mouth daily, Disp: 30 tablet, Rfl: 3     metoprolol succinate ER (TOPROL XL) 50 MG 24 hr tablet, Take 1 tablet (50 mg) by mouth daily, Disp: 90 tablet,  Rfl: 1     VITAMIN D PO, Take one tablet by mouth daily, Disp: , Rfl:     Documentation Date:10/20/2022 11:03 AM  Pearl Cho RN

## 2022-10-20 NOTE — TELEPHONE ENCOUNTER
Return call from patient, he states that he would like to proceed with PVI at this time.  He wonders about a sleep study, can see a consult was placed and reviewed contact information for patient in case he would like to schedule prior to their call.

## 2022-10-24 DIAGNOSIS — I48.19 PERSISTENT ATRIAL FIBRILLATION (H): Primary | ICD-10-CM

## 2022-10-24 LAB
ABO/RH(D): NORMAL
ANTIBODY SCREEN: NEGATIVE
SPECIMEN EXPIRATION DATE: NORMAL

## 2022-10-25 ENCOUNTER — ALLIED HEALTH/NURSE VISIT (OUTPATIENT)
Dept: CARDIOLOGY | Facility: CLINIC | Age: 77
End: 2022-10-25
Payer: COMMERCIAL

## 2022-10-25 ENCOUNTER — LAB (OUTPATIENT)
Dept: CARDIOLOGY | Facility: CLINIC | Age: 77
End: 2022-10-25
Payer: COMMERCIAL

## 2022-10-25 ENCOUNTER — PREP FOR PROCEDURE (OUTPATIENT)
Dept: CARDIOLOGY | Facility: CLINIC | Age: 77
End: 2022-10-25

## 2022-10-25 VITALS
HEART RATE: 69 BPM | DIASTOLIC BLOOD PRESSURE: 60 MMHG | OXYGEN SATURATION: 96 % | RESPIRATION RATE: 16 BRPM | SYSTOLIC BLOOD PRESSURE: 102 MMHG

## 2022-10-25 DIAGNOSIS — I48.19 PERSISTENT ATRIAL FIBRILLATION (H): ICD-10-CM

## 2022-10-25 DIAGNOSIS — I48.19 PERSISTENT ATRIAL FIBRILLATION (H): Primary | ICD-10-CM

## 2022-10-25 LAB
ANION GAP SERPL CALCULATED.3IONS-SCNC: 12 MMOL/L (ref 7–15)
ATRIAL RATE - MUSE: 57 BPM
BUN SERPL-MCNC: 17.3 MG/DL (ref 8–23)
CALCIUM SERPL-MCNC: 9.2 MG/DL (ref 8.8–10.2)
CHLORIDE SERPL-SCNC: 100 MMOL/L (ref 98–107)
CREAT SERPL-MCNC: 1.28 MG/DL (ref 0.67–1.17)
DEPRECATED HCO3 PLAS-SCNC: 27 MMOL/L (ref 22–29)
DIASTOLIC BLOOD PRESSURE - MUSE: NORMAL MMHG
ERYTHROCYTE [DISTWIDTH] IN BLOOD BY AUTOMATED COUNT: 14.3 % (ref 10–15)
GFR SERPL CREATININE-BSD FRML MDRD: 58 ML/MIN/1.73M2
GLUCOSE SERPL-MCNC: 91 MG/DL (ref 70–99)
HCT VFR BLD AUTO: 45.2 % (ref 40–53)
HGB BLD-MCNC: 15.3 G/DL (ref 13.3–17.7)
INTERPRETATION ECG - MUSE: NORMAL
MCH RBC QN AUTO: 31.9 PG (ref 26.5–33)
MCHC RBC AUTO-ENTMCNC: 33.8 G/DL (ref 31.5–36.5)
MCV RBC AUTO: 94 FL (ref 78–100)
P AXIS - MUSE: 49 DEGREES
PLATELET # BLD AUTO: 341 10E3/UL (ref 150–450)
POTASSIUM SERPL-SCNC: 4.5 MMOL/L (ref 3.4–5.3)
PR INTERVAL - MUSE: 184 MS
QRS DURATION - MUSE: 88 MS
QT - MUSE: 430 MS
QTC - MUSE: 418 MS
R AXIS - MUSE: 76 DEGREES
RBC # BLD AUTO: 4.8 10E6/UL (ref 4.4–5.9)
SODIUM SERPL-SCNC: 139 MMOL/L (ref 136–145)
SYSTOLIC BLOOD PRESSURE - MUSE: NORMAL MMHG
T AXIS - MUSE: -77 DEGREES
VENTRICULAR RATE- MUSE: 57 BPM
WBC # BLD AUTO: 7.7 10E3/UL (ref 4–11)

## 2022-10-25 PROCEDURE — 85027 COMPLETE CBC AUTOMATED: CPT

## 2022-10-25 PROCEDURE — 86850 RBC ANTIBODY SCREEN: CPT

## 2022-10-25 PROCEDURE — 36415 COLL VENOUS BLD VENIPUNCTURE: CPT

## 2022-10-25 PROCEDURE — 80048 BASIC METABOLIC PNL TOTAL CA: CPT

## 2022-10-25 PROCEDURE — 86901 BLOOD TYPING SEROLOGIC RH(D): CPT

## 2022-10-25 PROCEDURE — 86900 BLOOD TYPING SEROLOGIC ABO: CPT

## 2022-10-25 PROCEDURE — 93000 ELECTROCARDIOGRAM COMPLETE: CPT | Performed by: INTERNAL MEDICINE

## 2022-10-25 PROCEDURE — 99207 PR NO CHARGE NURSE ONLY: CPT

## 2022-10-25 RX ORDER — LIDOCAINE HYDROCHLORIDE 20 MG/ML
5 JELLY TOPICAL
Status: CANCELLED | OUTPATIENT
Start: 2022-10-28

## 2022-10-25 RX ORDER — SODIUM CHLORIDE 9 MG/ML
100 INJECTION, SOLUTION INTRAVENOUS CONTINUOUS
Status: CANCELLED | OUTPATIENT
Start: 2022-10-28

## 2022-10-25 RX ORDER — PANTOPRAZOLE SODIUM 40 MG/1
40 TABLET, DELAYED RELEASE ORAL DAILY
Qty: 48 TABLET | Refills: 0 | Status: SHIPPED | OUTPATIENT
Start: 2022-10-25 | End: 2022-11-21

## 2022-10-25 RX ORDER — LIDOCAINE 40 MG/G
CREAM TOPICAL
Status: CANCELLED | OUTPATIENT
Start: 2022-10-25

## 2022-10-25 NOTE — PROGRESS NOTES
PVI education was completed:     Pt is scheduled for a PVI with Dr. Lauren on 10-28-22.    See documented H&P completed by NP in Epic.    Reviewed pre and post op PVI procedure with pt, pre-procedure letter reviewed and given to pt- see letter in EPIC under documentation, see additional EP PVI prep note for details on procedure/prep, risks of procedure discussed and answered pt questions and concerns regarding procedure.    Discussed importance of continuing anticoagulation uninterrupted pre and post ablation, pt denies missing any doses of their anticoagulant, Eliquis, in the last 3 weeks, he admits to taking some doses late but has not missed an actual dose.  Pt denies issues with case placement in the past.  Discussed starting Protonix 3 days prior to procedure and for 6 weeks post procedure, prescription sent to patients pharmacy. Instructions given to pt to continue amiodarone, per Dr. Lauren    Pt has chosen to perform home Covid testing 1-2 days prior to procedure..  Pt is scheduled for follow up with EP Nurse Clinician on 10-31-22.  Explained that this is currently scheduled as a telephone visit and is subject to change based on timing of discharge and possible need for suture removal in clinic.  Pt is scheduled for follow up with EP NP on 12-2-22.    Reviewed contact information and encouraged patient to call with any questions or concerns pre or post procedure.

## 2022-10-27 ENCOUNTER — ANESTHESIA EVENT (OUTPATIENT)
Dept: CARDIOLOGY | Facility: HOSPITAL | Age: 77
End: 2022-10-27
Payer: COMMERCIAL

## 2022-10-28 ENCOUNTER — SURGERY (OUTPATIENT)
Age: 77
End: 2022-10-28
Payer: COMMERCIAL

## 2022-10-28 ENCOUNTER — ANESTHESIA (OUTPATIENT)
Dept: CARDIOLOGY | Facility: HOSPITAL | Age: 77
End: 2022-10-28
Payer: COMMERCIAL

## 2022-10-28 ENCOUNTER — HOSPITAL ENCOUNTER (OUTPATIENT)
Facility: HOSPITAL | Age: 77
Discharge: HOME | End: 2022-10-28
Attending: INTERNAL MEDICINE | Admitting: INTERNAL MEDICINE
Payer: COMMERCIAL

## 2022-10-28 VITALS
RESPIRATION RATE: 15 BRPM | DIASTOLIC BLOOD PRESSURE: 55 MMHG | HEIGHT: 72 IN | SYSTOLIC BLOOD PRESSURE: 102 MMHG | TEMPERATURE: 97.4 F | HEART RATE: 67 BPM | WEIGHT: 224 LBS | OXYGEN SATURATION: 95 % | BODY MASS INDEX: 30.34 KG/M2

## 2022-10-28 DIAGNOSIS — I43 TACHYCARDIA INDUCED CARDIOMYOPATHY (H): ICD-10-CM

## 2022-10-28 DIAGNOSIS — R00.0 TACHYCARDIA INDUCED CARDIOMYOPATHY (H): ICD-10-CM

## 2022-10-28 DIAGNOSIS — I48.19 PERSISTENT ATRIAL FIBRILLATION (H): ICD-10-CM

## 2022-10-28 LAB
ACT BLD: 377 SECONDS (ref 74–150)
ACT BLD: 399 SECONDS (ref 74–150)
ACT BLD: 433 SECONDS (ref 74–150)
ACT BLD: 441 SECONDS (ref 74–150)
ACT BLD: 501 SECONDS (ref 74–150)
ANION GAP SERPL CALCULATED.3IONS-SCNC: 11 MMOL/L (ref 7–15)
ATRIAL RATE - MUSE: 59 BPM
BUN SERPL-MCNC: 21.3 MG/DL (ref 8–23)
CALCIUM SERPL-MCNC: 9 MG/DL (ref 8.8–10.2)
CHLORIDE SERPL-SCNC: 100 MMOL/L (ref 98–107)
CREAT SERPL-MCNC: 1.39 MG/DL (ref 0.67–1.17)
DEPRECATED HCO3 PLAS-SCNC: 27 MMOL/L (ref 22–29)
DIASTOLIC BLOOD PRESSURE - MUSE: NORMAL MMHG
GFR SERPL CREATININE-BSD FRML MDRD: 53 ML/MIN/1.73M2
GLUCOSE SERPL-MCNC: 96 MG/DL (ref 70–99)
HOLD SPECIMEN: NORMAL
INTERPRETATION ECG - MUSE: NORMAL
P AXIS - MUSE: 56 DEGREES
POTASSIUM SERPL-SCNC: 4.2 MMOL/L (ref 3.4–5.3)
PR INTERVAL - MUSE: 190 MS
QRS DURATION - MUSE: 92 MS
QT - MUSE: 404 MS
QTC - MUSE: 399 MS
R AXIS - MUSE: 70 DEGREES
SODIUM SERPL-SCNC: 138 MMOL/L (ref 136–145)
SYSTOLIC BLOOD PRESSURE - MUSE: NORMAL MMHG
T AXIS - MUSE: 262 DEGREES
VENTRICULAR RATE- MUSE: 59 BPM

## 2022-10-28 PROCEDURE — C1887 CATHETER, GUIDING: HCPCS | Performed by: INTERNAL MEDICINE

## 2022-10-28 PROCEDURE — C1730 CATH, EP, 19 OR FEW ELECT: HCPCS | Performed by: INTERNAL MEDICINE

## 2022-10-28 PROCEDURE — 258N000003 HC RX IP 258 OP 636: Performed by: NURSE ANESTHETIST, CERTIFIED REGISTERED

## 2022-10-28 PROCEDURE — 999N000054 HC STATISTIC EKG NON-CHARGEABLE

## 2022-10-28 PROCEDURE — 93005 ELECTROCARDIOGRAM TRACING: CPT

## 2022-10-28 PROCEDURE — 93655 ICAR CATH ABLTJ DSCRT ARRHYT: CPT | Performed by: INTERNAL MEDICINE

## 2022-10-28 PROCEDURE — 93656 COMPRE EP EVAL ABLTJ ATR FIB: CPT | Performed by: INTERNAL MEDICINE

## 2022-10-28 PROCEDURE — 250N000013 HC RX MED GY IP 250 OP 250 PS 637: Performed by: NURSE ANESTHETIST, CERTIFIED REGISTERED

## 2022-10-28 PROCEDURE — C1732 CATH, EP, DIAG/ABL, 3D/VECT: HCPCS | Performed by: INTERNAL MEDICINE

## 2022-10-28 PROCEDURE — 250N000009 HC RX 250: Performed by: NURSE ANESTHETIST, CERTIFIED REGISTERED

## 2022-10-28 PROCEDURE — 250N000011 HC RX IP 250 OP 636: Performed by: NURSE ANESTHETIST, CERTIFIED REGISTERED

## 2022-10-28 PROCEDURE — 272N000001 HC OR GENERAL SUPPLY STERILE: Performed by: INTERNAL MEDICINE

## 2022-10-28 PROCEDURE — 36415 COLL VENOUS BLD VENIPUNCTURE: CPT | Performed by: INTERNAL MEDICINE

## 2022-10-28 PROCEDURE — 250N000011 HC RX IP 250 OP 636: Performed by: INTERNAL MEDICINE

## 2022-10-28 PROCEDURE — 250N000011 HC RX IP 250 OP 636

## 2022-10-28 PROCEDURE — 258N000003 HC RX IP 258 OP 636: Performed by: INTERNAL MEDICINE

## 2022-10-28 PROCEDURE — 85347 COAGULATION TIME ACTIVATED: CPT

## 2022-10-28 PROCEDURE — 93010 ELECTROCARDIOGRAM REPORT: CPT | Performed by: INTERNAL MEDICINE

## 2022-10-28 PROCEDURE — 82310 ASSAY OF CALCIUM: CPT | Performed by: INTERNAL MEDICINE

## 2022-10-28 PROCEDURE — C1894 INTRO/SHEATH, NON-LASER: HCPCS | Performed by: INTERNAL MEDICINE

## 2022-10-28 PROCEDURE — C1769 GUIDE WIRE: HCPCS | Performed by: INTERNAL MEDICINE

## 2022-10-28 PROCEDURE — C1759 CATH, INTRA ECHOCARDIOGRAPHY: HCPCS | Performed by: INTERNAL MEDICINE

## 2022-10-28 PROCEDURE — C1733 CATH, EP, OTHR THAN COOL-TIP: HCPCS | Performed by: INTERNAL MEDICINE

## 2022-10-28 PROCEDURE — 710N000009 HC RECOVERY PHASE 1, LEVEL 1, PER MIN

## 2022-10-28 PROCEDURE — 370N000017 HC ANESTHESIA TECHNICAL FEE, PER MIN: Performed by: INTERNAL MEDICINE

## 2022-10-28 RX ORDER — PROTAMINE SULFATE 10 MG/ML
INJECTION, SOLUTION INTRAVENOUS PRN
Status: DISCONTINUED | OUTPATIENT
Start: 2022-10-28 | End: 2022-10-28

## 2022-10-28 RX ORDER — ALBUTEROL SULFATE 90 UG/1
AEROSOL, METERED RESPIRATORY (INHALATION) PRN
Status: DISCONTINUED | OUTPATIENT
Start: 2022-10-28 | End: 2022-10-28

## 2022-10-28 RX ORDER — HEPARIN SODIUM 1000 [USP'U]/ML
INJECTION, SOLUTION INTRAVENOUS; SUBCUTANEOUS
Status: DISCONTINUED | OUTPATIENT
Start: 2022-10-28 | End: 2022-10-28 | Stop reason: HOSPADM

## 2022-10-28 RX ORDER — ACETAMINOPHEN 325 MG/1
650 TABLET ORAL EVERY 4 HOURS PRN
Status: DISCONTINUED | OUTPATIENT
Start: 2022-10-28 | End: 2022-10-28 | Stop reason: HOSPADM

## 2022-10-28 RX ORDER — ONDANSETRON 4 MG/1
4 TABLET, ORALLY DISINTEGRATING ORAL EVERY 6 HOURS PRN
Status: DISCONTINUED | OUTPATIENT
Start: 2022-10-28 | End: 2022-10-28 | Stop reason: HOSPADM

## 2022-10-28 RX ORDER — ONDANSETRON 2 MG/ML
4 INJECTION INTRAMUSCULAR; INTRAVENOUS EVERY 6 HOURS PRN
Status: DISCONTINUED | OUTPATIENT
Start: 2022-10-28 | End: 2022-10-28 | Stop reason: HOSPADM

## 2022-10-28 RX ORDER — DEXAMETHASONE SODIUM PHOSPHATE 4 MG/ML
INJECTION, SOLUTION INTRA-ARTICULAR; INTRALESIONAL; INTRAMUSCULAR; INTRAVENOUS; SOFT TISSUE PRN
Status: DISCONTINUED | OUTPATIENT
Start: 2022-10-28 | End: 2022-10-28

## 2022-10-28 RX ORDER — EPHEDRINE SULFATE 50 MG/ML
INJECTION, SOLUTION INTRAMUSCULAR; INTRAVENOUS; SUBCUTANEOUS PRN
Status: DISCONTINUED | OUTPATIENT
Start: 2022-10-28 | End: 2022-10-28

## 2022-10-28 RX ORDER — FUROSEMIDE 10 MG/ML
INJECTION INTRAMUSCULAR; INTRAVENOUS PRN
Status: DISCONTINUED | OUTPATIENT
Start: 2022-10-28 | End: 2022-10-28

## 2022-10-28 RX ORDER — PROPOFOL 10 MG/ML
INJECTION, EMULSION INTRAVENOUS PRN
Status: DISCONTINUED | OUTPATIENT
Start: 2022-10-28 | End: 2022-10-28

## 2022-10-28 RX ORDER — IBUPROFEN 600 MG/1
600 TABLET, FILM COATED ORAL EVERY 6 HOURS PRN
Status: DISCONTINUED | OUTPATIENT
Start: 2022-10-28 | End: 2022-10-28 | Stop reason: HOSPADM

## 2022-10-28 RX ORDER — SODIUM CHLORIDE 9 MG/ML
100 INJECTION, SOLUTION INTRAVENOUS CONTINUOUS
Status: DISCONTINUED | OUTPATIENT
Start: 2022-10-28 | End: 2022-10-28 | Stop reason: HOSPADM

## 2022-10-28 RX ORDER — LIDOCAINE 40 MG/G
CREAM TOPICAL
Status: DISCONTINUED | OUTPATIENT
Start: 2022-10-28 | End: 2022-10-28 | Stop reason: HOSPADM

## 2022-10-28 RX ORDER — ONDANSETRON 2 MG/ML
INJECTION INTRAMUSCULAR; INTRAVENOUS PRN
Status: DISCONTINUED | OUTPATIENT
Start: 2022-10-28 | End: 2022-10-28

## 2022-10-28 RX ORDER — LIDOCAINE HYDROCHLORIDE 20 MG/ML
5 JELLY TOPICAL
Status: DISCONTINUED | OUTPATIENT
Start: 2022-10-28 | End: 2022-10-28 | Stop reason: HOSPADM

## 2022-10-28 RX ORDER — AMIODARONE HYDROCHLORIDE 200 MG/1
200 TABLET ORAL DAILY
Qty: 138 TABLET | Refills: 1 | Status: SHIPPED | OUTPATIENT
Start: 2022-10-28 | End: 2022-12-07

## 2022-10-28 RX ORDER — FENTANYL CITRATE 50 UG/ML
INJECTION, SOLUTION INTRAMUSCULAR; INTRAVENOUS PRN
Status: DISCONTINUED | OUTPATIENT
Start: 2022-10-28 | End: 2022-10-28

## 2022-10-28 RX ORDER — HEPARIN SODIUM 10000 [USP'U]/100ML
INJECTION, SOLUTION INTRAVENOUS CONTINUOUS PRN
Status: DISCONTINUED | OUTPATIENT
Start: 2022-10-28 | End: 2022-10-28 | Stop reason: HOSPADM

## 2022-10-28 RX ADMIN — HEPARIN SODIUM 27.56 UNITS/KG/HR: 10000 INJECTION, SOLUTION INTRAVENOUS at 09:15

## 2022-10-28 RX ADMIN — FENTANYL CITRATE 100 MCG: 50 INJECTION, SOLUTION INTRAMUSCULAR; INTRAVENOUS at 08:24

## 2022-10-28 RX ADMIN — Medication 5 MG: at 09:58

## 2022-10-28 RX ADMIN — Medication 5 MG: at 09:00

## 2022-10-28 RX ADMIN — HEPARIN SODIUM 1000 UNITS: 1000 INJECTION INTRAVENOUS; SUBCUTANEOUS at 10:40

## 2022-10-28 RX ADMIN — ONDANSETRON 4 MG: 2 INJECTION INTRAMUSCULAR; INTRAVENOUS at 11:54

## 2022-10-28 RX ADMIN — HEPARIN SODIUM 3000 UNITS: 1000 INJECTION INTRAVENOUS; SUBCUTANEOUS at 09:52

## 2022-10-28 RX ADMIN — HEPARIN SODIUM 17000 UNITS: 1000 INJECTION INTRAVENOUS; SUBCUTANEOUS at 09:14

## 2022-10-28 RX ADMIN — PHENYLEPHRINE HYDROCHLORIDE 100 MCG: 10 INJECTION INTRAVENOUS at 08:30

## 2022-10-28 RX ADMIN — PROTAMINE SULFATE 100 MG: 10 INJECTION, SOLUTION INTRAVENOUS at 11:54

## 2022-10-28 RX ADMIN — SUGAMMADEX 200 MG: 100 INJECTION, SOLUTION INTRAVENOUS at 12:02

## 2022-10-28 RX ADMIN — ROCURONIUM BROMIDE 50 MG: 50 INJECTION, SOLUTION INTRAVENOUS at 08:24

## 2022-10-28 RX ADMIN — SODIUM CHLORIDE 100 ML/HR: 9 INJECTION, SOLUTION INTRAVENOUS at 06:49

## 2022-10-28 RX ADMIN — PROPOFOL 120 MG: 10 INJECTION, EMULSION INTRAVENOUS at 08:24

## 2022-10-28 RX ADMIN — HEPARIN SODIUM 1000 UNITS: 1000 INJECTION INTRAVENOUS; SUBCUTANEOUS at 09:32

## 2022-10-28 RX ADMIN — PHENYLEPHRINE HYDROCHLORIDE 0.2 MCG/KG/MIN: 10 INJECTION INTRAVENOUS at 08:37

## 2022-10-28 RX ADMIN — Medication 5 MG: at 09:04

## 2022-10-28 RX ADMIN — DEXAMETHASONE SODIUM PHOSPHATE 6 MG: 4 INJECTION, SOLUTION INTRA-ARTICULAR; INTRALESIONAL; INTRAMUSCULAR; INTRAVENOUS; SOFT TISSUE at 08:35

## 2022-10-28 RX ADMIN — FUROSEMIDE 20 MG: 10 INJECTION, SOLUTION INTRAMUSCULAR; INTRAVENOUS at 11:54

## 2022-10-28 RX ADMIN — ALBUTEROL SULFATE 6 PUFF: 90 AEROSOL, METERED RESPIRATORY (INHALATION) at 08:35

## 2022-10-28 ASSESSMENT — ACTIVITIES OF DAILY LIVING (ADL)
ADLS_ACUITY_SCORE: 35

## 2022-10-28 ASSESSMENT — ENCOUNTER SYMPTOMS: DYSRHYTHMIAS: 1

## 2022-10-28 NOTE — Clinical Note
Pt positioned and bony prominences padded with anesthesia assistance. Positioning assessed and repositioned every 2 hours throughout the procedure.

## 2022-10-28 NOTE — ANESTHESIA PREPROCEDURE EVALUATION
Anesthesia Pre-Procedure Evaluation    Patient: Khurram Reynoso   MRN: [id number] : 1945        Procedure : Procedure(s):  Ablation Pulmonary Vein Isolation          Past Medical History:   Diagnosis Date     Acute bronchitis 10/17/2005     Asthma 2018     Cardiac arrest (H) 2006    V-Fib arrest during heart cath     CARDIAC DYSRHYTHMIA NOS 2005     Degeneration of lumbar or lumbosacral intervertebral disc      Gastroesophageal reflux disease      Gout      Neoplasm of uncertain behavior of skin 2017    Created by Conversion      Other chronic pain     back     PAC (premature atrial contraction)      Paroxysmal A-fib (H)      PONV (postoperative nausea and vomiting)      PRIM CARDIOMYOPATHY NEC 2006     PVC (premature ventricular contraction)      Renal disease     kidney stones     Uncomplicated asthma       Past Surgical History:   Procedure Laterality Date     ABLATION OF DYSRHYTHMIC FOCUS  2007    RVOT PVCs     ANESTHESIA CARDIOVERSION N/A 2022    Procedure: ANESTHESIA, FOR CARDIOVERSION;  Surgeon: GENERIC ANESTHESIA PROVIDER;  Location: [facility] OR     CARDIAC SURGERY      Ablation     COMBINED CYSTOSCOPY, INSERT STENT URETER(S) Left 2020    Procedure: Video cystoscopy, left double-J stent placement and exam under anesthesia;  Surgeon: Dank Han MD;  Location: [facility] OR     DECOMPRESS DISC RF LUMBAR  3/2001    lumbar fusion L2-5     LASER HOLMIUM LITHOTRIPSY URETER(S), INSERT STENT, COMBINED Left 2020    Procedure: Video cystoscopy, left double-J stent removal, rigid ureteroscopy, flexible renoscopy with holmium laser lithotripsy and stone extraction.;  Surgeon: Dank Han MD;  Location:  OR     OPTICAL TRACKING SYSTEM FUSION SPINE POSTERIOR LUMBAR THREE+ LEVELS N/A 2016    Procedure: OPTICAL TRACKING SYSTEM FUSION SPINE POSTERIOR LUMBAR THREE+ LEVELS;  Surgeon: Hieu Araiza MD;  Location:  OR     ORTHOPEDIC SURGERY Bilateral      total knee replacements     ORTHOPEDIC SURGERY Right     Total Hipe Replacement     SOFT TISSUE SURGERY Right     right wrist ganglion surgery     ZZC NONSPECIFIC PROCEDURE      knee     ZC TOTAL HIP ARTHROPLASTY      Description: Total Hip Replacement;  Recorded: 09/24/2010;     ZZC TOTAL KNEE ARTHROPLASTY      Description: Total Knee Arthroplasty;  Recorded: 09/24/2010;  Comments: right      No Known Allergies   Social History     Tobacco Use     Smoking status: Never     Smokeless tobacco: Never   Substance Use Topics     Alcohol use: No     Alcohol/week: 0.0 standard drinks      Wt Readings from Last 1 Encounters:   10/28/22 101.6 kg (224 lb)        Anesthesia Evaluation   Pt has had prior anesthetic.     History of anesthetic complications  - PONV.      ROS/MED HX  ENT/Pulmonary:     (+) asthma     Neurologic:  - neg neurologic ROS     Cardiovascular: Comment: TTE   1. The left ventricle is normal in size. Left ventricular systolic function is  mildly reduced. The visual ejection fraction is 45-50%. Left ventricular  diastolic function is abnormal. Biplane LVEF is 46%. There is mild global  hypokinesia of the left ventricle.  2. The right ventricle is normal size. The right ventricular systolic function  is normal.  3. There is mild-moderate biatrial enlargement.  4. Trace to mild mitral and tricuspid regurgitation.  5. Mild to moderate aortic regurgitation.  6. No pericardial effusion.  7. In direct comparison to the previous study dated 07/12/2022, there has been  an interval improvement in bi-ventricular systolic function.    (+) Dyslipidemia hypertension-----CHF Last EF: 45% dysrhythmias, a-fib,     METS/Exercise Tolerance:     Hematologic:  - neg hematologic  ROS     Musculoskeletal:       GI/Hepatic:     (+) GERD,     Renal/Genitourinary:     (+) renal disease, type: CRI,     Endo:  - neg endo ROS     Psychiatric/Substance Use:  - neg psychiatric ROS     Infectious Disease:  - neg infectious disease ROS      Malignancy:  - neg malignancy ROS     Other:      (+) , H/O Chronic Pain,        Physical Exam    Airway        Mallampati: I   TM distance: > 3 FB   Neck ROM: full   Mouth opening: > 3 cm    Respiratory Devices and Support         Dental  no notable dental history         Cardiovascular   cardiovascular exam normal          Pulmonary   pulmonary exam normal                OUTSIDE LABS:  CBC:   Lab Results   Component Value Date    WBC 7.7 10/25/2022    WBC 9.1 07/19/2022    HGB 15.3 10/25/2022    HGB 16.3 07/19/2022    HCT 45.2 10/25/2022    HCT 49.8 07/19/2022     10/25/2022     07/19/2022     BMP:   Lab Results   Component Value Date     10/25/2022     08/01/2022    POTASSIUM 4.5 10/25/2022    POTASSIUM 4.4 08/11/2022    CHLORIDE 100 10/25/2022    CHLORIDE 108 08/01/2022    CO2 27 10/25/2022    CO2 26 08/01/2022    BUN 17.3 10/25/2022    BUN 25 08/01/2022    CR 1.28 (H) 10/25/2022    CR 1.18 08/01/2022    GLC 91 10/25/2022    GLC 91 08/01/2022     COAGS:   Lab Results   Component Value Date    PTT 45 (H) 07/13/2022    INR 0.94 06/27/2006     POC:   Lab Results   Component Value Date     (H) 06/28/2016     HEPATIC:   Lab Results   Component Value Date    ALBUMIN 3.6 12/01/2021    PROTTOTAL 7.7 12/01/2021    ALT 31 10/12/2022    AST 38 10/12/2022    ALKPHOS 189 (H) 12/01/2021    BILITOTAL 0.6 12/01/2021     OTHER:   Lab Results   Component Value Date    LACT 1.2 05/12/2019    SHERRON 9.2 10/25/2022    MAG 2.1 08/11/2022    TSH 4.88 (H) 10/12/2022    T4 1.18 10/12/2022    CRP 4.4 07/11/2022    SED 7 07/16/2020       Anesthesia Plan    ASA Status:  2   NPO Status:  NPO Appropriate    Anesthesia Type: General.     - Airway: ETT   Induction: Intravenous, Propofol.   Maintenance: Balanced.        Consents    Anesthesia Plan(s) and associated risks, benefits, and realistic alternatives discussed. Questions answered and patient/representative(s) expressed understanding.    - Discussed:      - Discussed with:  Patient, Spouse         Postoperative Care    Pain management: IV analgesics, Multi-modal analgesia.   PONV prophylaxis: Ondansetron (or other 5HT-3), Background Propofol Infusion, Dexamethasone or Solumedrol     Comments:    Other Comments: GETA  2 PIV  Decadron 10, zofran 4, background propofol gtt for antiemesis  Phenylephrine gtt to maintain MAP  Limit IV fluids given extraneous fluid through groin lines             Wendi North MD

## 2022-10-28 NOTE — Clinical Note
[facility] Med system 12 lead EKG, hemodynamics 5 lead, pulse oximetery, NIBP, Physiocontrol hands off defibrillator/external pacer, with 3 monitoring leads to patient. Baseline assessment done.

## 2022-10-28 NOTE — Clinical Note
Potential access sites were evaluated for patency using ultrasound.   The left femoral vein was selected x2. Access was obtained under with Sonosite guidance using a micropuncture 21 gauge needle with direct visualization of needle entry.

## 2022-10-28 NOTE — ANESTHESIA PROCEDURE NOTES
Airway       Patient location during procedure: OR       Procedure Start/Stop Times: 10/28/2022 8:27 AM  Staff -        CRNA: Marko Pham APRN CRNA       Performed By: CRNA  Consent for Airway        Urgency: elective  Indications and Patient Condition       Indications for airway management: morteza-procedural       Induction type:intravenous       Mask difficulty assessment: 1 - vent by mask    Final Airway Details       Final airway type: endotracheal airway       Successful airway: ETT - single and Oral  Endotracheal Airway Details        ETT size (mm): 7.5       Cuffed: yes       Successful intubation technique: direct laryngoscopy       DL Blade Type: Loera 2       Adjucts: stylet       Position: Right       Measured from: gums/teeth       Secured at (cm): 22       Bite block used: Soft    Post intubation assessment        Placement verified by: capnometry, equal breath sounds and chest rise        Number of attempts at approach: 1       Secured with: silk tape       Ease of procedure: easy       Dentition: Intact and Unchanged       Dental guard used and removed.    Medication(s) Administered   Medication Administration Time: 10/28/2022 8:27 AM       Message left for patient to call back and schedule sleep study with follow up.     Nadia Jewell CMA on 4/30/2021 at 10:02 AM

## 2022-10-28 NOTE — INTERVAL H&P NOTE
I have reviewed the surgical (or preoperative) H&P that is linked to this encounter, and examined the patient. There are no significant changes    Clinical Conditions Present on Arrival:  Clinically Significant Risk Factors Present on Admission                  # Drug Induced Coagulation Defect: home medication list includes an anticoagulant medication   # Obesity: Estimated body mass index is 30.38 kg/m  as calculated from the following:    Height as of this encounter: 1.829 m (6').    Weight as of this encounter: 101.6 kg (224 lb).

## 2022-10-28 NOTE — PLAN OF CARE
Goal Outcome Evaluation:             Pt admitted for PVI due to hx atrial fibrillation.  Pt has a hx of asthma and does have exp wheezing today with productive cough. Pt hs missed 1 dose  of eliquis MD aware. Pt is sinus maylin at this time.  Pt prepped and ready for procedure. Wife Kassie at bedside for support.      Cindy Tucker RN

## 2022-10-28 NOTE — DISCHARGE INSTRUCTIONS
Electrophysiology  Discharge Instructions for Pulmonary Vein Ablation for Atrial Fibrillation    Once Home:  Do not stop your aspirin or anticoagulation unless directed by a  [facility] Cardiologist.    Increase fluid intake for two days.    Continue to use Incentive Spirometer every 2 hours while awake over the next 1-2 days, then throw away.    No aggressive exercise for one week.    No lifting more than 10 pounds for one week.    No driving for 5 days.    Return to work in 5 days unless otherwise directed.    May shower day after procedure.    No tub bath, swimming or hot tub for 7-10 days until incisions are healed.    OK to use ice packs over groin sites, 20 minutes on 20 minutes off for groin discomfort.        Contact the [facility] at [phone] with questions or if you experience any of the following:    Episodes of Atrial Fibrillation lasting greater than 4 hours    If you develop shortness of breath, dizziness, or chest pains    If you develop any redness, swelling, drainage or bleeding at the puncture sites    If you develop a temperature greater than 100.5 degrees (especially weeks 2-5 post procedure)    Contact 911 should you experience any symptoms of a stroke such as:    Difficulty with your speech  Problems walking or with your balance  Problems with your vision  Side of your face droops or feels numb  Muscle weakness on one side of your body    Chest pain is common in the first 3-4 days after your surgery and should resolve completely. Contact your Cardiologist/Electrophysiologist right away if chest pain returns after the first week.      Your Procedural Physician was: Dr. Sourav Lauren   To reach the Electrophysiology Registered Nurses working with Dr Lauren please call [phone]       [facility]:  [phone]  If you are calling after hours, please listen to the entire voicemail, a live  will answer at the end of  the message

## 2022-10-28 NOTE — ANESTHESIA CARE TRANSFER NOTE
Patient: Khurram Reynoso    Procedure: Procedure(s):  Ablation Pulmonary Vein Isolation       Diagnosis: Atrial Fibrilaltion  Diagnosis Additional Information: No value filed.    Anesthesia Type:   General     Note:    Oropharynx: oropharynx clear of all foreign objects and spontaneously breathing  Level of Consciousness: awake  Oxygen Supplementation: face mask  Level of Supplemental Oxygen (L/min / FiO2): 8  Independent Airway: airway patency satisfactory and stable  Dentition: dentition unchanged  Vital Signs Stable: post-procedure vital signs reviewed and stable  Report to RN Given: handoff report given  Patient transferred to: Cardiac Special Care          Vitals:  Vitals Value Taken Time   /71    Temp     Pulse 63    Resp 12    SpO2 97        Electronically Signed By: MICKEY Carey CRNA  October 28, 2022  12:22 PM

## 2022-10-28 NOTE — ANESTHESIA POSTPROCEDURE EVALUATION
Patient: Khurram Reynoso    Procedure: Procedure(s):  Ablation Pulmonary Vein Isolation       Anesthesia Type:  General    Note:  Disposition: Outpatient   Postop Pain Control: Uneventful            Sign Out: Well controlled pain   PONV: No   Neuro/Psych: Uneventful            Sign Out: Acceptable/Baseline neuro status   Airway/Respiratory: Uneventful            Sign Out: Acceptable/Baseline resp. status   CV/Hemodynamics: Uneventful            Sign Out: Acceptable CV status   Other NRE: NONE   DID A NON-ROUTINE EVENT OCCUR? No           Last vitals:  Vitals Value Taken Time   /66 10/28/22 1315   Temp 36.3  C (97.4  F) 10/28/22 1220   Pulse 59 10/28/22 1315   Resp 13 10/28/22 1315   SpO2 97 % 10/28/22 1315       Electronically Signed By: Raciel Navarrete MD  October 28, 2022  1:51 PM

## 2022-10-29 NOTE — PLAN OF CARE
Pt recovered nicely after PVI. Up ambulating without problems, bilateral groins intact. Soft. No oozing. Urinating after case removed. Denies pain. Follow up instructions verbally understood. Appointments made. Home with wife.

## 2022-10-31 ENCOUNTER — VIRTUAL VISIT (OUTPATIENT)
Dept: CARDIOLOGY | Facility: CLINIC | Age: 77
End: 2022-10-31
Payer: COMMERCIAL

## 2022-10-31 DIAGNOSIS — I48.19 PERSISTENT ATRIAL FIBRILLATION (H): Primary | ICD-10-CM

## 2022-10-31 PROCEDURE — 93227 XTRNL ECG REC<48 HR R&I: CPT | Performed by: INTERNAL MEDICINE

## 2022-10-31 PROCEDURE — 99207 PR NO CHARGE NURSE ONLY: CPT

## 2022-10-31 NOTE — PROGRESS NOTES
Post PVI Procedural Follow Up Call    Pt is s/p PVI from 10/28 with Dr Lauren  PC was placed to pt, spoke to pt    General Assessment:     Weight: Pt reports pt is unable to report weight, but denies s/s of fluid retention    Pain: Pt denies generalized or localized pain abnormal to healing s/p     /GI: Pt denies difficulty swallowing, denies constipation, denies urinary retention/difficulty, reports no s/s of infection, report normal appetite and reports staying hydrated.    Respiratory: Pt denies SOB, denies difficulty breathing, denies throat pain, denies changes/abnormal sputum and denies any further symptoms abnormal to normal healing process s/p PVI.    Activity: Pt is tolerating advancement in activity while following physical restrictions, staying well hydrated and gradually working into baseline activity.     Rhythm Assessment:   Pt HR 66bpm, denies palpitations, denies irregularities in HR or rhythm and denies symptoms or sustained AF episodes.    Procedure Site Assessment:   Pts no visible/physical changes in groin sites and some bruising around sites without significant change from hospital discharge and normal to PVI recovery    Anticoagulation/Medication:  Pt remain on Eliquis without interruption  Pt confirms taking ASA 81mg Daily, and will continue taking this for 1 mo s/p    Education completed with pt at this visit:  Reviewed normal post-op PVI healing process, when to contact EP-RN/EP-MD, contact information was given to the pt for further concerns or questions and pt verbalized understanding    Follow up  Pts AVS was printed and mailed to pt by scheduling team, pt will be seen by EP NP in 4-6 wks, monitor will be ordered at this follow-up if indicated and 3mo follow-up and monitor will be determined at 6wk follow-up by EP NP    10/31/2022 11:15 AM  Vicki Bar RN

## 2022-10-31 NOTE — PATIENT INSTRUCTIONS
Your anticoagulation medication Eliquis:  It is important to remain on your anticoagulation medication uninterrupted after your ablation to reduce your risk of a stroke or heart attack, do not stop this medication  Please remain on your aspirin for 1 month, then you can stop    Healing from your pulmonary vein ablation:  Stay well hydrated, and increase your fluid intake during this recovery period  High protein foods aide in your bodies healing process  No aggressive or aerobic activity for 7-10 days, and do not lift more than 10 pounds for 7 days   Increase your activity gradually over the next 5-10 days, working back to your normal daily activity  If you are experiencing pain at your groin sites from the procedure, we advise applying ice for 20 minute durations 3-4 times per day     Please call me if any of the following occur:  Episodes of Atrial Fibrillation lasting greater than 4 hours, or if you notice the episodes are increasing in frequency or duration  If you develop shortness of breath, dizziness, or unresolving chest pains   Changes at your groin sites including swelling, hardening, drainage, increase in bruising, or an increase in pain  If you develop a temperature greater than 100.5 degrees (especially weeks 2-5 post   Procedure)    Call 911 if you are having symptoms of a stroke; difficulty with your speech, problems walking, difficulty with balance, vision disturbances, facial drooping or numbness, and muscle weakness on one side of your body     Your follow up appointments are as follows:  You will be seen by the electrophysiologist nurse practitioner at 6 weeks after your ablation  At your 6 week appointment, it will be determined if a 3 month follow-up is needed    Sincerely,  Vicki Bar RN [phone]    After hours please contact the on call service at # [phone]

## 2022-11-21 ENCOUNTER — TELEPHONE (OUTPATIENT)
Dept: CARDIOLOGY | Facility: CLINIC | Age: 77
End: 2022-11-21

## 2022-11-21 DIAGNOSIS — I48.20 CHRONIC ATRIAL FIBRILLATION (H): Primary | ICD-10-CM

## 2022-11-21 NOTE — TELEPHONE ENCOUNTER
Spoke to pt regarding his medication list.  He states he has some from a recent ER visit and just wanted to be sure he was taking the correct ones.    Amiodarone is now at 200mg daily- per Dr. Lauren will discuss stopping this medication at the 6 week post op appt on 12/7.    Pt stated he was in need of refills as well and doesn't have an office visit until 12/7, he will be out by then.  Eliquis, lasix and lisinopril.    Discussed that I will send in a refill of his eliquis medication to get him to his appt. Discussed he likely will be staying on this medication long term but Joe will discuss further at the follow up appt.    I let the pt know that I will have to send a message to the nurse practitioner he is seeing for his 6 week follow up regarding the lasix medication to see if she agrees to have the pt continue this. 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    Pt states he has not had any AFIB since his ablation procedure with Dr. Lauren on 10/28/22 and is feeling great.    Pt will call his PMD regarding lisinopril medication.    Pt was very thankful of the call and will await a call back regarding lasix medication.    Vicki

## 2022-11-21 NOTE — TELEPHONE ENCOUNTER
[facility] Call Center    Phone Message    May a detailed message be left on voicemail: yes     Reason for Call: Other: Pt would like  a call back to discuss medication as he is not sure which meds needs refills and which he is suppose to stop taking, please reach out to pt to discuss      Action Taken: Message routed to:  [facility] ([facility] Cardio    Travel Screening: Not Applicable

## 2022-11-22 NOTE — TELEPHONE ENCOUNTER
Noted.    Phone call to pt to discuss, LM for return call.    If pt hasn't called his Primary MD regarding lisinopril, will send in 30 day refill to get him to appt with Joe along with lasix.    Will wait for pt to return call and send over prescriptions at that time.    Vicki

## 2022-11-22 NOTE — TELEPHONE ENCOUNTER
Nemo Payne APRN CNP Holen, Megan, RN  Caller: Unspecified (Yesterday,  8:42 AM)  Absolutely ok to refill eliquis, lasix and lisinopril.   Joe

## 2022-11-23 ASSESSMENT — ASTHMA QUESTIONNAIRES
QUESTION_2 LAST FOUR WEEKS HOW OFTEN HAVE YOU HAD SHORTNESS OF BREATH: NOT AT ALL
QUESTION_5 LAST FOUR WEEKS HOW WOULD YOU RATE YOUR ASTHMA CONTROL: SOMEWHAT CONTROLLED
ACT_TOTALSCORE: 22
QUESTION_3 LAST FOUR WEEKS HOW OFTEN DID YOUR ASTHMA SYMPTOMS (WHEEZING, COUGHING, SHORTNESS OF BREATH, CHEST TIGHTNESS OR PAIN) WAKE YOU UP AT NIGHT OR EARLIER THAN USUAL IN THE MORNING: NOT AT ALL
QUESTION_4 LAST FOUR WEEKS HOW OFTEN HAVE YOU USED YOUR RESCUE INHALER OR NEBULIZER MEDICATION (SUCH AS ALBUTEROL): ONCE A WEEK OR LESS
QUESTION_1 LAST FOUR WEEKS HOW MUCH OF THE TIME DID YOUR ASTHMA KEEP YOU FROM GETTING AS MUCH DONE AT WORK, SCHOOL OR AT HOME: NONE OF THE TIME
ACT_TOTALSCORE: 22

## 2022-11-27 DIAGNOSIS — I50.9 ACUTE HEART FAILURE, UNSPECIFIED HEART FAILURE TYPE (H): ICD-10-CM

## 2022-11-28 ENCOUNTER — VIRTUAL VISIT (OUTPATIENT)
Dept: INTERNAL MEDICINE | Facility: CLINIC | Age: 77
End: 2022-11-28
Payer: COMMERCIAL

## 2022-11-28 DIAGNOSIS — M21.372 LEFT FOOT DROP: ICD-10-CM

## 2022-11-28 DIAGNOSIS — J45.30 MILD PERSISTENT ASTHMA WITHOUT COMPLICATION: ICD-10-CM

## 2022-11-28 DIAGNOSIS — K21.9 GASTROESOPHAGEAL REFLUX DISEASE WITHOUT ESOPHAGITIS: Primary | ICD-10-CM

## 2022-11-28 DIAGNOSIS — I48.19 PERSISTENT ATRIAL FIBRILLATION (H): ICD-10-CM

## 2022-11-28 DIAGNOSIS — I50.9 ACUTE HEART FAILURE, UNSPECIFIED HEART FAILURE TYPE (H): ICD-10-CM

## 2022-11-28 PROCEDURE — 99214 OFFICE O/P EST MOD 30 MIN: CPT | Mod: 95 | Performed by: INTERNAL MEDICINE

## 2022-11-28 RX ORDER — METOPROLOL SUCCINATE 50 MG/1
50 TABLET, EXTENDED RELEASE ORAL DAILY
Qty: 90 TABLET | Refills: 3 | Status: SHIPPED | OUTPATIENT
Start: 2022-11-28 | End: 2023-10-10

## 2022-11-28 RX ORDER — FUROSEMIDE 20 MG
20 TABLET ORAL DAILY
Qty: 90 TABLET | Refills: 3 | Status: SHIPPED | OUTPATIENT
Start: 2022-11-28 | End: 2024-02-19

## 2022-11-28 RX ORDER — PANTOPRAZOLE SODIUM 40 MG/1
40 TABLET, DELAYED RELEASE ORAL DAILY
COMMUNITY
End: 2022-11-28

## 2022-11-28 RX ORDER — LISINOPRIL 2.5 MG/1
TABLET ORAL
Qty: 90 TABLET | Refills: 1 | Status: SHIPPED | OUTPATIENT
Start: 2022-11-28 | End: 2023-06-05

## 2022-11-28 RX ORDER — PANTOPRAZOLE SODIUM 40 MG/1
40 TABLET, DELAYED RELEASE ORAL DAILY
Qty: 90 TABLET | Refills: 3 | Status: SHIPPED | OUTPATIENT
Start: 2022-11-28 | End: 2023-10-18

## 2022-11-28 NOTE — PROGRESS NOTES
Khurram is a 77 year old who is being evaluated via a billable video visit.      How would you like to obtain your AVS? MyChart  If the video visit is dropped, the invitation should be resent by: Text to cell phone: [phone]  Will anyone else be joining your video visit? No        Assessment & Plan     Acute heart failure, unspecified heart failure type (H)  Clinically improved with rate control post ablation for a fib.   - furosemide (LASIX) 20 MG tablet; Take 1 tablet (20 mg) by mouth daily  - metoprolol succinate ER (TOPROL XL) 50 MG 24 hr tablet; Take 1 tablet (50 mg) by mouth daily    Gastroesophageal reflux disease without esophagitis  Continue PPI   - pantoprazole (PROTONIX) 40 MG EC tablet; Take 1 tablet (40 mg) by mouth daily    Left foot drop  Handicapped parking form filled in  Has history of falls, uses a cane     Mild persistent asthma without complication  Controlled on PRN Albuterol     Persistent atrial fibrillation (H)  Post ablation, no recurrence. Follows with cardiology                BMI:   Estimated body mass index is 30.38 kg/m  as calculated from the following:    Height as of 10/28/22: 1.829 m (6').    Weight as of 10/28/22: 101.6 kg (224 lb).       See Patient Instructions    Return in about 8 months (around 7/28/2023) for Physical Exam.    Familia Gillespie MD  [facility]son is a 77 year old, presenting for the following health issues:  Forms      History of Present Illness       Reason for visit:  Handicap sticker renewal    He eats 2-3 servings of fruits and vegetables daily.He consumes 2 sweetened beverage(s) daily.He exercises with enough effort to increase his heart rate 9 or less minutes per day.  He exercises with enough effort to increase his heart rate 3 or less days per week.   He is taking medications regularly.       Patient is seen for a follow up visit.  No acute complaints, no medication change or new medical conditions.  Has  history of atrial fibrillation. On anticoagulation  and rate control medications. Asymptonatic - no chest pains , palpitations,  no side effects from medications. Had ablation, with no recurrence. Has had CHF, related to tachycardia, improved with rate control treatment.   Has h/o HTN. on medical treatment. BP has been controlled. No side effects from medications. No CP, HA, dizziness. good compliance with medications and low salt diet.  Has h/o asthma, on flare up of SOB, wheezing. Uses Flonase for postnasal drip symptoms.   Has h/o GERD on PPI treatment. symptoms are controlled. No nausea, vomiting, heartburns, bloating.          Review of Systems   Constitutional, HEENT, cardiovascular, pulmonary, gi and gu systems are negative, except as otherwise noted.      Objective    Vitals - Patient Reported  Weight (Patient Reported): 102.1 kg (225 lb)  Pain Score: Mild Pain (2)  Pain Loc: Low Back      Vitals:  No vitals were obtained today due to virtual visit.    Physical Exam   GENERAL: Healthy, alert and no distress  EYES: Eyes grossly normal to inspection.  No discharge or erythema, or obvious scleral/conjunctival abnormalities.  RESP: No audible wheeze, cough, or visible cyanosis.  No visible retractions or increased work of breathing.    SKIN: Visible skin clear. No significant rash, abnormal pigmentation or lesions.  NEURO: Cranial nerves grossly intact.  Mentation and speech appropriate for age.  PSYCH: Mentation appears normal, affect normal/bright, judgement and insight intact, normal speech and appearance well-groomed.    Admission on 10/28/2022, Discharged on 10/28/2022   Component Date Value Ref Range Status     Sodium 10/28/2022 138  136 - 145 mmol/L Final     Potassium 10/28/2022 4.2  3.4 - 5.3 mmol/L Final     Chloride 10/28/2022 100  98 - 107 mmol/L Final     Carbon Dioxide (CO2) 10/28/2022 27  22 - 29 mmol/L Final     Anion Gap 10/28/2022 11  7 - 15 mmol/L Final     Urea Nitrogen 10/28/2022 21.3  8.0 -  23.0 mg/dL Final     Creatinine 10/28/2022 1.39 (H)  0.67 - 1.17 mg/dL Final     Calcium 10/28/2022 9.0  8.8 - 10.2 mg/dL Final     Glucose 10/28/2022 96  70 - 99 mg/dL Final     GFR Estimate 10/28/2022 53 (L)  >60 mL/min/1.73m2 Final    Effective December 21, 2021 eGFRcr in adults is calculated using the 2021 CKD-EPI creatinine equation which includes age and gender (Raoul et al., NE, DOI: 10.1056/CGUDej5202373)     Hold Specimen 10/28/2022 JIC   Final     Activated Clotting Time (Celite) P* 10/28/2022 433 (H)  74 - 150 seconds Final     Activated Clotting Time (Celite) P* 10/28/2022 377 (H)  74 - 150 seconds Final     Activated Clotting Time (Celite) P* 10/28/2022 501 (H)  74 - 150 seconds Final     Activated Clotting Time (Celite) P* 10/28/2022 399 (H)  74 - 150 seconds Final     Activated Clotting Time (Celite) P* 10/28/2022 441 (H)  74 - 150 seconds Final     Ventricular Rate 10/28/2022 59  BPM Final     Atrial Rate 10/28/2022 59  BPM Final     TX Interval 10/28/2022 190  ms Final     QRS Duration 10/28/2022 92  ms Final     QT 10/28/2022 404  ms Final     QTc 10/28/2022 399  ms Final     P Axis 10/28/2022 56  degrees Final     R AXIS 10/28/2022 70  degrees Final     T Axis 10/28/2022 262  degrees Final     Interpretation ECG 10/28/2022    Final                    Value:Sinus bradycardia  Low voltage QRS  ST & T wave abnormality, consider inferolateral ischemia  Abnormal ECG  When compared with ECG of 25-OCT-2022 10:13,  No significant change was found  Confirmed by ELVIS GUZMÁN MD LOC:[facility] ([id number]) on 10/28/2022 3:16:09 PM                   Video-Visit Details    Video Start Time: 5:45 PM    Type of service:  Video Visit    Video End Time:5:55 PM    Originating Location (pt. Location): Home    Distant Location (provider location):  On-site    Platform used for Video Visit: Jed

## 2022-11-29 ENCOUNTER — TELEPHONE (OUTPATIENT)
Dept: INTERNAL MEDICINE | Facility: CLINIC | Age: 77
End: 2022-11-29

## 2022-11-29 NOTE — TELEPHONE ENCOUNTER
Review of chart, prior to additional contact with pt.  RX filled by PMD.  11/29/2022 9:10 AM  Debo Obrien RN

## 2022-11-29 NOTE — TELEPHONE ENCOUNTER
LM for call back due for AWV after 12/8 and before end of year.  Can be done with RN at  virtual or in clinic.  Or with PCP if pt prefers.  OK for virtual or approval spot per Rob         Sent my chart as well    Soniya Reddy RN

## 2022-12-07 ENCOUNTER — OFFICE VISIT (OUTPATIENT)
Dept: CARDIOLOGY | Facility: CLINIC | Age: 77
End: 2022-12-07
Payer: COMMERCIAL

## 2022-12-07 VITALS
WEIGHT: 232 LBS | RESPIRATION RATE: 18 BRPM | BODY MASS INDEX: 31.46 KG/M2 | DIASTOLIC BLOOD PRESSURE: 56 MMHG | SYSTOLIC BLOOD PRESSURE: 128 MMHG | HEART RATE: 60 BPM

## 2022-12-07 DIAGNOSIS — I43 TACHYCARDIA INDUCED CARDIOMYOPATHY (H): ICD-10-CM

## 2022-12-07 DIAGNOSIS — I48.19 PERSISTENT ATRIAL FIBRILLATION (H): Primary | ICD-10-CM

## 2022-12-07 DIAGNOSIS — Z98.890 STATUS POST CATHETER ABLATION OF ATRIAL FIBRILLATION: ICD-10-CM

## 2022-12-07 DIAGNOSIS — I50.9 ACUTE HEART FAILURE, UNSPECIFIED HEART FAILURE TYPE (H): ICD-10-CM

## 2022-12-07 DIAGNOSIS — R00.0 TACHYCARDIA INDUCED CARDIOMYOPATHY (H): ICD-10-CM

## 2022-12-07 LAB
ATRIAL RATE - MUSE: 60 BPM
DIASTOLIC BLOOD PRESSURE - MUSE: NORMAL MMHG
INTERPRETATION ECG - MUSE: NORMAL
P AXIS - MUSE: 50 DEGREES
PR INTERVAL - MUSE: 190 MS
QRS DURATION - MUSE: 92 MS
QT - MUSE: 460 MS
QTC - MUSE: 460 MS
R AXIS - MUSE: 63 DEGREES
SYSTOLIC BLOOD PRESSURE - MUSE: NORMAL MMHG
T AXIS - MUSE: -6 DEGREES
VENTRICULAR RATE- MUSE: 60 BPM

## 2022-12-07 PROCEDURE — 99214 OFFICE O/P EST MOD 30 MIN: CPT | Performed by: NURSE PRACTITIONER

## 2022-12-07 PROCEDURE — 93000 ELECTROCARDIOGRAM COMPLETE: CPT | Performed by: INTERNAL MEDICINE

## 2022-12-07 NOTE — PATIENT INSTRUCTIONS
Khurram Reynoso,    It was a pleasure to see you today at the [facility].     My recommendations after this visit include:    Stop amiodarone.    Call if pulse irregular and faster for 2 days in a row.  Continue daily pulse checks.    Limited ECHO 1-2 weeks before seeing Dr. Lauren.    To followup with Dr. Lauren in 6-8 weeks.      My contact information:  Joe Payne, [facility]  After Hours or Scheduling  [phone]  My Nurse---Annita Caba [phone]

## 2022-12-07 NOTE — PROGRESS NOTES
Thank you, Dr. ULISES POWELL, for asking the [facility] team to see . Khurram Reynoso to evaluate persistent atrial fibrillation.    Assessment/Recommendations     Assessment/Plan:    Diagnoses and all orders for this visit:  Persistent atrial fibrillation (H) and doing daily pulse checks and regular with a heart rate around 60s at rest.  No symptoms or erratic pulse since his ablation.  He continues on amiodarone 200 mg orally every day.  No side effects.  Dr. Lauren recommended to stop amiodarone at 6 weeks post ablation.  I discussed long half-life of medication.  No  cot monitor ordered.  To do continue daily pulse checks and call if irregular and faster for 2 days in a row.  Acute heart failure, unspecified heart failure type (H) and no signs or symptoms of decompensated heart failure.  No fluid retention since ablation.  To continue furosemide for now.  Tachycardia induced cardiomyopathy (H) and to do limited echo 1 to 2 weeks prior to follow-up in clinic.  We will continue low-dose lisinopril, diuretic and metoprolol.  Status post catheter ablation of atrial fibrillation and no complications post ablation.  JASE and recommended to have sleep study and follow through with treatment recommended.    [id number] score of 3 and on Eliquis.  Follow up in clinic with Dr. Lauren in 6 to 8 weeks.     History of Present Illness/Subjective     Khurram Reynoso is a very pleasant 77 year old male who comes in today for EP follow-up 6 weeks post ablation.  Khurram Reynoso has a known history of atrial flutter previously ablated in 2006 by Dr. Fountain.  The patient subsequently developed paroxysmal atrial fibrillation and then more recently persistent atrial fibrillation this year.  He underwent cardioversion but reports that he was back in atrial fibrillation within days.  Since that time he has continued to demonstrate atrial fibrillation by pulse check and symptoms which he describes as  "\"flutters\".  The patient reports that his overall energy and stamina are diminished in atrial fibrillation.  He had a sleep study in the remote past and was never placed on therapy.  The patient's description of the study suggest that he did have apnea during the course of the study but did not tolerate the initial trial of CPAP.   On 10/28/2022, Khurram had PVI with Dr. Lauren with cryo to pulmonary veins and right CTI flutter ablation.  Normal voltage except in the area of right superior pulmonary vein.  He was continued on amiodarone post ablation.  Khurram is accompanied by his wife.  He has been taking a little bit easy.  He is back to normal activities within a week.  I discussed that he is not on any activity restrictions.  If they do not intend to take him off of Eliquis for cataract surgery, he can proceed with scheduling.  I discussed that it is normal to do this particular procedure on anticoagulation.  He has no questions regarding his repeat ablation.  He denies any cardiac symptoms.      Cardiographics (reviewed):  ECHO:   Dated: 9/12/2022  1. The left ventricle is normal in size. Left ventricular systolic function is  mildly reduced. The visual ejection fraction is 45-50%. Left ventricular  diastolic function is abnormal. Biplane LVEF is 46%. There is mild global  hypokinesia of the left ventricle.  2. The right ventricle is normal size. The right ventricular systolic function  is normal.  3. There is mild-moderate biatrial enlargement.  4. Trace to mild mitral and tricuspid regurgitation.  5. Mild to moderate aortic regurgitation.  6. No pericardial effusion.  7. In direct comparison to the previous study dated 07/12/2022, there has been  an interval improvement in bi-ventricular systolic function.     Dated: 7/12/2022  The visual ejection fraction is 20-25%.  Left ventricular systolic function is severely reduced.  There is moderate to mod-severe (2-3+) tricuspid regurgitation.  Right ventricular systolic " pressure is elevated, consistent with mild  pulmonary hypertension.  There is trace aortic regurgitation.  Trivial pericardial effusion  The rhythm was atrial fibrillation.  Since 2016 there has been a marked deterioration in left ventricular and right  ventricular systolic function. There has naheed significant worsening of mitral  and tricuspid regurgitation and the rhythm has now changed from sinus to  atrial fibrillation.        Cardiac testing personally reviewed:  Twelve-lead ECG today shows normal sinus rhythm with PAC and QTC of 460 ms.         Problem List:  Patient Active Problem List   Diagnosis     Tachycardia induced cardiomyopathy (H)     Gait difficulty     PVC (premature ventricular contraction)     PAC (premature atrial contraction)     ACP (advance care planning)     Mild persistent asthma without complication     Calculus of kidney     Acute idiopathic gout of right foot     Left ureteral stone     Ureteral calculus     Elevated troponin     Acute heart failure, unspecified heart failure type (H)     Aortic root dilatation (H)     Persistent atrial fibrillation (H)     Status post catheter ablation of atrial fibrillation     Revi  e  Physical Examination Review of Systems   w [facility]  There were no vitals taken for this visit.  There is no height or weight on file to calculate BMI.  Wt Readings from Last 3 Encounters:   10/28/22 101.6 kg (224 lb)   10/12/22 95.3 kg (210 lb)   08/05/22 107.5 kg (237 lb)     General Appearance:   Alert, well-appearing and in no acute distress.   HEENT: Atraumatic, normocephalic.  No scleral icterus, normal conjunctivae; mucous membranes pink and moist.     Chest: Chest symmetric, spine straight.   Lungs:   Respirations unlabored: Lungs are clear to auscultation.   Cardiovascular:   Normal first and second heart sounds with no murmurs, rubs, or gallops.  Regular, regular.   Normal JVD, no edema.       Extremities: No cyanosis or clubbing   Musculoskeletal: Moves all  extremities   Skin: Warm, dry, intact.    Neurologic: Mood and affect are appropriate, alert and oriented to person, place, time, and situation     ROS: 10 point ROS neg other than the symptoms noted above in the HPI.     Medical History  Surgical History Family History Social History     Past Medical History:   Diagnosis Date     Acute bronchitis 10/17/2005     Asthma 2018     Cardiac arrest (H) 6/2006    V-Fib arrest during heart cath     CARDIAC DYSRHYTHMIA NOS 7/27/2005     Degeneration of lumbar or lumbosacral intervertebral disc      Gastroesophageal reflux disease      Gout 2001     Neoplasm of uncertain behavior of skin 2017    Created by Conversion      Other chronic pain     back     PAC (premature atrial contraction)      Paroxysmal A-fib (H) 2006     Persistent atrial fibrillation (H) 10/28/2022    Dx 2022 [id number] score = 3 (age 3, CHF) Tachy induced CM PVI 10/28/2022      PONV (postoperative nausea and vomiting)      PRIM CARDIOMYOPATHY NEC 7/18/2006     PVC (premature ventricular contraction)      Renal disease     kidney stones     Tachycardia induced cardiomyopathy (H) 7/18/2006    Problem list name updated by automated process. Provider to review     Uncomplicated asthma     Past Surgical History:   Procedure Laterality Date     ABLATION OF DYSRHYTHMIC FOCUS  04/03/2007    RVOT PVCs     ANESTHESIA CARDIOVERSION N/A 8/11/2022    Procedure: ANESTHESIA, FOR CARDIOVERSION;  Surgeon: GENERIC ANESTHESIA PROVIDER;  Location: [facility] OR     CARDIAC SURGERY      Ablation     COMBINED CYSTOSCOPY, INSERT STENT URETER(S) Left 8/6/2020    Procedure: Video cystoscopy, left double-J stent placement and exam under anesthesia;  Surgeon: Dank Han MD;  Location: [facility] OR     DECOMPRESS DISC RF LUMBAR  3/2001    lumbar fusion L2-5     EP ABLATION ATRIAL FLUTTER N/A 10/28/2022    Procedure: Ablation Atrial Flutter;  Surgeon: Sourav Lauren MD;  [facility]: [facility] CATH LAB CV     LASER HOLMIUM LITHOTRIPSY  URETER(S), INSERT STENT, COMBINED Left 8/20/2020    Procedure: Video cystoscopy, left double-J stent removal, rigid ureteroscopy, flexible renoscopy with holmium laser lithotripsy and stone extraction.;  Surgeon: Dank Han MD;  Location:  OR     OPTICAL TRACKING SYSTEM FUSION SPINE POSTERIOR LUMBAR THREE+ LEVELS N/A 6/27/2016    Procedure: OPTICAL TRACKING SYSTEM FUSION SPINE POSTERIOR LUMBAR THREE+ LEVELS;  Surgeon: Hieu Araiza MD;  Location:  OR     ORTHOPEDIC SURGERY Bilateral     total knee replacements     ORTHOPEDIC SURGERY Right     Total Hipe Replacement     SOFT TISSUE SURGERY Right     right wrist ganglion surgery     ZZC NONSPECIFIC PROCEDURE      knee     ZC TOTAL HIP ARTHROPLASTY      Description: Total Hip Replacement;  Recorded: 09/24/2010;     ZZC TOTAL KNEE ARTHROPLASTY      Description: Total Knee Arthroplasty;  Recorded: 09/24/2010;  Comments: right    Family History   Problem Relation Age of Onset     C.A.D. Father      Hypertension Father      Heart Surgery Father         bypass     Cancer Brother         bone ca      Family History Negative Mother      Other - See Comments Sister         polio     Unknown/Adopted Maternal Grandmother      Unknown/Adopted Maternal Grandfather      Unknown/Adopted Paternal Grandmother      Unknown/Adopted Paternal Grandfather      Family History Negative Sister      CABG Father 77.00    History   Smoking Status     Never   Smokeless Tobacco     Never     Social History    Substance and Sexual Activity      Alcohol use: No        Alcohol/week: 0.0 standard drinks       Medications  Allergies     Current Outpatient Medications   Medication Sig Dispense Refill     amiodarone (PACERONE) 200 MG tablet Take 1 tablet (200 mg) by mouth daily Take 200 mg twice daily for 3 weeks and then take 200 mg daily 138 tablet 1     apixaban ANTICOAGULANT (ELIQUIS) 5 MG tablet Take 1 tablet (5 mg) by mouth 2 times daily 60 tablet 0     ARNUITY ELLIPTA 100  MCG/ACT inhaler INHALE 1 PUFF INTO THE LUNGS DAILY (Patient not taking: Reported on 11/28/2022)       fluticasone (FLONASE) 50 MCG/ACT nasal spray SHAKE LIQUID AND USE 1 TO 2 SPRAYS IN EACH NOSTRIL DAILY 16 g 2     furosemide (LASIX) 20 MG tablet Take 1 tablet (20 mg) by mouth daily 90 tablet 3     levalbuterol (XOPENEX HFA) 45 MCG/ACT inhaler Inhale 2 puffs into the lungs every 6 hours as needed for wheezing (Patient not taking: Reported on 11/28/2022) 18 g 3     lisinopril (ZESTRIL) 2.5 MG tablet TAKE 1 TABLET(2.5 MG) BY MOUTH DAILY 90 tablet 1     metoprolol succinate ER (TOPROL XL) 50 MG 24 hr tablet Take 1 tablet (50 mg) by mouth daily 90 tablet 3     pantoprazole (PROTONIX) 40 MG EC tablet Take 1 tablet (40 mg) by mouth daily 90 tablet 3     VITAMIN D PO Take one tablet by mouth daily        No Known Allergies   Medical, surgical, family, social history, and medications were all reviewed and updated as necessary.   Lab Results    Chemistry/lipid CBC Cardiac Enzymes/BNP/TSH/INR   Recent Labs   Lab Test 12/01/21  0827   CHOL 193   HDL 43   *   TRIG 117     Recent Labs   Lab Test 12/01/21  0827 03/02/20  1637 11/05/18  1049   * 101* 92     Recent Labs   Lab Test 10/28/22  0620      POTASSIUM 4.2   CHLORIDE 100   CO2 27   GLC 96   BUN 21.3   CR 1.39*   GFRESTIMATED 53*   SHERRON 9.0     Recent Labs   Lab Test 10/28/22  0620 10/25/22  1032 08/01/22  0922   CR 1.39* 1.28* 1.18     No results for input(s): A1C in the last 13157 hours.       Recent Labs   Lab Test 10/25/22  1032   WBC 7.7   HGB 15.3   HCT 45.2   MCV 94        Recent Labs   Lab Test 10/25/22  1032 07/19/22  2251 07/13/22  0741   HGB 15.3 16.3 15.0    No results for input(s): TROPONINI in the last 47256 hours.  Recent Labs   Lab Test 07/19/22 2251 07/11/22  2103   NTBNPI 1,683 1,850*     Recent Labs   Lab Test 10/12/22  0942   TSH 4.88*     No results for input(s): INR in the last 66488 hours.       Total Time- 30 minutes spent  on date of encounter doing chart review, history and exam, documentation and further activities as noted above.  This note has been dictated using voice recognition software. Any grammatical, typographical, or context distortions are unintentional and inherent to the software.

## 2022-12-19 ENCOUNTER — TELEPHONE (OUTPATIENT)
Dept: CARDIOLOGY | Facility: CLINIC | Age: 77
End: 2022-12-19

## 2022-12-19 DIAGNOSIS — I48.20 CHRONIC ATRIAL FIBRILLATION (H): ICD-10-CM

## 2022-12-19 NOTE — TELEPHONE ENCOUNTER
Return call to patient.  He is s/p PVI with SWA on 10-28-22, had 6 week follow-up with Joe Payne on 12-7-22.    Pt continues Eliquis for [id number] score of 3.    He states he was previously scheduled for cataract surgery and was cancelled as Dr. Lauren requested he have PVI first.  He states he is not yet scheduled, they would like the ok from Cardiology first.  Explained that he would need to stay on Eliquis uninterrupted for 3 months post procedure and he states that is no problem.    Will route to Joe Payne for review.

## 2022-12-19 NOTE — TELEPHONE ENCOUNTER
Final Anesthesia Post-op Assessment    Patient: Leonid Loza  Procedure(s) Performed: ANTERIOR CERVICAL DECOMPRESSION AND GRAFTING C4-C5, C5-C6  Anesthesia type: General    Vital Last Value   Temperature 36.4 °C (97.6 °F) (01/30/17 1233)   Pulse 72 (01/30/17 1248)   Respiratory Rate 10 (01/30/17 1248)   Non-Invasive   Blood Pressure 141/66 (01/30/17 1248)   Arterial  Blood Pressure     Pulse Oximetry 94 % (01/30/17 1248)       PATIENT LOCATION: PACU Phase 1  POST-OP VITAL SIGNS: stable  LEVEL OF CONSCIOUSNESS: participates in exam, awake, oriented, answers questions appropriately and alert  RESPIRATORY STATUS: spontaneous ventilation, unassisted and face mask  CARDIOVASCULAR: blood pressure returned to baseline and stable  HYDRATION: euvolemic    PAIN MANAGEMENT: well controlled  NAUSEA: None  AIRWAY PATENCY: patent  POST-OP ASSESSMENT: no complications, patient tolerated procedure well with no complications, no evidence of recall and sufficiently recovered from acute administration of anesthesia effects and able to participate in evaluation  COMPLICATIONS: none       [facility]    Phone Message    May a detailed message be left on voicemail: yes     Reason for Call: Other: Pt is wondering if he needs cardiac clearance for cataract surgery at [facility]. He was just seen in clinic on 12/07/2022. Please review and call pt back for further discussion. Thank you      Action Taken: Message routed to:  Other: Cardiolology    Travel Screening: Not Applicable       Thank you!  [facility]

## 2022-12-20 RX ORDER — APIXABAN 5 MG/1
TABLET, FILM COATED ORAL
Qty: 60 TABLET | Refills: 0 | Status: SHIPPED | OUTPATIENT
Start: 2022-12-20 | End: 2023-01-30

## 2022-12-20 NOTE — TELEPHONE ENCOUNTER
Pc to patient, reviewed below.  He ask that we send it to [employer], this was sent.  He also ask that we send to [facility], this was completed.  Patient updated via [employer].  DONA

## 2022-12-20 NOTE — TELEPHONE ENCOUNTER
Nemo Payne APRN CNP  You 12 minutes ago (9:08 AM)     JH  Okay to proceed with cataract surgery.  They do not hold anticoagulant for this.   Joe

## 2022-12-25 ENCOUNTER — HEALTH MAINTENANCE LETTER (OUTPATIENT)
Age: 77
End: 2022-12-25

## 2022-12-27 ENCOUNTER — OFFICE VISIT (OUTPATIENT)
Dept: FAMILY MEDICINE | Facility: CLINIC | Age: 77
End: 2022-12-27
Payer: COMMERCIAL

## 2022-12-27 VITALS
RESPIRATION RATE: 14 BRPM | HEIGHT: 71 IN | DIASTOLIC BLOOD PRESSURE: 63 MMHG | WEIGHT: 230.5 LBS | OXYGEN SATURATION: 96 % | BODY MASS INDEX: 32.27 KG/M2 | HEART RATE: 59 BPM | TEMPERATURE: 97.4 F | SYSTOLIC BLOOD PRESSURE: 102 MMHG

## 2022-12-27 DIAGNOSIS — Z01.818 PREOP GENERAL PHYSICAL EXAM: Primary | ICD-10-CM

## 2022-12-27 DIAGNOSIS — H25.9 AGE-RELATED CATARACT OF BOTH EYES, UNSPECIFIED AGE-RELATED CATARACT TYPE: ICD-10-CM

## 2022-12-27 PROCEDURE — 90662 IIV NO PRSV INCREASED AG IM: CPT | Performed by: FAMILY MEDICINE

## 2022-12-27 PROCEDURE — G0008 ADMIN INFLUENZA VIRUS VAC: HCPCS | Performed by: FAMILY MEDICINE

## 2022-12-27 PROCEDURE — 99214 OFFICE O/P EST MOD 30 MIN: CPT | Mod: 25 | Performed by: FAMILY MEDICINE

## 2022-12-27 ASSESSMENT — PAIN SCALES - GENERAL: PAINLEVEL: NO PAIN (0)

## 2022-12-27 NOTE — PROGRESS NOTES
[facility]  [address] [facility] [address]  Phone: [phone]  Primary Provider: Familia Gillespie  Pre-op Performing Provider: ZHANG CRUZ      PREOPERATIVE EVALUATION:  Today's date: 12/27/2022    Khurram Reynoso is a 77 year old male who presents for a preoperative evaluation.    Surgical Information:  Surgery/Procedure: Cataract Surgery- RT and LT eye  Surgery Location: [facility] [place]  Surgeon: Dr. Welch   Surgery Date: 1/9/22 1/23/22  Time of Surgery: TBD  Where patient plans to recover: At home with family  Fax number for surgical facility: [phone]    Type of Anesthesia Anticipated: General    Assessment and Plan    (Z01.818) Preop general physical exam  (primary encounter diagnosis)  Comment: cleared for surgery without further eval  Plan:     (H25.9) Age-related cataract of both eyes, unspecified age-related cataract type  Comment:   Plan:       RTC in 9m CPE    Zhang Cruz MD      Subjective     HPI related to upcoming procedure: having both cataracts taken of over the next weeks.  Feeling well, no acute concerns.    Preop Questions 12/27/2022   1. Have you ever had a heart attack or stroke? No   2. Have you ever had surgery on your heart or blood vessels, such as a stent placement, a coronary artery bypass, or surgery on an artery in your head, neck, heart, or legs? No   3. Do you have chest pain with activity? No   4. Do you have a history of  heart failure? No   5. Do you currently have a cold, bronchitis or symptoms of other infection? No   6. Do you have a cough, shortness of breath, or wheezing? No   7. Do you or anyone in your family have previous history of blood clots? UNKNOWN - nothing that he knows of    8. Do you or does anyone in your family have a serious bleeding problem such as prolonged bleeding following surgeries or cuts? No   9. Have you ever had problems with anemia or been told to  take iron pills? No   10. Have you had any abnormal blood loss such as black, tarry or bloody stools? No   11. Have you ever had a blood transfusion? YES - After a surgery a long time ago   11a. Have you ever had a transfusion reaction? No   12. Are you willing to have a blood transfusion if it is medically needed before, during, or after your surgery? Yes   13. Have you or any of your relatives ever had problems with anesthesia? No   14. Do you have sleep apnea, excessive snoring or daytime drowsiness? No   15. Do you have any artifical heart valves or other implanted medical devices like a pacemaker, defibrillator, or continuous glucose monitor? No   16. Do you have artificial joints? YES - Hip and both knees   17. Are you allergic to latex? No       Health Care Directive:  Patient has a Health Care Directive on file      Preoperative Review of :   reviewed - no record of controlled substances prescribed.      Status of Chronic Conditions:  A-FIB - Patient has a longstanding history of chronic A-fib currently on rate control. Current treatment regimen includes Apixaban for stroke prevention and denies significant symptoms of lightheadedness, palpitations or dyspnea.     HYPERLIPIDEMIA - Patient has a long history of significant Hyperlipidemia requiring medication for treatment with recent good control. Patient reports no problems or side effects with the medication.     HYPERTENSION - Patient has longstanding history of HTN , currently denies any symptoms referable to elevated blood pressure. Specifically denies chest pain, palpitations, dyspnea, orthopnea, PND or peripheral edema. Blood pressure readings have been in normal range. Current medication regimen is as listed below. Patient denies any side effects of medication.       Review of Systems  CONSTITUTIONAL: NEGATIVE for fever, chills, change in weight  ENT/MOUTH: NEGATIVE for ear, mouth and throat problems  RESP: NEGATIVE for significant cough or  SOB  CV: NEGATIVE for chest pain, palpitations or peripheral edema    Patient Active Problem List    Diagnosis Date Noted     Status post catheter ablation of atrial fibrillation 12/07/2022     Priority: Medium     10/28/2022 PVI with Dr. Lauren with cryo to pulmonary veins and right CTI flutter ablation.  Normal voltage except in the area of right superior pulmonary vein.       Persistent atrial fibrillation (H) 10/28/2022     Priority: Medium     Dx 2022  [id number] score = 3 (age 3, CHF)  Tachy induced CM  PVI 10/28/2022    10/28/2022 PVI with Dr. Lauren with cryo to pulmonary veins and right CTI flutter ablation.  Normal voltage except in the area of right superior pulmonary vein.  Off amiodarone at 6 weeks post ablation.       Aortic root dilatation (H) 08/01/2022     Priority: Medium     Elevated troponin 07/11/2022     Priority: Medium     Acute heart failure, unspecified heart failure type (H) 07/11/2022     Priority: Medium     Left ureteral stone 08/06/2020     Priority: Medium     Added automatically from request for surgery [id number]       Ureteral calculus 08/06/2020     Priority: Medium     Added automatically from request for surgery [id number]       Calculus of kidney 11/05/2018     Priority: Medium     Acute idiopathic gout of right foot 11/05/2018     Priority: Medium     Mild persistent asthma without complication 07/20/2018     Priority: Medium     ACP (advance care planning) 02/01/2017     Priority: Medium     Advance Care Planning 2/1/2017: Receipt of ACP document:  Received: Health Care Directive which was witnessed or notarized on 8/2/16.  Document previously scanned on 11/28/16.  Validation form completed and sent to be scanned.  Code Status reflects choices in most recent ACP document.  Confirmed/documented designated decision maker(s).  Added by Stefany Wilson RN, Advance Care Planning Liaison.         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     Priority: Medium     Atrial fibrillation associated  LVEF 20-25% July 2022 > improved 40-45% Sept 2022        Past Medical History:   Diagnosis Date     Acute bronchitis 10/17/2005     Asthma 2018     Cardiac arrest (H) 6/2006    V-Fib arrest during heart cath     CARDIAC DYSRHYTHMIA NOS 7/27/2005     Degeneration of lumbar or lumbosacral intervertebral disc      Gastroesophageal reflux disease      Gout 2001     Neoplasm of uncertain behavior of skin 2017    Created by Conversion      Other chronic pain     back     PAC (premature atrial contraction)      Paroxysmal A-fib (H) 2006     Persistent atrial fibrillation (H) 10/28/2022    Dx 2022 [id number] score = 3 (age 3, CHF) Tachy induced CM PVI 10/28/2022      PONV (postoperative nausea and vomiting)      PRIM CARDIOMYOPATHY NEC 7/18/2006     PVC (premature ventricular contraction)      Renal disease     kidney stones     Tachycardia induced cardiomyopathy (H) 7/18/2006    Problem list name updated by automated process. Provider to review     Uncomplicated asthma      Past Surgical History:   Procedure Laterality Date     ABLATION OF DYSRHYTHMIC FOCUS  04/03/2007    RVOT PVCs     ANESTHESIA CARDIOVERSION N/A 8/11/2022    Procedure: ANESTHESIA, FOR CARDIOVERSION;  Surgeon: GENERIC ANESTHESIA PROVIDER;  Location: [facility] OR     CARDIAC SURGERY      Ablation     COMBINED CYSTOSCOPY, INSERT STENT URETER(S) Left 8/6/2020    Procedure: Video cystoscopy, left double-J stent placement and exam under anesthesia;  Surgeon: Dank Han MD;  Location: [facility] OR     DECOMPRESS DISC RF LUMBAR  3/2001    lumbar fusion L2-5     EP ABLATION ATRIAL FLUTTER N/A 10/28/2022    Procedure: Ablation Atrial Flutter;  Surgeon: Sourav Lauren MD;  Location: [facility] CATH LAB CV     LASER HOLMIUM LITHOTRIPSY URETER(S), INSERT STENT, COMBINED Left  8/20/2020    Procedure: Video cystoscopy, left double-J stent removal, rigid ureteroscopy, flexible renoscopy with holmium laser lithotripsy and stone extraction.;  Surgeon: Dank Han MD;  Location:  OR     OPTICAL TRACKING SYSTEM FUSION SPINE POSTERIOR LUMBAR THREE+ LEVELS N/A 6/27/2016    Procedure: OPTICAL TRACKING SYSTEM FUSION SPINE POSTERIOR LUMBAR THREE+ LEVELS;  Surgeon: Hieu Araiza MD;  Location: [facility] OR     ORTHOPEDIC SURGERY Bilateral     total knee replacements     ORTHOPEDIC SURGERY Right     Total Hipe Replacement     SOFT TISSUE SURGERY Right     right wrist ganglion surgery     ZZC NONSPECIFIC PROCEDURE      knee     ZZC TOTAL HIP ARTHROPLASTY      Description: Total Hip Replacement;  Recorded: 09/24/2010;     ZZC TOTAL KNEE ARTHROPLASTY      Description: Total Knee Arthroplasty;  Recorded: 09/24/2010;  Comments: right     Current Outpatient Medications   Medication Sig Dispense Refill     ALBUTEROL SULFATE IN Inhale into the lungs as needed       ELIQUIS ANTICOAGULANT 5 MG tablet TAKE 1 TABLET(5 MG) BY MOUTH TWICE DAILY 60 tablet 0     fluticasone (FLONASE) 50 MCG/ACT nasal spray SHAKE LIQUID AND USE 1 TO 2 SPRAYS IN EACH NOSTRIL DAILY 16 g 2     furosemide (LASIX) 20 MG tablet Take 1 tablet (20 mg) by mouth daily 90 tablet 3     lisinopril (ZESTRIL) 2.5 MG tablet TAKE 1 TABLET(2.5 MG) BY MOUTH DAILY 90 tablet 1     metoprolol succinate ER (TOPROL XL) 50 MG 24 hr tablet Take 1 tablet (50 mg) by mouth daily 90 tablet 3     pantoprazole (PROTONIX) 40 MG EC tablet Take 1 tablet (40 mg) by mouth daily 90 tablet 3     VITAMIN D PO Take one tablet by mouth daily         No Known Allergies     Social History     Tobacco Use     Smoking status: Never     Smokeless tobacco: Never   Substance Use Topics     Alcohol use: No     Alcohol/week: 0.0 standard drinks       History   Drug Use No         Objective     /63 (BP Location: Right arm, Patient Position: Sitting, Cuff Size:  "Adult Regular)   Pulse 59   Temp 97.4  F (36.3  C) (Tympanic)   Resp 14   Ht 1.803 m (5' 11\")   Wt 104.6 kg (230 lb 8 oz)   SpO2 96%   BMI 32.15 kg/m      Physical Exam  GENERAL APPEARANCE: healthy, alert and no distress  HENT: ear canals and TM's normal and nose and mouth without ulcers or lesions  RESP: lungs clear to auscultation - no rales, rhonchi or wheezes  CV: regular rate and rhythm, normal S1 S2, no S3 or S4 and no murmur, click or rub   ABDOMEN: soft, nontender, no HSM or masses and bowel sounds normal  NEURO: Normal strength and tone, sensory exam grossly normal, mentation intact and speech normal    Recent Labs   Lab Test 10/28/22  0620 10/25/22  1032 07/22/22  0913 07/19/22  2251   HGB  --  15.3  --  16.3   PLT  --  341  --  367    139   < > 141   POTASSIUM 4.2 4.5   < > 4.5   CR 1.39* 1.28*   < > 1.15    < > = values in this interval not displayed.        Diagnostics:  No labs were ordered during this visit.   No EKG required for low risk surgery (cataract, skin procedure, breast biopsy, etc).    Revised Cardiac Risk Index (RCRI):  The patient has the following serious cardiovascular risks for perioperative complications:   - No serious cardiac risks = 0 points     RCRI Interpretation: 0 points: Class I (very low risk - 0.4% complication rate)           Signed Electronically by: Zhang Jaramillo MD  Copy of this evaluation report is provided to requesting physician.      "

## 2022-12-27 NOTE — PATIENT INSTRUCTIONS
For informational purposes only. Not to replace the advice of your health care provider. Copyright   2003,  [facility]. All rights reserved. Clinically reviewed by Luisana Frank MD. [employer] [id number] - REV .  Preparing for Your Surgery  Getting started  A nurse will call you to review your health history and instructions. They will give you an arrival time based on your scheduled surgery time. Please be ready to share:    Your doctor's clinic name and phone number    Your medical, surgical, and anesthesia history    A list of allergies and sensitivities    A list of medicines, including herbal treatments and over-the-counter drugs    Whether the patient has a legal guardian (ask how to send us the papers in advance)  Please tell us if you're pregnant--or if there's any chance you might be pregnant. Some surgeries may injure a fetus (unborn baby), so they require a pregnancy test. Surgeries that are safe for a fetus don't always need a test, and you can choose whether to have one.   If you have a child who's having surgery, please ask for a copy of Preparing for Your Child's Surgery.    Preparing for surgery    Within 10 to 30 days of surgery: Have a pre-op exam (sometimes called an H&P, or History and Physical). This can be done at a clinic or pre-operative center.  ? If you're having a , you may not need this exam. Talk to your care team.    At your pre-op exam, talk to your care team about all medicines you take. If you need to stop any medicines before surgery, ask when to start taking them again.  ? We do this for your safety. Many medicines can make you bleed too much during surgery. Some change how well surgery (anesthesia) drugs work.    Call your insurance company to let them know you're having surgery. (If you don't have insurance, call [phone].)    Call your clinic if there's any change in your health. This includes signs of a cold or flu (sore throat, runny nose,  cough, rash, fever). It also includes a scrape or scratch near the surgery site.    If you have questions on the day of surgery, call your hospital or surgery center.  Eating and drinking guidelines  For your safety: Unless your surgeon tells you otherwise, follow the guidelines below.    Eat and drink as usual until 8 hours before you arrive for surgery. After that, no food or milk.    Drink clear liquids until 2 hours before you arrive. These are liquids you can see through, like water, Gatorade, and Propel Water. They also include plain black coffee and tea (no cream or milk), candy, and breath mints. You can spit out gum when you arrive.    If you drink alcohol: Stop drinking it the night before surgery.    If your care team tells you to take medicine on the morning of surgery, it's okay to take it with a sip of water.  Preventing infection    Shower or bathe the night before and morning of your surgery. Follow the instructions your clinic gave you. (If no instructions, use regular soap.)    Don't shave or clip hair near your surgery site. We'll remove the hair if needed.    Don't smoke or vape the morning of surgery. You may chew nicotine gum up to 2 hours before surgery. A nicotine patch is okay.  ? Note: Some surgeries require you to completely quit smoking and nicotine. Check with your surgeon.    Your care team will make every effort to keep you safe from infection. We will:  ? Clean our hands often with soap and water (or an alcohol-based hand rub).  ? Clean the skin at your surgery site with a special soap that kills germs.  ? Give you a special gown to keep you warm. (Cold raises the risk of infection.)  ? Wear special hair covers, masks, gowns and gloves during surgery.  ? Give antibiotic medicine, if prescribed. Not all surgeries need antibiotics.  What to bring on the day of surgery    Photo ID and insurance card    Copy of your health care directive, if you have one    Glasses and hearing aids (bring  cases)  ? You can't wear contacts during surgery    Inhaler and eye drops, if you use them (tell us about these when you arrive)    CPAP machine or breathing device, if you use them    A few personal items, if spending the night    If you have . . .  ? A pacemaker, ICD (cardiac defibrillator) or other implant: Bring the ID card.  ? An implanted stimulator: Bring the remote control.  ? A legal guardian: Bring a copy of the certified (court-stamped) guardianship papers.  Please remove any jewelry, including body piercings. Leave jewelry and other valuables at home.  If you're going home the day of surgery    You must have a responsible adult drive you home. They should stay with you overnight as well.    If you don't have someone to stay with you, and you aren't safe to go home alone, we may keep you overnight. Insurance often won't pay for this.  After surgery  If it's hard to control your pain or you need more pain medicine, please call your surgeon's office.  Questions?   If you have any questions for your care team, list them here: _________________________________________________________________________________________________________________________________________________________________________ ____________________________________ ____________________________________ ____________________________________

## 2023-01-05 ENCOUNTER — HOSPITAL ENCOUNTER (OUTPATIENT)
Dept: CARDIOLOGY | Facility: CLINIC | Age: 78
Discharge: HOME | End: 2023-01-05
Attending: NURSE PRACTITIONER | Admitting: NURSE PRACTITIONER
Payer: COMMERCIAL

## 2023-01-05 DIAGNOSIS — I48.19 PERSISTENT ATRIAL FIBRILLATION (H): ICD-10-CM

## 2023-01-05 LAB — LVEF ECHO: NORMAL

## 2023-01-05 PROCEDURE — 93308 TTE F-UP OR LMTD: CPT | Mod: 26 | Performed by: INTERNAL MEDICINE

## 2023-01-05 PROCEDURE — 93321 DOPPLER ECHO F-UP/LMTD STD: CPT | Mod: 26 | Performed by: INTERNAL MEDICINE

## 2023-01-05 PROCEDURE — 93308 TTE F-UP OR LMTD: CPT

## 2023-01-05 PROCEDURE — 93325 DOPPLER ECHO COLOR FLOW MAPG: CPT | Mod: 26 | Performed by: INTERNAL MEDICINE

## 2023-01-12 ENCOUNTER — OFFICE VISIT (OUTPATIENT)
Dept: CARDIOLOGY | Facility: CLINIC | Age: 78
End: 2023-01-12
Payer: COMMERCIAL

## 2023-01-12 VITALS
HEIGHT: 72 IN | BODY MASS INDEX: 31.15 KG/M2 | RESPIRATION RATE: 16 BRPM | HEART RATE: 69 BPM | DIASTOLIC BLOOD PRESSURE: 62 MMHG | SYSTOLIC BLOOD PRESSURE: 115 MMHG | WEIGHT: 230 LBS

## 2023-01-12 DIAGNOSIS — I48.19 PERSISTENT ATRIAL FIBRILLATION (H): Primary | ICD-10-CM

## 2023-01-12 DIAGNOSIS — R00.0 TACHYCARDIA INDUCED CARDIOMYOPATHY (H): ICD-10-CM

## 2023-01-12 DIAGNOSIS — Z98.890 STATUS POST CATHETER ABLATION OF ATRIAL FIBRILLATION: ICD-10-CM

## 2023-01-12 DIAGNOSIS — I43 TACHYCARDIA INDUCED CARDIOMYOPATHY (H): ICD-10-CM

## 2023-01-12 DIAGNOSIS — I77.810 AORTIC ROOT DILATATION (H): ICD-10-CM

## 2023-01-12 PROCEDURE — 99215 OFFICE O/P EST HI 40 MIN: CPT | Performed by: INTERNAL MEDICINE

## 2023-01-12 NOTE — PROGRESS NOTES
[facility]  Cardiac Electrophysiology     Progress Note: Sourav Lauren MD    Primary Care: Familia Gillespie MD    Primary Cardiologist: Will refer to general cardiology for long-term follow-up.        Primary Electrophysiologist: Sourav Lauren MD    Assessment:         Persistent atrial fibrillation: The patient is approximately 3 months out from his ablation procedure (PVI + RA CTI line) to treat his atrial fibrillation.  The patient reports no symptomatic recurrence of atrial fibrillation or flutter symptoms.  The patient has not worn a monitor to date.  The patient has an elevated [id number] score of 3 (age 3, CHF) and remains on oral anticoagulant therapy (apixaban).  The patient reports no difficulties with bleeding or other side effects at this time.  It should be noted that the patient has gait instability and he may be a candidate for left atrial appendage closure moving forward.    Tachycardia induced cardiomyopathy: Mild depression and LV function which has now normalized based on the echocardiogram from this month (LVEF 50-55%).  The patient reports that overall his exercise capacity has continued to improve.  He finds that he can do all of his activities without significant limitation.  By exam today he does not appear to be fluid overloaded.  He does report that he continues to take his Lasix daily.    Recommendations:    Patient will cut furosemide dose to 20 mg every other day and follow his weight and lower extremity edema.    Patient will wear a 14-day MCOT cardiac monitor to document his rhythm status.    Patient will be scheduled for follow-up in the arrhythmia clinic with the EP RED in 6 months.    Patient be scheduled to see general cardiology to establish care and follow-up in 3 months.    Time spent: 40 minutes spent on the date of the encounter doing chart review, history and exam, documentation and further activities as noted above.    Subjective:  Khurram Reynoso (77  year old male) returns to the arrhythmia clinic for follow-up of his atrial fibrillation post ablation.  The patient is approximately 3 months out from his ablation procedure to treat his atrial fibrillation.  Patient reports that his recovery was uneventful with no urgent or emergent hospital or office visits.  He notes no recurrence of tachypalpitations or other symptoms suggesting return of atrial fibrillation or flutter.  The patient checks his pulse daily and has found it regular.  His exercise tolerance is improved with no significant dyspnea or exertion chest pain.  The patient reports no orthopnea PND or ankle edema.  The patient does state that he is continue to take his furosemide daily but notes no edema even at the end of the day.    Current Outpatient Medications   Medication Sig Dispense Refill     ALBUTEROL SULFATE IN Inhale into the lungs as needed       ELIQUIS ANTICOAGULANT 5 MG tablet TAKE 1 TABLET(5 MG) BY MOUTH TWICE DAILY 60 tablet 0     fluticasone (FLONASE) 50 MCG/ACT nasal spray SHAKE LIQUID AND USE 1 TO 2 SPRAYS IN EACH NOSTRIL DAILY 16 g 2     furosemide (LASIX) 20 MG tablet Take 1 tablet (20 mg) by mouth daily 90 tablet 3     lisinopril (ZESTRIL) 2.5 MG tablet TAKE 1 TABLET(2.5 MG) BY MOUTH DAILY 90 tablet 1     metoprolol succinate ER (TOPROL XL) 50 MG 24 hr tablet Take 1 tablet (50 mg) by mouth daily 90 tablet 3     pantoprazole (PROTONIX) 40 MG EC tablet Take 1 tablet (40 mg) by mouth daily 90 tablet 3     VITAMIN D PO Take one tablet by mouth daily         Review of Systems:     Family History  Family History   Problem Relation Age of Onset     C.A.D. Father      Hypertension Father      Heart Surgery Father         bypass     Cancer Brother         bone ca      Family History Negative Mother      Other - See Comments Sister         polio     Unknown/Adopted Maternal Grandmother      Unknown/Adopted Maternal Grandfather      Unknown/Adopted Paternal Grandmother      Unknown/Adopted  Paternal Grandfather      Family History Negative Sister      CABG Father 77.00       Social History   reports that he has never smoked. He has never used smokeless tobacco. He reports that he does not drink alcohol and does not use drugs.    Objective:   Vital signs in last 24 hours:  /62 (BP Location: Right arm, Patient Position: Sitting, Cuff Size: Adult Regular)   Pulse 69   Resp 16   Ht 1.829 m (6')   Wt 104.3 kg (230 lb)   BMI 31.19 kg/m      Physical Exam:  General: The patient is alert oriented to person place and situation.  The patient is in no acute distress at the time of my evaluation.  Eyes: Pupils are equal, round, and reactive to light.  Conjunctiva and sclera are clear.  ENT: Oral mucosa is moist and without redness. No evident nasal discharge.  Pulmonary: Lungs are clear bilaterally with no rales, rhonchi, or wheezes.    Cardiovascular exam: Rhythm is regular. S1 and S2 are normal. No significant murmur is present. JVP is normal. Lower extremities demonstrate no significant edema. Distal pulses are intact bilaterally.  Abdomen is soft, nontender, no organomegaly, and bowel sounds present.  Musculoskeletal: Spine is straight. Extremities without deformity.  Neuro: Gait is asymmetric and the patient uses a cane.     Skin is warm, dry, and otherwise intact.      Cardiographics:   Echo 1/5/2023  Left ventricular function is decreased. The ejection fraction is 50-55%  (borderline).  Normal right ventricle size and systolic function.  Limited views were obtained. No hemodynamically significant valvular  abnormalities on 2D or color flow imaging.    Lab Results:         Outside record review:

## 2023-01-12 NOTE — LETTER
1/12/2023    Familia Gillespie MD  [address] [facility] [address]    RE: Khurram Reynoso       Dear Colleague,     I had the pleasure of seeing Khurram Reynoso in the [facility].    [facility]  Cardiac Electrophysiology     Progress Note: Sourav Lauren MD    Primary Care: Familia Gillespie MD    Primary Cardiologist: Will refer to general cardiology for long-term follow-up.        Primary Electrophysiologist: Sourav Lauren MD    Assessment:         Persistent atrial fibrillation: The patient is approximately 3 months out from his ablation procedure (PVI + RA CTI line) to treat his atrial fibrillation.  The patient reports no symptomatic recurrence of atrial fibrillation or flutter symptoms.  The patient has not worn a monitor to date.  The patient has an elevated [id number] score of 3 (age 3, CHF) and remains on oral anticoagulant therapy (apixaban).  The patient reports no difficulties with bleeding or other side effects at this time.  It should be noted that the patient has gait instability and he may be a candidate for left atrial appendage closure moving forward.    Tachycardia induced cardiomyopathy: Mild depression and LV function which has now normalized based on the echocardiogram from this month (LVEF 50-55%).  The patient reports that overall his exercise capacity has continued to improve.  He finds that he can do all of his activities without significant limitation.  By exam today he does not appear to be fluid overloaded.  He does report that he continues to take his Lasix daily.    Recommendations:    Patient will cut furosemide dose to 20 mg every other day and follow his weight and lower extremity edema.    Patient will wear a 14-day MCOT cardiac monitor to document his rhythm status.    Patient will be scheduled for follow-up in the arrhythmia clinic with the EP RED in 6 months.    Patient be scheduled to see general cardiology to establish care and  follow-up in 3 months.    Time spent: 40 minutes spent on the date of the encounter doing chart review, history and exam, documentation and further activities as noted above.    Subjective:  Khurram Reynoso (77 year old male) returns to the arrhythmia clinic for follow-up of his atrial fibrillation post ablation.  The patient is approximately 3 months out from his ablation procedure to treat his atrial fibrillation.  Patient reports that his recovery was uneventful with no urgent or emergent hospital or office visits.  He notes no recurrence of tachypalpitations or other symptoms suggesting return of atrial fibrillation or flutter.  The patient checks his pulse daily and has found it regular.  His exercise tolerance is improved with no significant dyspnea or exertion chest pain.  The patient reports no orthopnea PND or ankle edema.  The patient does state that he is continue to take his furosemide daily but notes no edema even at the end of the day.    Current Outpatient Medications   Medication Sig Dispense Refill     ALBUTEROL SULFATE IN Inhale into the lungs as needed       ELIQUIS ANTICOAGULANT 5 MG tablet TAKE 1 TABLET(5 MG) BY MOUTH TWICE DAILY 60 tablet 0     fluticasone (FLONASE) 50 MCG/ACT nasal spray SHAKE LIQUID AND USE 1 TO 2 SPRAYS IN EACH NOSTRIL DAILY 16 g 2     furosemide (LASIX) 20 MG tablet Take 1 tablet (20 mg) by mouth daily 90 tablet 3     lisinopril (ZESTRIL) 2.5 MG tablet TAKE 1 TABLET(2.5 MG) BY MOUTH DAILY 90 tablet 1     metoprolol succinate ER (TOPROL XL) 50 MG 24 hr tablet Take 1 tablet (50 mg) by mouth daily 90 tablet 3     pantoprazole (PROTONIX) 40 MG EC tablet Take 1 tablet (40 mg) by mouth daily 90 tablet 3     VITAMIN D PO Take one tablet by mouth daily         Review of Systems:     Family History  Family History   Problem Relation Age of Onset     C.A.D. Father      Hypertension Father      Heart Surgery Father         bypass     Cancer Brother         bone ca      Family  History Negative Mother      Other - See Comments Sister         polio     Unknown/Adopted Maternal Grandmother      Unknown/Adopted Maternal Grandfather      Unknown/Adopted Paternal Grandmother      Unknown/Adopted Paternal Grandfather      Family History Negative Sister      CABG Father 77.00       Social History   reports that he has never smoked. He has never used smokeless tobacco. He reports that he does not drink alcohol and does not use drugs.    Objective:   Vital signs in last 24 hours:  /62 (BP Location: Right arm, Patient Position: Sitting, Cuff Size: Adult Regular)   Pulse 69   Resp 16   Ht 1.829 m (6')   Wt 104.3 kg (230 lb)   BMI 31.19 kg/m      Physical Exam:  General: The patient is alert oriented to person place and situation.  The patient is in no acute distress at the time of my evaluation.  Eyes: Pupils are equal, round, and reactive to light.  Conjunctiva and sclera are clear.  ENT: Oral mucosa is moist and without redness. No evident nasal discharge.  Pulmonary: Lungs are clear bilaterally with no rales, rhonchi, or wheezes.    Cardiovascular exam: Rhythm is regular. S1 and S2 are normal. No significant murmur is present. JVP is normal. Lower extremities demonstrate no significant edema. Distal pulses are intact bilaterally.  Abdomen is soft, nontender, no organomegaly, and bowel sounds present.  Musculoskeletal: Spine is straight. Extremities without deformity.  Neuro: Gait is asymmetric and the patient uses a cane.     Skin is warm, dry, and otherwise intact.      Cardiographics:   Echo 1/5/2023  Left ventricular function is decreased. The ejection fraction is 50-55%  (borderline).  Normal right ventricle size and systolic function.  Limited views were obtained. No hemodynamically significant valvular  abnormalities on 2D or color flow imaging.    Lab Results:         Outside record review:      Thank you for allowing me to participate in the care of your patient.      Sincerely,      Sourav Lauren MD     [facility]  cc:   MICKEY Palmer CNP  [address]  [place] [address]

## 2023-01-17 NOTE — CONSULTS
Patient has Medicare D through [facility].    Xarelto/Eliquis  --Upon receipt of RX, Discharge Pharmacy can provide 1 mo free.  --Subsequent fills will be $47/mo.  --lf/when total drug costs exceed $4,430, price will increase to a 25% coinsurance, equivalent to $141/mo.  --If total out of pocket exceeds $7,050, coinsurance will be reduced to a 5% coinsurance, equivalent to $28/mo.    Jantoven (warfarin): $0/mo.    -KALLI Das, Pharmacy Technician/Liaison, Discharge Pharmacy [phone]   Glycopyrrolate Counseling:  I discussed with the patient the risks of glycopyrrolate including but not limited to skin rash, drowsiness, dry mouth, difficulty urinating, and blurred vision.

## 2023-01-19 ENCOUNTER — HOSPITAL ENCOUNTER (OUTPATIENT)
Dept: CARDIOLOGY | Facility: HOSPITAL | Age: 78
Discharge: HOME | End: 2023-01-19
Attending: INTERNAL MEDICINE
Payer: COMMERCIAL

## 2023-01-19 DIAGNOSIS — I48.19 PERSISTENT ATRIAL FIBRILLATION (H): ICD-10-CM

## 2023-01-19 PROCEDURE — 999N000096 CARDIAC MOBILE TELEMETRY MONITOR

## 2023-01-19 PROCEDURE — 93228 REMOTE 30 DAY ECG REV/REPORT: CPT | Performed by: INTERNAL MEDICINE

## 2023-03-21 NOTE — TELEPHONE ENCOUNTER
"Requested Prescriptions   Pending Prescriptions Disp Refills     fluticasone (FLONASE) 50 MCG/ACT nasal spray [Pharmacy Med Name: FLUTICASONE 50MCG NASAL SP (120) RX] 16 g      Sig: SHAKE LIQUID AND USE 1 TO 2 SPRAYS IN EACH NOSTRIL DAILY  Last Written Prescription Date:  10/29/19  Last Fill Quantity: 16 mL,  # refills: 0   Last office visit: 11/27/2018 with prescribing provider:  Jose Miguel   Future Office Visit:         Inhaled Steroids Protocol Failed - 12/23/2019  4:20 PM        Failed - Asthma control assessment score within normal limits in last 6 months     Please review ACT score.   ACT Total Scores 7/10/2019   ACT TOTAL SCORE (Goal Greater than or Equal to 20) 19   In the past 12 months, how many times did you visit the emergency room for your asthma without being admitted to the hospital? 1   In the past 12 months, how many times were you hospitalized overnight because of your asthma? 0           Failed - Recent (6 mo) or future (30 days) visit within the authorizing provider's specialty     Patient had office visit in the last 6 months or has a visit in the next 30 days with authorizing provider or within the authorizing provider's specialty.  See \"Patient Info\" tab in inbasket, or \"Choose Columns\" in Meds & Orders section of the refill encounter.            Passed - Patient is age 12 or older        Passed - Medication is active on med list           "
Last OV on 11/27/18. Pt is OVERDUE for OV.   Pt has specialists for his chronic illnesses. Will fill x 1 and sent letter to schedule OV.       
No

## 2023-04-10 ENCOUNTER — TRANSFERRED RECORDS (OUTPATIENT)
Dept: HEALTH INFORMATION MANAGEMENT | Facility: CLINIC | Age: 78
End: 2023-04-10
Payer: COMMERCIAL

## 2023-04-14 ENCOUNTER — OFFICE VISIT (OUTPATIENT)
Dept: CARDIOLOGY | Facility: CLINIC | Age: 78
End: 2023-04-14
Payer: COMMERCIAL

## 2023-04-14 VITALS
OXYGEN SATURATION: 97 % | BODY MASS INDEX: 31.74 KG/M2 | SYSTOLIC BLOOD PRESSURE: 108 MMHG | DIASTOLIC BLOOD PRESSURE: 71 MMHG | RESPIRATION RATE: 20 BRPM | WEIGHT: 234 LBS | HEART RATE: 77 BPM

## 2023-04-14 DIAGNOSIS — Z98.890 STATUS POST CATHETER ABLATION OF ATRIAL FIBRILLATION: ICD-10-CM

## 2023-04-14 DIAGNOSIS — I48.19 PERSISTENT ATRIAL FIBRILLATION (H): ICD-10-CM

## 2023-04-14 DIAGNOSIS — R00.0 TACHYCARDIA INDUCED CARDIOMYOPATHY (H): Primary | ICD-10-CM

## 2023-04-14 DIAGNOSIS — I43 TACHYCARDIA INDUCED CARDIOMYOPATHY (H): Primary | ICD-10-CM

## 2023-04-14 DIAGNOSIS — I77.810 AORTIC ROOT DILATATION (H): ICD-10-CM

## 2023-04-14 PROCEDURE — 99214 OFFICE O/P EST MOD 30 MIN: CPT | Performed by: INTERNAL MEDICINE

## 2023-04-14 NOTE — LETTER
4/14/2023    Familia Gillespie MD  [address] [facility] [address]    RE: Khurram Reynoso       Dear Colleague,     I had the pleasure of seeing Khurram Reynoso in the [facility].        Thank you, Dr. Lauren, for asking [facility] to evaluate Mr. Khurram Reynoso.      Assessment/Recommendations   Assessment:    Aortic root dilatation, mild, asymptomatic  Borderline to mildly depressed LV systolic function/ nonischemic cardiomyopathy-no fluid overload  History of PVCs induced cardiomyopathy status post ablation in 2006, no recurrence  History of paroxysmal atrial fibrillation status post ablation in 2022, no recurrence  Chronic low back pain with severely compromised mobility    Plan:  CTs of the chest to assess the size of ascending aorta  Continue current medications for mild LV systolic dysfunction    Reassess LV function with echo next year    Follow-up in 1 year       History of Present Illness    Mr. Khurram Reynoso is a 77 year old male who comes in for initial cardiac evaluation with general cardiology.  He has a history of ventricular atrial dysrhythmias.  Back in 2006 she he had frequent PVCs and decreased LV systolic function.  He was ablated at [facility] on 2007.  He had near complete recovery of LV systolic function.  Incidentally mildly enlarged aortic root was discovered at time.  He had atrial fibrillation 2022 and underwent an ablation.  He has not had recurrent heart racing or syncope.  He has had chronic low back pain and multiple back surgeries.  He has limited ability to walk.  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    He is not known to have coronary artery disease he has previous coronary angiogram prior to ablations were negative.  He denies exertional chest pain.  He has not had weight gain, PND, orthopnea.    He has no family history of sudden death or aortic rupture.    ECG: Personally reviewed.  8/11/2022 normal sinus rhythm PACs  otherwise normal    Echocardiogram: January 2023, personally reviewed  Left ventricular function is decreased. The ejection fraction is 50-55%  (borderline).  Normal right ventricle size and systolic function.  Limited views were obtained. No hemodynamically significant valvular  abnormalities on 2D or color flow imaging.        Coronary Angiogram: 2006 at [facility]  Normal coronaries     Physical Examination Review of Systems   /71 (BP Location: Right arm, Patient Position: Sitting, Cuff Size: Adult Large)   Pulse 77   Resp 20   Wt 106.1 kg (234 lb)   SpO2 97%   BMI 31.74 kg/m    Body mass index is 31.74 kg/m .  Wt Readings from Last 3 Encounters:   04/14/23 106.1 kg (234 lb)   01/12/23 104.3 kg (230 lb)   12/27/22 104.6 kg (230 lb 8 oz)     General Appearance:   Alert, cooperative, no distress, appears stated age   Head/ENT: Normocephalic, without obvious abnormality. Membranes moist      EYES:  no scleral icterus, normal conjunctivae   Neck: Supple, symmetrical, trachea midline, no adenopathy, thyroid: not enlarged, symmetric, no carotid bruit or JVD   Chest/Lungs:   Lungs are clear to auscultation, respirations unlabored. No tenderness or deformity    Cardiovascular:   Regular rhythm, S1, S2 normal, no murmur, rub or gallop.   Abdomen:  Soft, non-tender, bowel sounds active all four quadrants,  no masses, no organomegaly   Extremities: no cyanosis or clubbing. No edema   Skin: Skin color, texture, turgor normal, no rashes or lesions.    Psychiatric: Normal affect, calm   Neurologic: Alert and oriented x 3, moving all four extremities.     Enc Vitals  BP: 108/71  Pulse: 77  Resp: 20  SpO2: 97 %  Weight: 106.1 kg (234 lb) (shoes on)                                          Lab Results    Chemistry/lipid CBC Cardiac Enzymes/BNP/TSH/INR   Recent Labs   Lab Test 12/01/21  0827   CHOL 193   HDL 43   *   TRIG 117     Recent Labs   Lab Test 12/01/21  0827 03/02/20  1637 11/05/18  1049   * 101*  92     Recent Labs   Lab Test 10/28/22  0620      POTASSIUM 4.2   CHLORIDE 100   CO2 27   GLC 96   BUN 21.3   CR 1.39*   GFRESTIMATED 53*   SHERRON 9.0     Recent Labs   Lab Test 10/28/22  0620 10/25/22  1032 08/01/22  0922   CR 1.39* 1.28* 1.18     No results for input(s): A1C in the last 57833 hours.       Recent Labs   Lab Test 10/25/22  1032   WBC 7.7   HGB 15.3   HCT 45.2   MCV 94        Recent Labs   Lab Test 10/25/22  1032 07/19/22  2251 07/13/22  0741   HGB 15.3 16.3 15.0    No results for input(s): TROPONINI in the last 25704 hours.  Recent Labs   Lab Test 07/19/22 2251 07/11/22  2103   NTBNPI 1,683 1,850*     Recent Labs   Lab Test 10/12/22  0942   TSH 4.88*     No results for input(s): INR in the last 67229 hours.     Medical History  Surgical History Family History Social History   Past Medical History:   Diagnosis Date    Acute bronchitis 10/17/2005    Asthma 2018    Cardiac arrest (H) 6/2006    V-Fib arrest during heart cath    CARDIAC DYSRHYTHMIA NOS 7/27/2005    Degeneration of lumbar or lumbosacral intervertebral disc     Gastroesophageal reflux disease     Gout 2001    Neoplasm of uncertain behavior of skin 2017    Created by Conversion     Other chronic pain     back    PAC (premature atrial contraction)     Paroxysmal A-fib (H) 2006    Persistent atrial fibrillation (H) 10/28/2022    Dx 2022 [id number] score = 3 (age 3, CHF) Tachy induced CM PVI 10/28/2022     PONV (postoperative nausea and vomiting)     PRIM CARDIOMYOPATHY NEC 7/18/2006    PVC (premature ventricular contraction)     Renal disease     kidney stones    Tachycardia induced cardiomyopathy (H) 7/18/2006    Problem list name updated by automated process. Provider to review    Uncomplicated asthma      Past Surgical History:   Procedure Laterality Date    ABLATION OF DYSRHYTHMIC FOCUS  04/03/2007    RVOT PVCs    ANESTHESIA CARDIOVERSION N/A 8/11/2022    Procedure: ANESTHESIA, FOR CARDIOVERSION;  Surgeon: GENERIC ANESTHESIA  PROVIDER;  Location: [facility] OR    CARDIAC SURGERY      Ablation    COMBINED CYSTOSCOPY, INSERT STENT URETER(S) Left 8/6/2020    Procedure: Video cystoscopy, left double-J stent placement and exam under anesthesia;  Surgeon: Dank Han MD;  Location: [facility] OR    DECOMPRESS DISC RF LUMBAR  3/2001    lumbar fusion L2-5    EP ABLATION ATRIAL FLUTTER N/A 10/28/2022    Procedure: Ablation Atrial Flutter;  Surgeon: Sourav Lauren MD;  Location: [facility] CV    LASER HOLMIUM LITHOTRIPSY URETER(S), INSERT STENT, COMBINED Left 8/20/2020    Procedure: Video cystoscopy, left double-J stent removal, rigid ureteroscopy, flexible renoscopy with holmium laser lithotripsy and stone extraction.;  Surgeon: Dank Han MD;  Location: [facility] OR    OPTICAL TRACKING SYSTEM FUSION SPINE POSTERIOR LUMBAR THREE+ LEVELS N/A 6/27/2016    Procedure: OPTICAL TRACKING SYSTEM FUSION SPINE POSTERIOR LUMBAR THREE+ LEVELS;  Surgeon: Hieu Araiza MD;  Location: [facility] OR    ORTHOPEDIC SURGERY Bilateral     total knee replacements    ORTHOPEDIC SURGERY Right     Total Hipe Replacement    SOFT TISSUE SURGERY Right     right wrist ganglion surgery    ZZC NONSPECIFIC PROCEDURE      knee    Z TOTAL HIP ARTHROPLASTY      Description: Total Hip Replacement;  Recorded: 09/24/2010;    [facility] TOTAL KNEE ARTHROPLASTY      Description: Total Knee Arthroplasty;  Recorded: 09/24/2010;  Comments: right     No premature CAD, SCD,cardiomyopathy   Social History     Socioeconomic History    Marital status:      Spouse name: Not on file    Number of children: Not on file    Years of education: Not on file    Highest education level: Not on file   Occupational History    Not on file   Tobacco Use    Smoking status: Never    Smokeless tobacco: Never   Vaping Use    Vaping status: Never Used   Substance and Sexual Activity    Alcohol use: No     Alcohol/week: 0.0 standard drinks of alcohol    Drug use: No    Sexual activity: Not Currently    Other Topics Concern    Parent/sibling w/ CABG, MI or angioplasty before 65F 55M? Not Asked     Service Not Asked    Blood Transfusions Not Asked    Caffeine Concern No     Comment: 20oz coffee a day.  occ pop    Occupational Exposure Not Asked    Hobby Hazards Not Asked    Sleep Concern No    Stress Concern No    Weight Concern No    Special Diet No    Back Care Not Asked    Exercise No     Comment: back issue    Bike Helmet Not Asked    Seat Belt Yes    Self-Exams Not Asked   Social History Narrative    Not on file     Social Determinants of Health     Financial Resource Strain: Not on file   Food Insecurity: Not on file   Transportation Needs: Not on file   Physical Activity: Not on file   Stress: Not on file   Social Connections: Not on file   Intimate Partner Violence: Not on file   Housing Stability: Not on file         Medications  Allergies   Current Outpatient Medications   Medication Sig Dispense Refill    ALBUTEROL SULFATE IN Inhale into the lungs as needed      ELIQUIS ANTICOAGULANT 5 MG tablet TAKE 1 TABLET(5 MG) BY MOUTH TWICE DAILY 60 tablet 3    fluticasone (FLONASE) 50 MCG/ACT nasal spray SHAKE LIQUID AND USE 1 TO 2 SPRAYS IN EACH NOSTRIL DAILY 16 g 2    furosemide (LASIX) 20 MG tablet Take 1 tablet (20 mg) by mouth daily 90 tablet 3    lisinopril (ZESTRIL) 2.5 MG tablet TAKE 1 TABLET(2.5 MG) BY MOUTH DAILY 90 tablet 1    metoprolol succinate ER (TOPROL XL) 50 MG 24 hr tablet Take 1 tablet (50 mg) by mouth daily 90 tablet 3    pantoprazole (PROTONIX) 40 MG EC tablet Take 1 tablet (40 mg) by mouth daily 90 tablet 3    VITAMIN D PO Take one tablet by mouth daily        No Known Allergies                    Thank you for allowing me to participate in the care of your patient.      Sincerely,     Jesus Reyez MD     [facility]  cc:   Sourav Lauren MD  [address]  [place] [address]

## 2023-04-14 NOTE — PATIENT INSTRUCTIONS
Khurram Reynoso,    It was a pleasure to see you today at the [facility].     My recommendations after this visit include:    CT aorta  same medications    KE Reyez MD, FACC, EVONNE

## 2023-04-14 NOTE — PROGRESS NOTES
Thank you, Dr. Lauren, for asking [facility] to evaluate Mr. Khurram Reynoso.      Assessment/Recommendations   Assessment:    Aortic root dilatation, mild, asymptomatic  Borderline to mildly depressed LV systolic function/ nonischemic cardiomyopathy-no fluid overload  History of PVCs induced cardiomyopathy status post ablation in 2006, no recurrence  History of paroxysmal atrial fibrillation status post ablation in 2022, no recurrence  Chronic low back pain with severely compromised mobility    Plan:  CTs of the chest to assess the size of ascending aorta  Continue current medications for mild LV systolic dysfunction    Reassess LV function with echo next year    Follow-up in 1 year       History of Present Illness    Mr. Khurram Reynoso is a 77 year old male who comes in for initial cardiac evaluation with general cardiology.  He has a history of ventricular atrial dysrhythmias.  Back in 2006 she he had frequent PVCs and decreased LV systolic function.  He was ablated at [facility] on 2007.  He had near complete recovery of LV systolic function.  Incidentally mildly enlarged aortic root was discovered at time.  He had atrial fibrillation 2022 and underwent an ablation.  He has not had recurrent heart racing or syncope.  He has had chronic low back pain and multiple back surgeries.  He has limited ability to walk.  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    He is not known to have coronary artery disease he has previous coronary angiogram prior to ablations were negative.  He denies exertional chest pain.  He has not had weight gain, PND, orthopnea.    He has no family history of sudden death or aortic rupture.    ECG: Personally reviewed.  8/11/2022 normal sinus rhythm PACs otherwise normal    Echocardiogram: January 2023, personally reviewed  Left ventricular function is decreased. The ejection fraction is 50-55%  (borderline).  Normal right ventricle size and systolic  function.  Limited views were obtained. No hemodynamically significant valvular  abnormalities on 2D or color flow imaging.        Coronary Angiogram: 2006 at [facility]  Normal coronaries     Physical Examination Review of Systems   /71 (BP Location: Right arm, Patient Position: Sitting, Cuff Size: Adult Large)   Pulse 77   Resp 20   Wt 106.1 kg (234 lb)   SpO2 97%   BMI 31.74 kg/m    Body mass index is 31.74 kg/m .  Wt Readings from Last 3 Encounters:   04/14/23 106.1 kg (234 lb)   01/12/23 104.3 kg (230 lb)   12/27/22 104.6 kg (230 lb 8 oz)     General Appearance:   Alert, cooperative, no distress, appears stated age   Head/ENT: Normocephalic, without obvious abnormality. Membranes moist      EYES:  no scleral icterus, normal conjunctivae   Neck: Supple, symmetrical, trachea midline, no adenopathy, thyroid: not enlarged, symmetric, no carotid bruit or JVD   Chest/Lungs:   Lungs are clear to auscultation, respirations unlabored. No tenderness or deformity    Cardiovascular:   Regular rhythm, S1, S2 normal, no murmur, rub or gallop.   Abdomen:  Soft, non-tender, bowel sounds active all four quadrants,  no masses, no organomegaly   Extremities: no cyanosis or clubbing. No edema   Skin: Skin color, texture, turgor normal, no rashes or lesions.    Psychiatric: Normal affect, calm   Neurologic: Alert and oriented x 3, moving all four extremities.     Enc Vitals  BP: 108/71  Pulse: 77  Resp: 20  SpO2: 97 %  Weight: 106.1 kg (234 lb) (shoes on)                                          Lab Results    Chemistry/lipid CBC Cardiac Enzymes/BNP/TSH/INR   Recent Labs   Lab Test 12/01/21  0827   CHOL 193   HDL 43   *   TRIG 117     Recent Labs   Lab Test 12/01/21  0827 03/02/20  1637 11/05/18  1049   * 101* 92     Recent Labs   Lab Test 10/28/22  0620      POTASSIUM 4.2   CHLORIDE 100   CO2 27   GLC 96   BUN 21.3   CR 1.39*   GFRESTIMATED 53*   SHERRON 9.0     Recent Labs   Lab Test 10/28/22  0620  10/25/22  1032 08/01/22  0922   CR 1.39* 1.28* 1.18     No results for input(s): A1C in the last 48661 hours.       Recent Labs   Lab Test 10/25/22  1032   WBC 7.7   HGB 15.3   HCT 45.2   MCV 94        Recent Labs   Lab Test 10/25/22  1032 07/19/22  2251 07/13/22  0741   HGB 15.3 16.3 15.0    No results for input(s): TROPONINI in the last 16244 hours.  Recent Labs   Lab Test 07/19/22  2251 07/11/22  2103   NTBNPI 1,683 1,850*     Recent Labs   Lab Test 10/12/22  0942   TSH 4.88*     No results for input(s): INR in the last 35281 hours.     Medical History  Surgical History Family History Social History   Past Medical History:   Diagnosis Date     Acute bronchitis 10/17/2005     Asthma 2018     Cardiac arrest (H) 6/2006    V-Fib arrest during heart cath     CARDIAC DYSRHYTHMIA NOS 7/27/2005     Degeneration of lumbar or lumbosacral intervertebral disc      Gastroesophageal reflux disease      Gout 2001     Neoplasm of uncertain behavior of skin 2017    Created by Conversion      Other chronic pain     back     PAC (premature atrial contraction)      Paroxysmal A-fib (H) 2006     Persistent atrial fibrillation (H) 10/28/2022    Dx 2022 [id number] score = 3 (age 3, CHF) Tachy induced CM PVI 10/28/2022      PONV (postoperative nausea and vomiting)      PRIM CARDIOMYOPATHY NEC 7/18/2006     PVC (premature ventricular contraction)      Renal disease     kidney stones     Tachycardia induced cardiomyopathy (H) 7/18/2006    Problem list name updated by automated process. Provider to review     Uncomplicated asthma      Past Surgical History:   Procedure Laterality Date     ABLATION OF DYSRHYTHMIC FOCUS  04/03/2007    RVOT PVCs     ANESTHESIA CARDIOVERSION N/A 8/11/2022    Procedure: ANESTHESIA, FOR CARDIOVERSION;  Surgeon: GENERIC ANESTHESIA PROVIDER;  Location: [facility] OR     CARDIAC SURGERY      Ablation     COMBINED CYSTOSCOPY, INSERT STENT URETER(S) Left 8/6/2020    Procedure: Video cystoscopy, left double-J stent  placement and exam under anesthesia;  Surgeon: Dank Han MD;  Location: [facility] OR     DECOMPRESS DISC RF LUMBAR  3/2001    lumbar fusion L2-5     EP ABLATION ATRIAL FLUTTER N/A 10/28/2022    Procedure: Ablation Atrial Flutter;  Surgeon: Sourav Lauren MD;  Location: [facility] LAB CV     LASER HOLMIUM LITHOTRIPSY URETER(S), INSERT STENT, COMBINED Left 8/20/2020    Procedure: Video cystoscopy, left double-J stent removal, rigid ureteroscopy, flexible renoscopy with holmium laser lithotripsy and stone extraction.;  Surgeon: Dank Han MD;  Location:  OR     OPTICAL TRACKING SYSTEM FUSION SPINE POSTERIOR LUMBAR THREE+ LEVELS N/A 6/27/2016    Procedure: OPTICAL TRACKING SYSTEM FUSION SPINE POSTERIOR LUMBAR THREE+ LEVELS;  Surgeon: Hieu Araiza MD;  Location: [facility] OR     ORTHOPEDIC SURGERY Bilateral     total knee replacements     ORTHOPEDIC SURGERY Right     Total Hipe Replacement     SOFT TISSUE SURGERY Right     right wrist ganglion surgery     ZZC NONSPECIFIC PROCEDURE      knee     ZZC TOTAL HIP ARTHROPLASTY      Description: Total Hip Replacement;  Recorded: 09/24/2010;     ZZC TOTAL KNEE ARTHROPLASTY      Description: Total Knee Arthroplasty;  Recorded: 09/24/2010;  Comments: right     No premature CAD, SCD,cardiomyopathy   Social History     Socioeconomic History     Marital status:      Spouse name: Not on file     Number of children: Not on file     Years of education: Not on file     Highest education level: Not on file   Occupational History     Not on file   Tobacco Use     Smoking status: Never     Smokeless tobacco: Never   Vaping Use     Vaping status: Never Used   Substance and Sexual Activity     Alcohol use: No     Alcohol/week: 0.0 standard drinks of alcohol     Drug use: No     Sexual activity: Not Currently   Other Topics Concern     Parent/sibling w/ CABG, MI or angioplasty before 65F 55M? Not Asked      Service Not Asked     Blood Transfusions Not Asked      Caffeine Concern No     Comment: 20oz coffee a day.  occ pop     Occupational Exposure Not Asked     Hobby Hazards Not Asked     Sleep Concern No     Stress Concern No     Weight Concern No     Special Diet No     Back Care Not Asked     Exercise No     Comment: back issue     Bike Helmet Not Asked     Seat Belt Yes     Self-Exams Not Asked   Social History Narrative     Not on file     Social Determinants of Health     Financial Resource Strain: Not on file   Food Insecurity: Not on file   Transportation Needs: Not on file   Physical Activity: Not on file   Stress: Not on file   Social Connections: Not on file   Intimate Partner Violence: Not on file   Housing Stability: Not on file         Medications  Allergies   Current Outpatient Medications   Medication Sig Dispense Refill     ALBUTEROL SULFATE IN Inhale into the lungs as needed       ELIQUIS ANTICOAGULANT 5 MG tablet TAKE 1 TABLET(5 MG) BY MOUTH TWICE DAILY 60 tablet 3     fluticasone (FLONASE) 50 MCG/ACT nasal spray SHAKE LIQUID AND USE 1 TO 2 SPRAYS IN EACH NOSTRIL DAILY 16 g 2     furosemide (LASIX) 20 MG tablet Take 1 tablet (20 mg) by mouth daily 90 tablet 3     lisinopril (ZESTRIL) 2.5 MG tablet TAKE 1 TABLET(2.5 MG) BY MOUTH DAILY 90 tablet 1     metoprolol succinate ER (TOPROL XL) 50 MG 24 hr tablet Take 1 tablet (50 mg) by mouth daily 90 tablet 3     pantoprazole (PROTONIX) 40 MG EC tablet Take 1 tablet (40 mg) by mouth daily 90 tablet 3     VITAMIN D PO Take one tablet by mouth daily        No Known Allergies

## 2023-04-16 ENCOUNTER — HEALTH MAINTENANCE LETTER (OUTPATIENT)
Age: 78
End: 2023-04-16

## 2023-04-24 ENCOUNTER — HOSPITAL ENCOUNTER (OUTPATIENT)
Dept: CT IMAGING | Facility: HOSPITAL | Age: 78
Discharge: HOME | End: 2023-04-24
Attending: INTERNAL MEDICINE | Admitting: INTERNAL MEDICINE
Payer: COMMERCIAL

## 2023-04-24 DIAGNOSIS — I77.810 AORTIC ROOT DILATATION (H): ICD-10-CM

## 2023-04-24 LAB
CREAT BLD-MCNC: 1.6 MG/DL (ref 0.7–1.3)
GFR SERPL CREATININE-BSD FRML MDRD: 44 ML/MIN/1.73M2

## 2023-04-24 PROCEDURE — 82565 ASSAY OF CREATININE: CPT

## 2023-04-24 PROCEDURE — 250N000011 HC RX IP 250 OP 636: Performed by: INTERNAL MEDICINE

## 2023-04-24 PROCEDURE — 71275 CT ANGIOGRAPHY CHEST: CPT

## 2023-04-24 RX ORDER — IOPAMIDOL 755 MG/ML
75 INJECTION, SOLUTION INTRAVASCULAR ONCE
Status: COMPLETED | OUTPATIENT
Start: 2023-04-24 | End: 2023-04-24

## 2023-04-24 RX ADMIN — IOPAMIDOL 75 ML: 755 INJECTION, SOLUTION INTRAVENOUS at 11:12

## 2023-05-18 ENCOUNTER — OFFICE VISIT (OUTPATIENT)
Dept: URGENT CARE | Facility: URGENT CARE | Age: 78
End: 2023-05-18
Payer: COMMERCIAL

## 2023-05-18 VITALS
HEART RATE: 84 BPM | OXYGEN SATURATION: 92 % | SYSTOLIC BLOOD PRESSURE: 116 MMHG | DIASTOLIC BLOOD PRESSURE: 70 MMHG | TEMPERATURE: 100.8 F | RESPIRATION RATE: 16 BRPM

## 2023-05-18 DIAGNOSIS — R50.9 FEVER, UNSPECIFIED FEVER CAUSE: ICD-10-CM

## 2023-05-18 DIAGNOSIS — R05.1 ACUTE COUGH: Primary | ICD-10-CM

## 2023-05-18 PROCEDURE — 99213 OFFICE O/P EST LOW 20 MIN: CPT | Performed by: FAMILY MEDICINE

## 2023-05-18 RX ORDER — AZITHROMYCIN 250 MG/1
TABLET, FILM COATED ORAL
Qty: 6 TABLET | Refills: 0 | Status: SHIPPED | OUTPATIENT
Start: 2023-05-18 | End: 2023-05-23

## 2023-05-18 RX ORDER — BENZONATATE 200 MG/1
200 CAPSULE ORAL 3 TIMES DAILY PRN
Qty: 21 CAPSULE | Refills: 0 | Status: SHIPPED | OUTPATIENT
Start: 2023-05-18 | End: 2023-06-16

## 2023-05-18 NOTE — PATIENT INSTRUCTIONS
Understand a fever  Relax, lie down. Go to bed if you want. Just get off your feet and rest. Also, drink plenty of fluids to avoid dehydration.  Take acetaminophen or a nonsteroidal anti-inflammatory agent (NSAID), such as ibuprofen.  A fever is a normal reaction of your body to an illness. The temperature itself often isn t harmful. It actually helps your body fight infections. You don t need to treat a fever unless you feel very uncomfortable.   If the fever doesn t get better within 1 hour after you take acetaminophen, take ibuprofen. If this works, keep taking the ibuprofen every 6 to 8 hours.   If either medicine alone doesn t keep the fever down, you may switch off between the 2 medicines every 3 to 4 hours. For example, take ibuprofen. Wait 3 hours. Then take acetaminophen. Wait 3 hours. Take ibuprofen, and so on.  Treat a troubled nose kindly  Breathe steam or heated humidified air to open blocked nasal passages.  a hot shower or use a vaporizer. Be careful not to get burned by the steam.  Saline nasal sprays and decongestant tablets help open a stuffy nose. Antihistamines (Claritin, Zyrtec, etc.) can also help, but they can cause side effects such as drowsiness and drying of the eyes, nose, and mouth.  Nasal rinses such as Netti pot will help with the sinus congestion and nasal drainage.   Soothe a sore throat and cough  Gargle every 2 hours with 1/4 teaspoon of salt dissolved in 1/2 cup of warm water. Suck on throat lozenges and cough drops to moisten your throat.  Gargling with Chloraseptic spray   Cough medicines are available but it is unclear how effective they actually are.  Manuka Honey tbs for cough suppressant   Take acetaminophen or an NSAID, such as ibuprofen to ease throat pain  Humidifier in room where you are sleeping.   Ease digestive problems  Put fluid back into your body. Take frequent sips of clear liquids such as water or broth. Do not drink beverages with a lot of sugar in them,  such as juices and sodas. These can make diarrhea worse. Older children and adults can drink sports drinks.  Patient will need to drink at least 1.5-2 liters of fluids daily for adults and 1-1.5 liters for children. If vomiting and not tolerating liquids for more than 24 hrs, please go to your nearest emergency department for IV fluids and further treatment.   Maintain hydration by drinking small amounts of clear fluids frequently, then soft diet, and then advance diet as tolerated. May use any prescribed imodium if desired for any diarrhea. Call if symptoms worsen, high fever, severe weakness or fainting, increased abdominal pain, blood in stool or vomit, or failure to improve in 2-3 days.   As your appetite returns, you can resume your normal diet. Ask your doctor whether there are any foods you should avoid.  When to seek medical care  When you first notice symptoms, ask your health care provider if antiviral medications are appropriate. Antibiotics should not be taken for colds or flu. Also, call your doctor if you have any of the following symptoms or if you aren t feeling better after 7 days:  Shortness of breath  Pain or pressure in the chest or abdomen  Worsening symptoms, especially after a period of improvement  Fever that doesn t go down with medication  Sudden dizziness or confusion  Severe or continued vomiting  Signs of dehydration, including extreme thirst, dark urine, infrequent urination, dry mouth  Spotted, red, or very sore throat

## 2023-05-18 NOTE — PROGRESS NOTES
URGENT CARE VISIT:    ASSESSMENT AND PLAN:      ICD-10-CM    1. Acute cough  R05.1 amoxicillin-clavulanate (AUGMENTIN) 875-125 MG tablet     azithromycin (ZITHROMAX) 250 MG tablet     benzonatate (TESSALON) 200 MG capsule      2. Fever, unspecified fever cause  R50.9 amoxicillin-clavulanate (AUGMENTIN) 875-125 MG tablet     azithromycin (ZITHROMAX) 250 MG tablet          Differentials include but not limited to COVID-19, Asthma exacerbation, Bronchitis-viral, Pneumonia, Viral upper respiratory illness, etc.  Pt reports unable to complete testing today including chest x-ray and CBC and is requesting treatment.  At this time in light of ongoing cough and fever will presumably treat for pneumonia for Augmentin and azithromycin; side effects of medication, finishing full course and probiotics discussed.  Tessalon Perles for cough suppressant.  Supportive measures outlined in AVS.      Red flag symptoms for urgent evaluation via ED shared.      Follow up with primary care provider with any problems, questions or concerns or if symptoms worsen or fail to improve. Patient verbalized understanding and is agreeable to plan. The patient was discharged ambulatory and in stable condition.    SUBJECTIVE:   Khurram Reynoso is a 77 year old male presenting for chief complaint of fever, chills and cough - productive.  Onset was 7 day(s) ago.   He denies the following symptoms: wheezing, shortness of breath, facial pain/pressure, body aches, fatigue, nausea, vomiting and diarrhea  Course of illness is waxing and waning.    Treatment measures tried include Tylenol/Ibuprofen and Antihistamine with some relief of symptoms.  Predisposing factors include None.      Home testin23 home negative test     PMH:   Past Medical History:   Diagnosis Date     Acute bronchitis 10/17/2005     Asthma 2018     Cardiac arrest (H) 2006    V-Fib arrest during heart cath     CARDIAC DYSRHYTHMIA NOS 2005     Degeneration of lumbar or  lumbosacral intervertebral disc      Gastroesophageal reflux disease      Gout 2001     Neoplasm of uncertain behavior of skin 2017    Created by Conversion      Other chronic pain     back     PAC (premature atrial contraction)      Paroxysmal A-fib (H) 2006     Persistent atrial fibrillation (H) 10/28/2022    Dx 2022 [id number] score = 3 (age 3, CHF) Tachy induced CM PVI 10/28/2022      PONV (postoperative nausea and vomiting)      PRIM CARDIOMYOPATHY NEC 7/18/2006     PVC (premature ventricular contraction)      Renal disease     kidney stones     Tachycardia induced cardiomyopathy (H) 7/18/2006    Problem list name updated by automated process. Provider to review     Uncomplicated asthma      Allergies: Patient has no known allergies.   Medications:   Current Outpatient Medications   Medication Sig Dispense Refill     ALBUTEROL SULFATE IN Inhale into the lungs as needed       amoxicillin-clavulanate (AUGMENTIN) 875-125 MG tablet Take 1 tablet by mouth 2 times daily for 7 days 14 tablet 0     azithromycin (ZITHROMAX) 250 MG tablet Take 2 tablets (500 mg) by mouth daily for 1 day, THEN 1 tablet (250 mg) daily for 4 days. 6 tablet 0     benzonatate (TESSALON) 200 MG capsule Take 1 capsule (200 mg) by mouth 3 times daily as needed for cough 21 capsule 0     ELIQUIS ANTICOAGULANT 5 MG tablet TAKE 1 TABLET(5 MG) BY MOUTH TWICE DAILY 60 tablet 3     fluticasone (FLONASE) 50 MCG/ACT nasal spray SHAKE LIQUID AND USE 1 TO 2 SPRAYS IN EACH NOSTRIL DAILY 16 g 2     furosemide (LASIX) 20 MG tablet Take 1 tablet (20 mg) by mouth daily 90 tablet 3     lisinopril (ZESTRIL) 2.5 MG tablet TAKE 1 TABLET(2.5 MG) BY MOUTH DAILY 90 tablet 1     metoprolol succinate ER (TOPROL XL) 50 MG 24 hr tablet Take 1 tablet (50 mg) by mouth daily 90 tablet 3     pantoprazole (PROTONIX) 40 MG EC tablet Take 1 tablet (40 mg) by mouth daily 90 tablet 3     VITAMIN D PO Take one tablet by mouth daily       Social History:   Social History      Tobacco Use     Smoking status: Never     Smokeless tobacco: Never   Vaping Use     Vaping status: Never Used   Substance Use Topics     Alcohol use: No     Alcohol/week: 0.0 standard drinks of alcohol       ROS:  Review of systems negative except as stated above.    OBJECTIVE:  /70   Pulse 84   Temp (!) 100.8  F (38.2  C) (Tympanic)   Resp 16   SpO2 92%     GENERAL APPEARANCE: healthy, alert and no distress  EYES: EOMI,  PERRL, conjunctiva clear  HENT: ear canals and TM's normal.  Nose and mouth without ulcers, erythema or lesions  NECK: supple, nontender, no lymphadenopathy  RESP: lungs clear to auscultation - no rales, rhonchi or wheezes  CV: regular rates and rhythm, normal S1 S2, no murmur noted  SKIN: no suspicious lesions or rashes    Labs:      No results found for any visits on 05/18/23.

## 2023-06-04 DIAGNOSIS — I50.9 ACUTE HEART FAILURE, UNSPECIFIED HEART FAILURE TYPE (H): ICD-10-CM

## 2023-06-05 DIAGNOSIS — I48.20 CHRONIC ATRIAL FIBRILLATION (H): ICD-10-CM

## 2023-06-05 RX ORDER — LISINOPRIL 2.5 MG/1
TABLET ORAL
Qty: 90 TABLET | Refills: 3 | Status: SHIPPED | OUTPATIENT
Start: 2023-06-05 | End: 2023-10-10

## 2023-06-05 RX ORDER — APIXABAN 5 MG/1
TABLET, FILM COATED ORAL
Qty: 180 TABLET | Refills: 3 | Status: SHIPPED | OUTPATIENT
Start: 2023-06-05 | End: 2023-10-10

## 2023-06-12 ASSESSMENT — ENCOUNTER SYMPTOMS
EYE PAIN: 0
CHILLS: 0
NERVOUS/ANXIOUS: 0
JOINT SWELLING: 1
DIZZINESS: 0
FEVER: 0
PARESTHESIAS: 0
HEMATURIA: 0
ABDOMINAL PAIN: 0
CONSTIPATION: 0
HEMATOCHEZIA: 1
DYSURIA: 0
SORE THROAT: 0
FREQUENCY: 0
PALPITATIONS: 0
SHORTNESS OF BREATH: 0
MYALGIAS: 1
ARTHRALGIAS: 1
COUGH: 1
NAUSEA: 0
DIARRHEA: 1
HEARTBURN: 0
HEADACHES: 0
WEAKNESS: 0

## 2023-06-12 ASSESSMENT — ASTHMA QUESTIONNAIRES
ACT_TOTALSCORE: 25
QUESTION_3 LAST FOUR WEEKS HOW OFTEN DID YOUR ASTHMA SYMPTOMS (WHEEZING, COUGHING, SHORTNESS OF BREATH, CHEST TIGHTNESS OR PAIN) WAKE YOU UP AT NIGHT OR EARLIER THAN USUAL IN THE MORNING: NOT AT ALL
ACT_TOTALSCORE: 25
QUESTION_1 LAST FOUR WEEKS HOW MUCH OF THE TIME DID YOUR ASTHMA KEEP YOU FROM GETTING AS MUCH DONE AT WORK, SCHOOL OR AT HOME: NONE OF THE TIME
QUESTION_4 LAST FOUR WEEKS HOW OFTEN HAVE YOU USED YOUR RESCUE INHALER OR NEBULIZER MEDICATION (SUCH AS ALBUTEROL): NOT AT ALL
QUESTION_5 LAST FOUR WEEKS HOW WOULD YOU RATE YOUR ASTHMA CONTROL: COMPLETELY CONTROLLED
QUESTION_2 LAST FOUR WEEKS HOW OFTEN HAVE YOU HAD SHORTNESS OF BREATH: NOT AT ALL

## 2023-06-12 ASSESSMENT — ACTIVITIES OF DAILY LIVING (ADL): CURRENT_FUNCTION: NO ASSISTANCE NEEDED

## 2023-06-13 ENCOUNTER — MYC MEDICAL ADVICE (OUTPATIENT)
Dept: INTERNAL MEDICINE | Facility: CLINIC | Age: 78
End: 2023-06-13
Payer: COMMERCIAL

## 2023-06-16 ENCOUNTER — OFFICE VISIT (OUTPATIENT)
Dept: INTERNAL MEDICINE | Facility: CLINIC | Age: 78
End: 2023-06-16
Payer: COMMERCIAL

## 2023-06-16 ENCOUNTER — ANCILLARY PROCEDURE (OUTPATIENT)
Dept: GENERAL RADIOLOGY | Facility: CLINIC | Age: 78
End: 2023-06-16
Attending: INTERNAL MEDICINE
Payer: COMMERCIAL

## 2023-06-16 VITALS
DIASTOLIC BLOOD PRESSURE: 76 MMHG | OXYGEN SATURATION: 96 % | BODY MASS INDEX: 30.43 KG/M2 | HEIGHT: 72 IN | TEMPERATURE: 97.3 F | SYSTOLIC BLOOD PRESSURE: 130 MMHG | WEIGHT: 224.7 LBS | HEART RATE: 87 BPM | RESPIRATION RATE: 16 BRPM

## 2023-06-16 DIAGNOSIS — Z00.00 ENCOUNTER FOR PREVENTATIVE ADULT HEALTH CARE EXAMINATION: Primary | ICD-10-CM

## 2023-06-16 DIAGNOSIS — Z12.5 SCREENING FOR PROSTATE CANCER: ICD-10-CM

## 2023-06-16 DIAGNOSIS — Z00.00 ENCOUNTER FOR MEDICARE ANNUAL WELLNESS EXAM: ICD-10-CM

## 2023-06-16 DIAGNOSIS — M25.572 PAIN IN JOINT INVOLVING ANKLE AND FOOT, LEFT: ICD-10-CM

## 2023-06-16 DIAGNOSIS — R05.9 COUGH, UNSPECIFIED TYPE: ICD-10-CM

## 2023-06-16 DIAGNOSIS — Z98.890 STATUS POST CATHETER ABLATION OF ATRIAL FIBRILLATION: ICD-10-CM

## 2023-06-16 DIAGNOSIS — J45.30 MILD PERSISTENT ASTHMA WITHOUT COMPLICATION: ICD-10-CM

## 2023-06-16 DIAGNOSIS — R20.0 HAND NUMBNESS: ICD-10-CM

## 2023-06-16 LAB
ALBUMIN SERPL BCG-MCNC: 4.1 G/DL (ref 3.5–5.2)
ALBUMIN UR-MCNC: NEGATIVE MG/DL
ALP SERPL-CCNC: 180 U/L (ref 40–129)
ALT SERPL W P-5'-P-CCNC: 12 U/L (ref 0–70)
ANION GAP SERPL CALCULATED.3IONS-SCNC: 15 MMOL/L (ref 7–15)
APPEARANCE UR: CLEAR
AST SERPL W P-5'-P-CCNC: 25 U/L (ref 0–45)
BACTERIA #/AREA URNS HPF: ABNORMAL /HPF
BILIRUB SERPL-MCNC: 0.6 MG/DL
BILIRUB UR QL STRIP: NEGATIVE
BUN SERPL-MCNC: 20.8 MG/DL (ref 8–23)
CALCIUM SERPL-MCNC: 9.5 MG/DL (ref 8.8–10.2)
CHLORIDE SERPL-SCNC: 102 MMOL/L (ref 98–107)
CHOLEST SERPL-MCNC: 180 MG/DL
COLOR UR AUTO: YELLOW
CREAT SERPL-MCNC: 1.33 MG/DL (ref 0.67–1.17)
DEPRECATED HCO3 PLAS-SCNC: 23 MMOL/L (ref 22–29)
ERYTHROCYTE [DISTWIDTH] IN BLOOD BY AUTOMATED COUNT: 12.8 % (ref 10–15)
GFR SERPL CREATININE-BSD FRML MDRD: 55 ML/MIN/1.73M2
GLUCOSE SERPL-MCNC: 87 MG/DL (ref 70–99)
GLUCOSE UR STRIP-MCNC: NEGATIVE MG/DL
HCT VFR BLD AUTO: 43.3 % (ref 40–53)
HDLC SERPL-MCNC: 40 MG/DL
HGB BLD-MCNC: 14.8 G/DL (ref 13.3–17.7)
HGB UR QL STRIP: ABNORMAL
KETONES UR STRIP-MCNC: NEGATIVE MG/DL
LDLC SERPL CALC-MCNC: 118 MG/DL
LEUKOCYTE ESTERASE UR QL STRIP: NEGATIVE
MCH RBC QN AUTO: 31.8 PG (ref 26.5–33)
MCHC RBC AUTO-ENTMCNC: 34.2 G/DL (ref 31.5–36.5)
MCV RBC AUTO: 93 FL (ref 78–100)
NITRATE UR QL: NEGATIVE
NONHDLC SERPL-MCNC: 140 MG/DL
PH UR STRIP: 5 [PH] (ref 5–7)
PLATELET # BLD AUTO: 352 10E3/UL (ref 150–450)
POTASSIUM SERPL-SCNC: 4.3 MMOL/L (ref 3.4–5.3)
PROT SERPL-MCNC: 7.7 G/DL (ref 6.4–8.3)
PSA SERPL DL<=0.01 NG/ML-MCNC: 2.64 NG/ML (ref 0–6.5)
RBC # BLD AUTO: 4.65 10E6/UL (ref 4.4–5.9)
RBC #/AREA URNS AUTO: ABNORMAL /HPF
SODIUM SERPL-SCNC: 140 MMOL/L (ref 136–145)
SP GR UR STRIP: 1.01 (ref 1–1.03)
SQUAMOUS #/AREA URNS AUTO: ABNORMAL /LPF
T4 FREE SERPL-MCNC: 1.27 NG/DL (ref 0.9–1.7)
TRIGL SERPL-MCNC: 109 MG/DL
TSH SERPL DL<=0.005 MIU/L-ACNC: 12.2 UIU/ML (ref 0.3–4.2)
URATE SERPL-MCNC: 9.6 MG/DL (ref 3.4–7)
UROBILINOGEN UR STRIP-ACNC: 0.2 E.U./DL
WBC # BLD AUTO: 6.8 10E3/UL (ref 4–11)
WBC #/AREA URNS AUTO: ABNORMAL /HPF

## 2023-06-16 PROCEDURE — G0103 PSA SCREENING: HCPCS | Performed by: INTERNAL MEDICINE

## 2023-06-16 PROCEDURE — 84550 ASSAY OF BLOOD/URIC ACID: CPT | Performed by: INTERNAL MEDICINE

## 2023-06-16 PROCEDURE — 73610 X-RAY EXAM OF ANKLE: CPT | Mod: TC | Performed by: RADIOLOGY

## 2023-06-16 PROCEDURE — 84443 ASSAY THYROID STIM HORMONE: CPT | Performed by: INTERNAL MEDICINE

## 2023-06-16 PROCEDURE — 80053 COMPREHEN METABOLIC PANEL: CPT | Performed by: INTERNAL MEDICINE

## 2023-06-16 PROCEDURE — 99213 OFFICE O/P EST LOW 20 MIN: CPT | Mod: 25 | Performed by: INTERNAL MEDICINE

## 2023-06-16 PROCEDURE — 99397 PER PM REEVAL EST PAT 65+ YR: CPT | Performed by: INTERNAL MEDICINE

## 2023-06-16 PROCEDURE — 71046 X-RAY EXAM CHEST 2 VIEWS: CPT | Mod: TC | Performed by: RADIOLOGY

## 2023-06-16 PROCEDURE — 85027 COMPLETE CBC AUTOMATED: CPT | Performed by: INTERNAL MEDICINE

## 2023-06-16 PROCEDURE — 80061 LIPID PANEL: CPT | Performed by: INTERNAL MEDICINE

## 2023-06-16 PROCEDURE — 36415 COLL VENOUS BLD VENIPUNCTURE: CPT | Performed by: INTERNAL MEDICINE

## 2023-06-16 PROCEDURE — 81001 URINALYSIS AUTO W/SCOPE: CPT | Performed by: INTERNAL MEDICINE

## 2023-06-16 PROCEDURE — 84439 ASSAY OF FREE THYROXINE: CPT | Performed by: INTERNAL MEDICINE

## 2023-06-16 ASSESSMENT — ENCOUNTER SYMPTOMS
SHORTNESS OF BREATH: 0
NERVOUS/ANXIOUS: 0
CHILLS: 0
CONSTIPATION: 0
JOINT SWELLING: 1
HEARTBURN: 0
DYSURIA: 0
DIZZINESS: 0
COUGH: 1
DIARRHEA: 1
FEVER: 0
PALPITATIONS: 0
ABDOMINAL PAIN: 0
PARESTHESIAS: 0
ARTHRALGIAS: 1
HEMATOCHEZIA: 1
NAUSEA: 0
WEAKNESS: 0
HEMATURIA: 0
MYALGIAS: 1
SORE THROAT: 0
EYE PAIN: 0
HEADACHES: 0
FREQUENCY: 0

## 2023-06-16 ASSESSMENT — ACTIVITIES OF DAILY LIVING (ADL): CURRENT_FUNCTION: NO ASSISTANCE NEEDED

## 2023-06-16 NOTE — PATIENT INSTRUCTIONS
Patient Education   Personalized Prevention Plan  You are due for the preventive services outlined below.  Your care team is available to assist you in scheduling these services.  If you have already completed any of these items, please share that information with your care team to update in your medical record.  Health Maintenance Due   Topic Date Due     Asthma Action Plan - yearly  Never done     Zoster (Shingles) Vaccine (2 of 3) 03/05/2015     COVID-19 Vaccine (4 - Pfizer series) 03/03/2022       Exercise for a Healthier Heart  You may wonder how you can improve the health of your heart. If you re thinking about exercise, you re on the right track. You don t need to become an athlete. But you do need a certain amount of brisk exercise to help strengthen your heart. If you have been diagnosed with a heart condition, your healthcare provider may advise exercise to help your condition. To help make exercise a habit, choose safe, fun activities.      Exercise with a friend. When activity is fun, you're more likely to stick with it.     Before you start  Check with your healthcare provider before starting an exercise program. This is especially important if you haven't been active for a while. It's also important if you have a long-term (chronic) health problem such as heart disease, diabetes, or obesity. Also check with your provider if you're at high risk for having these problems.   Why exercise?  Exercising regularly offers many healthy rewards. It can help you do all of these:     Improve your blood cholesterol level to help prevent further heart trouble.    Lower your blood pressure to help prevent a stroke or heart attack.    Control diabetes or reduce your risk of getting this disease.    Improve your heart and lung function.    Reach and stay at a healthy weight.    Make your muscles stronger so you can stay active.    Prevent falls and fractures by slowing the loss of bone mass (osteoporosis).    Manage  stress better.    Improve your sense of self and your body image.  Exercise tips      Ease into your routine. Set small goals. Then build on them. Talk with your healthcare provider first before starting an exercise routine if you're not sure what your activity level should be.    Exercise on most days. Aim for a total of at least 150 minutes (2 hours and 30 minutes) or more of moderate-intensity aerobic activity each week. You could also do 75 minutes (1 hour and 15 minutes) or more of vigorous-intensity aerobic activity each week. Or try for a combination of both. Moderate activity means that you breathe heavier and your heart rate increases, but you can still talk. Think about doing at least 30 minutes of moderate exercise, 5 times a week. It's OK to work up to the 30-minute period over time. Examples of moderate-intensity activity are brisk walking, gardening, and water aerobics.    Step up your daily activity level.  Along with your exercise program, try being more active the whole day. Walk instead of drive. Or park further away so that you take more steps each day. Do more household tasks or yard work. You may not be able to meet the advised amount of physical activity. But doing some moderate- or vigorous-intensity aerobic activity can help reduce your risk for heart disease. Your healthcare provider can help you figure out what is best for you.    Choose 1 or more activities you enjoy.  Walking is one of the easiest things you can do. You can also try swimming, riding a bike, dancing, or taking an exercise class.    Call 911  Call 911 right away if any of these occur:     Chest pain that doesn't go away quickly with rest    New burning, tightness, pressure, or heaviness in your chest, neck, shoulders, back, or arms    Abnormal or severe shortness of breath    A very fast or irregular heartbeat (palpitations)    Fainting  When to call your healthcare provider  Call your healthcare provider if you have any of  these:     Dizziness or lightheadedness    Mild shortness of breath or chest pain    Increased or new joint or muscle pain    [employer] last reviewed this educational content on 7/1/2022 2000-2022 [employer]. All rights reserved. This information is not intended as a substitute for professional medical care. Always follow your healthcare professional's instructions.          Understanding USDA MyPlate  The USDA has guidelines to help you make healthy food choices. These are called MyPlate. MyPlate shows the food groups that make up healthy meals using the image of a place setting. Before you eat, think about the healthiest choices for what to put on your plate or in your cup or bowl. To learn more about building a healthy plate, visit www.choosemyplate.gov.     The food groups    Fruits. Any fruit or 100% fruit juice counts as part of the Fruit Group. Fruits may be fresh, canned, frozen, or dried, and may be whole, cut-up, or pureed. Make 1/2 of your plate fruits and vegetables.    Vegetables. Any vegetable or 100% vegetable juice counts as a member of the Vegetable Group. Vegetables may be fresh, frozen, canned, or dried. They can be served raw or cooked and may be whole, cut-up, or mashed. Make 1/2 of your plate fruits and vegetables.    Grains. All foods made from grains are part of the Grains Group. These include wheat, rice, oats, cornmeal, and barley. Grains are often used to make foods such as bread, pasta, oatmeal, cereal, tortillas, and grits. Grains should be no more than 1/4 of your plate. At least half of your grains should be whole grains.    Protein. This group includes meat, poultry, seafood, beans and peas, eggs, processed soy products (such as tofu), nuts (including nut butters), and seeds. Make protein choices no more than 1/4 of your plate. Meat and poultry choices should be lean or low fat.    Dairy. The Dairy Group includes all fluid milk products and foods made from milk that  contain calcium, such as yogurt and cheese. (Foods that have little calcium, such as cream, butter, and cream cheese, are not part of this group.) Most dairy choices should be low-fat or fat-free.    Oils. Oils aren't a food group, but they do contain essential nutrients. However it's important to watch your intake of oils. These are fats that are liquid at room temperature. They include canola, corn, olive, soybean, vegetable, and sunflower oil. Foods that are mainly oil include mayonnaise, certain salad dressings, and soft margarines. You likely already get your daily oil allowance from the foods you eat.  Things to limit  Eating healthy also means limiting these things in your diet:    Salt (sodium). Many processed foods have a lot of sodium. To keep sodium intake down, eat fresh vegetables, meats, poultry, and seafood when possible. Purchase low-sodium, reduced-sodium, or no-salt-added food products at the store. And don't add salt to your meals at home. Instead, season them with herbs and spices such as dill, oregano, cumin, and paprika. Or try adding flavor with lemon or lime zest and juice.    Saturated fat. Saturated fats are most often found in animal products such as beef, pork, and chicken. They are often solid at room temperature, such as butter. To reduce your saturated fat intake, choose leaner cuts of meat and poultry. And try healthier cooking methods such as grilling, broiling, roasting, or baking. For a simple lower-fat swap, use plain nonfat yogurt instead of mayonnaise when making potato salad or macaroni salad.    Added sugars. These are sugars added to foods. They are in foods such as ice cream, candy, soda, fruit drinks, sports drinks, energy drinks, cookies, pastries, jams, and syrups. Cut down on added sugars by sharing sweet treats with a family member or friend. You can also choose fruit for dessert, and drink water or other unsweetened beverages.  [employer] last reviewed this educational  content on 6/1/2020 2000-2022 [employer]. All rights reserved. This information is not intended as a substitute for professional medical care. Always follow your healthcare professional's instructions.          Signs of Hearing Loss  Hearing loss is a problem shared by many people. In fact, it's one of the most common health problems, particularly as people age. Most people aged 65 and older have some hearing loss. By age 80, almost everyone does. Hearing loss often occurs slowly over the years. So, you may not realize your hearing has gotten worse.   When sudden hearing loss occurs, it's important to contact your healthcare provider right away. Your provider will do a medical exam and a hearing exam as soon as possible. This is to help find the cause and type of your sudden hearing loss. Based on your diagnosis, your healthcare provider will discuss possible treatments.      Hearing much better with one ear can be a sign of hearing loss.     Have your hearing checked  Call your healthcare provider if you:     Have to strain to hear normal conversation    Have to watch other people s faces very carefully to follow what they re saying    Need to ask people to repeat what they ve said    Often misunderstand what people are saying    Turn the volume of the television or radio up so high that others complain    Feel that people are mumbling when they re talking to you    Find that the effort to hear leaves you feeling tired and irritated    Notice, when using the phone, that you hear better with one ear than the other  [employer] last reviewed this educational content on 6/1/2022 2000-2022 [employer]. All rights reserved. This information is not intended as a substitute for professional medical care. Always follow your healthcare professional's instructions.

## 2023-06-16 NOTE — LETTER
June 19, 2023      [place]  [address]  [facility] [address]        Dear ,    We are writing to inform you of your test results.    Your labs show slightly decreased kidney function, elevated test for gout, elevated TSH, but with normal FT4. UA with trace blood.   Recommend to keep diet and exercise. Start treatment to decrease uric acid.   Recheck TSH and UA in 6 months.     Resulted Orders   Lipid panel reflex to direct LDL Fasting   Result Value Ref Range    Cholesterol 180 <200 mg/dL    Triglycerides 109 <150 mg/dL    Direct Measure HDL 40 >=40 mg/dL    LDL Cholesterol Calculated 118 (H) <=100 mg/dL    Non HDL Cholesterol 140 (H) <130 mg/dL    Narrative    Cholesterol  Desirable:  <200 mg/dL    Triglycerides  Normal:  Less than 150 mg/dL  Borderline High:  150-199 mg/dL  High:  200-499 mg/dL  Very High:  Greater than or equal to 500 mg/dL    Direct Measure HDL  Female:  Greater than or equal to 50 mg/dL   Male:  Greater than or equal to 40 mg/dL    LDL Cholesterol  Desirable:  <100mg/dL  Above Desirable:  100-129 mg/dL   Borderline High:  130-159 mg/dL   High:  160-189 mg/dL   Very High:  >= 190 mg/dL    Non HDL Cholesterol  Desirable:  130 mg/dL  Above Desirable:  130-159 mg/dL  Borderline High:  160-189 mg/dL  High:  190-219 mg/dL  Very High:  Greater than or equal to 220 mg/dL   CBC with platelets   Result Value Ref Range    WBC Count 6.8 4.0 - 11.0 10e3/uL    RBC Count 4.65 4.40 - 5.90 10e6/uL    Hemoglobin 14.8 13.3 - 17.7 g/dL    Hematocrit 43.3 40.0 - 53.0 %    MCV 93 78 - 100 fL    MCH 31.8 26.5 - 33.0 pg    MCHC 34.2 31.5 - 36.5 g/dL    RDW 12.8 10.0 - 15.0 %    Platelet Count 352 150 - 450 10e3/uL   Comprehensive metabolic panel (BMP + Alb, Alk Phos, ALT, AST, Total. Bili, TP)   Result Value Ref Range    Sodium 140 136 - 145 mmol/L    Potassium 4.3 3.4 - 5.3 mmol/L    Chloride 102 98 - 107 mmol/L    Carbon Dioxide (CO2) 23 22 - 29 mmol/L    Anion Gap 15 7 - 15 mmol/L    Urea  Nitrogen 20.8 8.0 - 23.0 mg/dL    Creatinine 1.33 (H) 0.67 - 1.17 mg/dL    Calcium 9.5 8.8 - 10.2 mg/dL    Glucose 87 70 - 99 mg/dL    Alkaline Phosphatase 180 (H) 40 - 129 U/L    AST 25 0 - 45 U/L      Comment:      Reference intervals for this test were updated on 6/12/2023 to more accurately reflect our healthy population. There may be differences in the flagging of prior results with similar values performed with this method. Interpretation of those prior results can be made in the context of the updated reference intervals.    ALT 12 0 - 70 U/L      Comment:      Reference intervals for this test were updated on 6/12/2023 to more accurately reflect our healthy population. There may be differences in the flagging of prior results with similar values performed with this method. Interpretation of those prior results can be made in the context of the updated reference intervals.      Protein Total 7.7 6.4 - 8.3 g/dL    Albumin 4.1 3.5 - 5.2 g/dL    Bilirubin Total 0.6 <=1.2 mg/dL    GFR Estimate 55 (L) >60 mL/min/1.73m2      Comment:      eGFR calculated using 2021 CKD-EPI equation.   TSH with free T4 reflex   Result Value Ref Range    TSH 12.20 (H) 0.30 - 4.20 uIU/mL   PSA, screen   Result Value Ref Range    Prostate Specific Antigen Screen 2.64 0.00 - 6.50 ng/mL    Narrative    This result is obtained using the Roche Elecsys total PSA method on the rakan [id number] immunoassay analyzer. Results obtained with different assay methods or kits cannot be used interchangeably.   UA with Microscopic reflex to Culture - lab collect   Result Value Ref Range    Color Urine Yellow Colorless, Straw, Light Yellow, Yellow    Appearance Urine Clear Clear    Glucose Urine Negative Negative mg/dL    Bilirubin Urine Negative Negative    Ketones Urine Negative Negative mg/dL    Specific Gravity Urine 1.015 1.003 - 1.035    Blood Urine Trace (A) Negative    pH Urine 5.0 5.0 - 7.0    Protein Albumin Urine Negative Negative mg/dL     Urobilinogen Urine 0.2 0.2, 1.0 E.U./dL    Nitrite Urine Negative Negative    Leukocyte Esterase Urine Negative Negative   Uric acid   Result Value Ref Range    Uric Acid 9.6 (H) 3.4 - 7.0 mg/dL   UA Microscopic with Reflex to Culture   Result Value Ref Range    Bacteria Urine Few (A) None Seen /HPF    RBC Urine 5-10 (A) 0-2 /HPF /HPF    WBC Urine 0-5 0-5 /HPF /HPF    Squamous Epithelials Urine Few (A) None Seen /LPF    Narrative    Urine Culture not indicated   T4 free   Result Value Ref Range    Free T4 1.27 0.90 - 1.70 ng/dL       If you have any questions or concerns, please call the clinic at the number listed above.       Sincerely,      Familia Gillespie MD

## 2023-06-16 NOTE — NURSING NOTE
/76   Pulse 87   Temp 97.3  F (36.3  C) (Tympanic)   Resp 16   Ht 1.829 m (6')   Wt 101.9 kg (224 lb 11.2 oz)   SpO2 96%   BMI 30.47 kg/m

## 2023-06-16 NOTE — LETTER
June 19, 2023      [place]  [address] [facility] [address]        Dear ,    We are writing to inform you of your test results.    Your Xray was normal.    Resulted Orders   XR Chest 2 Views    Narrative    XR CHEST 2 VIEWS   6/16/2023 11:03 AM     HISTORY: Cough, unspecified type    COMPARISON: 8/1/2022.      Impression    IMPRESSION: No acute cardiopulmonary disease.    MARGOTH CHENG MD         SYSTEM ID:  [id number]       If you have any questions or concerns, please call the clinic at the number listed above.       Sincerely,      Familia Gillespie MD

## 2023-06-16 NOTE — PROGRESS NOTES
"SUBJECTIVE:   Khurram is a 78 year old who presents for Preventive Visit.       View : No data to display.              Are you in the first 12 months of your Medicare coverage?  No    Healthy Habits:     In general, how would you rate your overall health?  Good    Frequency of exercise:  None    Do you usually eat at least 4 servings of fruit and vegetables a day, include whole grains    & fiber and avoid regularly eating high fat or \"junk\" foods?  No    Taking medications regularly:  Yes    Medication side effects:  None    Ability to successfully perform activities of daily living:  No assistance needed    Home Safety:  No safety concerns identified    Hearing Impairment:  Need to ask people to speak up or repeat themselves and difficulty understanding soft or whispered speech    In the past 6 months, have you been bothered by leaking of urine?  No    In general, how would you rate your overall mental or emotional health?  Excellent      PHQ-2 Total Score: 0    Additional concerns today:  Yes        Have you ever done Advance Care Planning? (For example, a Health Directive, POLST, or a discussion with a medical provider or your loved ones about your wishes): Yes, advance care planning is on file.       Fall risk  Fallen 2 or more times in the past year?: No  Any fall with injury in the past year?: No    Cognitive Screening   1) Repeat 3 items (Leader, Season, Table)    2) Clock draw: NORMAL  3) 3 item recall: Recalls 1 object   Results: NORMAL clock, 1-2 items recalled: COGNITIVE IMPAIRMENT LESS LIKELY    Mini-CogTM Copyright DA Sprague. Licensed by the author for use in [facility]; reprinted with permission (jolanta@.[facility]). All rights reserved.      Do you have sleep apnea, excessive snoring or daytime drowsiness?: no    Reviewed and updated as needed this visit by clinical staff                  Reviewed and updated as needed this visit by Provider                 Social History     Tobacco Use     " Smoking status: Never     Smokeless tobacco: Never   Vaping Use     Vaping status: Never Used   Substance Use Topics     Alcohol use: No     Alcohol/week: 0.0 standard drinks of alcohol             6/12/2023     4:39 AM   Alcohol Use   Prescreen: >3 drinks/day or >7 drinks/week? Not Applicable          View : No data to display.              Do you have a current opioid prescription? No  Do you use any other controlled substances or medications that are not prescribed by a provider? None          PROBLEMS TO ADD ON...    Has history of atrial fibrillation. On anticoagulation with Eliquis and rate control medications. Has had ablation 10/2022. Asymptonatic - no chest pains , palpitations,  no side effects from medications.  Has h/o asthma, on PRN Albuterol. Has chronic cough and wheezing.   Has h/o HTN. on medical treatment. BP has been controlled. No side effects from medications. No CP, HA, dizziness. good compliance with medications and low salt diet.  Concern for left foot pain , uses a cane for foot drop , has fallen many times, because of foot catching on the floor.   Pain is in the lateral heel foot area.   Concern for right arm numbness in the hand and fingers, affecting the 3,4,5th fingers. On and off.       Current providers sharing in care for this patient include:   Patient Care Team:  Familia Gillespie MD as PCP - General (Internal Medicine)  [facility] as PCP - Internal Medicine  [facility] (Otol[facility]ology)  Familia Gillespie MD as Assigned PCP  Sourav Lauren MD as MD (Cardiovascular Disease)  Sourav Lauren MD as Assigned Heart and Vascular Provider  Jesus Reyez MD as MD (Cardiovascular Disease)    The following health maintenance items are reviewed in Epic and correct as of today:  Health Maintenance   Topic Date Due     ASTHMA ACTION PLAN  Never done     ZOSTER IMMUNIZATION (2 of 3) 03/05/2015     COVID-19 Vaccine (4 - Pfizer series) 03/03/2022      MEDICARE ANNUAL WELLNESS VISIT  12/08/2022     ANNUAL REVIEW OF HM ORDERS  08/01/2023     DIGOXIN  08/11/2023     ASTHMA CONTROL TEST  12/16/2023     FALL RISK ASSESSMENT  06/16/2024     LIPID  12/01/2026     ADVANCE CARE PLANNING  12/08/2026     DTAP/TDAP/TD IMMUNIZATION (4 - Td or Tdap) 11/09/2030     HEPATITIS C SCREENING  Completed     PHQ-2 (once per calendar year)  Completed     INFLUENZA VACCINE  Completed     Pneumococcal Vaccine: 65+ Years  Completed     IPV IMMUNIZATION  Aged Out     MENINGITIS IMMUNIZATION  Aged Out     COLORECTAL CANCER SCREENING  Discontinued     Lab work is in process  Labs reviewed in EPIC          Review of Systems   Constitutional: Negative for chills and fever.   HENT: Positive for hearing loss. Negative for congestion, ear pain and sore throat.    Eyes: Negative for pain and visual disturbance.   Respiratory: Positive for cough. Negative for shortness of breath.    Cardiovascular: Positive for peripheral edema. Negative for chest pain and palpitations.   Gastrointestinal: Positive for diarrhea and hematochezia. Negative for abdominal pain, constipation, heartburn and nausea.   Genitourinary: Positive for impotence. Negative for dysuria, frequency, genital sores, hematuria, penile discharge and urgency.   Musculoskeletal: Positive for arthralgias, joint swelling and myalgias.   Skin: Negative for rash.   Neurological: Negative for dizziness, weakness, headaches and paresthesias.   Psychiatric/Behavioral: Negative for mood changes. The patient is not nervous/anxious.          OBJECTIVE:   There were no vitals taken for this visit. Estimated body mass index is 31.74 kg/m  as calculated from the following:    Height as of 1/12/23: 1.829 m (6').    Weight as of 4/14/23: 106.1 kg (234 lb).  Physical Exam  GENERAL:  alert and no distress  EYES: Eyes grossly normal to inspection, PERRL and conjunctivae and sclerae normal  HENT: ear canals and TM's normal, nose and mouth without ulcers or  lesions  NECK: no adenopathy, no asymmetry, masses, or scars and thyroid normal to palpation  RESP: lungs  - no rales,scattered rhonchi and expiratory wheezes  CV: regular rate and rhythm, normal S1 S2, no S3 or S4, no murmur, click or rub, no peripheral edema and peripheral pulses strong  ABDOMEN: soft, nontender, no hepatosplenomegaly, no masses and bowel sounds normal  MS: no gross musculoskeletal defects noted, no edema  SKIN: no suspicious lesions or rashes  NEURO: Normal strength and tone, mentation intact and speech normal, left foot drop , uses a cane   PSYCH: mentation appears normal, affect normal/bright    Diagnostic Test Results:  Labs reviewed in Epic    ASSESSMENT / PLAN:       ICD-10-CM    1. Encounter for preventative adult health care examination  Z00.00 Lipid panel reflex to direct LDL Fasting     CBC with platelets     Comprehensive metabolic panel (BMP + Alb, Alk Phos, ALT, AST, Total. Bili, TP)     TSH with free T4 reflex     PSA, screen     UA with Microscopic reflex to Culture - lab collect      2. Hand numbness  R20.0 EMG     OFFICE/OUTPT VISIT,EST,LEVL IV      3. Cough, unspecified type  R05.9 XR Chest 2 Views     General PFT Lab (Please always keep checked)     Pulmonary Function Test     OFFICE/OUTPT VISIT,EST,LEVL IV      4. Screening for prostate cancer  Z12.5 PSA, screen      5. Mild persistent asthma without complication  J45.30       6. Status post catheter ablation of atrial fibrillation  Z98.890 OFFICE/OUTPT VISIT,EST,LEVL IV      7. Pain in joint involving ankle and foot, left  M25.572 XR Ankle Left G/E 3 Views     Uric acid     OFFICE/OUTPT VISIT,EST,LEVL IV        Assess lab  Refer for EMG right arm   Assess CXR and PFT, consider inhaled steroid   Continue treatment  Assess foot pain - X rays     Patient has been advised of split billing requirements and indicates understanding: Yes      COUNSELING:  Reviewed preventive health counseling, as reflected in patient instructions        Regular exercise       Healthy diet/nutrition       Vision screening       Hearing screening       Colon cancer screening       Prostate cancer screening      BMI:   Estimated body mass index is 31.74 kg/m  as calculated from the following:    Height as of 1/12/23: 1.829 m (6').    Weight as of 4/14/23: 106.1 kg (234 lb).   Weight management plan: Discussed healthy diet and exercise guidelines      He reports that he has never smoked. He has never used smokeless tobacco.      Appropriate preventive services were discussed with this patient, including applicable screening as appropriate for cardiovascular disease, diabetes, osteopenia/osteoporosis, and glaucoma.  As appropriate for age/gender, discussed screening for colorectal cancer, prostate cancer, breast cancer, and cervical cancer. Checklist reviewing preventive services available has been given to the patient.    Reviewed patients plan of care and provided an AVS. The Intermediate Care Plan ( asthma action plan, low back pain action plan, and migraine action plan) for Khurram meets the Care Plan requirement. This Care Plan has been established and reviewed with the Patient.          Familia Gillespie MD  [facility]    Identified Health Risks:    I have reviewed Opioid Use Disorder and Substance Use Disorder risk factors and made any needed referrals.       He is at risk for lack of exercise and has been provided with information to increase physical activity for the benefit of his well-being.  The patient was counseled and encouraged to consider modifying their diet and eating habits. He was provided with information on recommended healthy diet options.  The patient was provided with written information regarding signs of hearing loss.

## 2023-06-16 NOTE — LETTER
June 19, 2023      Khurram Reynoso  [address]  [place] [id number]        Dear ,    We are writing to inform you of your test results. Your Xray shows no fracture or significant arthritis. It does show Achilles tendon calcifications. I recommend assessment with podiatry.     Resulted Orders   XR Ankle Left G/E 3 Views    Narrative    XR ANKLE LEFT G/E 3 VIEWS 6/16/2023 11:03 AM     HISTORY: Pain in joint involving ankle and foot, left    COMPARISON: None.       Impression    IMPRESSION:   Lateral ankle soft tissue swelling. There is no evidence of an acute  fracture. Ankle mortise is intact. Joint spaces are maintained.  Scattered dystrophic calcifications projecting over the distal  Achilles tendon. Atherosclerotic vascular calcifications.    DEMI WELCH MD         SYSTEM ID:  [id number]       If you have any questions or concerns, please call the clinic at the number listed above.       Sincerely,      Familia Gillespie MD

## 2023-06-21 ENCOUNTER — TELEPHONE (OUTPATIENT)
Dept: CARDIOLOGY | Facility: CLINIC | Age: 78
End: 2023-06-21
Payer: COMMERCIAL

## 2023-06-21 ENCOUNTER — TRANSFERRED RECORDS (OUTPATIENT)
Dept: CARDIOLOGY | Facility: CLINIC | Age: 78
End: 2023-06-21
Payer: COMMERCIAL

## 2023-06-21 NOTE — TELEPHONE ENCOUNTER
Request to hold eliquis    Request for Anticoagulation Interruption    Procedure: Radio Frequency Ablation   Requested hold time from facility: 3 days prior  Date of procedure: Not yet scheduled  Anticoagulation medication: Eliquis  [id number] score: 3  CrCl, mL/min: 67.04   Previous Procedures: Pt has not had hx of recent PVI in the past 3mo, does not have anticipated PVI within the next 1 mo, DCCV in the past 4 wks, and/or LAAO- restricting interruption in AC  Guidelines: Low Risk (Eliquis, Xarelto) with CrCl ml/min: > or = to 30 discontinue > or = to  24hrs, 15-29 discontinue > or = to 36hrs, <15 No Data consider anti Xa level an/or > or = to 48 hrs. Uncertain/Intermediate/High Risk (Eliquis, Xarelto) with CrCl ml/min: > or = to 30 discontinue > or = to 48 hrs, < 30 No data consider anti Xa level and/or > or = to 72 hrs    Please advise hold orders, with or without need for bridging  Thanks you,  6/21/2023 10:33 AM  Ashlie Trivedi MA     CrCl Calculator Cockcroft-Gault Equation- Micromedex    2017 ACC Expert ConsensusDecision Pathway for PeriproceduralManagement of Anticoagulation inPatients With Nonvalvular Atrial Fibrillation    male  78 year old  Wt Readings from Last 1 Encounters:   06/16/23 101.9 kg (224 lb 11.2 oz)     Most Recent 3 BMP's:  Recent Labs   Lab Test 06/16/23  1110 04/24/23  1109 10/28/22  0620 10/25/22  1032     --  138 139   POTASSIUM 4.3  --  4.2 4.5   CHLORIDE 102  --  100 100   CO2 23  --  27 27   BUN 20.8  --  21.3 17.3   CR 1.33* 1.6* 1.39* 1.28*   ANIONGAP 15  --  11 12   SHERRON 9.5  --  9.0 9.2   GLC 87  --  96 91

## 2023-06-27 ENCOUNTER — MYC MEDICAL ADVICE (OUTPATIENT)
Dept: INTERNAL MEDICINE | Facility: CLINIC | Age: 78
End: 2023-06-27
Payer: COMMERCIAL

## 2023-06-27 DIAGNOSIS — M10.071 ACUTE IDIOPATHIC GOUT OF RIGHT FOOT: Primary | ICD-10-CM

## 2023-06-27 RX ORDER — INDOMETHACIN 50 MG/1
50 CAPSULE ORAL 2 TIMES DAILY WITH MEALS
Qty: 30 CAPSULE | Refills: 1 | Status: SHIPPED | OUTPATIENT
Start: 2023-06-27 | End: 2023-08-02

## 2023-06-27 NOTE — TELEPHONE ENCOUNTER
Please see my chart message and advise.  Thanks    Medication was discontinued.    Pended medication and sent a myc message asking patient which pharmacy he would like to use.  Pended the one he uses most.

## 2023-06-28 ENCOUNTER — MYC MEDICAL ADVICE (OUTPATIENT)
Dept: INTERNAL MEDICINE | Facility: CLINIC | Age: 78
End: 2023-06-28
Payer: COMMERCIAL

## 2023-06-28 NOTE — TELEPHONE ENCOUNTER
TSH is elevated, but FT4 level is normal, this is the thyroid hormone. I would recommend to recheck in 6 months.

## 2023-08-02 ENCOUNTER — OFFICE VISIT (OUTPATIENT)
Dept: NEUROLOGY | Facility: CLINIC | Age: 78
End: 2023-08-02
Payer: COMMERCIAL

## 2023-08-02 DIAGNOSIS — G56.01 CARPAL TUNNEL SYNDROME OF RIGHT WRIST: Primary | ICD-10-CM

## 2023-08-02 DIAGNOSIS — M54.12 CERVICAL RADICULOPATHY: ICD-10-CM

## 2023-08-02 PROCEDURE — 95909 NRV CNDJ TST 5-6 STUDIES: CPT | Performed by: PSYCHIATRY & NEUROLOGY

## 2023-08-02 PROCEDURE — 95886 MUSC TEST DONE W/N TEST COMP: CPT | Mod: RT | Performed by: PSYCHIATRY & NEUROLOGY

## 2023-08-02 NOTE — PROGRESS NOTES
[facility]  Electrodiagnostic Laboratory                 Department of Neurology                                                                                                         Test Date:  2023    Patient: Khurram Reynoso : 1945 Physician: Salazar Bangura MD   Sex: Male AGE: 78 year Ref Phys:    ID#: [id number]   Technician: Zhang Pena     History and Examination:  78 year old with about 1 year of sensory changes digits 3-5 of the right hand. He also reports shoulder pain. This study is being performed to investigate for radiculopathy vs focal neuropathy.     Techniques:  Motor and sensory conduction studies were done with surface recording electrodes. S EMG was done with a concentric needle electrode.     Results:  Nerve conduction studies:  1. Right median-D2 sensory responses shows mildly reduced amplitude and moderately slowed CV.   2. Right ulnar-D5 and radial sensory responses are normal.   3. Right median-ulnar palmar interlatency difference is prolonged.   4. Right median-APB motor response shows mildly prolonged DL, normal amplitude and normal CV in the forearm.   5. Right ulnar-FDI and ulnar-ADM motor responses are normal.   6. Right median-lumbrical vs ulnar-interosseous latency difference is prolonged.     Needle EMG:  No abnormal spontaneous activity was seen in the sampled muscles.   Large amplitude and/or long duration motor unit potentials (MUP) were seen in the right triceps, PT, and FDI muscles.   Rapidly firing MUPs with reduced recruitment were seen in the right triceps muscle.    Interpretation:  This is an abnormal study. 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  The absence of active denervation changes in the muscles  as tabulated below argues against a recent nerve or nerve root injury. Clinical correlation is recommended.     Salazar Bangura MD  Department of Neurology        Nerve Conduction Studies  Motor Sites      Latency Amplitude Neg. Amp Diff Segment Distance Velocity Neg. Dur Neg Area Diff Temperature Comment   Site (ms) Norm (mV) Norm %  cm m/s Norm ms %  C    Right Median (APB) Motor   Wrist 4.7  < 4.4 7.2  > 5.0  Wrist-APB 8   5.0  32.3    Elbow 9.7 - 6.5  > 5.0 -9.7 Elbow-Wrist 24.6 49  > 48 5.1 -10.8 32.4    Right Median/Ulnar (Lumb-Dors Int II) Motor        Median (Lumb I)   Wrist 4.0 - 1.50 -  Wrist-Lumb I 10   5.5  32.6         Ulnar (Dorsal Int (manus))   Wrist 3.0 - 3.5 -  Wrist-Dorsal Int (manus) 10   5.3  32.5    Right Ulnar (ADM) Motor   Wrist 3.3  < 3.5 10.0  > 5.0  Wrist-ADM 8   5.1  32.3    Bel Elbow 7.2 - 9.1 - -9.0 Bel Elbow-Wrist 21.6 55  > 48 5.5 -0.38 32.2    Abv Elbow 10.0 - 8.8 - -3.3 Abv Elbow-Bel Elbow 15.3 55  > 48 5.5 -1.92 32.2    Right Ulnar (FDI) Motor   Wrist 4.5 - 11.5 -      3.9      Bel Elbow 8.5 - 10.6 - -7.8 Bel Elbow-Wrist 22 55  > 48 4.2 -3.3     Abv Elbow 10.5 - 10.2 - -3.8 Abv Elbow-Bel Elbow 12 60  > 48 4.1 -4.2       Sensory Sites      Onset Lat Peak Lat Amp (O-P) Amp (P-P) Segment Distance Velocity Temperature Comment   Site ms ms  V Norm  V  cm m/s Norm  C    Right Median Sensory   Wrist-Dig II 3.2 3.8 8  > 10 8 Wrist-Dig II 14 44  > 48 32.3    Right Median (Ortho) Sensory   Palm-Wrist 1.98 2.6 20 - 30 Palm-Wrist 8 40 - 32    Right Median-Ulnar Palmar Sensory        Median   Palm-Wrist 1.98 2.6 20 - 30 Palm-Wrist 8 40 - 32         Ulnar   Palm-Wrist 1.58 2.1 10 - 8 Palm-Wrist 8 51 - 32    Right Radial Sensory   Forearm-Wrist 1.80 2.4 19  > 15 15 Forearm-Wrist 10 56 - 32    Right Ulnar Sensory   Wrist-Dig V 2.2 2.9 10  > 8 29 Wrist-Dig V 12.5 57  > 48 32.2    Right Ulnar (Ortho) Sensory   Palm-Wrist 1.58 2.1 10 - 8 Palm-Wrist 8 51 - 32      Inter-Nerve Comparisons     Nerve 1  Value 1 Nerve 2 Value 2 Parameter Result Normal   Sensory Sites   R Median Palm-Wrist 2.6 ms R Ulnar Palm-Wrist 2.1 ms Peak Lat Diff 0.50 ms <0.40       Electromyography     Side Muscle Ins Act Fibs/PSW Fasc HF Amp Dur Poly Recrt Int Pat   Right Deltoid Nml None Nml 0 Nml Nml 0 Nml Nml   Right Biceps Nml None Nml 0 Nml Nml 0 Nml Nml   Right Triceps Nml None Nml 0 2+ Nml 1+ Demetris Nml   Right Pronator Teres Nml None Nml 0 1+ Nml 0 Nml Nml   Right FDI Nml None Nml 0 1+ Nml 1+ Nml Nml   Right Cervical Parasp (Lower) Nml None Nml 0              NCS Waveforms:    Motor                Sensory

## 2023-08-02 NOTE — LETTER
2023       RE: Khurram Reynoso  [address]  [place] [address]       Dear Colleague,    Thank you for referring your patient, Khurram Reynoso, to the [facility] [place] [facility]. Please see a copy of my visit note below.                            [facility]  Electrodiagnostic Laboratory                 Department of Neurology                                                                                                         Test Date:  2023    Patient: Khurram Reynoso : 1945 Physician: Salazar Bangura MD   Sex: Male AGE: 78 year Ref Phys:    ID#: [id number]   Technician: Zhang Pena     History and Examination:  78 year old with about 1 year of sensory changes digits 3-5 of the right hand. He also reports shoulder pain. This study is being performed to investigate for radiculopathy vs focal neuropathy.     Techniques:  Motor and sensory conduction studies were done with surface recording electrodes. S EMG was done with a concentric needle electrode.     Results:  Nerve conduction studies:  1. Right median-D2 sensory responses shows mildly reduced amplitude and moderately slowed CV.   2. Right ulnar-D5 and radial sensory responses are normal.   3. Right median-ulnar palmar interlatency difference is prolonged.   4. Right median-APB motor response shows mildly prolonged DL, normal amplitude and normal CV in the forearm.   5. Right ulnar-FDI and ulnar-ADM motor responses are normal.   6. Right median-lumbrical vs ulnar-interosseous latency difference is prolonged.     Needle EMG:  No abnormal spontaneous activity was seen in the sampled muscles.   Large amplitude and/or long duration motor unit potentials (MUP) were seen in the right triceps, PT, and FDI muscles.   Rapidly firing MUPs with reduced recruitment were seen in the right triceps muscle.    Interpretation:  This is an abnormal study. 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  The absence of active denervation changes in the muscles as tabulated below argues against a recent nerve or nerve root injury. Clinical correlation is recommended.     Salazar Bangura MD  Department of Neurology        Nerve Conduction Studies  Motor Sites      Latency Amplitude Neg. Amp Diff Segment Distance Velocity Neg. Dur Neg Area Diff Temperature Comment   Site (ms) Norm (mV) Norm %  cm m/s Norm ms %  C    Right Median (APB) Motor   Wrist 4.7  < 4.4 7.2  > 5.0  Wrist-APB 8   5.0  32.3    Elbow 9.7 - 6.5  > 5.0 -9.7 Elbow-Wrist 24.6 49  > 48 5.1 -10.8 32.4    Right Median/Ulnar (Lumb-Dors Int II) Motor        Median (Lumb I)   Wrist 4.0 - 1.50 -  Wrist-Lumb I 10   5.5  32.6         Ulnar (Dorsal Int (manus))   Wrist 3.0 - 3.5 -  Wrist-Dorsal Int (manus) 10   5.3  32.5    Right Ulnar (ADM) Motor   Wrist 3.3  < 3.5 10.0  > 5.0  Wrist-ADM 8   5.1  32.3    Bel Elbow 7.2 - 9.1 - -9.0 Bel Elbow-Wrist 21.6 55  > 48 5.5 -0.38 32.2    Abv Elbow 10.0 - 8.8 - -3.3 Abv Elbow-Bel Elbow 15.3 55  > 48 5.5 -1.92 32.2    Right Ulnar (FDI) Motor   Wrist 4.5 - 11.5 -      3.9      Bel Elbow 8.5 - 10.6 - -7.8 Bel Elbow-Wrist 22 55  > 48 4.2 -3.3     Abv Elbow 10.5 - 10.2 - -3.8 Abv Elbow-Bel Elbow 12 60  > 48 4.1 -4.2       Sensory Sites      Onset Lat Peak Lat Amp (O-P) Amp (P-P) Segment Distance Velocity Temperature Comment   Site ms ms  V Norm  V  cm m/s Norm  C    Right Median Sensory   Wrist-Dig II 3.2 3.8 8  > 10 8 Wrist-Dig II 14 44  > 48 32.3    Right Median (Ortho) Sensory   Palm-Wrist 1.98 2.6 20 - 30 Palm-Wrist 8 40 - 32    Right Median-Ulnar Palmar Sensory        Median   Palm-Wrist 1.98 2.6 20 - 30 Palm-Wrist 8 40 - 32         Ulnar   Palm-Wrist 1.58 2.1 10 - 8  Palm-Wrist 8 51 - 32    Right Radial Sensory   Forearm-Wrist 1.80 2.4 19  > 15 15 Forearm-Wrist 10 56 - 32    Right Ulnar Sensory   Wrist-Dig V 2.2 2.9 10  > 8 29 Wrist-Dig V 12.5 57  > 48 32.2    Right Ulnar (Ortho) Sensory   Palm-Wrist 1.58 2.1 10 - 8 Palm-Wrist 8 51 - 32      Inter-Nerve Comparisons     Nerve 1 Value 1 Nerve 2 Value 2 Parameter Result Normal   Sensory Sites   R Median Palm-Wrist 2.6 ms R Ulnar Palm-Wrist 2.1 ms Peak Lat Diff 0.50 ms <0.40       Electromyography     Side Muscle Ins Act Fibs/PSW Fasc HF Amp Dur Poly Recrt Int Pat   Right Deltoid Nml None Nml 0 Nml Nml 0 Nml Nml   Right Biceps Nml None Nml 0 Nml Nml 0 Nml Nml   Right Triceps Nml None Nml 0 2+ Nml 1+ Demetris Nml   Right Pronator Teres Nml None Nml 0 1+ Nml 0 Nml Nml   Right FDI Nml None Nml 0 1+ Nml 1+ Nml Nml   Right Cervical Parasp (Lower) Nml None Nml 0              NCS Waveforms:    Motor                Sensory                                  Again, thank you for allowing me to participate in the care of your patient.      Sincerely,    Salazar Bangura MD

## 2023-08-03 ENCOUNTER — TELEPHONE (OUTPATIENT)
Dept: INTERNAL MEDICINE | Facility: CLINIC | Age: 78
End: 2023-08-03
Payer: COMMERCIAL

## 2023-08-03 ENCOUNTER — HOSPITAL ENCOUNTER (OUTPATIENT)
Dept: RESPIRATORY THERAPY | Facility: CLINIC | Age: 78
Discharge: HOME | End: 2023-08-03
Attending: INTERNAL MEDICINE | Admitting: INTERNAL MEDICINE
Payer: COMMERCIAL

## 2023-08-03 DIAGNOSIS — R05.9 COUGH, UNSPECIFIED TYPE: ICD-10-CM

## 2023-08-03 PROCEDURE — 94060 EVALUATION OF WHEEZING: CPT

## 2023-08-03 PROCEDURE — 94726 PLETHYSMOGRAPHY LUNG VOLUMES: CPT

## 2023-08-03 PROCEDURE — 94729 DIFFUSING CAPACITY: CPT

## 2023-08-03 NOTE — TELEPHONE ENCOUNTER
Patient calling  He was told by primary that he needed to be seen in clinic for a pulmonary follow before 9/16/2023 but there is no openings. Please advise if later is ok or if we can work him in. Ok to call and raine [phone]

## 2023-08-03 NOTE — PROGRESS NOTES
Patient completed pulmonary function testing with pre/post spirometry, lung volumes and diffusion. Good patient effort and cooperation. The results of this test met the ATS standards for acceptability and repeatability, except for DLCO. DLCO did not meet ATS due to IV<90%VC. The average of two consistent results were recorded. Albuterol 4 P with spacer given for bronchodilation. Hgb result of 14.8 from 6/16/23 was used for DLCO.

## 2023-08-04 LAB
DLCOCOR-%PRED-PRE: 101 %
DLCOCOR-PRE: 26.6 ML/MIN/MMHG
DLCOUNC-%PRED-PRE: 101 %
DLCOUNC-PRE: 26.75 ML/MIN/MMHG
DLCOUNC-PRED: 26.25 ML/MIN/MMHG
ERV-%PRED-PRE: 71 %
ERV-PRE: 0.97 L
ERV-PRED: 1.36 L
EXPTIME-PRE: 11.78 SEC
FEF2575-%PRED-POST: 88 %
FEF2575-%PRED-PRE: 47 %
FEF2575-POST: 1.89 L/SEC
FEF2575-PRE: 1.02 L/SEC
FEF2575-PRED: 2.13 L/SEC
FEFMAX-%PRED-PRE: 99 %
FEFMAX-PRE: 8.05 L/SEC
FEFMAX-PRED: 8.06 L/SEC
FEV1-%PRED-PRE: 82 %
FEV1-PRE: 2.43 L
FEV1FEV6-PRE: 65 %
FEV1FEV6-PRED: 77 %
FEV1FVC-PRE: 60 %
FEV1FVC-PRED: 75 %
FEV1SVC-PRE: 59 %
FEV1SVC-PRED: 66 %
FIFMAX-PRE: 7.53 L/SEC
FRCPLETH-%PRED-PRE: 118 %
FRCPLETH-PRE: 5.09 L
FRCPLETH-PRED: 4.31 L
FVC-%PRED-PRE: 103 %
FVC-PRE: 4.07 L
FVC-PRED: 3.94 L
GAW-%PRED-PRE: 75 %
GAW-PRE: 0.45 L/S/CMH2O
GAW-PRED: 0.59 L/S/CMH2O
IC-%PRED-PRE: 99 %
IC-PRE: 3.15 L
IC-PRED: 3.17 L
RVPLETH-%PRED-PRE: 142 %
RVPLETH-PRE: 4.12 L
RVPLETH-PRED: 2.9 L
SGAW-%PRED-PRE: 40 %
SGAW-PRE: 0.08 1/CMH2O*S
SGAW-PRED: 0.19 1/CMH2O*S
SRAW-%PRED-PRE: 273 %
SRAW-PRE: 13.02 CMH2O*S
SRAW-PRED: 4.76 CMH2O*S
TLCPLETH-%PRED-PRE: 108 %
TLCPLETH-PRE: 8.24 L
TLCPLETH-PRED: 7.57 L
VA-%PRED-PRE: 93 %
VA-PRE: 6.38 L
VC-%PRED-PRE: 92 %
VC-PRE: 4.12 L
VC-PRED: 4.47 L

## 2023-08-08 ENCOUNTER — TRANSFERRED RECORDS (OUTPATIENT)
Dept: HEALTH INFORMATION MANAGEMENT | Facility: CLINIC | Age: 78
End: 2023-08-08
Payer: COMMERCIAL

## 2023-08-16 ENCOUNTER — OFFICE VISIT (OUTPATIENT)
Dept: INTERNAL MEDICINE | Facility: CLINIC | Age: 78
End: 2023-08-16
Payer: COMMERCIAL

## 2023-08-16 VITALS
SYSTOLIC BLOOD PRESSURE: 112 MMHG | HEART RATE: 92 BPM | OXYGEN SATURATION: 97 % | TEMPERATURE: 96.8 F | RESPIRATION RATE: 16 BRPM | BODY MASS INDEX: 30.37 KG/M2 | HEIGHT: 72 IN | DIASTOLIC BLOOD PRESSURE: 68 MMHG | WEIGHT: 224.2 LBS

## 2023-08-16 DIAGNOSIS — E03.8 SUBCLINICAL HYPOTHYROIDISM: ICD-10-CM

## 2023-08-16 DIAGNOSIS — J45.30 MILD PERSISTENT ASTHMA WITHOUT COMPLICATION: Primary | ICD-10-CM

## 2023-08-16 DIAGNOSIS — R41.3 MEMORY DEFICIT: ICD-10-CM

## 2023-08-16 DIAGNOSIS — I48.19 PERSISTENT ATRIAL FIBRILLATION (H): ICD-10-CM

## 2023-08-16 DIAGNOSIS — R20.2 PARESTHESIA: ICD-10-CM

## 2023-08-16 DIAGNOSIS — M48.02 SPINAL STENOSIS OF CERVICAL REGION: ICD-10-CM

## 2023-08-16 LAB
T3FREE SERPL-MCNC: 3.1 PG/ML (ref 2–4.4)
T4 FREE SERPL-MCNC: 1.35 NG/DL (ref 0.9–1.7)
TSH SERPL DL<=0.005 MIU/L-ACNC: 6.67 UIU/ML (ref 0.3–4.2)
VIT B12 SERPL-MCNC: 246 PG/ML (ref 232–1245)

## 2023-08-16 PROCEDURE — 36415 COLL VENOUS BLD VENIPUNCTURE: CPT | Performed by: INTERNAL MEDICINE

## 2023-08-16 PROCEDURE — 84443 ASSAY THYROID STIM HORMONE: CPT | Performed by: INTERNAL MEDICINE

## 2023-08-16 PROCEDURE — 99214 OFFICE O/P EST MOD 30 MIN: CPT | Performed by: INTERNAL MEDICINE

## 2023-08-16 PROCEDURE — 84439 ASSAY OF FREE THYROXINE: CPT | Performed by: INTERNAL MEDICINE

## 2023-08-16 PROCEDURE — 84481 FREE ASSAY (FT-3): CPT | Performed by: INTERNAL MEDICINE

## 2023-08-16 PROCEDURE — 82607 VITAMIN B-12: CPT | Performed by: INTERNAL MEDICINE

## 2023-08-16 RX ORDER — FLUTICASONE PROPIONATE AND SALMETEROL 250; 50 UG/1; UG/1
1 POWDER RESPIRATORY (INHALATION) EVERY 12 HOURS
Qty: 3 EACH | Refills: 3 | Status: SHIPPED | OUTPATIENT
Start: 2023-08-16 | End: 2024-06-17

## 2023-08-16 RX ORDER — MONTELUKAST SODIUM 10 MG/1
10 TABLET ORAL AT BEDTIME
Qty: 90 TABLET | Refills: 3 | Status: SHIPPED | OUTPATIENT
Start: 2023-08-16

## 2023-08-16 ASSESSMENT — PAIN SCALES - GENERAL: PAINLEVEL: NO PAIN (0)

## 2023-08-16 NOTE — PROGRESS NOTES
Assessment & Plan     Persistent atrial fibrillation (H)  Controlled on treatment    Paresthesia  Assess MRI spine for radiculopathy   - Vitamin B12  - MR Cervical Spine w/o Contrast; Future    Memory deficit  Assess brain MRI, B12 level   - Vitamin B12  - MR Brain w/o Contrast; Future    Subclinical hypothyroidism  Assess thyroid function   - TSH with free T4 reflex  - T3, Free    Mild persistent asthma without complication  - fluticasone-salmeterol (ADVAIR) 250-50 MCG/ACT inhaler; Inhale 1 puff into the lungs every 12 hours  - montelukast (SINGULAIR) 10 MG tablet; Take 1 tablet (10 mg) by mouth At Bedtime             See Patient Instructions    Familia Gillespie MD  [facility] OSCAR Paulson is a 78 year old, presenting for the following health issues:  RECHECK        8/16/2023    12:45 PM   Additional Questions   Roomed by Doni  Wife        History of Present Illness     Asthma:  He presents for follow up of asthma.  He has some cough, no wheezing, and no shortness of breath.  He is using a relief medication a few times a month. He typically misses taking his controller medication 3 time(s) per week. Patient is aware of the following triggers: none. The patient has not had a visit to the Emergency Room, Urgent Care or Hospital due to asthma since the last clinic visit.     He eats 2-3 servings of fruits and vegetables daily.He consumes 1 sweetened beverage(s) daily.He exercises with enough effort to increase his heart rate 9 or less minutes per day.  He exercises with enough effort to increase his heart rate 3 or less days per week.   He is taking medications regularly.       Patient is seen for a follow up visit.  Has h/o asthma, has chronic cough, congestion, wheezing. On Albuterol. PFT showed reversible obstruction. Has cough with clear to green phlegm.   No CP, has SOB, feels tired.     Has concern for memory deficits, short term affected.   Lab work showed elevated TSH,  normal FT4.   Has symptoms of paresthesias in the right hand and fingers, EMG showed CTS and cervical radiculopathy             Review of Systems   Constitutional, HEENT, cardiovascular, pulmonary, gi and gu systems are negative, except as otherwise noted.      Objective    /68   Pulse 92   Temp 96.8  F (36  C) (Tympanic)   Resp 16   Ht 1.829 m (6')   Wt 101.7 kg (224 lb 3.2 oz)   SpO2 97%   BMI 30.41 kg/m    Body mass index is 30.41 kg/m .  Physical Exam   GENERAL: healthy, alert and no distress  NECK: no adenopathy, no asymmetry, masses, or scars and thyroid normal to palpation  RESP: lungs  - no rales, + rhonchi and coarse wheezes  CV: regular rate and rhythm, normal S1 S2, no S3 or S4, no murmur, click or rub, no peripheral edema and peripheral pulses strong  ABDOMEN: soft, nontender, no hepatosplenomegaly, no masses and bowel sounds normal  MS: no gross musculoskeletal defects noted, no edema    Hospital Outpatient Visit on 08/03/2023   Component Date Value Ref Range Status    FVC-Pred 08/03/2023 3.94  L Final    FVC-Pre 08/03/2023 4.07  L Final    FVC-%Pred-Pre 08/03/2023 103  % Final    FEV1-Pre 08/03/2023 2.43  L Final    FEV1-%Pred-Pre 08/03/2023 82  % Final    FEV1FVC-Pred 08/03/2023 75  % Final    FEV1FVC-Pre 08/03/2023 60  % Final    FEFMax-Pred 08/03/2023 8.06  L/sec Final    FEFMax-Pre 08/03/2023 8.05  L/sec Final    FEFMax-%Pred-Pre 08/03/2023 99  % Final    FEF2575-Pred 08/03/2023 2.13  L/sec Final    FEF2575-Pre 08/03/2023 1.02  L/sec Final    RMX6789-%Pred-Pre 08/03/2023 47  % Final    FEF2575-Post 08/03/2023 1.89  L/sec Final    XMI4107-%Pred-Post 08/03/2023 88  % Final    ExpTime-Pre 08/03/2023 11.78  sec Final    FIFMax-Pre 08/03/2023 7.53  L/sec Final    VC-Pred 08/03/2023 4.47  L Final    VC-Pre 08/03/2023 4.12  L Final    VC-%Pred-Pre 08/03/2023 92  % Final    IC-Pred 08/03/2023 3.17  L Final    IC-Pre 08/03/2023 3.15  L Final    IC-%Pred-Pre 08/03/2023 99  % Final    ERV-Pred  08/03/2023 1.36  L Final    ERV-Pre 08/03/2023 0.97  L Final    ERV-%Pred-Pre 08/03/2023 71  % Final    FEV1FEV6-Pred 08/03/2023 77  % Final    FEV1FEV6-Pre 08/03/2023 65  % Final    FRCPleth-Pred 08/03/2023 4.31  L Final    FRCPleth-Pre 08/03/2023 5.09  L Final    FRCPleth-%Pred-Pre 08/03/2023 118  % Final    RVPleth-Pred 08/03/2023 2.90  L Final    RVPleth-Pre 08/03/2023 4.12  L Final    RVPleth-%Pred-Pre 08/03/2023 142  % Final    TLCPleth-Pred 08/03/2023 7.57  L Final    TLCPleth-Pre 08/03/2023 8.24  L Final    TLCPleth-%Pred-Pre 08/03/2023 108  % Final    Gaw-Pred 08/03/2023 0.59  L/s/cmH2O Final    Gaw-Pre 08/03/2023 0.45  L/s/cmH2O Final    Gaw-%Pred-Pre 08/03/2023 75  % Final    sGaw-Pred 08/03/2023 0.19  1/cmH2O*s Final    sGaw-Pre 08/03/2023 0.08  1/cmH2O*s Final    sGaw-%Pred-Pre 08/03/2023 40  % Final    sRaw-Pred 08/03/2023 4.76  cmH2O*s Final    sRaw-Pre 08/03/2023 13.02  cmH2O*s Final    sRaw-%Pred-Pre 08/03/2023 273  % Final    DLCOunc-Pred 08/03/2023 26.25  ml/min/mmHg Final    DLCOunc-Pre 08/03/2023 26.75  ml/min/mmHg Final    DLCOunc-%Pred-Pre 08/03/2023 101  % Final    DLCOcor-Pre 08/03/2023 26.60  ml/min/mmHg Final    DLCOcor-%Pred-Pre 08/03/2023 101  % Final    VA-Pre 08/03/2023 6.38  L Final    VA-%Pred-Pre 08/03/2023 93  % Final    FEV1SVC-Pred 08/03/2023 66  % Final    FEV1SVC-Pre 08/03/2023 59  % Final

## 2023-08-16 NOTE — LETTER
August 18, 2023      Khurram Reynoso  [address]  [facility] [address]        Dear ,    We are writing to inform you of your test results. Your TSH is elevated, but improved from prior.  Your FT4 and FT3 are normal. I recommend rechecking these in 6 months.      Your Vitamin B12 is borderline low. Start taking Vitamin B12, 1000 mcg, daily.  We will also recheck this in 6 months.    Resulted Orders   Vitamin B12   Result Value Ref Range    Vitamin B12 246 232 - 1,245 pg/mL   TSH with free T4 reflex   Result Value Ref Range    TSH 6.67 (H) 0.30 - 4.20 uIU/mL   T3, Free   Result Value Ref Range    T3 Free 3.1 2.0 - 4.4 pg/mL   T4 free   Result Value Ref Range    Free T4 1.35 0.90 - 1.70 ng/dL       If you have any questions or concerns, please call the clinic at the number listed above.       Sincerely,      Familia Gillespie MD

## 2023-08-25 ENCOUNTER — HOSPITAL ENCOUNTER (OUTPATIENT)
Dept: MRI IMAGING | Facility: CLINIC | Age: 78
Discharge: HOME | End: 2023-08-25
Attending: INTERNAL MEDICINE | Admitting: INTERNAL MEDICINE
Payer: COMMERCIAL

## 2023-08-25 DIAGNOSIS — R41.3 MEMORY DEFICIT: ICD-10-CM

## 2023-08-25 DIAGNOSIS — R20.2 PARESTHESIA: ICD-10-CM

## 2023-08-25 PROCEDURE — 72141 MRI NECK SPINE W/O DYE: CPT

## 2023-08-25 PROCEDURE — 70551 MRI BRAIN STEM W/O DYE: CPT

## 2023-09-13 ENCOUNTER — OFFICE VISIT (OUTPATIENT)
Dept: NEUROSURGERY | Facility: CLINIC | Age: 78
End: 2023-09-13
Attending: STUDENT IN AN ORGANIZED HEALTH CARE EDUCATION/TRAINING PROGRAM
Payer: COMMERCIAL

## 2023-09-13 ENCOUNTER — ANCILLARY PROCEDURE (OUTPATIENT)
Dept: GENERAL RADIOLOGY | Facility: CLINIC | Age: 78
End: 2023-09-13
Attending: STUDENT IN AN ORGANIZED HEALTH CARE EDUCATION/TRAINING PROGRAM
Payer: COMMERCIAL

## 2023-09-13 ENCOUNTER — PRE VISIT (OUTPATIENT)
Dept: NEUROSURGERY | Facility: CLINIC | Age: 78
End: 2023-09-13

## 2023-09-13 VITALS — DIASTOLIC BLOOD PRESSURE: 70 MMHG | OXYGEN SATURATION: 99 % | SYSTOLIC BLOOD PRESSURE: 106 MMHG | HEART RATE: 117 BPM

## 2023-09-13 DIAGNOSIS — M48.02 SPINAL STENOSIS OF CERVICAL REGION: ICD-10-CM

## 2023-09-13 DIAGNOSIS — R20.2 PARESTHESIA: ICD-10-CM

## 2023-09-13 DIAGNOSIS — Z01.818 PRE-OP TESTING: Primary | ICD-10-CM

## 2023-09-13 PROCEDURE — G0463 HOSPITAL OUTPT CLINIC VISIT: HCPCS | Performed by: STUDENT IN AN ORGANIZED HEALTH CARE EDUCATION/TRAINING PROGRAM

## 2023-09-13 PROCEDURE — 99204 OFFICE O/P NEW MOD 45 MIN: CPT | Performed by: STUDENT IN AN ORGANIZED HEALTH CARE EDUCATION/TRAINING PROGRAM

## 2023-09-13 PROCEDURE — 72040 X-RAY EXAM NECK SPINE 2-3 VW: CPT | Mod: TC | Performed by: RADIOLOGY

## 2023-09-13 ASSESSMENT — PAIN SCALES - GENERAL: PAINLEVEL: NO PAIN (0)

## 2023-09-13 NOTE — PROGRESS NOTES
HPI:  78-year-old male with 1 year of right hand numbness.  The numbness is in his third fourth and fifth digits and goes past the wrist up into the forearm.  He does associate some discomfort in this region however no specific pain.  He denies any significant neck pain.  He denies any left-handed symptoms.  He has noticed difficulty with holding onto things and dropping things more easily especially with his right hand due to the numbness.  He has noticed difficulty with fine motor tasks and doing buttons and more difficulty with writing with his right hand as well.  He thinks these have very gradually gotten worse over the past year or so.  He does note difficulty with his balance.  He does have a history of low back pain with history of lumbar surgeries as well.  He does have a chronic foot drop on his left side since a prior surgery at least 10 years ago from what he recalls.  Current Outpatient Medications   Medication    ALBUTEROL SULFATE IN    ELIQUIS ANTICOAGULANT 5 MG tablet    fluticasone (FLONASE) 50 MCG/ACT nasal spray    fluticasone-salmeterol (ADVAIR) 250-50 MCG/ACT inhaler    furosemide (LASIX) 20 MG tablet    indomethacin (INDOCIN) 50 MG capsule    lisinopril (ZESTRIL) 2.5 MG tablet    metoprolol succinate ER (TOPROL XL) 50 MG 24 hr tablet    montelukast (SINGULAIR) 10 MG tablet    pantoprazole (PROTONIX) 40 MG EC tablet    VITAMIN D PO     No current facility-administered medications for this visit.      Physical Exam:  Vital signs:      BP: 106/70 Pulse: 117     SpO2: 99 %          Estimated body mass index is 30.41 kg/m  as calculated from the following:    Height as of 8/16/23: 1.829 m (6').    Weight as of 8/16/23: 101.7 kg (224 lb 3.2 oz).  He has full strength in his bilateral upper extremities and bilateral lower extremities with the exception of left dorsiflexion which is 0 out of 5.  Sensation is intact light touch throughout.  No Gemma sign noted.  Biceps and ankle reflexes are 1+  bilaterally.  Results Reviewed:  I personally reviewed the images of an MRI of the cervical spine.  This shows severe central canal stenosis with likely myelomalacia and cord signal change most severe at C4-5 and C5-6.  There is also foraminal stenosis present on the left side at C7-T1.  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  His right-handed symptoms are most likely due to myelopathic changes.  Plan:  We discussed conservative and surgical management options going forward.  With his progressive myelopathy symptoms I would suggest surgery as the most beneficial option going forward.  This would need to decompress his C4-5 and C5-6 level but with the spinal listhesis at C4-5 and the significant central canal stenosis with thickened posterior ligament I would suggest a posterior approach over an anterior approach in general.  I do have concerns that an anterior approach would limit the amount of decompression we could get.  I would therefore recommend a C4-T1 posterior fusion and decompression.  We discussed the risks and benefits this procedure in detail.  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  Given his symptoms are very slowly changing I believe this is reasonable but did instruct him to let us know if symptoms do more rapidly change.  I will have him see cardiology for clearance prior to surgery given his history of A-fib as well as his primary care doctor to clear for surgery.  We will also get a CT scan and flexion-extension films of the cervical spine prior to surgery as well.  He would like to proceed with surgery at [facility] which I believe is reasonable unless significant medical problems arise for surgery.    Guanakito Yancey MD

## 2023-09-13 NOTE — NURSING NOTE
Khurram Reynoso is a 78 year old male who presents for:  Chief Complaint   Patient presents with    Consult        Vitals:    Vitals:    09/13/23 1333   BP: 106/70   Pulse: 117   SpO2: 99%       BMI:  Estimated body mass index is 30.41 kg/m  as calculated from the following:    Height as of 8/16/23: 6' (1.829 m).    Weight as of 8/16/23: 224 lb 3.2 oz (101.7 kg).    Pain Score:  No Pain (0)        Daisy Hunt

## 2023-09-13 NOTE — NURSING NOTE
Reviewed pre- and post-operative instructions as outlined in the After Visit Summary/Patient Instructions with patient.   Surgery folder was given to patient    Patient Education Topic: Procedure with Dr. Yancey     Learner(s): Patient and Significant other/Spouse     Knowledge Level: Basic    Readiness to Learn: Ready    Method:  Verbal explanation and Written material     Outcome: Able to verbalize instructions    Barriers to Learning: No barrier    Covid Testing: patient educated they will need to have a test for the novel Coronavirus, COVID-19.The PCR test needs to be completed within 4 days (96) hours of surgery. . Order Placed.    Scheduling Number: [phone]    NDI/DMITRY: Confirmation of completion within last 6 months    Pain Clinic: N/A    STD/FMLA: retired    Patient had the opportunity for questions to be answered. Advised Patient and Significant other/Spouse  to call clinic with any questions/concerns. Gave patient antibacterial soap for pre-surgery skin preparation.

## 2023-09-13 NOTE — LETTER
9/13/2023         RE: Khurram Reynoso  [address]  [facility] [address]        Dear Colleague,    Thank you for referring your patient, Khurram Reynoso, to the [facility] [place]. Please see a copy of my visit note below.    HPI:  78-year-old male with 1 year of right hand numbness.  The numbness is in his third fourth and fifth digits and goes past the wrist up into the forearm.  He does associate some discomfort in this region however no specific pain.  He denies any significant neck pain.  He denies any left-handed symptoms.  He has noticed difficulty with holding onto things and dropping things more easily especially with his right hand due to the numbness.  He has noticed difficulty with fine motor tasks and doing buttons and more difficulty with writing with his right hand as well.  He thinks these have very gradually gotten worse over the past year or so.  He does note difficulty with his balance.  He does have a history of low back pain with history of lumbar surgeries as well.  He does have a chronic foot drop on his left side since a prior surgery at least 10 years ago from what he recalls.  Current Outpatient Medications   Medication     ALBUTEROL SULFATE IN     ELIQUIS ANTICOAGULANT 5 MG tablet     fluticasone (FLONASE) 50 MCG/ACT nasal spray     fluticasone-salmeterol (ADVAIR) 250-50 MCG/ACT inhaler     furosemide (LASIX) 20 MG tablet     indomethacin (INDOCIN) 50 MG capsule     lisinopril (ZESTRIL) 2.5 MG tablet     metoprolol succinate ER (TOPROL XL) 50 MG 24 hr tablet     montelukast (SINGULAIR) 10 MG tablet     pantoprazole (PROTONIX) 40 MG EC tablet     VITAMIN D PO     No current facility-administered medications for this visit.      Physical Exam:  Vital signs:      BP: 106/70 Pulse: 117     SpO2: 99 %          Estimated body mass index is 30.41 kg/m  as calculated from the following:    Height as of 8/16/23: 1.829 m (6').    Weight as of 8/16/23: 101.7  kg (224 lb 3.2 oz).  He has full strength in his bilateral upper extremities and bilateral lower extremities with the exception of left dorsiflexion which is 0 out of 5.  Sensation is intact light touch throughout.  No Gemma sign noted.  Biceps and ankle reflexes are 1+ bilaterally.  Results Reviewed:  I personally reviewed the images of an MRI of the cervical spine.  This shows severe central canal stenosis with likely myelomalacia and cord signal change most severe at C4-5 and C5-6.  There is also foraminal stenosis present on the left side at C7-T1.  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  His right-handed symptoms are most likely due to myelopathic changes.  Plan:  We discussed conservative and surgical management options going forward.  With his progressive myelopathy symptoms I would suggest surgery as the most beneficial option going forward.  This would need to decompress his C4-5 and C5-6 level but with the spinal listhesis at C4-5 and the significant central canal stenosis with thickened posterior ligament I would suggest a posterior approach over an anterior approach in general.  I do have concerns that an anterior approach would limit the amount of decompression we could get.  I would therefore recommend a C4-T1 posterior fusion and decompression.  We discussed the risks and benefits this procedure in detail.  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  Given his symptoms are very slowly changing I believe this is reasonable but did instruct him to let us know if symptoms do more rapidly change.  I will have him see cardiology for clearance prior to surgery given his history of A-fib as well as his primary care doctor to clear for surgery.  We will  also get a CT scan and flexion-extension films of the cervical spine prior to surgery as well.  He would like to proceed with surgery at [facility] which I believe is reasonable unless significant medical problems arise for surgery.    Guanakiot Yancey MD        Again, thank you for allowing me to participate in the care of your patient.        Sincerely,        Guanakito Yancey MD

## 2023-09-13 NOTE — PATIENT INSTRUCTIONS
Cervical XR to be completed today  Cervical CT to be completed sometime before surgery  See your cardiologist for pre-op approval     Patient Instructions    Surgery scheduled at [facility] for Cervical Fusion with Dr. Yancey    Pre-Operative  Surgical risks: blood clots in the leg or lung, problems urinating, nerve damage, drainage from the incision, infection,stiffness  Pre-operative physical with primary care physician within 30 days of surgical date.   4 night hospitalization stay  Shower procedure  Please shower with antimicrobial soap the night before surgery and morning of surgery. Please refer to showering instruction sheet in folder.  Eating/Drinking  Stop solid foods 8 hours prior to arrival time  May have clear liquids up to 1 hour prior to arrival time   Clear liquids include water, clear juice, black coffee, or clear tea without milk, Gatorade, clear soda.   Medications  Hold Aspirin, NSAIDs (Advil/Ibuprofen, Indocin, Naproxen,Nuprin,Relafen/Nabumetone, Diclofenac,Meloxicam, Aleve, Celebrex) x 7 days prior to surgical date  Hold Eliquis 3 days pre-op, may resume 2 weeks post op. Review this with your cardiology team as well  You can take Tylenol (Acetaminophen) for pain, 1000 mg  Do not exceed 3,000 mg per day   If you are on chronic pain medication (oxycodone, Percocet, hydrocodone, Vicodin, Norco, Dilaudid, morphine, MS Contin, naltrexone, Suboxone, etc) or have a pain contract we will reach out to your pain clinic to gather your most recent records and recommendations for pain management post-op.  Please ask your provider who manages your chronic pain if they require you to schedule an office visit prior to surgery. Continue obtaining your pain medications from your current provider until surgery. Our team will manage your acute post-op pain in the hospital and during the recovery period. Your pain team will continue to manage your chronic pain.  Any other medications  prescribed, please discuss with your primary care provider at your pre-operative physical   As part of preparation for your upcoming procedure you are required to have a test for the novel Coronavirus, COVID-19.  Please call the number below to schedule your appointment. The test needs to be completed within 4 days (96) hours of surgery.   Scheduling Number: [phone]  You may NOT receive the COVID-19 vaccine 72 hours before or after surgery.    Pain Management  Dealing with pain  As your body heals, you might feel a stabbing, burning, or aching pain. You may also have some numbness.  Everyone feels pain differently, we may ask you to rate your pain using a pain scale. This will let us know how much pain you feel.   Keep in mind that medicine won't take away all of your pain. It helps to try other ways to relax and ease pain.   Things to help with pain  After surgery, we will give you medicine for your pain. These medications work well, but they can make you drowsy, itchy, or sick to your stomach. If we give you narcotics for pain, try to take the pills with food.   For mild to moderate pain, you can take medication such as Tylenol. These can be used with narcotics, but make sure that your narcotic does not contain Tylenol.   Do NOT drive while taking narcotic pain medication  Do NOT drink alcohol while using any pain medication  You can utilize ice as needed (no longer than 20 minutes at one time)  You may resume taking NSAIDs (ex. Ibuprofen, aleve, naproxen) 2 weeks after surgery.    Incision Care  No submerging incision in water such as pools, hot tubs, baths for at least 8 weeks or until incision is healed  You may get your incision wet in the shower. Allow water to run over incision, and gently pat dry.   Remove dressing as instructed upon discharge  Watch for signs of infection  Redness, swelling, warmth, drainage, and fever of 101 degrees or higher  Notify clinic [phone].    Activity Restrictions  For  the first 6-8 weeks, no lifting > 10 pounds, no bending, twisting, or overhead reaching.  Take stairs in moderation   Ok to walk as tolerated, take short frequent walks. You may gradually increase the distance as tolerated.   Avoid bed rest and prolonged sitting for longer than 30 minutes (change positions frequently while awake)  No contact sports until after follow up visit  No high impact activities such as; running/jogging, snowmobile or 4 gil riding or any other recreational vehicles  Please call the clinic if you develop any of the following symptoms:  Swelling and/or warmth in one or both legs  Pain or tenderness in your leg, ankle, foot, or arm   Red or discolored skin     Post-Op Follow Up Appointments  2 week staple/suture removal with RED/RN  6 week post op with xray prior with RED  3 months post op with xray prior with Dr. Yancey  6 months with xray prior with RED  1 year post op with xray prior with RED  2 year post op with xray prior with RED  Please call to schedule follow up and xray appointments at [phone]    Resources  If you are currently employed, you will need to be off work for 4-6 weeks for post op recovery and healing.  Please fax any FMLA/short term disability paperwork to [phone]  You may call our clinic when you'd like to return to work and we can provide a work letter  To allow staff adequate time to complete paperwork, please send as soon as possible     [facility]  Phone: [phone]  Fax: [phone]

## 2023-09-19 ENCOUNTER — TELEPHONE (OUTPATIENT)
Dept: CARDIOLOGY | Facility: CLINIC | Age: 78
End: 2023-09-19
Payer: COMMERCIAL

## 2023-09-19 NOTE — TELEPHONE ENCOUNTER
[facility] Call Center    Phone Message    May a detailed message be left on voicemail: yes     Reason for Call: Other: Pt is requesting a call back to discuss holding eliquis prior spine surgery on 11/13/23.  Please review and call pr.     Action Taken: Other: cardio    Travel Screening: Not Applicable    Thank you!  [facility]

## 2023-09-19 NOTE — TELEPHONE ENCOUNTER
LVM to discuss and find out how many days the surgeon would like the Eliquis held. Direct line left for call back. -[facility] [place]

## 2023-09-20 NOTE — TELEPHONE ENCOUNTER
Called Khurram and updated on message of approval from Dr. Reyez. He verbalized understanding and had no further questions or concerns. He was given permission to proceed with proposed surgery in other telephone encounter at acceptable cardiac risk without need for testing/visit from Dr. Reyez given no new cardiac symptoms. -[facility] [place]

## 2023-09-20 NOTE — TELEPHONE ENCOUNTER
Call  back received from Khurram. He reports that his surgeon is requesting that he hold Eliquis 3 days prior and they want a 2 week hold afterwards. Will route to Dr. Reyez for review. -[facility]      Per 9/13 OV with Dr. Yancey, neurosurgery.   Medications  Hold Aspirin, NSAIDs (Advil/Ibuprofen, Indocin, Naproxen,Nuprin,Relafen/Nabumetone, Diclofenac,Meloxicam, Aleve, Celebrex) x 7 days prior to surgical date  Hold Eliquis 3 days pre-op, may resume 2 weeks post op. Review this with your cardiology team as well            GUILLE Reyez,  OK to hold Eliquis for proposed time period above for upcoming cervical spine surgery in November?  Thanks,  Mal

## 2023-10-02 ENCOUNTER — HOSPITAL ENCOUNTER (OUTPATIENT)
Dept: CT IMAGING | Facility: CLINIC | Age: 78
Discharge: HOME | End: 2023-10-02
Attending: STUDENT IN AN ORGANIZED HEALTH CARE EDUCATION/TRAINING PROGRAM | Admitting: STUDENT IN AN ORGANIZED HEALTH CARE EDUCATION/TRAINING PROGRAM
Payer: COMMERCIAL

## 2023-10-02 DIAGNOSIS — M48.02 SPINAL STENOSIS OF CERVICAL REGION: ICD-10-CM

## 2023-10-02 PROCEDURE — 72125 CT NECK SPINE W/O DYE: CPT

## 2023-10-10 DIAGNOSIS — I48.20 CHRONIC ATRIAL FIBRILLATION (H): ICD-10-CM

## 2023-10-10 DIAGNOSIS — I50.9 ACUTE HEART FAILURE, UNSPECIFIED HEART FAILURE TYPE (H): ICD-10-CM

## 2023-10-10 RX ORDER — LISINOPRIL 2.5 MG/1
2.5 TABLET ORAL DAILY
Qty: 90 TABLET | Refills: 3 | Status: SHIPPED | OUTPATIENT
Start: 2023-10-10 | End: 2024-01-09

## 2023-10-10 RX ORDER — METOPROLOL SUCCINATE 50 MG/1
50 TABLET, EXTENDED RELEASE ORAL DAILY
Qty: 90 TABLET | Refills: 3 | Status: SHIPPED | OUTPATIENT
Start: 2023-10-10 | End: 2024-01-09

## 2023-10-18 RX ORDER — CARBOXYMETHYLCELLULOSE SODIUM 5 MG/ML
1 SOLUTION/ DROPS OPHTHALMIC 3 TIMES DAILY PRN
COMMUNITY

## 2023-11-03 ENCOUNTER — OFFICE VISIT (OUTPATIENT)
Dept: INTERNAL MEDICINE | Facility: CLINIC | Age: 78
End: 2023-11-03
Payer: COMMERCIAL

## 2023-11-03 VITALS
HEART RATE: 78 BPM | DIASTOLIC BLOOD PRESSURE: 78 MMHG | WEIGHT: 217 LBS | BODY MASS INDEX: 29.39 KG/M2 | SYSTOLIC BLOOD PRESSURE: 106 MMHG | RESPIRATION RATE: 18 BRPM | OXYGEN SATURATION: 98 % | HEIGHT: 72 IN | TEMPERATURE: 97.3 F

## 2023-11-03 DIAGNOSIS — Z01.818 PREOPERATIVE EXAMINATION: Primary | ICD-10-CM

## 2023-11-03 DIAGNOSIS — M48.02 SPINAL STENOSIS OF CERVICAL REGION: ICD-10-CM

## 2023-11-03 DIAGNOSIS — I48.19 PERSISTENT ATRIAL FIBRILLATION (H): ICD-10-CM

## 2023-11-03 PROBLEM — I50.9 ACUTE HEART FAILURE, UNSPECIFIED HEART FAILURE TYPE (H): Status: RESOLVED | Noted: 2022-07-11 | Resolved: 2023-11-03

## 2023-11-03 LAB
ANION GAP SERPL CALCULATED.3IONS-SCNC: 10 MMOL/L (ref 7–15)
BASOPHILS # BLD AUTO: 0 10E3/UL (ref 0–0.2)
BASOPHILS NFR BLD AUTO: 1 %
BUN SERPL-MCNC: 16 MG/DL (ref 8–23)
CALCIUM SERPL-MCNC: 9.6 MG/DL (ref 8.8–10.2)
CHLORIDE SERPL-SCNC: 103 MMOL/L (ref 98–107)
CREAT SERPL-MCNC: 1.29 MG/DL (ref 0.67–1.17)
DEPRECATED HCO3 PLAS-SCNC: 26 MMOL/L (ref 22–29)
EGFRCR SERPLBLD CKD-EPI 2021: 57 ML/MIN/1.73M2
EOSINOPHIL # BLD AUTO: 0.2 10E3/UL (ref 0–0.7)
EOSINOPHIL NFR BLD AUTO: 3 %
ERYTHROCYTE [DISTWIDTH] IN BLOOD BY AUTOMATED COUNT: 13.3 % (ref 10–15)
GLUCOSE SERPL-MCNC: 88 MG/DL (ref 70–99)
HCT VFR BLD AUTO: 44 % (ref 40–53)
HGB BLD-MCNC: 14.9 G/DL (ref 13.3–17.7)
IMM GRANULOCYTES # BLD: 0 10E3/UL
IMM GRANULOCYTES NFR BLD: 0 %
LYMPHOCYTES # BLD AUTO: 2.2 10E3/UL (ref 0.8–5.3)
LYMPHOCYTES NFR BLD AUTO: 33 %
MCH RBC QN AUTO: 31.9 PG (ref 26.5–33)
MCHC RBC AUTO-ENTMCNC: 33.9 G/DL (ref 31.5–36.5)
MCV RBC AUTO: 94 FL (ref 78–100)
MONOCYTES # BLD AUTO: 0.6 10E3/UL (ref 0–1.3)
MONOCYTES NFR BLD AUTO: 9 %
NEUTROPHILS # BLD AUTO: 3.6 10E3/UL (ref 1.6–8.3)
NEUTROPHILS NFR BLD AUTO: 53 %
PLATELET # BLD AUTO: 334 10E3/UL (ref 150–450)
POTASSIUM SERPL-SCNC: 4.7 MMOL/L (ref 3.4–5.3)
RBC # BLD AUTO: 4.67 10E6/UL (ref 4.4–5.9)
SODIUM SERPL-SCNC: 139 MMOL/L (ref 135–145)
WBC # BLD AUTO: 6.8 10E3/UL (ref 4–11)

## 2023-11-03 PROCEDURE — 36415 COLL VENOUS BLD VENIPUNCTURE: CPT | Performed by: INTERNAL MEDICINE

## 2023-11-03 PROCEDURE — 93000 ELECTROCARDIOGRAM COMPLETE: CPT | Performed by: INTERNAL MEDICINE

## 2023-11-03 PROCEDURE — 85025 COMPLETE CBC W/AUTO DIFF WBC: CPT | Performed by: INTERNAL MEDICINE

## 2023-11-03 PROCEDURE — 99214 OFFICE O/P EST MOD 30 MIN: CPT | Mod: 25 | Performed by: INTERNAL MEDICINE

## 2023-11-03 PROCEDURE — 80048 BASIC METABOLIC PNL TOTAL CA: CPT | Performed by: INTERNAL MEDICINE

## 2023-11-03 RX ORDER — RESPIRATORY SYNCYTIAL VIRUS VACCINE 120MCG/0.5
0.5 KIT INTRAMUSCULAR ONCE
Qty: 1 EACH | Refills: 0 | Status: CANCELLED | OUTPATIENT
Start: 2023-11-03 | End: 2023-11-03

## 2023-11-03 ASSESSMENT — ENCOUNTER SYMPTOMS
CARDIOVASCULAR NEGATIVE: 1
NUMBNESS: 1
RESPIRATORY NEGATIVE: 1
GASTROINTESTINAL NEGATIVE: 1
ARTHRALGIAS: 1
CONSTITUTIONAL NEGATIVE: 1

## 2023-11-03 NOTE — PROGRESS NOTES
Addendum  CBC and BMP showed no concerning findings.  Patient should be able to proceed with his surgery as scheduled.    [facility]  [address]  [address]  [facility] [address]  Phone: [phone]  Primary Provider: Familia Gillespie  Pre-op Performing Provider: TRINY TSAI    PREOPERATIVE EVALUATION:  Today's date: 11/3/2023    Khurram is a 78 year old male who presents for a preoperative evaluation.      Surgical Information:  Surgery/Procedure: Cervical 4 to Thoracic 1 Posterior Fusion and Decompression   Surgery Location: [facility]  Surgeon: Dr. Yancey  Surgery Date: 11/13/23  Time of Surgery: TBD  Where patient plans to recover: At home with family  Fax number for surgical facility: Note does not need to be faxed, will be available electronically in Epic.    Assessment & Plan     The proposed surgical procedure is considered INTERMEDIATE risk.    Preoperative examination and spinal stenosis of cervical region  At this time, patient does have a relatively unremarkable physical examination.  He has previously tolerated anesthesia without issue.  Patient's blood pressure is noted to be at acceptable level.  He also is able to tolerate greater than 4 METS of physical activity.  EKG shows normal sinus rhythm.  CBC and BMP are currently pending.  Patient reports that he has been instructed to hold his Eliquis approximately 2 to 4 days prior to his surgery date by his surgeon.  I would consider him to be an acceptable candidate to proceed with a noncardiac related surgery.  Assuming no unexpected abnormalities on his outstanding lab work, patient should be able to proceed with his surgery as scheduled.  He should follow any additional instructions as provided to him by his performing surgeon.  - Basic metabolic panel  (Ca, Cl, CO2, Creat, Gluc, K, Na, BUN); Future  - CBC with platelets and differential; Future    Persistent atrial fibrillation (H)  At this time,  patient's EKG shows normal sinus rhythm.  His pulse rate was noted to be 78.  Patient will continue his metoprolol 50 mg daily for ongoing management of this issue.  Patient will hold his Eliquis as documented below prior to his surgery.        - No identified additional risk factors other than previously addressed    Antiplatelet or Anticoagulation Medication Instructions:   - apixaban (Eliquis), edoxaban (Savaysa), rivaroxaban (Xarelto): Bleeding risk is moderate or high for this procedure AND CrCl  (>=) 50 mL/min. HOLD 2 days before surgery.     Additional Medication Instructions:  Patient is to take all scheduled medications on the day of surgery    RECOMMENDATION:  APPROVAL GIVEN to proceed with proposed procedure pending review of diagnostic evaluation.    Ordering of each unique test  Prescription drug management  30 minutes spent by me on the date of the encounter doing chart review, history and exam, documentation and further activities per the note      Subjective       HPI related to upcoming procedure: Patient is a 78-year-old  male who presents to the clinic for a preoperative examination.  He is currently scheduled to undergo C4-T1 spinal fusion secondary to cervical spinal stenosis on November 13th 2023.  Patient has previously received anesthesia when he underwent a prior lumbar fusion, bilateral knee replacements, and right hip arthroplasty.  Patient did tolerate anesthesia without issue.  He is not aware of any family history of anesthesia or bleeding issues.  Patient does have a history of atrial fibrillation for which he has been taking metoprolol succinate ER 50 mg daily along with Eliquis 5 mg twice per day.  Patient has been on this medication regimen for quite some time.  He also takes lisinopril 2.5 mg daily for assistance with his blood pressure.  Patient had previously undergone an ablation for his issues with persistent atrial fibrillation.  Patient is able to walk up a flight of  stairs without experiencing chest pain, shortness of breath, or syncopal episodes.  He is a non-smoker.        10/27/2023     9:34 AM   Preop Questions   1. Have you ever had a heart attack or stroke? No   2. Have you ever had surgery on your heart or blood vessels, such as a stent placement, a coronary artery bypass, or surgery on an artery in your head, neck, heart, or legs? No   3. Do you have chest pain with activity? No   4. Do you have a history of  heart failure? No   5. Do you currently have a cold, bronchitis or symptoms of other infection? No   6. Do you have a cough, shortness of breath, or wheezing? No   7. Do you or anyone in your family have previous history of blood clots? YES    8. Do you or does anyone in your family have a serious bleeding problem such as prolonged bleeding following surgeries or cuts? No   9. Have you ever had problems with anemia or been told to take iron pills? No   10. Have you had any abnormal blood loss such as black, tarry or bloody stools? No   11. Have you ever had a blood transfusion? YES -    11a. Have you ever had a transfusion reaction? No   12. Are you willing to have a blood transfusion if it is medically needed before, during, or after your surgery? Yes   13. Have you or any of your relatives ever had problems with anesthesia? No   14. Do you have sleep apnea, excessive snoring or daytime drowsiness? YES -    14a. Do you have a CPAP machine? No   15. Do you have any artifical heart valves or other implanted medical devices like a pacemaker, defibrillator, or continuous glucose monitor? No   16. Do you have artificial joints? YES -bilateral knees and right hip   17. Are you allergic to latex? No       Health Care Directive:  Patient has a Health Care Directive on file      Preoperative Review of :   reviewed - no record of controlled substances prescribed.      Review of Systems   Constitutional: Negative.    HENT: Negative.     Respiratory: Negative.      Cardiovascular: Negative.    Gastrointestinal: Negative.    Genitourinary: Negative.    Musculoskeletal:  Positive for arthralgias.   Neurological:  Positive for numbness.       Patient Active Problem List    Diagnosis Date Noted    Status post catheter ablation of atrial fibrillation 12/07/2022     Priority: Medium     10/28/2022 PVI with Dr. Lauren with cryo to pulmonary veins and right CTI flutter ablation.  Normal voltage except in the area of right superior pulmonary vein.      Persistent atrial fibrillation (H) 10/28/2022     Priority: Medium     Dx 2022  [id number] score = 3 (age 3, CHF)  Tachy induced CM  PVI 10/28/2022    10/28/2022 PVI with Dr. Lauren with cryo to pulmonary veins and right CTI flutter ablation.  Normal voltage except in the area of right superior pulmonary vein.  Off amiodarone at 6 weeks post ablation.      Aortic root dilatation (H24) 08/01/2022     Priority: Medium    Elevated troponin 07/11/2022     Priority: Medium    Acute heart failure, unspecified heart failure type (H) 07/11/2022     Priority: Medium    Left ureteral stone 08/06/2020     Priority: Medium     Added automatically from request for surgery [id number]      Ureteral calculus 08/06/2020     Priority: Medium     Added automatically from request for surgery [id number]      Calculus of kidney 11/05/2018     Priority: Medium    Acute idiopathic gout of right foot 11/05/2018     Priority: Medium    Mild persistent asthma without complication 07/20/2018     Priority: Medium    ACP (advance care planning) 02/01/2017     Priority: Medium     Advance Care Planning 2/1/2017: Receipt of ACP document:  Received: Health Care Directive which was witnessed or notarized on 8/2/16.  Document previously scanned on 11/28/16.  Validation form completed and sent to be scanned.  Code Status reflects choices in most recent ACP document.  Confirmed/documented designated decision maker(s).  Added by Stefany Wilson RN, Advance Care Planning  Liaison.        PVC (premature ventricular contraction)      Priority: Medium    PAC (premature atrial contraction)      Priority: Medium    Gait difficulty 12/30/2009     Priority: Medium    Tachycardia induced cardiomyopathy (H) 07/18/2006     Priority: Medium     Atrial fibrillation associated  LVEF 20-25% July 2022 > improved 40-45% Sept 2022        Past Medical History:   Diagnosis Date    Acute bronchitis 10/17/2005    Asthma 2018    Cardiac arrest (H) 06/2006    V-Fib arrest during heart cath    CARDIAC DYSRHYTHMIA NOS 07/27/2005    Congestive heart failure (H) July 11. 2022    Degeneration of lumbar or lumbosacral intervertebral disc     Gastroesophageal reflux disease     Gout 2001    Neoplasm of uncertain behavior of skin 2017    Created by Conversion     Other chronic pain     back    PAC (premature atrial contraction)     Paroxysmal A-fib (H) 2006    Persistent atrial fibrillation (H) 10/28/2022    Dx 2022 [id number] score = 3 (age 3, CHF) Tachy induced CM PVI 10/28/2022     PONV (postoperative nausea and vomiting)     PRIM CARDIOMYOPATHY NEC 07/18/2006    PVC (premature ventricular contraction)     Renal disease     kidney stones    Tachycardia induced cardiomyopathy (H) 07/18/2006    Problem list name updated by automated process. Provider to review    Uncomplicated asthma      Past Surgical History:   Procedure Laterality Date    ABLATION OF DYSRHYTHMIC FOCUS  04/03/2007    RVOT PVCs    ANESTHESIA CARDIOVERSION N/A 08/11/2022    Procedure: ANESTHESIA, FOR CARDIOVERSION;  Surgeon: GENERIC ANESTHESIA PROVIDER;  Location: [facility] OR    CARDIAC SURGERY      Ablation    COLONOSCOPY  2017    COMBINED CYSTOSCOPY, INSERT STENT URETER(S) Left 08/06/2020    Procedure: Video cystoscopy, left double-J stent placement and exam under anesthesia;  Surgeon: Dank Han MD;  Location: [facility] OR    DECOMPRESS DISC RF LUMBAR  03/2001    lumbar fusion L2-5    EP ABLATION ATRIAL FLUTTER N/A 10/28/2022    Procedure:  Ablation Atrial Flutter;  Surgeon: Sourav Lauren MD;  Location: [facility] LAB CV    LASER HOLMIUM LITHOTRIPSY URETER(S), INSERT STENT, COMBINED Left 08/20/2020    Procedure: Video cystoscopy, left double-J stent removal, rigid ureteroscopy, flexible renoscopy with holmium laser lithotripsy and stone extraction.;  Surgeon: Dank Han MD;  Location:  OR    OPTICAL TRACKING SYSTEM FUSION SPINE POSTERIOR LUMBAR THREE+ LEVELS N/A 06/27/2016    Procedure: OPTICAL TRACKING SYSTEM FUSION SPINE POSTERIOR LUMBAR THREE+ LEVELS;  Surgeon: Hieu Araiza MD;  Location:  OR    ORTHOPEDIC SURGERY Bilateral     total knee replacements    ORTHOPEDIC SURGERY Right     Total Hipe Replacement    SOFT TISSUE SURGERY Right     right wrist ganglion surgery    ZZC NONSPECIFIC PROCEDURE      knee    ZZC TOTAL HIP ARTHROPLASTY      Description: Total Hip Replacement;  Recorded: 09/24/2010;    ZZC TOTAL KNEE ARTHROPLASTY      Description: Total Knee Arthroplasty;  Recorded: 09/24/2010;  Comments: right     Current Outpatient Medications   Medication Sig Dispense Refill    ALBUTEROL SULFATE IN Inhale into the lungs as needed      apixaban ANTICOAGULANT (ELIQUIS ANTICOAGULANT) 5 MG tablet Take 1 tablet (5 mg) by mouth 2 times daily 180 tablet 3    carboxymethylcellulose PF (REFRESH PLUS) 0.5 % ophthalmic solution Place 1 drop into both eyes 3 times daily as needed for dry eyes      fluticasone (FLONASE) 50 MCG/ACT nasal spray SHAKE LIQUID AND USE 1 TO 2 SPRAYS IN EACH NOSTRIL DAILY 16 g 2    fluticasone-salmeterol (ADVAIR) 250-50 MCG/ACT inhaler Inhale 1 puff into the lungs every 12 hours 3 each 3    furosemide (LASIX) 20 MG tablet Take 1 tablet (20 mg) by mouth daily 90 tablet 3    lisinopril (ZESTRIL) 2.5 MG tablet Take 1 tablet (2.5 mg) by mouth daily 90 tablet 3    metoprolol succinate ER (TOPROL XL) 50 MG 24 hr tablet Take 1 tablet (50 mg) by mouth daily 90 tablet 3    montelukast (SINGULAIR) 10 MG tablet Take  1 tablet (10 mg) by mouth At Bedtime 90 tablet 3    VITAMIN D PO Take one tablet by mouth daily      indomethacin (INDOCIN) 50 MG capsule TAKE 1 CAPSULE(50 MG) BY MOUTH TWICE DAILY WITH MEALS (Patient taking differently: 2 times daily as needed) 30 capsule 1       Allergies   Allergen Reactions    Seasonal Allergies         Social History     Tobacco Use    Smoking status: Never    Smokeless tobacco: Never   Substance Use Topics    Alcohol use: No       History   Drug Use No         Objective     /78   Pulse 78   Temp 97.3  F (36.3  C)   Resp 18   Ht 1.829 m (6')   Wt 98.4 kg (217 lb)   SpO2 98%   BMI 29.43 kg/m      Physical Exam  Vitals reviewed.   HENT:      Head: Normocephalic and atraumatic.      Mouth/Throat:      Mouth: Mucous membranes are moist.      Pharynx: Oropharynx is clear.   Eyes:      Extraocular Movements: Extraocular movements intact.      Conjunctiva/sclera: Conjunctivae normal.      Pupils: Pupils are equal, round, and reactive to light.   Cardiovascular:      Rate and Rhythm: Normal rate and regular rhythm.      Pulses: Normal pulses.      Heart sounds: Normal heart sounds.   Pulmonary:      Effort: Pulmonary effort is normal.      Breath sounds: Normal breath sounds.   Musculoskeletal:      Cervical back: Normal range of motion and neck supple.   Skin:     General: Skin is warm and dry.      Capillary Refill: Capillary refill takes less than 2 seconds.   Neurological:      Mental Status: He is alert.         Recent Labs   Lab Test 06/16/23  1110 04/24/23  1109 10/28/22  0620 10/25/22  1032   HGB 14.8  --   --  15.3     --   --  341     --  138 139   POTASSIUM 4.3  --  4.2 4.5   CR 1.33* 1.6* 1.39* 1.28*        Diagnostics:  Labs pending at this time.  Results will be reviewed when available.   EKG: appears normal, NSR, normal axis, normal intervals, no acute ST/T changes c/w ischemia, no LVH by voltage criteria, unchanged from previous tracings    Revised Cardiac Risk  Index (RCRI):  The patient has the following serious cardiovascular risks for perioperative complications:   - No serious cardiac risks = 0 points     RCRI Interpretation: 0 points: Class I (very low risk - 0.4% complication rate)         Signed Electronically by: Torrey Esparza MD  Copy of this evaluation report is provided to requesting physician.

## 2023-11-03 NOTE — PATIENT INSTRUCTIONS
Preoperative labs are pending.    My Asthma Action Plan    Name: Khurram Reynoso   YOB: 1945  Date: 11/3/2023   My doctor: Torrey Esparza MD   My clinic: [facility]        My Control Medicine: Fluticasone propionate + salmeterol (Advair HFA) -  230/21 mcg bid  My Rescue Medicine: Albuterol (Proair/Ventolin/Proventil HFA) 2-4 puffs EVERY 4 HOURS as needed. Use a spacer if recommended by your provider.   My Asthma Severity:   Mild Persistent  Know your asthma triggers: upper respiratory infections               GREEN ZONE   Good Control  I feel good  No cough or wheeze  Can work, sleep and play without asthma symptoms       Take your asthma control medicine every day.     If exercise triggers your asthma, take your rescue medication  15 minutes before exercise or sports, and  During exercise if you have asthma symptoms  Spacer to use with inhaler: If you have a spacer, make sure to use it with your inhaler             YELLOW ZONE Getting Worse  I have ANY of these:  I do not feel good  Cough or wheeze  Chest feels tight  Wake up at night   Keep taking your Green Zone medications  Start taking your rescue medicine:  every 20 minutes for up to 1 hour. Then every 4 hours for 24-48 hours.  If you stay in the Yellow Zone for more than 12-24 hours, contact your doctor.  If you do not return to the Green Zone in 12-24 hours or you get worse, start taking your oral steroid medicine if prescribed by your provider.           RED ZONE Medical Alert - Get Help  I have ANY of these:  I feel awful  Medicine is not helping  Breathing getting harder  Trouble walking or talking  Nose opens wide to breathe       Take your rescue medicine NOW  If your provider has prescribed an oral steroid medicine, start taking it NOW  Call your doctor NOW  If you are still in the Red Zone after 20 minutes and you have not reached your doctor:  Take your rescue medicine again and  Call 911 or go to the  emergency room right away    See your regular doctor within 2 weeks of an Emergency Room or Urgent Care visit for follow-up treatment.          Annual Reminders:  Meet with Asthma Educator,  Flu Shot in the Fall, consider Pneumonia Vaccination for patients with asthma (aged 19 and older).    Pharmacy:    [id number] - [place], [place] - [address]  [id number] - [place] - [address] [facility] - [place], [place] - [address]    Electronically signed by Torrey Esparza MD   Date: 11/03/23                      Asthma Triggers  How To Control Things That Make Your Asthma Worse    Triggers are things that make your asthma worse.  Look at the list below to help you find your triggers and what you can do about them.  You can help prevent asthma flare-ups by staying away from your triggers.      Trigger                                                          What you can do   Cigarette Smoke  Tobacco smoke can make asthma worse. Do not allow smoking in your home, car or around you.  Be sure no one smokes at a child s day care or school.  If you smoke, ask your health care provider for ways to help you quit.  Ask family members to quit too.  Ask your health care provider for a referral to Quit Plan to help you quit smoking, or call 9-274-026-PLAN.     Colds, Flu, Bronchitis  These are common triggers of asthma. Wash your hands often.  Don t touch your eyes, nose or mouth.  Get a flu shot every year.     Dust Mites  These are tiny bugs that live in cloth or carpet. They are too small to see. Wash sheets and blankets in hot water every week.   Encase pillows and mattress in dust mite proof covers.  Avoid having carpet if you can. If you have carpet, vacuum weekly.   Use a dust mask and HEPA vacuum.   Pollen and Outdoor Mold  Some people are allergic to trees, grass, or weed pollen, or molds. Try to keep your windows closed.  Limit  time out doors when pollen count is high.   Ask you health care provider about taking medicine during allergy season.     Animal Dander  Some people are allergic to skin flakes, urine or saliva from pets with fur or feathers. Keep pets with fur or feathers out of your home.    If you can t keep the pet outdoors, then keep the pet out of your bedroom.  Keep the bedroom door closed.  Keep pets off cloth furniture and away from stuffed toys.     Mice, Rats, and Cockroaches   Some people are allergic to the waste from these pests.   Cover food and garbage.  Clean up spills and food crumbs.  Store grease in the refrigerator.   Keep food out of the bedroom.   Indoor Mold  This can be a trigger if your home has high moisture. Fix leaking faucets, pipes, or other sources of water.   Clean moldy surfaces.  Dehumidify basement if it is damp and smelly.   Smoke, Strong Odors, and Sprays  These can reduce air quality. Stay away from strong odors and sprays, such as perfume, powder, hair spray, paints, smoke incense, paint, cleaning products, candles and new carpet.   Exercise or Sports  Some people with asthma have this trigger. Be active!  Ask your doctor about taking medicine before sports or exercise to prevent symptoms.    Warm up for 5-10 minutes before and after sports or exercise.     Other Triggers of Asthma  Cold air:  Cover your nose and mouth with a scarf.  Sometimes laughing or crying can be a trigger.  Some medicines and food can trigger asthma.

## 2023-11-09 RX ORDER — INDOMETHACIN 50 MG/1
50 CAPSULE ORAL 2 TIMES DAILY PRN
Status: ON HOLD | COMMUNITY
End: 2023-11-15

## 2023-11-09 RX ORDER — ALBUTEROL SULFATE 90 UG/1
1-2 AEROSOL, METERED RESPIRATORY (INHALATION) EVERY 4 HOURS PRN
COMMUNITY
End: 2024-06-17

## 2023-11-09 NOTE — PHARMACY-ADMISSION MEDICATION HISTORY
Medication history and patient interview completed by pre-admitting RN or pre-op/PACU RN. Reviewed by pharmacist, [facility], [facility] Everywhere, and chart review.       Garth Rocha, Pharm.D., BCPS      Status Changed by Time of change   Nurse Lauren Pimentel, RN Wed Oct 18, 2023 11:31 AM       Prior to Admission medications    Medication Sig Last Dose Taking? Auth Provider Long Term End Date   albuterol (PROAIR HFA/PROVENTIL HFA/VENTOLIN HFA) 108 (90 Base) MCG/ACT inhaler Inhale 1-2 puffs into the lungs every 4 hours as needed for shortness of breath, wheezing or cough  Yes Unknown, Entered By History No    apixaban ANTICOAGULANT (ELIQUIS ANTICOAGULANT) 5 MG tablet Take 1 tablet (5 mg) by mouth 2 times daily  Yes Jesus Reyez MD     carboxymethylcellulose PF (REFRESH PLUS) 0.5 % ophthalmic solution Place 1 drop into both eyes 3 times daily as needed for dry eyes  Yes Reported, Patient     fluticasone (FLONASE) 50 MCG/ACT nasal spray SHAKE LIQUID AND USE 1 TO 2 SPRAYS IN EACH NOSTRIL DAILY  Yes Asif Moctezuma MD     fluticasone-salmeterol (ADVAIR) 250-50 MCG/ACT inhaler Inhale 1 puff into the lungs every 12 hours  Yes Familia Gillespie MD Yes    furosemide (LASIX) 20 MG tablet Take 1 tablet (20 mg) by mouth daily  Yes Familia Gillespie MD Yes    indomethacin (INDOCIN) 50 MG capsule Take 50 mg by mouth 2 times daily as needed  Yes Unknown, Entered By History No    lisinopril (ZESTRIL) 2.5 MG tablet Take 1 tablet (2.5 mg) by mouth daily  Yes Jesus Reyez MD Yes    metoprolol succinate ER (TOPROL XL) 50 MG 24 hr tablet Take 1 tablet (50 mg) by mouth daily  Yes Jesus Reyez MD Yes    montelukast (SINGULAIR) 10 MG tablet Take 1 tablet (10 mg) by mouth At Bedtime  Yes Familia Gillespie MD Yes    VITAMIN D PO Take one tablet by mouth daily  Yes Unknown, Entered By History

## 2023-11-10 ENCOUNTER — LAB (OUTPATIENT)
Dept: LAB | Facility: CLINIC | Age: 78
End: 2023-11-10
Payer: COMMERCIAL

## 2023-11-10 DIAGNOSIS — Z01.818 PRE-OP TESTING: ICD-10-CM

## 2023-11-10 PROCEDURE — 87635 SARS-COV-2 COVID-19 AMP PRB: CPT | Performed by: INTERNAL MEDICINE

## 2023-11-11 LAB — SARS-COV-2 RNA RESP QL NAA+PROBE: NEGATIVE

## 2023-11-13 ENCOUNTER — HOSPITAL ENCOUNTER (INPATIENT)
Facility: CLINIC | Age: 78
LOS: 2 days | Discharge: HOME | DRG: 472 | End: 2023-11-15
Attending: STUDENT IN AN ORGANIZED HEALTH CARE EDUCATION/TRAINING PROGRAM | Admitting: STUDENT IN AN ORGANIZED HEALTH CARE EDUCATION/TRAINING PROGRAM
Payer: COMMERCIAL

## 2023-11-13 ENCOUNTER — ANESTHESIA EVENT (OUTPATIENT)
Dept: SURGERY | Facility: CLINIC | Age: 78
DRG: 472 | End: 2023-11-13
Payer: COMMERCIAL

## 2023-11-13 ENCOUNTER — APPOINTMENT (OUTPATIENT)
Dept: GENERAL RADIOLOGY | Facility: CLINIC | Age: 78
DRG: 472 | End: 2023-11-13
Attending: STUDENT IN AN ORGANIZED HEALTH CARE EDUCATION/TRAINING PROGRAM
Payer: COMMERCIAL

## 2023-11-13 ENCOUNTER — ANESTHESIA (OUTPATIENT)
Dept: SURGERY | Facility: CLINIC | Age: 78
DRG: 472 | End: 2023-11-13
Payer: COMMERCIAL

## 2023-11-13 DIAGNOSIS — Z98.1 S/P CERVICAL SPINAL FUSION: Primary | ICD-10-CM

## 2023-11-13 LAB
ABO/RH(D): NORMAL
ANION GAP SERPL CALCULATED.3IONS-SCNC: 9 MMOL/L (ref 7–15)
ANTIBODY SCREEN: NEGATIVE
APTT PPP: 33 SECONDS (ref 22–38)
BASOPHILS # BLD AUTO: 0 10E3/UL (ref 0–0.2)
BASOPHILS NFR BLD AUTO: 1 %
BUN SERPL-MCNC: 22.1 MG/DL (ref 8–23)
CALCIUM SERPL-MCNC: 9.3 MG/DL (ref 8.8–10.2)
CHLORIDE SERPL-SCNC: 103 MMOL/L (ref 98–107)
CREAT SERPL-MCNC: 1.12 MG/DL (ref 0.67–1.17)
DEPRECATED HCO3 PLAS-SCNC: 27 MMOL/L (ref 22–29)
EGFRCR SERPLBLD CKD-EPI 2021: 68 ML/MIN/1.73M2
EOSINOPHIL # BLD AUTO: 0.2 10E3/UL (ref 0–0.7)
EOSINOPHIL NFR BLD AUTO: 2 %
ERYTHROCYTE [DISTWIDTH] IN BLOOD BY AUTOMATED COUNT: 13.2 % (ref 10–15)
GLUCOSE BLDC GLUCOMTR-MCNC: 89 MG/DL (ref 70–99)
GLUCOSE SERPL-MCNC: 96 MG/DL (ref 70–99)
HCT VFR BLD AUTO: 40.9 % (ref 40–53)
HGB BLD-MCNC: 14.2 G/DL (ref 13.3–17.7)
IMM GRANULOCYTES # BLD: 0.1 10E3/UL
IMM GRANULOCYTES NFR BLD: 1 %
INR PPP: 1.01 (ref 0.85–1.15)
LYMPHOCYTES # BLD AUTO: 1.6 10E3/UL (ref 0.8–5.3)
LYMPHOCYTES NFR BLD AUTO: 20 %
MCH RBC QN AUTO: 33 PG (ref 26.5–33)
MCHC RBC AUTO-ENTMCNC: 34.7 G/DL (ref 31.5–36.5)
MCV RBC AUTO: 95 FL (ref 78–100)
MONOCYTES # BLD AUTO: 0.8 10E3/UL (ref 0–1.3)
MONOCYTES NFR BLD AUTO: 9 %
NEUTROPHILS # BLD AUTO: 5.4 10E3/UL (ref 1.6–8.3)
NEUTROPHILS NFR BLD AUTO: 67 %
NRBC # BLD AUTO: 0 10E3/UL
NRBC BLD AUTO-RTO: 0 /100
PLATELET # BLD AUTO: 292 10E3/UL (ref 150–450)
POTASSIUM SERPL-SCNC: 4.4 MMOL/L (ref 3.4–5.3)
RBC # BLD AUTO: 4.3 10E6/UL (ref 4.4–5.9)
SODIUM SERPL-SCNC: 139 MMOL/L (ref 135–145)
SPECIMEN EXPIRATION DATE: NORMAL
WBC # BLD AUTO: 8.1 10E3/UL (ref 4–11)

## 2023-11-13 PROCEDURE — 85610 PROTHROMBIN TIME: CPT | Performed by: PHYSICIAN ASSISTANT

## 2023-11-13 PROCEDURE — 710N000009 HC RECOVERY PHASE 1, LEVEL 1, PER MIN: Performed by: STUDENT IN AN ORGANIZED HEALTH CARE EDUCATION/TRAINING PROGRAM

## 2023-11-13 PROCEDURE — 250N000011 HC RX IP 250 OP 636: Performed by: PHYSICIAN ASSISTANT

## 2023-11-13 PROCEDURE — 85730 THROMBOPLASTIN TIME PARTIAL: CPT | Performed by: PHYSICIAN ASSISTANT

## 2023-11-13 PROCEDURE — 22600 ARTHRD PST TQ 1NTRSPC CRV: CPT | Performed by: STUDENT IN AN ORGANIZED HEALTH CARE EDUCATION/TRAINING PROGRAM

## 2023-11-13 PROCEDURE — 250N000013 HC RX MED GY IP 250 OP 250 PS 637

## 2023-11-13 PROCEDURE — 258N000003 HC RX IP 258 OP 636

## 2023-11-13 PROCEDURE — 250N000009 HC RX 250: Performed by: NURSE ANESTHETIST, CERTIFIED REGISTERED

## 2023-11-13 PROCEDURE — 120N000001 HC R&B MED SURG/OB

## 2023-11-13 PROCEDURE — 0RG4071 FUSION OF CERVICOTHORACIC VERTEBRAL JOINT WITH AUTOLOGOUS TISSUE SUBSTITUTE, POSTERIOR APPROACH, POSTERIOR COLUMN, OPEN APPROACH: ICD-10-PCS | Performed by: STUDENT IN AN ORGANIZED HEALTH CARE EDUCATION/TRAINING PROGRAM

## 2023-11-13 PROCEDURE — 86901 BLOOD TYPING SEROLOGIC RH(D): CPT | Performed by: PHYSICIAN ASSISTANT

## 2023-11-13 PROCEDURE — 22614 ARTHRD PST TQ 1NTRSPC EA ADD: CPT | Performed by: STUDENT IN AN ORGANIZED HEALTH CARE EDUCATION/TRAINING PROGRAM

## 2023-11-13 PROCEDURE — 250N000009 HC RX 250: Performed by: ANESTHESIOLOGY

## 2023-11-13 PROCEDURE — 63015 REMOVE SPINE LAMINA >2 CRVCL: CPT | Mod: 51 | Performed by: STUDENT IN AN ORGANIZED HEALTH CARE EDUCATION/TRAINING PROGRAM

## 2023-11-13 PROCEDURE — 85025 COMPLETE CBC W/AUTO DIFF WBC: CPT | Performed by: PHYSICIAN ASSISTANT

## 2023-11-13 PROCEDURE — 922N000001 HC NEURO MONITORING SERVICE, UP TO 7 HOURS (T1FEE): Performed by: STUDENT IN AN ORGANIZED HEALTH CARE EDUCATION/TRAINING PROGRAM

## 2023-11-13 PROCEDURE — 999N000179 XR SURGERY CARM FLUORO LESS THAN 5 MIN W STILLS: Mod: TC

## 2023-11-13 PROCEDURE — 86850 RBC ANTIBODY SCREEN: CPT | Performed by: PHYSICIAN ASSISTANT

## 2023-11-13 PROCEDURE — 258N000003 HC RX IP 258 OP 636: Performed by: ANESTHESIOLOGY

## 2023-11-13 PROCEDURE — 36415 COLL VENOUS BLD VENIPUNCTURE: CPT | Performed by: PHYSICIAN ASSISTANT

## 2023-11-13 PROCEDURE — 250N000011 HC RX IP 250 OP 636: Mod: JZ

## 2023-11-13 PROCEDURE — 20930 SP BONE ALGRFT MORSEL ADD-ON: CPT | Performed by: STUDENT IN AN ORGANIZED HEALTH CARE EDUCATION/TRAINING PROGRAM

## 2023-11-13 PROCEDURE — 370N000017 HC ANESTHESIA TECHNICAL FEE, PER MIN: Performed by: STUDENT IN AN ORGANIZED HEALTH CARE EDUCATION/TRAINING PROGRAM

## 2023-11-13 PROCEDURE — 80048 BASIC METABOLIC PNL TOTAL CA: CPT | Performed by: PHYSICIAN ASSISTANT

## 2023-11-13 PROCEDURE — 250N000005 HC OR RX SURGIFLO HEMOSTATIC MATRIX 10ML 199102S OPNP: Performed by: STUDENT IN AN ORGANIZED HEALTH CARE EDUCATION/TRAINING PROGRAM

## 2023-11-13 PROCEDURE — 272N000001 HC OR GENERAL SUPPLY STERILE: Performed by: STUDENT IN AN ORGANIZED HEALTH CARE EDUCATION/TRAINING PROGRAM

## 2023-11-13 PROCEDURE — C1713 ANCHOR/SCREW BN/BN,TIS/BN: HCPCS | Performed by: STUDENT IN AN ORGANIZED HEALTH CARE EDUCATION/TRAINING PROGRAM

## 2023-11-13 PROCEDURE — C1762 CONN TISS, HUMAN(INC FASCIA): HCPCS | Performed by: STUDENT IN AN ORGANIZED HEALTH CARE EDUCATION/TRAINING PROGRAM

## 2023-11-13 PROCEDURE — 250N000011 HC RX IP 250 OP 636: Mod: JZ | Performed by: NURSE ANESTHETIST, CERTIFIED REGISTERED

## 2023-11-13 PROCEDURE — 258N000003 HC RX IP 258 OP 636: Performed by: NURSE ANESTHETIST, CERTIFIED REGISTERED

## 2023-11-13 PROCEDURE — 20936 SP BONE AGRFT LOCAL ADD-ON: CPT | Performed by: STUDENT IN AN ORGANIZED HEALTH CARE EDUCATION/TRAINING PROGRAM

## 2023-11-13 PROCEDURE — 360N000086 HC SURGERY LEVEL 6 W/ FLUORO, PER MIN: Performed by: STUDENT IN AN ORGANIZED HEALTH CARE EDUCATION/TRAINING PROGRAM

## 2023-11-13 PROCEDURE — 250N000011 HC RX IP 250 OP 636: Performed by: STUDENT IN AN ORGANIZED HEALTH CARE EDUCATION/TRAINING PROGRAM

## 2023-11-13 PROCEDURE — 250N000025 HC SEVOFLURANE, PER MIN: Performed by: STUDENT IN AN ORGANIZED HEALTH CARE EDUCATION/TRAINING PROGRAM

## 2023-11-13 PROCEDURE — 61783 SCAN PROC SPINAL: CPT | Mod: 59 | Performed by: STUDENT IN AN ORGANIZED HEALTH CARE EDUCATION/TRAINING PROGRAM

## 2023-11-13 PROCEDURE — 250N000009 HC RX 250: Performed by: STUDENT IN AN ORGANIZED HEALTH CARE EDUCATION/TRAINING PROGRAM

## 2023-11-13 PROCEDURE — 8E0WXBF COMPUTER ASSISTED PROCEDURE OF TRUNK REGION, WITH FLUOROSCOPY: ICD-10-PCS | Performed by: STUDENT IN AN ORGANIZED HEALTH CARE EDUCATION/TRAINING PROGRAM

## 2023-11-13 PROCEDURE — 999N000141 HC STATISTIC PRE-PROCEDURE NURSING ASSESSMENT: Performed by: STUDENT IN AN ORGANIZED HEALTH CARE EDUCATION/TRAINING PROGRAM

## 2023-11-13 PROCEDURE — 250N000011 HC RX IP 250 OP 636: Performed by: NURSE ANESTHETIST, CERTIFIED REGISTERED

## 2023-11-13 PROCEDURE — 22842 INSERT SPINE FIXATION DEVICE: CPT | Performed by: STUDENT IN AN ORGANIZED HEALTH CARE EDUCATION/TRAINING PROGRAM

## 2023-11-13 PROCEDURE — 0RG2071 FUSION OF 2 OR MORE CERVICAL VERTEBRAL JOINTS WITH AUTOLOGOUS TISSUE SUBSTITUTE, POSTERIOR APPROACH, POSTERIOR COLUMN, OPEN APPROACH: ICD-10-PCS | Performed by: STUDENT IN AN ORGANIZED HEALTH CARE EDUCATION/TRAINING PROGRAM

## 2023-11-13 DEVICE — IMP SCREW INFINITY SPINE MULTIAXIAL12MMX3.5MM 3603512: Type: IMPLANTABLE DEVICE | Site: SPINE CERVICAL | Status: FUNCTIONAL

## 2023-11-13 DEVICE — ROD SPNL 80MM 3.5MM PCUT: Type: IMPLANTABLE DEVICE | Site: SPINE CERVICAL | Status: FUNCTIONAL

## 2023-11-13 DEVICE — SCREW BN 20MM 3.5MM MA NS INFNT SPNE OC UPR THOR LF: Type: IMPLANTABLE DEVICE | Site: SPINE CERVICAL | Status: FUNCTIONAL

## 2023-11-13 DEVICE — GRAFT BONE MAGNIFUSE 1CMX5CM 7509215: Type: IMPLANTABLE DEVICE | Site: SPINE CERVICAL | Status: FUNCTIONAL

## 2023-11-13 DEVICE — IMP SET SCREW MEDTRONIC INFINITY 3600215: Type: IMPLANTABLE DEVICE | Site: SPINE CERVICAL | Status: FUNCTIONAL

## 2023-11-13 DEVICE — IMP SCREW INFINITY SPINE MULTIAXIAL 16MMX3.5MM 3603516: Type: IMPLANTABLE DEVICE | Site: SPINE CERVICAL | Status: FUNCTIONAL

## 2023-11-13 RX ORDER — CEFAZOLIN SODIUM/WATER 2 G/20 ML
2 SYRINGE (ML) INTRAVENOUS SEE ADMIN INSTRUCTIONS
Status: DISCONTINUED | OUTPATIENT
Start: 2023-11-13 | End: 2023-11-13 | Stop reason: HOSPADM

## 2023-11-13 RX ORDER — FENTANYL CITRATE 50 UG/ML
50 INJECTION, SOLUTION INTRAMUSCULAR; INTRAVENOUS EVERY 5 MIN PRN
Status: DISCONTINUED | OUTPATIENT
Start: 2023-11-13 | End: 2023-11-13 | Stop reason: HOSPADM

## 2023-11-13 RX ORDER — ACETAMINOPHEN 325 MG/1
975 TABLET ORAL EVERY 8 HOURS
Status: DISCONTINUED | OUTPATIENT
Start: 2023-11-13 | End: 2023-11-15 | Stop reason: HOSPADM

## 2023-11-13 RX ORDER — HYDROMORPHONE HCL IN WATER/PF 6 MG/30 ML
0.2 PATIENT CONTROLLED ANALGESIA SYRINGE INTRAVENOUS EVERY 5 MIN PRN
Status: DISCONTINUED | OUTPATIENT
Start: 2023-11-13 | End: 2023-11-13 | Stop reason: HOSPADM

## 2023-11-13 RX ORDER — BUPIVACAINE HYDROCHLORIDE 5 MG/ML
INJECTION, SOLUTION PERINEURAL PRN
Status: DISCONTINUED | OUTPATIENT
Start: 2023-11-13 | End: 2023-11-13 | Stop reason: HOSPADM

## 2023-11-13 RX ORDER — HYDROXYZINE HYDROCHLORIDE 10 MG/1
10 TABLET, FILM COATED ORAL EVERY 6 HOURS PRN
Status: DISCONTINUED | OUTPATIENT
Start: 2023-11-13 | End: 2023-11-15 | Stop reason: HOSPADM

## 2023-11-13 RX ORDER — HYDROMORPHONE HCL IN WATER/PF 6 MG/30 ML
0.2 PATIENT CONTROLLED ANALGESIA SYRINGE INTRAVENOUS
Status: DISCONTINUED | OUTPATIENT
Start: 2023-11-13 | End: 2023-11-15 | Stop reason: HOSPADM

## 2023-11-13 RX ORDER — FENTANYL CITRATE 50 UG/ML
INJECTION, SOLUTION INTRAMUSCULAR; INTRAVENOUS PRN
Status: DISCONTINUED | OUTPATIENT
Start: 2023-11-13 | End: 2023-11-13

## 2023-11-13 RX ORDER — LIDOCAINE 40 MG/G
CREAM TOPICAL
Status: DISCONTINUED | OUTPATIENT
Start: 2023-11-13 | End: 2023-11-14

## 2023-11-13 RX ORDER — ONDANSETRON 2 MG/ML
INJECTION INTRAMUSCULAR; INTRAVENOUS PRN
Status: DISCONTINUED | OUTPATIENT
Start: 2023-11-13 | End: 2023-11-13

## 2023-11-13 RX ORDER — METHOCARBAMOL 750 MG/1
750 TABLET, FILM COATED ORAL EVERY 6 HOURS PRN
Status: DISCONTINUED | OUTPATIENT
Start: 2023-11-13 | End: 2023-11-15 | Stop reason: HOSPADM

## 2023-11-13 RX ORDER — ACETAMINOPHEN 325 MG/1
650 TABLET ORAL EVERY 4 HOURS PRN
Status: DISCONTINUED | OUTPATIENT
Start: 2023-11-16 | End: 2023-11-15 | Stop reason: HOSPADM

## 2023-11-13 RX ORDER — SODIUM CHLORIDE, SODIUM LACTATE, POTASSIUM CHLORIDE, CALCIUM CHLORIDE 600; 310; 30; 20 MG/100ML; MG/100ML; MG/100ML; MG/100ML
INJECTION, SOLUTION INTRAVENOUS CONTINUOUS
Status: DISCONTINUED | OUTPATIENT
Start: 2023-11-13 | End: 2023-11-13 | Stop reason: HOSPADM

## 2023-11-13 RX ORDER — EPHEDRINE SULFATE 50 MG/ML
INJECTION, SOLUTION INTRAMUSCULAR; INTRAVENOUS; SUBCUTANEOUS PRN
Status: DISCONTINUED | OUTPATIENT
Start: 2023-11-13 | End: 2023-11-13

## 2023-11-13 RX ORDER — MAGNESIUM HYDROXIDE 1200 MG/15ML
LIQUID ORAL PRN
Status: DISCONTINUED | OUTPATIENT
Start: 2023-11-13 | End: 2023-11-13 | Stop reason: HOSPADM

## 2023-11-13 RX ORDER — FENTANYL CITRATE 50 UG/ML
25 INJECTION, SOLUTION INTRAMUSCULAR; INTRAVENOUS EVERY 5 MIN PRN
Status: DISCONTINUED | OUTPATIENT
Start: 2023-11-13 | End: 2023-11-13 | Stop reason: HOSPADM

## 2023-11-13 RX ORDER — BACITRACIN ZINC 500 [USP'U]/G
OINTMENT TOPICAL PRN
Status: DISCONTINUED | OUTPATIENT
Start: 2023-11-13 | End: 2023-11-13 | Stop reason: HOSPADM

## 2023-11-13 RX ORDER — ONDANSETRON 4 MG/1
4 TABLET, ORALLY DISINTEGRATING ORAL EVERY 30 MIN PRN
Status: DISCONTINUED | OUTPATIENT
Start: 2023-11-13 | End: 2023-11-13 | Stop reason: HOSPADM

## 2023-11-13 RX ORDER — LISINOPRIL 2.5 MG/1
2.5 TABLET ORAL DAILY
Status: DISCONTINUED | OUTPATIENT
Start: 2023-11-14 | End: 2023-11-15 | Stop reason: HOSPADM

## 2023-11-13 RX ORDER — NALOXONE HYDROCHLORIDE 0.4 MG/ML
0.2 INJECTION, SOLUTION INTRAMUSCULAR; INTRAVENOUS; SUBCUTANEOUS
Status: DISCONTINUED | OUTPATIENT
Start: 2023-11-13 | End: 2023-11-15 | Stop reason: HOSPADM

## 2023-11-13 RX ORDER — PROPOFOL 10 MG/ML
INJECTION, EMULSION INTRAVENOUS PRN
Status: DISCONTINUED | OUTPATIENT
Start: 2023-11-13 | End: 2023-11-13

## 2023-11-13 RX ORDER — SODIUM CHLORIDE 9 MG/ML
INJECTION, SOLUTION INTRAVENOUS CONTINUOUS
Status: DISCONTINUED | OUTPATIENT
Start: 2023-11-13 | End: 2023-11-15 | Stop reason: HOSPADM

## 2023-11-13 RX ORDER — LIDOCAINE HYDROCHLORIDE 10 MG/ML
INJECTION, SOLUTION INFILTRATION; PERINEURAL
Status: COMPLETED | OUTPATIENT
Start: 2023-11-13 | End: 2023-11-13

## 2023-11-13 RX ORDER — HYDROMORPHONE HCL IN WATER/PF 6 MG/30 ML
0.4 PATIENT CONTROLLED ANALGESIA SYRINGE INTRAVENOUS
Status: DISCONTINUED | OUTPATIENT
Start: 2023-11-13 | End: 2023-11-15 | Stop reason: HOSPADM

## 2023-11-13 RX ORDER — METOPROLOL TARTRATE 1 MG/ML
1-2 INJECTION, SOLUTION INTRAVENOUS EVERY 5 MIN PRN
Status: DISCONTINUED | OUTPATIENT
Start: 2023-11-13 | End: 2023-11-13 | Stop reason: HOSPADM

## 2023-11-13 RX ORDER — NALOXONE HYDROCHLORIDE 0.4 MG/ML
0.4 INJECTION, SOLUTION INTRAMUSCULAR; INTRAVENOUS; SUBCUTANEOUS
Status: DISCONTINUED | OUTPATIENT
Start: 2023-11-13 | End: 2023-11-15 | Stop reason: HOSPADM

## 2023-11-13 RX ORDER — FLUTICASONE PROPIONATE 50 MCG
1-2 SPRAY, SUSPENSION (ML) NASAL DAILY
Status: DISCONTINUED | OUTPATIENT
Start: 2023-11-13 | End: 2023-11-15 | Stop reason: HOSPADM

## 2023-11-13 RX ORDER — ONDANSETRON 2 MG/ML
4 INJECTION INTRAMUSCULAR; INTRAVENOUS EVERY 30 MIN PRN
Status: DISCONTINUED | OUTPATIENT
Start: 2023-11-13 | End: 2023-11-13 | Stop reason: HOSPADM

## 2023-11-13 RX ORDER — SODIUM CHLORIDE, SODIUM LACTATE, POTASSIUM CHLORIDE, CALCIUM CHLORIDE 600; 310; 30; 20 MG/100ML; MG/100ML; MG/100ML; MG/100ML
INJECTION, SOLUTION INTRAVENOUS CONTINUOUS PRN
Status: DISCONTINUED | OUTPATIENT
Start: 2023-11-13 | End: 2023-11-13

## 2023-11-13 RX ORDER — METOPROLOL SUCCINATE 50 MG/1
50 TABLET, EXTENDED RELEASE ORAL DAILY
Status: DISCONTINUED | OUTPATIENT
Start: 2023-11-14 | End: 2023-11-15 | Stop reason: HOSPADM

## 2023-11-13 RX ORDER — BISACODYL 10 MG
10 SUPPOSITORY, RECTAL RECTAL DAILY PRN
Status: DISCONTINUED | OUTPATIENT
Start: 2023-11-13 | End: 2023-11-15 | Stop reason: HOSPADM

## 2023-11-13 RX ORDER — AMOXICILLIN 250 MG
1 CAPSULE ORAL 2 TIMES DAILY
Status: DISCONTINUED | OUTPATIENT
Start: 2023-11-13 | End: 2023-11-15 | Stop reason: HOSPADM

## 2023-11-13 RX ORDER — VASOPRESSIN IN 0.9 % NACL 2 UNIT/2ML
SYRINGE (ML) INTRAVENOUS PRN
Status: DISCONTINUED | OUTPATIENT
Start: 2023-11-13 | End: 2023-11-13

## 2023-11-13 RX ORDER — POLYETHYLENE GLYCOL 3350 17 G/17G
17 POWDER, FOR SOLUTION ORAL DAILY
Status: DISCONTINUED | OUTPATIENT
Start: 2023-11-14 | End: 2023-11-15 | Stop reason: HOSPADM

## 2023-11-13 RX ORDER — PROCHLORPERAZINE MALEATE 5 MG
5 TABLET ORAL EVERY 6 HOURS PRN
Status: DISCONTINUED | OUTPATIENT
Start: 2023-11-13 | End: 2023-11-15 | Stop reason: HOSPADM

## 2023-11-13 RX ORDER — MONTELUKAST SODIUM 10 MG/1
10 TABLET ORAL AT BEDTIME
Status: DISCONTINUED | OUTPATIENT
Start: 2023-11-13 | End: 2023-11-15 | Stop reason: HOSPADM

## 2023-11-13 RX ORDER — CEFAZOLIN SODIUM 2 G/100ML
2 INJECTION, SOLUTION INTRAVENOUS EVERY 8 HOURS
Qty: 300 ML | Refills: 0 | Status: COMPLETED | OUTPATIENT
Start: 2023-11-13 | End: 2023-11-14

## 2023-11-13 RX ORDER — DEXAMETHASONE SODIUM PHOSPHATE 4 MG/ML
INJECTION, SOLUTION INTRA-ARTICULAR; INTRALESIONAL; INTRAMUSCULAR; INTRAVENOUS; SOFT TISSUE PRN
Status: DISCONTINUED | OUTPATIENT
Start: 2023-11-13 | End: 2023-11-13

## 2023-11-13 RX ORDER — ALBUTEROL SULFATE 90 UG/1
1-2 AEROSOL, METERED RESPIRATORY (INHALATION) EVERY 4 HOURS PRN
Status: DISCONTINUED | OUTPATIENT
Start: 2023-11-13 | End: 2023-11-15 | Stop reason: HOSPADM

## 2023-11-13 RX ORDER — OXYCODONE HYDROCHLORIDE 10 MG/1
10 TABLET ORAL EVERY 4 HOURS PRN
Status: DISCONTINUED | OUTPATIENT
Start: 2023-11-13 | End: 2023-11-15 | Stop reason: HOSPADM

## 2023-11-13 RX ORDER — FUROSEMIDE 20 MG
20 TABLET ORAL DAILY
Status: DISCONTINUED | OUTPATIENT
Start: 2023-11-13 | End: 2023-11-15 | Stop reason: HOSPADM

## 2023-11-13 RX ORDER — ONDANSETRON 2 MG/ML
4 INJECTION INTRAMUSCULAR; INTRAVENOUS EVERY 6 HOURS PRN
Status: DISCONTINUED | OUTPATIENT
Start: 2023-11-13 | End: 2023-11-15 | Stop reason: HOSPADM

## 2023-11-13 RX ORDER — OXYCODONE HYDROCHLORIDE 5 MG/1
5 TABLET ORAL EVERY 4 HOURS PRN
Status: DISCONTINUED | OUTPATIENT
Start: 2023-11-13 | End: 2023-11-15 | Stop reason: HOSPADM

## 2023-11-13 RX ORDER — CARBOXYMETHYLCELLULOSE SODIUM 5 MG/ML
1 SOLUTION/ DROPS OPHTHALMIC 3 TIMES DAILY PRN
Status: DISCONTINUED | OUTPATIENT
Start: 2023-11-13 | End: 2023-11-15 | Stop reason: HOSPADM

## 2023-11-13 RX ORDER — LIDOCAINE 40 MG/G
CREAM TOPICAL
Status: DISCONTINUED | OUTPATIENT
Start: 2023-11-13 | End: 2023-11-13 | Stop reason: HOSPADM

## 2023-11-13 RX ORDER — ONDANSETRON 4 MG/1
4 TABLET, ORALLY DISINTEGRATING ORAL EVERY 6 HOURS PRN
Status: DISCONTINUED | OUTPATIENT
Start: 2023-11-13 | End: 2023-11-15 | Stop reason: HOSPADM

## 2023-11-13 RX ORDER — KETAMINE HYDROCHLORIDE 10 MG/ML
INJECTION INTRAMUSCULAR; INTRAVENOUS PRN
Status: DISCONTINUED | OUTPATIENT
Start: 2023-11-13 | End: 2023-11-13

## 2023-11-13 RX ORDER — CEFAZOLIN SODIUM/WATER 2 G/20 ML
2 SYRINGE (ML) INTRAVENOUS
Status: DISCONTINUED | OUTPATIENT
Start: 2023-11-13 | End: 2023-11-13 | Stop reason: HOSPADM

## 2023-11-13 RX ORDER — CALCIUM CARBONATE 500 MG/1
500 TABLET, CHEWABLE ORAL 4 TIMES DAILY PRN
Status: DISCONTINUED | OUTPATIENT
Start: 2023-11-13 | End: 2023-11-15 | Stop reason: HOSPADM

## 2023-11-13 RX ORDER — HYDROMORPHONE HCL IN WATER/PF 6 MG/30 ML
0.4 PATIENT CONTROLLED ANALGESIA SYRINGE INTRAVENOUS EVERY 5 MIN PRN
Status: DISCONTINUED | OUTPATIENT
Start: 2023-11-13 | End: 2023-11-13 | Stop reason: HOSPADM

## 2023-11-13 RX ADMIN — Medication 2 UNITS: at 10:56

## 2023-11-13 RX ADMIN — LIDOCAINE HYDROCHLORIDE 3 ML: 10 INJECTION, SOLUTION INFILTRATION; PERINEURAL at 10:51

## 2023-11-13 RX ADMIN — Medication 2 UNITS: at 13:26

## 2023-11-13 RX ADMIN — PHENYLEPHRINE HYDROCHLORIDE 200 MCG: 10 INJECTION INTRAVENOUS at 13:00

## 2023-11-13 RX ADMIN — PROPOFOL 200 MG: 10 INJECTION, EMULSION INTRAVENOUS at 10:51

## 2023-11-13 RX ADMIN — Medication 2 UNITS: at 11:33

## 2023-11-13 RX ADMIN — MONTELUKAST 10 MG: 10 TABLET, FILM COATED ORAL at 22:09

## 2023-11-13 RX ADMIN — Medication 100 MG: at 10:51

## 2023-11-13 RX ADMIN — SODIUM CHLORIDE, POTASSIUM CHLORIDE, SODIUM LACTATE AND CALCIUM CHLORIDE: 600; 310; 30; 20 INJECTION, SOLUTION INTRAVENOUS at 10:50

## 2023-11-13 RX ADMIN — SODIUM CHLORIDE, POTASSIUM CHLORIDE, SODIUM LACTATE AND CALCIUM CHLORIDE: 600; 310; 30; 20 INJECTION, SOLUTION INTRAVENOUS at 14:53

## 2023-11-13 RX ADMIN — Medication 2 UNITS: at 13:41

## 2023-11-13 RX ADMIN — Medication 2 UNITS: at 12:54

## 2023-11-13 RX ADMIN — SODIUM CHLORIDE: 9 INJECTION, SOLUTION INTRAVENOUS at 18:18

## 2023-11-13 RX ADMIN — Medication 2 G: at 11:03

## 2023-11-13 RX ADMIN — Medication 2 UNITS: at 12:18

## 2023-11-13 RX ADMIN — PHENYLEPHRINE HYDROCHLORIDE 0.15 MCG/KG/MIN: 10 INJECTION INTRAVENOUS at 11:15

## 2023-11-13 RX ADMIN — Medication 2 UNITS: at 11:15

## 2023-11-13 RX ADMIN — Medication 2 UNITS: at 11:11

## 2023-11-13 RX ADMIN — PROPOFOL 100 MCG/KG/MIN: 10 INJECTION, EMULSION INTRAVENOUS at 10:55

## 2023-11-13 RX ADMIN — HYDROXYZINE HYDROCHLORIDE 10 MG: 10 TABLET ORAL at 18:20

## 2023-11-13 RX ADMIN — METHOCARBAMOL TABLETS 750 MG: 750 TABLET, COATED ORAL at 19:36

## 2023-11-13 RX ADMIN — Medication 2 UNITS: at 12:47

## 2023-11-13 RX ADMIN — ONDANSETRON 4 MG: 2 INJECTION INTRAMUSCULAR; INTRAVENOUS at 14:50

## 2023-11-13 RX ADMIN — SODIUM CHLORIDE, POTASSIUM CHLORIDE, SODIUM LACTATE AND CALCIUM CHLORIDE: 600; 310; 30; 20 INJECTION, SOLUTION INTRAVENOUS at 11:03

## 2023-11-13 RX ADMIN — PHENYLEPHRINE HYDROCHLORIDE 100 MCG: 10 INJECTION INTRAVENOUS at 11:27

## 2023-11-13 RX ADMIN — CEFAZOLIN SODIUM 2 G: 2 INJECTION, SOLUTION INTRAVENOUS at 22:09

## 2023-11-13 RX ADMIN — FENTANYL CITRATE 150 MCG: 50 INJECTION INTRAMUSCULAR; INTRAVENOUS at 10:51

## 2023-11-13 RX ADMIN — LIDOCAINE HYDROCHLORIDE 5 ML: 10 INJECTION, SOLUTION INFILTRATION; PERINEURAL at 10:00

## 2023-11-13 RX ADMIN — Medication 2 UNITS: at 13:04

## 2023-11-13 RX ADMIN — Medication 2 UNITS: at 12:31

## 2023-11-13 RX ADMIN — Medication 5 MG: at 13:52

## 2023-11-13 RX ADMIN — SODIUM CHLORIDE, POTASSIUM CHLORIDE, SODIUM LACTATE AND CALCIUM CHLORIDE: 600; 310; 30; 20 INJECTION, SOLUTION INTRAVENOUS at 13:10

## 2023-11-13 RX ADMIN — Medication 2 UNITS: at 11:27

## 2023-11-13 RX ADMIN — Medication 2 G: at 15:03

## 2023-11-13 RX ADMIN — Medication 50 MG: at 10:57

## 2023-11-13 RX ADMIN — HYDROMORPHONE HYDROCHLORIDE 0.5 MG: 1 INJECTION, SOLUTION INTRAMUSCULAR; INTRAVENOUS; SUBCUTANEOUS at 14:58

## 2023-11-13 RX ADMIN — SENNOSIDES AND DOCUSATE SODIUM 1 TABLET: 8.6; 5 TABLET ORAL at 19:36

## 2023-11-13 RX ADMIN — FLUTICASONE PROPIONATE 2 SPRAY: 50 SPRAY, METERED NASAL at 18:21

## 2023-11-13 RX ADMIN — DEXAMETHASONE SODIUM PHOSPHATE 8 MG: 4 INJECTION, SOLUTION INTRA-ARTICULAR; INTRALESIONAL; INTRAMUSCULAR; INTRAVENOUS; SOFT TISSUE at 11:39

## 2023-11-13 RX ADMIN — SODIUM CHLORIDE, POTASSIUM CHLORIDE, SODIUM LACTATE AND CALCIUM CHLORIDE: 600; 310; 30; 20 INJECTION, SOLUTION INTRAVENOUS at 11:32

## 2023-11-13 RX ADMIN — ACETAMINOPHEN 975 MG: 325 TABLET, FILM COATED ORAL at 18:20

## 2023-11-13 RX ADMIN — HYDROMORPHONE HYDROCHLORIDE 0.5 MG: 1 INJECTION, SOLUTION INTRAMUSCULAR; INTRAVENOUS; SUBCUTANEOUS at 14:53

## 2023-11-13 RX ADMIN — Medication 2 UNITS: at 11:37

## 2023-11-13 RX ADMIN — OXYCODONE HYDROCHLORIDE 5 MG: 5 TABLET ORAL at 22:09

## 2023-11-13 ASSESSMENT — ACTIVITIES OF DAILY LIVING (ADL)
ADLS_ACUITY_SCORE: 26
ADLS_ACUITY_SCORE: 25
ADLS_ACUITY_SCORE: 26
ADLS_ACUITY_SCORE: 33
ADLS_ACUITY_SCORE: 26
ADLS_ACUITY_SCORE: 26

## 2023-11-13 ASSESSMENT — NEW YORK HEART ASSOCIATION (NYHA) CLASSIFICATION: NYHA FUNCTIONAL CLASS: II

## 2023-11-13 ASSESSMENT — ENCOUNTER SYMPTOMS: DYSRHYTHMIAS: 1

## 2023-11-13 NOTE — ANESTHESIA PROCEDURE NOTES
Airway       Patient location during procedure: OR       Procedure Start/Stop Times: 11/13/2023 10:54 AM  Staff -        CRNA: Myrna Pham APRN CRNA       Performed By: CRNA  Consent for Airway        Urgency: elective  Indications and Patient Condition       Indications for airway management: morteza-procedural       Induction type:intravenous       Mask difficulty assessment: 1 - vent by mask    Final Airway Details       Final airway type: endotracheal airway       Successful airway: ETT - single  Endotracheal Airway Details        ETT size (mm): 8.0       Cuffed: yes       Successful intubation technique: video laryngoscopy       VL Blade Size: Glidescope 3       Grade View of Cords: 1       Adjucts: stylet       Position: Right       Measured from: lips       Secured at (cm): 23       Bite block used: Soft    Post intubation assessment        Placement verified by: capnometry, equal breath sounds and chest rise        Number of attempts at approach: 1       Number of other approaches attempted: 0       Secured with: plastic tape       Ease of procedure: easy       Dentition: Intact and Unchanged    Medication(s) Administered   Medication Administration Time: 11/13/2023 10:54 AM    Additional Comments       Neck remained neutral during glidescope, cords open/clear

## 2023-11-13 NOTE — BRIEF OP NOTE
[facility]    Brief Operative Note    Pre-operative diagnosis: Spinal stenosis of cervical region [M48.02]  Post-operative diagnosis Same as pre-operative diagnosis    Procedure: Cervical 4 to Thoracic 1 Posterior Fusion and Decompression, N/A - Spine    Surgeon: Surgeon(s) and Role:     * Guanakito Yancey MD - Primary     * Robina Hughes - Assisting  Anesthesia: General   Estimated Blood Loss: 400 ml    Drains: Hemovac  Specimens: * No specimens in log *  Findings:   Soft bone .  Complications: None.  Implants:   Implant Name Type Inv. Item Serial No.  Lot No. LRB No. Used Action   IMP SCREW INFINITY SPINE [id number] [id number] - [id number] Metallic Hardware/[place] IMP SCREW INFINITY SPINE [id number] [id number]  [employer] 8004 10NOV 2023 N/A 4 Implanted   IMP SCREW INFINITY SPINE [id number] [id number] - [id number] Metallic Hardware/[place] IMP SCREW INFINITY SPINE [id number] [id number]  [employer] 8004 10NOV 2023 N/A 1 Wasted   IMP SCREW INFINITY SPINE MULTIAXIAL 16MMX3.5MM [id number] - [id number] Metallic Hardware/[place] IMP SCREW INFINITY SPINE MULTIAXIAL 16MMX3.5MM [id number]  [employer] 8004 10NOV 2023 N/A 2 Implanted   SCREW BN 20MM 3.5MM MA NS INFNT SPNE OC UPR THOR LF - [id number] Metallic Hardware/[place] SCREW BN 20MM 3.5MM MA NS INFNT SPNE OC UPR THOR LF  [employer] 8004 10NOV 2023 N/A 2 Implanted   IMP SET SCREW [employer] INFINITY [id number] - [id number] Metallic Hardware/[place] IMP SET SCREW [employer]ON[employer] INFINITY [id number]  [employer]ANE[employer] 8004 10NOV 2023 N/A 8 Implanted   LILIAM SPNL 80MM 3.5MM PCUT - [id number] Metallic Hardware/[place] LILIAM SPNL 80MM 3.5MM PCUT  [employer] 8004 10NOV 2023 N/A 2 Implanted   GRAFT BONE MAGNIFUSE [id number] - [id number] Bone/Tissue/Biologic GRAFT BONE MAGNIFUSE [id number] [employer]  N/A 1 Implanted     Guanakito Yancey MD

## 2023-11-13 NOTE — ANESTHESIA CARE TRANSFER NOTE
Patient: Khurram Reynoso    Procedure: Procedure(s):  Cervical 4 to Thoracic 1 Posterior Fusion and Decompression       Diagnosis: Spinal stenosis of cervical region [M48.02]  Diagnosis Additional Information: No value filed.    Anesthesia Type:   General     Note:    Oropharynx: oral airway in place  Level of Consciousness: drowsy  Oxygen Supplementation: face mask  Level of Supplemental Oxygen (L/min / FiO2): 6  Independent Airway: airway patency satisfactory and stable  Dentition: dentition unchanged  Vital Signs Stable: post-procedure vital signs reviewed and stable  Report to RN Given: handoff report given  Patient transferred to: PACU    Handoff Report: Identifed the Patient, Identified the Reponsible Provider, Reviewed the pertinent medical history, Discussed the surgical course, Reviewed Intra-OP anesthesia mangement and issues during anesthesia, Set expectations for post-procedure period and Allowed opportunity for questions and acknowledgement of understanding      Vitals:  Vitals Value Taken Time   /66 11/13/23 1535   Temp 96.6  F (35.9  C) 11/13/23 1535   Pulse 89 11/13/23 1539   Resp 34 11/13/23 1539   SpO2 100 % 11/13/23 1539   Vitals shown include unfiled device data.    Electronically Signed By: MICKEY Neumann CRNA  November 13, 2023  3:40 PM

## 2023-11-13 NOTE — BRIEF OP NOTE
[facility]    Brief Operative Note    Pre-operative diagnosis: Spinal stenosis of cervical region [M48.02]  Post-operative diagnosis Same as pre-operative diagnosis    Procedure: Cervical 4 to Thoracic 1 Posterior Fusion and Decompression, N/A - Spine    Surgeon: Surgeon(s) and Role:     * Guanakito Yancey MD - Primary     * Robina Hughes - Assisting  Anesthesia: General   Estimated Blood Loss: 400 ml    Drains: Hemovac  Specimens: * No specimens in log *  Findings:   See op Note.    Implants:   Implant Name Type Inv. Item Serial No.  Lot No. LRB No. Used Action   IMP SCREW INFINITY SPINE [id number] [id number] - [id number] Metallic Hardware/[place] IMP SCREW INFINITY SPINE [id number] [id number]  [employer] 8004 10NOV 2023 N/A 4 Implanted   IMP SCREW INFINITY SPINE HNAAYBXFED02UIQ3.5MM 3782641 - [id number] Metallic Hardware/[place] IMP SCREW INFINITY SPINE [id number] [id number]  [employer] 8004 10NOV 2023 N/A 1 Wasted   IMP SCREW INFINITY SPINE MULTIAXIAL 16MMX3.5MM [id number] - [id number] Metallic Hardware/[place] IMP SCREW INFINITY SPINE MULTIAXIAL 16MMX3.5MM [id number]  [employer] 8004 10NOV 2023 N/A 2 Implanted   SCREW BN 20MM 3.5MM MA NS INFNT SPNE OC UPR THOR LF - [id number] Metallic Hardware/[place] SCREW BN 20MM 3.5MM MA NS INFNT SPNE OC UPR THOR LF  [employer] 8004 10NOV 2023 N/A 2 Implanted   IMP SET SCREW [employer] INFINITY [id number] - [id number] Metallic Hardware/[place] IMP SET SCREW [employer] INFINITY [id number]  [employer]DANE[employer] 8004 10NOV 2023 N/A 8 Implanted   LILIAM SPNL 80MM 3.5MM PCUT - [id number] Metallic Hardware/[place] LILIAM SPNL 80MM 3.5MM PCUT  [employer] 8004 10NOV 2023 N/A 2 Implanted   GRAFT BONE MAGNIFUSE [id number] - [id number] Bone/Tissue/Biologic GRAFT BONE MAGNIFUSE [id number] [employer]  N/A 1 Implanted         Robina Hughes PA-C  [facility]  [address] [place] [address]  [address]  [place] [address]

## 2023-11-13 NOTE — OP NOTE
Location: Main or  Attending Surgeon: Guanakito Yancey MD  Assistant Surgeon: FRANCO Ruggiero  Preprocedure diagnosis: Cervical stenosis with myelopathy  Postprocedure diagnosis: Same  Procedure:  1.  Instrumentation at C4, C5, C7 and T1 bilaterally with use of Stealth navigation for placement of instrumentation  2.  Posterior lateral fusion with use of cortical autograft and allograft from C4-T1 bilaterally.  3.  Cervical decompression at C4-5 and C5-6 as well as rostral portion of C6-7  4.  Use of intraoperative spinal cord monitoring  5.  Use of intraoperative fluoroscopy and O-arm  Indications: The patient is a 78-year-old male with some numbness and tingling in his right arm and symptoms consistent with mild myelopathy symptoms.  On imaging he has severe stenosis at C4-5 and C5-6 with cord compression.  He also has spinal listhesis at C4-5.  Procedure details:  The patient was brought to the main operating room, intubated and placed under general anesthesia.  He is placed in the operating table in the prone position with all pressure points padded.  His head was placed on a levo cervical management system in a neutral position.  Prior to flipping baseline SSEPs were obtained and remained stable upon post flip.  And midline posterior cervical spine incision was marked out.  He is cleaned and prepped in sterile fashion.  Local anesthetic was injected following a timeout.  Using a 10 blade incision made through the skin.  We then used monopolar cautery dissected the midline down to the spinous process of C4-T1.  We then performed subperiosteal dissection bilaterally at these levels.  Once this was completed we attached the frame to the T1 spinous process for the O-arm and performed an O-arm spin.  We then proceeded to place screws under Stealth navigation.  This was done via a pedicle screw approach.  Using a high-speed drill we created an entry point and then used a drill and guide to drill our trajectory.  We  then tapped the trajectory and then placed screws.  This was done starting at C4 on the left and then proceeding to C5 followed by C7 and T1.  C6 was skipped as the pedicle was deemed to be too small to place any screws.  We then performed the same procedure on the right side in the same fashion.  Following this we then performed an O-arm spin to check instrumentation.  We noted that the bilateral C3 screws and the right C4 screw were slightly medial.  We then performed another O-arm spin and reposition the slightly further laterally without any further complications.  And repeat O-arm spin was found to show good hardware placement.  We then used a high-speed drill to create a laminotomy trough from the inferior aspect of C4 down through the superior aspect of C7.  We then removed any remaining pieces of bone in the lateral trough using punch rongeurs.  The medial remaining lamina was then removed and further lateral removal of bone was done using contractors once again.  We also removed any further stenotic soft tissue in these areas using punch rongeurs.  We then moved the head in the [facility] cervical management system and to a neutral position and placed a janneth into the screws.  These were secured using setscrews which were then final tightened.  We used fluoroscopy to verify hardware location.  We then irrigated with normal saline.  Cortical cancellous autograft and allograft was placed from C4-T1 bilaterally for fusion.  A Hemovac drain was placed in the wound and tunneled out inferiorly.  We then closed the muscle and fascial layers using 0 Vicryl sutures followed by 2-0 Vicryl sutures for the subcutaneous layer and 3-0 Vicryl sutures for the dermal layer of the skin was closing staples.  All counts were correct at the end the procedure.  The patient tolerated the procedure well.  He was extubated and transferred the PACU in stable condition.  I was present for the entire procedure.  Robina Hughes was present  for opening, closure and retraction during the procedure as no qualified resident was available.    Guanakito Yancey MD

## 2023-11-13 NOTE — ANESTHESIA POSTPROCEDURE EVALUATION
Patient: Khurram Reynoso    Procedure: Procedure(s):  Cervical 4 to Thoracic 1 Posterior Fusion and Decompression       Anesthesia Type:  General    Note:  Disposition: Inpatient   Postop Pain Control: Uneventful            Sign Out: Well controlled pain   PONV: No   Neuro/Psych: Uneventful            Sign Out: Acceptable/Baseline neuro status   Airway/Respiratory: Uneventful            Sign Out: Acceptable/Baseline resp. status   CV/Hemodynamics: Uneventful            Sign Out: Acceptable CV status; No obvious hypovolemia; No obvious fluid overload   Other NRE:    DID A NON-ROUTINE EVENT OCCUR?            Last vitals:  Vitals Value Taken Time   /72 11/13/23 1615   Temp 96.6  F (35.9  C) 11/13/23 1535   Pulse 94 11/13/23 1622   Resp 10 11/13/23 1622   SpO2 94 % 11/13/23 1622   Vitals shown include unfiled device data.    Electronically Signed By: Brent Andrew MD  November 13, 2023  4:23 PM

## 2023-11-13 NOTE — PROGRESS NOTES
Post op day 0 - Cervical 4 to Thoracic 1 Posterior Fusion and Decompression     Plan:  -Aspen collar on at all times, can provide patient with moses collar for hygiene if needed  - PT/OT  - Hold anticoagulation   - Cervical XR tomorrow AM  - Activity as tolerated  - Pain control measures  - Advance diet as tolerated  - Hospitalist team consulted for co-medical management   - Routine wound care  - Monitor and record drain output     Plans reviewed with Dr. Naye Hughes PA-C  [facility]  [address] [place] [phone]

## 2023-11-13 NOTE — ANESTHESIA PREPROCEDURE EVALUATION
Anesthesia Pre-Procedure Evaluation    Patient: Khurram Reynoso   MRN: [id number] : 1945        Procedure : Procedure(s):  Cervical 4 to Thoracic 1 Posterior Fusion and Decompression          Past Medical History:   Diagnosis Date     Acute bronchitis 10/17/2005     Asthma 2018     Cardiac arrest (H) 2006    V-Fib arrest during heart cath     CARDIAC DYSRHYTHMIA NOS 2005     Congestive heart failure (H)      Degeneration of lumbar or lumbosacral intervertebral disc      Gastroesophageal reflux disease      Gout      Neoplasm of uncertain behavior of skin 2017    Created by Conversion      Other chronic pain     back     PAC (premature atrial contraction)      Paroxysmal A-fib (H)      Persistent atrial fibrillation (H) 10/28/2022    Dx  [id number] score = 3 (age 3, CHF) Tachy induced CM PVI 10/28/2022      PONV (postoperative nausea and vomiting)      PRIM CARDIOMYOPATHY NEC 2006     PVC (premature ventricular contraction)      Renal disease     kidney stones     Tachycardia induced cardiomyopathy (H) 2006    Problem list name updated by automated process. Provider to review     Uncomplicated asthma       Past Surgical History:   Procedure Laterality Date     ABLATION OF DYSRHYTHMIC FOCUS  2007    RVOT PVCs     ANESTHESIA CARDIOVERSION N/A 2022    Procedure: ANESTHESIA, FOR CARDIOVERSION;  Surgeon: GENERIC ANESTHESIA PROVIDER;  Location: [facility] OR     CARDIAC SURGERY      Ablation     COLONOSCOPY  2017     COMBINED CYSTOSCOPY, INSERT STENT URETER(S) Left 2020    Procedure: Video cystoscopy, left double-J stent placement and exam under anesthesia;  Surgeon: Dank Han MD;  Location: [facility] OR     DECOMPRESS DISC RF LUMBAR  2001    lumbar fusion L2-5     EP ABLATION ATRIAL FLUTTER N/A 10/28/2022    Procedure: Ablation Atrial Flutter;  Surgeon: Sourav Lauren MD;  Location: [facility] CATH LAB CV     LASER HOLMIUM LITHOTRIPSY URETER(S), INSERT  STENT, COMBINED Left 08/20/2020    Procedure: Video cystoscopy, left double-J stent removal, rigid ureteroscopy, flexible renoscopy with holmium laser lithotripsy and stone extraction.;  Surgeon: Dank Han MD;  Location:  OR     OPTICAL TRACKING SYSTEM FUSION SPINE POSTERIOR LUMBAR THREE+ LEVELS N/A 06/27/2016    Procedure: OPTICAL TRACKING SYSTEM FUSION SPINE POSTERIOR LUMBAR THREE+ LEVELS;  Surgeon: Hieu Araiza MD;  Location: [facility] OR     ORTHOPEDIC SURGERY Bilateral     total knee replacements     ORTHOPEDIC SURGERY Right     Total Hipe Replacement     SOFT TISSUE SURGERY Right     right wrist ganglion surgery     ZZC NONSPECIFIC PROCEDURE      knee     ZZC TOTAL HIP ARTHROPLASTY      Description: Total Hip Replacement;  Recorded: 09/24/2010;     ZZC TOTAL KNEE ARTHROPLASTY      Description: Total Knee Arthroplasty;  Recorded: 09/24/2010;  Comments: right      Allergies   Allergen Reactions     Seasonal Allergies       Social History     Tobacco Use     Smoking status: Never     Smokeless tobacco: Never   Substance Use Topics     Alcohol use: No      Wt Readings from Last 1 Encounters:   11/13/23 99.7 kg (219 lb 11.2 oz)        Anesthesia Evaluation            ROS/MED HX  ENT/Pulmonary:     (+)                    Intermittent, asthma               (-) sleep apnea   Neurologic:     (+)    peripheral neuropathy, - L Foot drop.                           Cardiovascular: Comment: Study Date: 01/05/2023 10:50 AM  Age: 77 yrs  Gender: Male  Patient Location: [facility]  Reason For Study: Persistent atrial fibrillation (H)  Ordering Physician: CAITLYN SHOEMAKER  Referring Physician: CAITLYN SHOEMAKER  Performed By: CM     BSA: 2.2 m2  Height: 71 in  Weight: 230 lb  HR: 61  BP: 112/71 mmHg  ______________________________________________________________________________  Procedure  Limited Echo Adult.  ______________________________________________________________________________  Interpretation  Summary     Left ventricular function is decreased. The ejection fraction is 50-55%  (borderline).  Normal right ventricle size and systolic function.  Limited views were obtained. No hemodynamically significant valvular  abnormalities on 2D or color flow imaging.      (+)  hypertension- -   -  - -   Taking blood thinners   CHF etiology: Tachycardia Last EF: 50%  NYHA classification: II.              dysrhythmias (s/p ablation now sinus), a-fib,        Previous cardiac testing  (-) CAD and valvular problems/murmurs   METS/Exercise Tolerance:     Hematologic:    (-) history of blood clots   Musculoskeletal:       GI/Hepatic:     (+) GERD,                   Renal/Genitourinary:     (+) renal disease, type: CRI,     Nephrolithiasis ,       Endo:    (-) obesity   Psychiatric/Substance Use:       Infectious Disease:       Malignancy:       Other:      (+)  , H/O Chronic Pain,         Physical Exam    Airway        Mallampati: II   TM distance: > 3 FB   Neck ROM: full   Mouth opening: > 3 cm    Respiratory Devices and Support         Dental           Cardiovascular   cardiovascular exam normal          Pulmonary   pulmonary exam normal            Other findings: Lab Test        11/03/23     06/16/23     10/25/22                       0840          1110          1032          WBC          6.8          6.8          7.7           HGB          14.9         14.8         15.3          MCV          94           93           94            PLT          334          352          341            Lab Test        11/13/23     11/03/23     06/16/23     04/24/23     10/28/22                       0843          0840          1110          1109          0620          NA            --          139          140           --          138           POTASSIUM     --          4.7          4.3           --          4.2           CHLORIDE      --          103          102           --          100           CO2           --          26            23            --          27            BUN           --          16.0         20.8          --          21.3          CR            --          1.29*        1.33*        1.6*         1.39*         ANIONGAP      --          10           15            --          11            SHERRON           --          9.6          9.5           --          9.0           GLC          89           88           87            --          96            OUTSIDE LABS:  CBC:   Lab Results   Component Value Date    WBC 6.8 11/03/2023    WBC 6.8 06/16/2023    HGB 14.9 11/03/2023    HGB 14.8 06/16/2023    HCT 44.0 11/03/2023    HCT 43.3 06/16/2023     11/03/2023     06/16/2023     BMP:   Lab Results   Component Value Date     11/03/2023     06/16/2023    POTASSIUM 4.7 11/03/2023    POTASSIUM 4.3 06/16/2023    CHLORIDE 103 11/03/2023    CHLORIDE 102 06/16/2023    CO2 26 11/03/2023    CO2 23 06/16/2023    BUN 16.0 11/03/2023    BUN 20.8 06/16/2023    CR 1.29 (H) 11/03/2023    CR 1.33 (H) 06/16/2023    GLC 89 11/13/2023    GLC 88 11/03/2023     COAGS:   Lab Results   Component Value Date    PTT 45 (H) 07/13/2022    INR 0.94 06/27/2006     POC:   Lab Results   Component Value Date     (H) 06/28/2016     HEPATIC:   Lab Results   Component Value Date    ALBUMIN 4.1 06/16/2023    PROTTOTAL 7.7 06/16/2023    ALT 12 06/16/2023    AST 25 06/16/2023    ALKPHOS 180 (H) 06/16/2023    BILITOTAL 0.6 06/16/2023     OTHER:   Lab Results   Component Value Date    LACT 1.2 05/12/2019    SHERRON 9.6 11/03/2023    MAG 2.1 08/11/2022    TSH 6.67 (H) 08/16/2023    T4 1.35 08/16/2023    CRP 4.4 07/11/2022    SED 7 07/16/2020       Anesthesia Plan    ASA Status:  3       Anesthesia Type: General.     - Airway: ETT   Induction: Propofol.      Techniques and Equipment:     - Airway: Video-Laryngoscope     - Lines/Monitors: Arterial Line, 2nd IV     Consents    Anesthesia Plan(s) and associated risks, benefits, and realistic alternatives  discussed. Questions answered and patient/representative(s) expressed understanding.     - Discussed: Risks, Benefits and Alternatives for the PROCEDURE were discussed     - Discussed with:  Patient       - Patient is DNR/DNI Status: No     Use of blood products discussed: Yes.     Postoperative Care    Pain management: IV analgesics, Oral pain medications.   PONV prophylaxis: Ondansetron (or other 5HT-3), Background Propofol Infusion     Comments:              Brent Andrew MD

## 2023-11-13 NOTE — ANESTHESIA PROCEDURE NOTES
Arterial Line Procedure Note    Pre-Procedure   Staff -        Anesthesiologist:  Brent Andrew MD       Performed By: anesthesiologist       Referred By: Naye       Location: pre-op       Pre-Anesthestic Checklist: patient identified, IV checked, risks and benefits discussed, informed consent, monitors and equipment checked, pre-op evaluation and at physician/surgeon's request  Timeout:       Correct Patient: Yes        Correct Procedure: Yes        Correct Site: Yes        Correct Position: Yes   Line Placement:   This line was placed Pre Induction starting at 11/13/2023 9:55 AM and ending at 11/13/2023 10:14 AM  Procedure   Procedure: arterial line       Laterality: left       Insertion Site: radial.  Sterile Prep        Standard elements of sterile barrier followed       Skin prep: Chloraprep  Insertion/Injection        Technique: Seldinger Technique        Catheter Type/Size: 20 G, 12 cm  Narrative         Secured by: suture       Tegaderm dressing used.       Complications: None apparent,        Arterial waveform: Yes        IBP within 10% of NIBP: Yes

## 2023-11-14 ENCOUNTER — APPOINTMENT (OUTPATIENT)
Dept: PHYSICAL THERAPY | Facility: CLINIC | Age: 78
DRG: 472 | End: 2023-11-14
Payer: COMMERCIAL

## 2023-11-14 ENCOUNTER — APPOINTMENT (OUTPATIENT)
Dept: GENERAL RADIOLOGY | Facility: CLINIC | Age: 78
DRG: 472 | End: 2023-11-14
Payer: COMMERCIAL

## 2023-11-14 LAB
GLUCOSE SERPL-MCNC: 127 MG/DL (ref 70–99)
HGB BLD-MCNC: 11.5 G/DL (ref 13.3–17.7)

## 2023-11-14 PROCEDURE — 97530 THERAPEUTIC ACTIVITIES: CPT | Mod: GP

## 2023-11-14 PROCEDURE — 36415 COLL VENOUS BLD VENIPUNCTURE: CPT | Performed by: STUDENT IN AN ORGANIZED HEALTH CARE EDUCATION/TRAINING PROGRAM

## 2023-11-14 PROCEDURE — 82947 ASSAY GLUCOSE BLOOD QUANT: CPT | Performed by: STUDENT IN AN ORGANIZED HEALTH CARE EDUCATION/TRAINING PROGRAM

## 2023-11-14 PROCEDURE — 120N000001 HC R&B MED SURG/OB

## 2023-11-14 PROCEDURE — 99221 1ST HOSP IP/OBS SF/LOW 40: CPT | Performed by: PHYSICIAN ASSISTANT

## 2023-11-14 PROCEDURE — 97161 PT EVAL LOW COMPLEX 20 MIN: CPT | Mod: GP

## 2023-11-14 PROCEDURE — 85018 HEMOGLOBIN: CPT

## 2023-11-14 PROCEDURE — 999N000065 XR CERVICAL SPINE 2/3 VIEWS

## 2023-11-14 PROCEDURE — 250N000013 HC RX MED GY IP 250 OP 250 PS 637

## 2023-11-14 PROCEDURE — L0172 CERV COL SR FOAM 2PC PRE OTS: HCPCS

## 2023-11-14 PROCEDURE — 250N000011 HC RX IP 250 OP 636: Mod: JZ

## 2023-11-14 PROCEDURE — 97116 GAIT TRAINING THERAPY: CPT | Mod: GP

## 2023-11-14 RX ADMIN — OXYCODONE HYDROCHLORIDE 5 MG: 5 TABLET ORAL at 17:05

## 2023-11-14 RX ADMIN — OXYCODONE HYDROCHLORIDE 5 MG: 5 TABLET ORAL at 13:23

## 2023-11-14 RX ADMIN — HYDROXYZINE HYDROCHLORIDE 10 MG: 10 TABLET ORAL at 15:49

## 2023-11-14 RX ADMIN — ACETAMINOPHEN 975 MG: 325 TABLET, FILM COATED ORAL at 09:32

## 2023-11-14 RX ADMIN — HYDROXYZINE HYDROCHLORIDE 10 MG: 10 TABLET ORAL at 09:33

## 2023-11-14 RX ADMIN — CEFAZOLIN SODIUM 2 G: 2 INJECTION, SOLUTION INTRAVENOUS at 06:16

## 2023-11-14 RX ADMIN — ACETAMINOPHEN 975 MG: 325 TABLET, FILM COATED ORAL at 17:05

## 2023-11-14 RX ADMIN — ACETAMINOPHEN 975 MG: 325 TABLET, FILM COATED ORAL at 01:50

## 2023-11-14 RX ADMIN — OXYCODONE HYDROCHLORIDE 5 MG: 5 TABLET ORAL at 21:14

## 2023-11-14 RX ADMIN — MONTELUKAST 10 MG: 10 TABLET, FILM COATED ORAL at 21:14

## 2023-11-14 RX ADMIN — OXYCODONE HYDROCHLORIDE 5 MG: 5 TABLET ORAL at 09:33

## 2023-11-14 RX ADMIN — METHOCARBAMOL TABLETS 750 MG: 750 TABLET, COATED ORAL at 06:40

## 2023-11-14 RX ADMIN — CEFAZOLIN SODIUM 2 G: 2 INJECTION, SOLUTION INTRAVENOUS at 13:23

## 2023-11-14 RX ADMIN — METHOCARBAMOL TABLETS 750 MG: 750 TABLET, COATED ORAL at 12:09

## 2023-11-14 RX ADMIN — METHOCARBAMOL TABLETS 750 MG: 750 TABLET, COATED ORAL at 19:49

## 2023-11-14 ASSESSMENT — ACTIVITIES OF DAILY LIVING (ADL)
ADLS_ACUITY_SCORE: 33
ADLS_ACUITY_SCORE: 27
ADLS_ACUITY_SCORE: 32
ADLS_ACUITY_SCORE: 27
ADLS_ACUITY_SCORE: 33
ADLS_ACUITY_SCORE: 32
ADLS_ACUITY_SCORE: 33

## 2023-11-14 NOTE — CONSULTS
Hospitalist Consultation      Khurram Reynoso MRN# [id number]   YOB: 1945 Age: 78 year old   Date of Admission: 11/13/2023     Requesting Physician:  Naye  Reason for consult: Post-op medical mangement            Assessment and Plan:   This patient is a 78 year old male with a PMH significant for HTN, hx of persistent Afib s/p ablation on Eliquis, asthma who is POD 1 s/p C4-T1 spinal fusion.   Since surgery he has been complaining of pain over upper bilateral trapezius muscle along with some paresthesia of the right shoulder and right elbow.    1. S/p C4-T1 spinal fusion: doing well, cont PT/OT and pain control as per primary  He is complaining of mild upper shoulder pain with paresthesia.   Seen by neurosurgery, planning for [place] collar at all time  PT OT with repeat cervical x-ray today  2.  History of persistent E-[id number]-[id number] in sinus.  Status post previous ablation.  He is maintained on metoprolol as well as Eliquis which is currently on hold.  -Okay to resume metoprolol with parameters  3.  Asthma-stable no history of recent exacerbation  4. DVT Prophylaxis: on SCDs as per primary, recommending resuming Eliquis as soon as okay with surgery  5. D/C planning: To home versus TCU as per primary    Thank you for consult, we will follow along             History of Present Illness:   This patient is a 78 year old male who is POD 1 s/p C4-T1 spinal fusion.   Intra-op report reviewed and showed no intra-op complications.   I/o's reviewed, Currently net +1.3L with good UOP since OR. Hgb stable this am at 11.5  Overnight did pretty well, no complaints, VSS.   Currently, today sandro diet, pain controlled, no CP, SOB, no n/v.   O/w other medical problems have been stable, with no recent c/o illness.                 Past Medical History:     Past Medical History:   Diagnosis Date    Acute bronchitis 10/17/2005    Asthma 2018    Cardiac arrest (H) 06/2006    V-Fib arrest during heart cath    CARDIAC  DYSRHYTHMIA NOS 07/27/2005    Congestive heart failure (H) July 11. 2022    Degeneration of lumbar or lumbosacral intervertebral disc     Gastroesophageal reflux disease     Gout 2001    Neoplasm of uncertain behavior of skin 2017    Created by Conversion     Other chronic pain     back    PAC (premature atrial contraction)     Paroxysmal A-fib (H) 2006    Persistent atrial fibrillation (H) 10/28/2022    Dx 2022 [id number] score = 3 (age 3, CHF) Tachy induced CM PVI 10/28/2022     PONV (postoperative nausea and vomiting)     PRIM CARDIOMYOPATHY NEC 07/18/2006    PVC (premature ventricular contraction)     Renal disease     kidney stones    Tachycardia induced cardiomyopathy (H) 07/18/2006    Problem list name updated by automated process. Provider to review    Uncomplicated asthma                Past Surgical History:     Past Surgical History:   Procedure Laterality Date    ABLATION OF DYSRHYTHMIC FOCUS  04/03/2007    RVOT PVCs    ANESTHESIA CARDIOVERSION N/A 08/11/2022    Procedure: ANESTHESIA, FOR CARDIOVERSION;  Surgeon: GENERIC ANESTHESIA PROVIDER;  Location:  OR    CARDIAC SURGERY      Ablation    COLONOSCOPY  2017    COMBINED CYSTOSCOPY, INSERT STENT URETER(S) Left 08/06/2020    Procedure: Video cystoscopy, left double-J stent placement and exam under anesthesia;  Surgeon: Dank Han MD;  Location:  OR    DECOMPRESS DISC RF LUMBAR  03/2001    lumbar fusion L2-5    EP ABLATION ATRIAL FLUTTER N/A 10/28/2022    Procedure: Ablation Atrial Flutter;  Surgeon: Sourav Lauren MD;  Location: [facility] CATH LAB CV    LASER HOLMIUM LITHOTRIPSY URETER(S), INSERT STENT, COMBINED Left 08/20/2020    Procedure: Video cystoscopy, left double-J stent removal, rigid ureteroscopy, flexible renoscopy with holmium laser lithotripsy and stone extraction.;  Surgeon: Dank Han MD;  Location:  OR    OPTICAL TRACKING SYSTEM FUSION SPINE POSTERIOR LUMBAR THREE+ LEVELS N/A 06/27/2016    Procedure: OPTICAL TRACKING  SYSTEM FUSION SPINE POSTERIOR LUMBAR THREE+ LEVELS;  Surgeon: Hieu Araiza MD;  Location: [facility] OR    ORTHOPEDIC SURGERY Bilateral     total knee replacements    ORTHOPEDIC SURGERY Right     Total Hipe Replacement    SOFT TISSUE SURGERY Right     right wrist ganglion surgery    ZZC NONSPECIFIC PROCEDURE      knee    ZC TOTAL HIP ARTHROPLASTY      Description: Total Hip Replacement;  Recorded: 09/24/2010;    ZZC TOTAL KNEE ARTHROPLASTY      Description: Total Knee Arthroplasty;  Recorded: 09/24/2010;  Comments: right                 Social History:     Social History     Tobacco Use    Smoking status: Never    Smokeless tobacco: Never   Vaping Use    Vaping Use: Never used   Substance Use Topics    Alcohol use: No    Drug use: No                 Family History:   I have reviewed this patient's family history  family history includes C.A.D. in his father; CABG (age of onset: 77) in his father; Cancer in his brother; Cerebrovascular Disease in his mother; Diabetes in his father; Family History Negative in his mother and sister; Heart Surgery in his father; Hypertension in his father; Other - See Comments in his sister; Other Cancer in his father; Unknown/Adopted in his maternal grandfather, maternal grandmother, paternal grandfather, and paternal grandmother.  Family Hx fully reviewed and is non contributory to this admission.             Allergies:     Allergies   Allergen Reactions    Seasonal Allergies              Medications:     Prior to Admission medications    Medication Sig Last Dose Taking? Auth Provider Long Term End Date   albuterol (PROAIR HFA/PROVENTIL HFA/VENTOLIN HFA) 108 (90 Base) MCG/ACT inhaler Inhale 1-2 puffs into the lungs every 4 hours as needed for shortness of breath, wheezing or cough Past Month Yes Unknown, Entered By History No    apixaban ANTICOAGULANT (ELIQUIS ANTICOAGULANT) 5 MG tablet Take 1 tablet (5 mg) by mouth 2 times daily 11/9/2023 Yes Jesus Reyez MD    "  carboxymethylcellulose PF (REFRESH PLUS) 0.5 % ophthalmic solution Place 1 drop into both eyes 3 times daily as needed for dry eyes Past Month Yes Reported, Patient     fluticasone (FLONASE) 50 MCG/ACT nasal spray SHAKE LIQUID AND USE 1 TO 2 SPRAYS IN EACH NOSTRIL DAILY Past Week Yes Asif Moctezuma MD     fluticasone-salmeterol (ADVAIR) 250-50 MCG/ACT inhaler Inhale 1 puff into the lungs every 12 hours  Yes Familia Gillespie MD Yes    furosemide (LASIX) 20 MG tablet Take 1 tablet (20 mg) by mouth daily 11/11/2023 Yes Familia Gillespie MD Yes    indomethacin (INDOCIN) 50 MG capsule Take 50 mg by mouth 2 times daily as needed 11/12/2023 at PM Yes Unknown, Entered By History No    lisinopril (ZESTRIL) 2.5 MG tablet Take 1 tablet (2.5 mg) by mouth daily 11/13/2023 at 0750 Yes Jesus Reyez MD Yes    metoprolol succinate ER (TOPROL XL) 50 MG 24 hr tablet Take 1 tablet (50 mg) by mouth daily 11/13/2023 at 0750 Yes Jesus Reyez MD Yes    montelukast (SINGULAIR) 10 MG tablet Take 1 tablet (10 mg) by mouth At Bedtime Past Week Yes Familia Gillespie MD Yes    VITAMIN D PO Take one tablet by mouth daily Past Week Yes Unknown, Entered By History                 Review of Systems:     A comprehensive greater than 10 system review of systems was carried out.  Pertinent positives and negatives are noted above.  Otherwise negative for contributory info.            Physical Exam:   Vitals were reviewed  Blood pressure 106/60, pulse 72, temperature 97.5  F (36.4  C), temperature source Temporal, resp. rate 19, height 1.829 m (6' 0.01\"), weight 99.7 kg (219 lb 11.2 oz), SpO2 100%.  Exam:    GENERAL:  Comfortable.  PSYCH: pleasant, oriented, No acute distress.  HEENT:  PERRLA. Normal conjunctiva, normal hearing, nasal mucosa and Oropharynx are normal.  NECK:  Supple, no neck vein distention, adenopathy or bruits, normal thyroid.  HEART:  Normal S1, S2 with no murmur, no pericardial rub, S3 or S4.  LUNGS:  Clear to " auscultation, normal Respiratory effort.  ABDOMEN:  Soft, no hepatosplenomegaly, normal bowel sounds.  EXTREMITIES:  No pedal edema, +2 pulses bilateral and equal.  Neck brace intact, +drain from surgical site, dressing c/d/i  SKIN:  Dry to touch, No rash, wound or ulcerations.  NEUROLOGIC:  Nonfocal with normal cranial nerve and motor power and sensation.            Data:   Past 24 hours labs, studies, and imaging were reviewed.  Recent Labs   Lab 11/14/23 0726 11/13/23 0858   WBC  --  8.1   HGB 11.5* 14.2   HCT  --  40.9   MCV  --  95   PLT  --  292     Recent Labs   Lab 11/14/23 0726 11/13/23 0858 11/13/23  0843   NA  --  139  --    POTASSIUM  --  4.4  --    CHLORIDE  --  103  --    CO2  --  27  --    ANIONGAP  --  9  --    * 96 89   BUN  --  22.1  --    CR  --  1.12  --    GFRESTIMATED  --  68  --    SHERRON  --  9.3  --        Anastasiya Roper PA-C

## 2023-11-14 NOTE — PLAN OF CARE
Goal Outcome Evaluation:      Plan of Care Reviewed With: patient, spouse    Overall Patient Progress: improvingOverall Patient Progress: improving     Afeb, vss, has preop numbness in right hand, also left foot drop, other cms and n/v intact. [place] dressing to posterior neck clean dry and intact, hemavac with 37cc output this shift. Orthotics came and put an aspen collar on pt. Thompson removed at 1000am so pt due to void, scanned for 229ml at 1400. Moderate pain this am  but lower after pt up in chair and ambulated in halls. Oxycodone for pain along with robaxin atarax and tylenol. Spouse in the room with pt. Plan is to discharge to home in 2-3 more days.

## 2023-11-14 NOTE — PLAN OF CARE
Goal Outcome Evaluation:  Alert and oriented x 4.  Vital signs stable, on RA with capno monitoring.  Lungs coarse, occasional non productive cough, encouraged inspirometer use.  Airway patent.  Swallowing intact,speech clear.  Baseline numbness, tingling in right hand and right foot drop.  Edema to legs and ankles..  Pcd's  on.  Dressing intact to posterior cervical spine. Ice pack applied.Hemovac patent.  Pain controlled with tylenol, atarax , oxycodone and robaxin. Log rolled to turn, reposition  Sat up at edge of bed and stood at bedside with 1 assist.    Plan of Care Reviewed With: patient, spouse

## 2023-11-14 NOTE — PLAN OF CARE
"INPATIENT NOTE: [id number]     PRIMARY PROBLEM: Spinal fusion.      /54 (BP Location: Right arm)   Pulse 69   Temp 97.7  F (36.5  C) (Temporal)   Resp 17   Ht 1.829 m (6' 0.01\")   Wt 99.7 kg (219 lb 11.2 oz)   SpO2 98%   BMI 29.79 kg/m           Orientation: A&O x4   Pain status: Rates pain at 2-3/10, received sched tylenol this shift.   Resp: Lungs are coarse.   CMS: Pt has baseline numbness, and tingling in the right hand and right foot drop present.   Edema: slight edema in lower extremities, PCDs in place.   Cardiac: WDL   GI: WDL   : Thompson in place and draining.   Skin: Incision on the back of the neck, no visible drainage.    LDA: PIV running NS  at 75 ml/hr, second PIV SL, Hemovac in place and draining.   Diet: Regular   Activity: Ax1, not oob overnight. Log rolled overnight for repositioning.   Alarms/Safety: Bed alarm    Consults: PT/OT    Pt has orders to receive Xray in the morning. Continue Ancef IV q8.       "

## 2023-11-14 NOTE — PROGRESS NOTES
I fit 2 collars today.  One was [place] for full time wear except for showering in which the [place] to be worn.  Physical, verbal,and written instructions given as well as contact information.  Parminder Jenkins.

## 2023-11-14 NOTE — PROGRESS NOTES
"[facility]    Neurosurgery Progress Note    Date of Service (when I saw the patient): 11/14/2023     Assessment & Plan     Procedure(s):  Cervical 4 to Thoracic 1 Posterior Fusion and Decompression   -1 Day Post-Op  Patient seen sitting up in bed. Patient reports some pain over upper bilateral trapezius muscles. Reports resolution of numbness from right shoulder to right elbow. Reports new tenderness to touch over right thumb described as \"pins and needles when touched\". Denies left arm pain/numbness/tingling. Case in place. Dressing C/D/I. Drain intact. Orthotics present to place [place] collar. Patient would like a moses collar for hygiene. Patient has not worked with PT/OT yet.     Drain output: 90 mL      Plan:   --[place] collar on at all times  - Will place order for moses collar  - PT/OT  - Hold anticoagulation   - Cervical XR today  - Activity as tolerated  - Pain control measures  - Advance diet as tolerated  - Hospitalist team consulted for co-medical management   - Routine wound care  - Keep drain in place monitor and record drain output   - Remove case today     I have discussed the following assessment and plan Dr. Yancey who is in agreement with initial plan and will follow up with further consultation recommendations.    Robina Hughes PA-C  [facility] Neurosurgery  [place] [id number]      Interval History   Stable    Physical Exam   Temp: 97.5  F (36.4  C) Temp src: Temporal BP: 106/60 Pulse: 72   Resp: 19 SpO2: 100 % O2 Device: Nasal cannula Oxygen Delivery: 1.5 LPM  Vitals:    11/13/23 0834   Weight: 99.7 kg (219 lb 11.2 oz)     Vital Signs with Ranges  Temp:  [96.6  F (35.9  C)-97.7  F (36.5  C)] 97.5  F (36.4  C)  Pulse:  [69-94] 72  Resp:  [7-34] 19  BP: (105-132)/(54-85) 106/60  SpO2:  [92 %-100 %] 100 %  I/O last 3 completed shifts:  In: 5275 [P.O.:940; I.V.:4335]  Out: 4065 [Urine:3475; " "Drains:140; Blood:450]     , Blood pressure 106/60, pulse 72, temperature 97.5  F (36.4  C), temperature source Temporal, resp. rate 19, height 1.829 m (6' 0.01\"), weight 99.7 kg (219 lb 11.2 oz), SpO2 100%.  219 lbs 11.2 oz  NEUROLOGICAL EXAMINATION:   Mental status:  Alert and Oriented x 3, speech is fluent. NAD  Cranial nerves:  II-XII intact.   Motor:   Shoulder Abduction:  Right:  5/5   Left:  5/5  Biceps:                      Right:  5/5   Left:  5/5  Triceps:                     Right:  5/5   Left:  5/5  Wrist Extensors:       Right:  5/5   Left:  5/5  Wrist Flexors:           Right:  5/5   Left:  5/5  interosseus :            Right:  5/5   Left:  5/5   Hip Flexor:                Right: 5/5  Left:  5/5  Hip Adductor:             Right:  5/5  Left:  5/5  Hip Abductor:             Right:  5/5  Left:  5/5  Gastroc Soleus:        Right:  5/5  Left:  5/5  Tib/Ant:                      Right:  5/5  Left:  0/5 - baseline  Sensation:  intact to BUE/BLE  Reflexes:  Negative Clonus. Negative Hoffmans.    Incision: C/D/I  Drain Intact    Medications    sodium chloride 75 mL/hr at 11/13/23 1818      acetaminophen  975 mg Oral Q8H    ceFAZolin  2 g Intravenous Q8H    fluticasone  1-2 spray Both Nostrils Daily    [Held by provider] furosemide  20 mg Oral Daily    [Held by provider] lisinopril  2.5 mg Oral Daily    metoprolol succinate ER  50 mg Oral Daily    montelukast  10 mg Oral At Bedtime    polyethylene glycol  17 g Oral Daily    senna-docusate  1 tablet Oral BID         "

## 2023-11-14 NOTE — PROGRESS NOTES
11/14/23 1029   Appointment Info   Signing Clinician's Name / Credentials (PT) Chel Kapadia DPT   Living Environment   People in Home spouse   Current Living Arrangements house   Home Accessibility stairs to enter home;stairs within home   Number of Stairs, Main Entrance 3   Stair Railings, Main Entrance none   Number of Stairs, Within Home, Primary five   Stair Railings, Within Home, Primary railings safe and in good condition   Transportation Anticipated car, drives self;family or friend will provide   Self-Care   Usual Activity Tolerance moderate   Current Activity Tolerance fair   Equipment Currently Used at Home cane, straight   Fall history within last six months yes   Number of times patient has fallen within last six months 3   Activity/Exercise/Self-Care Comment Pt IND at baseline, uses cane outside of house   General Information   Onset of Illness/Injury or Date of Surgery 11/13/23   Referring Physician Robina Hughes, PAYadiraC   Patient/Family Therapy Goals Statement (PT) Return home   Pertinent History of Current Problem (include personal factors and/or comorbidities that impact the POC) Pt is a 77 y/o male POD #1 following Cervical 4 to Thoracic 1 Posterior Fusion and Decompression   Existing Precautions/Restrictions spinal;brace worn when out of bed   Cognition   Affect/Mental Status (Cognition) WFL   Orientation Status (Cognition) oriented x 4   Follows Commands (Cognition) WFL   Pain Assessment   Patient Currently in Pain   (5/10 in neck and R trap)   Integumentary/Edema   Integumentary/Edema Comments Incision not observed, covered. Hemovac in place   Posture    Posture Forward head position;Protracted shoulders   Range of Motion (ROM)   Range of Motion ROM deficits secondary to surgical procedure   Strength (Manual Muscle Testing)   Strength (Manual Muscle Testing) Deficits observed during functional mobility   Bed Mobility   Comment, (Bed Mobility) Supine to sit SBA   Transfers   Comment,  (Transfers) Sit to stand SBA w/ FWW   Gait/Stairs (Locomotion)   Comment, (Gait/Stairs) Pt amb w/ FWW and CGA   Balance   Balance Comments Good seated and fair standing   Sensory Examination   Sensory Perception Comments Baseline numbness in R fingers   Clinical Impression   Criteria for Skilled Therapeutic Intervention Yes, treatment indicated   PT Diagnosis (PT) Impaired functional mobility and gait   Influenced by the following impairments Pain, weakness, decreased activity tolerance, impaired balance   Functional limitations due to impairments Limited functional mobility requiring AD and assist   Clinical Presentation (PT Evaluation Complexity) stable   Clinical Presentation Rationale Based on PMH, current status, and social support   Clinical Decision Making (Complexity) low complexity   Planned Therapy Interventions (PT) balance training;bed mobility training;gait training;stair training;strengthening;transfer training;progressive activity/exercise   Risk & Benefits of therapy have been explained evaluation/treatment results reviewed;care plan/treatment goals reviewed;risks/benefits reviewed;current/potential barriers reviewed;participants voiced agreement with care plan;participants included;patient;spouse/significant other   PT Total Evaluation Time   PT Eval, Low Complexity Minutes (12003) 10   Physical Therapy Goals   PT Frequency Daily   PT Predicted Duration/Target Date for Goal Attainment 11/21/23   PT Goals Bed Mobility;Transfers;Gait;Stairs   PT: Bed Mobility Independent;Supine to/from sit;Rolling   PT: Transfers Modified independent;Sit to/from stand;Assistive device   PT: Gait Modified independent;Assistive device;150 feet   PT: Stairs Supervision/stand-by assist;5 stairs   Interventions   Interventions Quick Adds Gait Training;Therapeutic Activity   Therapeutic Activity   Therapeutic Activities: dynamic activities to improve functional performance Minutes (28528) 10   Symptoms Noted During/After  Treatment None   Treatment Detail/Skilled Intervention Pt in chair upon therapist arrival, agreeable to PT. Pt performed sit to stand from rehab mat w/ FWW and SBA. Pt performed sit to supine SBA w/ log roll. Pt able to boost up in bed w/ SBA and VCs. Pt performed sit <> stand from EOB x2 reps w/ FWW and SBA. Pt performed stand pivot to chair w/ FWW and SBA. Pt in chair at end of session, all needs w/in reach, wife at side, RN updated.   Gait Training   Gait Training Minutes (77233) 10   Symptoms Noted During/After Treatment (Gait Training) fatigue   Treatment Detail/Skilled Intervention Pt amb 85' w/ FWW and CGA. Pt demos fair speed and stability, VCs for upright posture. Pt w/ chronic foot drop on L, demos good ability to  foot, no catching of toes. Pt navigated 4 stairs w/ one rail and CGA. Pt amb 85' back to room w/ FWW and CGA.   PT Discharge Planning   PT Plan Progress bed mob, transfers, amb, stairs. Screen OT needs.   PT Discharge Recommendation (DC Rec) home with assist   PT Rationale for DC Rec Pt below baseline, currently SBA-CGA w/ mobility. Anticipate w/ further IP PT pt will progress and be able to d/c home w/ assist from wife. Recommend pt use AD for mobility,   PT Brief overview of current status SBA-CGA   PT Equipment Needed at Discharge walker, standard   Total Session Time   Timed Code Treatment Minutes 20   Total Session Time (sum of timed and untimed services) 30

## 2023-11-15 ENCOUNTER — APPOINTMENT (OUTPATIENT)
Dept: PHYSICAL THERAPY | Facility: CLINIC | Age: 78
DRG: 472 | End: 2023-11-15
Attending: STUDENT IN AN ORGANIZED HEALTH CARE EDUCATION/TRAINING PROGRAM
Payer: COMMERCIAL

## 2023-11-15 VITALS
WEIGHT: 219.7 LBS | TEMPERATURE: 97.9 F | OXYGEN SATURATION: 98 % | BODY MASS INDEX: 29.76 KG/M2 | SYSTOLIC BLOOD PRESSURE: 114 MMHG | HEIGHT: 72 IN | RESPIRATION RATE: 17 BRPM | DIASTOLIC BLOOD PRESSURE: 66 MMHG | HEART RATE: 86 BPM

## 2023-11-15 LAB
GLUCOSE SERPL-MCNC: 86 MG/DL (ref 70–99)
HOLD SPECIMEN: NORMAL

## 2023-11-15 PROCEDURE — 250N000013 HC RX MED GY IP 250 OP 250 PS 637: Performed by: PHYSICIAN ASSISTANT

## 2023-11-15 PROCEDURE — 99232 SBSQ HOSP IP/OBS MODERATE 35: CPT | Performed by: STUDENT IN AN ORGANIZED HEALTH CARE EDUCATION/TRAINING PROGRAM

## 2023-11-15 PROCEDURE — 250N000013 HC RX MED GY IP 250 OP 250 PS 637

## 2023-11-15 PROCEDURE — 999N000111 HC STATISTIC OT IP EVAL DEFER: Performed by: REHABILITATION PRACTITIONER

## 2023-11-15 PROCEDURE — 97116 GAIT TRAINING THERAPY: CPT | Mod: GP

## 2023-11-15 PROCEDURE — 97530 THERAPEUTIC ACTIVITIES: CPT | Mod: GP

## 2023-11-15 PROCEDURE — 82947 ASSAY GLUCOSE BLOOD QUANT: CPT | Performed by: STUDENT IN AN ORGANIZED HEALTH CARE EDUCATION/TRAINING PROGRAM

## 2023-11-15 PROCEDURE — 36415 COLL VENOUS BLD VENIPUNCTURE: CPT | Performed by: STUDENT IN AN ORGANIZED HEALTH CARE EDUCATION/TRAINING PROGRAM

## 2023-11-15 RX ORDER — AMOXICILLIN 250 MG
1-2 CAPSULE ORAL 2 TIMES DAILY PRN
Qty: 40 TABLET | Refills: 0 | Status: SHIPPED | OUTPATIENT
Start: 2023-11-15 | End: 2024-06-17

## 2023-11-15 RX ORDER — METHOCARBAMOL 750 MG/1
750 TABLET, FILM COATED ORAL EVERY 6 HOURS PRN
Qty: 40 TABLET | Refills: 0 | Status: SHIPPED | OUTPATIENT
Start: 2023-11-15 | End: 2023-11-29

## 2023-11-15 RX ORDER — OXYCODONE HYDROCHLORIDE 5 MG/1
5 TABLET ORAL EVERY 4 HOURS PRN
Qty: 40 TABLET | Refills: 0 | Status: SHIPPED | OUTPATIENT
Start: 2023-11-15 | End: 2023-11-29

## 2023-11-15 RX ADMIN — METOPROLOL SUCCINATE 50 MG: 50 TABLET, EXTENDED RELEASE ORAL at 08:30

## 2023-11-15 RX ADMIN — OXYCODONE HYDROCHLORIDE 10 MG: 10 TABLET ORAL at 12:56

## 2023-11-15 RX ADMIN — METHOCARBAMOL TABLETS 750 MG: 750 TABLET, COATED ORAL at 03:35

## 2023-11-15 RX ADMIN — ACETAMINOPHEN 975 MG: 325 TABLET, FILM COATED ORAL at 01:34

## 2023-11-15 RX ADMIN — OXYCODONE HYDROCHLORIDE 10 MG: 10 TABLET ORAL at 08:30

## 2023-11-15 RX ADMIN — FLUTICASONE PROPIONATE 2 SPRAY: 50 SPRAY, METERED NASAL at 08:26

## 2023-11-15 RX ADMIN — METHOCARBAMOL TABLETS 750 MG: 750 TABLET, COATED ORAL at 09:24

## 2023-11-15 RX ADMIN — ACETAMINOPHEN 975 MG: 325 TABLET, FILM COATED ORAL at 09:25

## 2023-11-15 RX ADMIN — OXYCODONE HYDROCHLORIDE 10 MG: 10 TABLET ORAL at 01:34

## 2023-11-15 ASSESSMENT — ACTIVITIES OF DAILY LIVING (ADL)
ADLS_ACUITY_SCORE: 27
ADLS_ACUITY_SCORE: 27
ADLS_ACUITY_SCORE: 24
ADLS_ACUITY_SCORE: 27
ADLS_ACUITY_SCORE: 25

## 2023-11-15 NOTE — PLAN OF CARE
Goal Outcome Evaluation:  Vital signs stable.  Lungs coarse, occasional non productive cough, encouraged inspirometer use.  Bowel sounds hyperactive passing flatus, refused senna tabs. Encouraged po intake.  Ambulated ,wearing cervical collar, using walker and gait belt. Pt has left foot drop at baseline.  Right hand numbness, tingling.  Dressing intact to posterior neck.  Ice pack applied.  Voiding.  Pain controlled with tylenol, atarax, robaxin and oxycodone.  Airway patent, swallowing intact,speech clear.    Plan of Care Reviewed With: patient, spouse

## 2023-11-15 NOTE — PROGRESS NOTES
"[facility]    Neurosurgery Progress Note    Date of Service (when I saw the patient): 11/15/2023     Assessment & Plan     Procedure(s):  Cervical 4 to thoracic 1 posterior fusion and decompression   -2 Days Post-Op  Patient seen sitting up in chair.  Patient continues to have mild pain over lower neck/bilateral trapezius muscles. Continues to have intermittent pain over right thumb with palpation. Denies left arm pain/numbness/tingling. Denies new or worsening symptoms. Patient does note he recently has had a flare of his gout. Patient has been able to void on own. Dressing C/D/I. Aspen collar in place. Patient received moses collar. Patient has worked with PT who recommends home with assist.     Drain output: 30 mL     Plan:  - Remove Drain and leave incision open to air  - Wear [place] collar at all times, can use moses collar for hygiene  - Activity as tolerated  - Pain control  - Routine wound care  - Hold anticoagulation for 2 weeks   - Plan to discharge patient to home today with assist      I have discussed the following assessment and plan Dr. Yancey.    Robina Hughes PA-C  [facility] Neurosurgery  [place] [id number]      Interval History   Stable    Physical Exam   Temp: 97.5  F (36.4  C) Temp src: Temporal BP: 121/69 Pulse: 83   Resp: 12 SpO2: 98 % O2 Device: None (Room air)    Vitals:    11/13/23 0834   Weight: 219 lb 11.2 oz (99.7 kg)     Vital Signs with Ranges  Temp:  [97.2  F (36.2  C)-98.1  F (36.7  C)] 97.5  F (36.4  C)  Pulse:  [82-85] 83  Resp:  [12-16] 12  BP: (114-141)/(62-71) 121/69  SpO2:  [94 %-100 %] 98 %  I/O last 3 completed shifts:  In: 1516 [P.O.:1120; I.V.:396]  Out: 2212 [Urine:2125; Drains:87]     , Blood pressure 121/69, pulse 83, temperature 97.5  F (36.4  C), temperature source Temporal, resp. rate 12, height 6' 0.01\" (1.829 m), weight 219 lb 11.2 oz (99.7 kg), SpO2 98%.  219 lbs 11.2 " oz    NEUROLOGICAL EXAMINATION:   Mental status:  Alert and Oriented x 3, speech is fluent. NAD  Cranial nerves:  II-XII intact.   Motor:  Strength is 5/5 throughout the upper and lower extremities  Shoulder Abduction:  Right:  5/5   Left:  5/5  Biceps:                      Right:  5/5   Left:  5/5  Triceps:                     Right:  5/5   Left:  5/5  Wrist Extensors:       Right:  5/5   Left:  5/5  Wrist Flexors:           Right:  5/5   Left:  5/5  interosseus :            Right:  5/5   Left:  5/5   Hip Flexor:                Right: 5/5  Left:  5/5  Hip Adductor:             Right:  5/5  Left:  5/5  Hip Abductor:             Right:  5/5  Left:  5/5  Gastroc Soleus:        Right:  5/5  Left:  5/5  Tib/Ant:              Right:  5/5  Left:  0/5 - baseline  Sensation:  intact BUE/BLE  Reflexes:  Negative Hoffmans.    Incision: C/D/I  Drain intact      Medications    sodium chloride 75 mL/hr at 11/13/23 1818      acetaminophen  975 mg Oral Q8H    fluticasone  1-2 spray Both Nostrils Daily    [Held by provider] furosemide  20 mg Oral Daily    [Held by provider] lisinopril  2.5 mg Oral Daily    metoprolol succinate ER  50 mg Oral Daily    montelukast  10 mg Oral At Bedtime    polyethylene glycol  17 g Oral Daily    senna-docusate  1 tablet Oral BID

## 2023-11-15 NOTE — PLAN OF CARE
Physical Therapy Discharge Summary    Reason for therapy discharge:    Discharged to home.    Progress towards therapy goal(s). See goals on Care Plan in [facility] electronic health record for goal details.  Goals partially met.  Barriers to achieving goals:   discharge from facility.    Therapy recommendation(s):    No further therapy is recommended.

## 2023-11-15 NOTE — PROGRESS NOTES
[facility]    Medicine Consult Follow up - Hospitalist Service    Date of Admission:  11/13/2023    Assessment & Plan   Summary: Khurram Reynoso is a 78 year old male with a PMH significant for HTN, hx of persistent Afib s/p ablation on Eliquis, asthma who is POD 1 s/p C4-T1 spinal fusion.      1. S/p C4-T1 spinal fusion: doing well, cont PT/OT and pain control as per primary  2.  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  Status post previous ablation.  He is maintained on metoprolol as well as Eliquis which is currently on hold.  -Okay to resume metoprolol with parameters  3.  Asthma-stable no history of recent exacerbation  4. DVT Prophylaxis: on SCDs as per primary, recommending resuming Eliquis as soon as okay with surgery  5. Acute blood loss anemia: 2/2 surgery, Hgb above transfusion threshold, no s/s of ongoing bleeding, monitor  6. Chronic diastolic heart failure - resume PTA meds  7. D/C planning: ok to discharge from medicine perspective       Irais Benítez, DO  Hospitalist Service  [facility]  Securely message with [employer] (more info)  Text page via [employer] Paging/Directory   ______________________________________________________________________    Interval History   No acute overnight events. Doing well today. Pain tolerable. Feels ready to go home. No acute concerns.     Physical Exam   Vital Signs: Temp: 97.9  F (36.6  C) Temp src: Temporal BP: 114/66 Pulse: 86   Resp: 17 SpO2: 98 % O2 Device: None (Room air)    Weight: 219 lbs 11.2 oz    Constitutional: Awake, alert, no distress, and cooperative  Cardiovascular: Regular rate and rhythm, normal S1 and S2, no S3 or S4, and no murmur noted  Respiratory: No increased work of breathing, good air exchange, clear to auscultation bilaterally, no crackles or wheezing  Gastrointestinal: Abdomen soft, non-tender, non-distended. BS normal. No masses, organomegaly    Medical Decision Making       30 MINUTES  SPENT BY ME on the date of service doing chart review, history, exam, documentation & further activities per the note.      Data

## 2023-11-15 NOTE — DISCHARGE SUMMARY
DISCHARGE SUMMARY   Patient ID:   Khurram Reynoso   [id number]   78 year old   1945     Admit date: 11/13/2023     Discharge date: 11/15/2023    Admission Diagnoses: Spinal stenosis of cervical region [M48.02]  S/P cervical spinal fusion [Z98.1]     Procedure:     Cervical 4 to Thoracic 1 Posterior Fusion and Decompression      Post op Diagnosis:     Spinal stenosis of cervical region [M48.02]      Surgeon: Dr. Yancey    Disposition: Home     Khurram Reynoso verbalizes understanding and is in agreement with discharge.     Done while pt still in hospital bed      Review of your medicines        START taking        Dose / Directions   methocarbamol 750 MG tablet  Commonly known as: ROBAXIN      Dose: 750 mg  Take 1 tablet (750 mg) by mouth every 6 hours as needed for muscle spasms  Quantity: 40 tablet  Refills: 0     oxyCODONE 5 MG tablet  Commonly known as: ROXICODONE      Dose: 5 mg  Take 1 tablet (5 mg) by mouth every 4 hours as needed for moderate pain  Quantity: 40 tablet  Refills: 0     senna-docusate 8.6-50 MG tablet  Commonly known as: SENOKOT-S/PERICOLACE      Dose: 1-2 tablet  Take 1-2 tablets by mouth 2 times daily as needed for constipation  Quantity: 40 tablet  Refills: 0            CONTINUE these medicines which have NOT CHANGED        Dose / Directions   albuterol 108 (90 Base) MCG/ACT inhaler  Commonly known as: PROAIR HFA/PROVENTIL HFA/VENTOLIN HFA      Dose: 1-2 puff  Inhale 1-2 puffs into the lungs every 4 hours as needed for shortness of breath, wheezing or cough  Refills: 0     carboxymethylcellulose PF 0.5 % ophthalmic solution  Commonly known as: REFRESH PLUS  Notes to patient: Home schedule      Dose: 1 drop  Place 1 drop into both eyes 3 times daily as needed for dry eyes  Refills: 0     fluticasone 50 MCG/ACT nasal spray  Commonly known as: FLONASE  Used for: Cough, Mild persistent asthma without complication      SHAKE LIQUID AND USE 1 TO 2 SPRAYS IN EACH NOSTRIL DAILY  Quantity: 16  g  Refills: 2     fluticasone-salmeterol 250-50 MCG/ACT inhaler  Commonly known as: ADVAIR  Used for: Persistent atrial fibrillation (H)  Notes to patient: Home schedule      Dose: 1 puff  Inhale 1 puff into the lungs every 12 hours  Quantity: 3 each  Refills: 3     furosemide 20 MG tablet  Commonly known as: LASIX  Used for: Acute heart failure, unspecified heart failure type (H)  Notes to patient: Home schedule      Dose: 20 mg  Take 1 tablet (20 mg) by mouth daily  Quantity: 90 tablet  Refills: 3     lisinopril 2.5 MG tablet  Commonly known as: ZESTRIL  Used for: Acute heart failure, unspecified heart failure type (H)  Notes to patient: Home schedule      Dose: 2.5 mg  Take 1 tablet (2.5 mg) by mouth daily  Quantity: 90 tablet  Refills: 3     metoprolol succinate ER 50 MG 24 hr tablet  Commonly known as: TOPROL XL  Used for: Acute heart failure, unspecified heart failure type (H)      Dose: 50 mg  Take 1 tablet (50 mg) by mouth daily  Quantity: 90 tablet  Refills: 3     montelukast 10 MG tablet  Commonly known as: SINGULAIR  Used for: Persistent atrial fibrillation (H)      Dose: 10 mg  Take 1 tablet (10 mg) by mouth At Bedtime  Quantity: 90 tablet  Refills: 3     VITAMIN D PO  Notes to patient: Home schedule      Take one tablet by mouth daily  Refills: 0            STOP taking      apixaban ANTICOAGULANT 5 MG tablet  Commonly known as: ELIQUIS ANTICOAGULANT        indomethacin 50 MG capsule  Commonly known as: INDOCIN                  Where to get your medicines        These medications were sent to [facility] [place], [place] - [address] [facility]  [address] [facility] [address]      Phone: [phone]   methocarbamol 750 MG tablet  oxyCODONE 5 MG tablet  senna-docusate 8.6-50 MG tablet          Discharge Procedure Orders   Reason for your hospital stay   Order Comments: S/p cervical fusion     Follow-up and recommended labs and tests    Order Comments: Please follow up at [facility]  [facility] Neurosurgery in 2 weeks and 6 weeks.  Please call the clinic at [phone] to schedule your appointment.     Activity   Order Comments: Discharge instructions:  No lifting of more than 10 pounds, no bending, no twisting until follow up visit.    Ok to shampoo hair, shower or bathe, but, do not scrub or submerge incision under water until evaluated post-operatively in clinic.    Ok to walk as tolerated, avoid bed rest and prolonged sitting.    No contact sports until after follow up visit  No high impact activities such as; running/jogging, snowmobile or 4 gil riding or any other recreational vehicles.    Do not take NSAIDS (ibuprofen, advil, aleve, naproxen, etc) for pain control.    Do NOT drive while taking narcotic pain medication.    Call [facility] at [phone] for increasing redness, swelling or pus draining from the incision, increased pain or any other questions and concerns.     Order Specific Question Answer Comments   Is discharge order? Yes      Wound care and dressings   Order Comments: Keep your incision clean and dry at all times.  OK to shower on postop day 3 and allow water to run over incision, pat dry after shower.  No bathing swimming or submerging in water.  Call immediately or come to ED if any drainage occurs, or you develop new pain, redness, or swelling.     Discharge Instructions     Walker Order for DME - ONLY FOR DME   Order Comments: I, the undersigned, certify that the above prescribed supplies are medically necessary for this patient and is both reasonable and necessary in reference to accepted standards of medical and necessary in reference to accepted standards of medical practice in the treatment of this patient's condition and is not prescribed as a convenience.      Order Specific Question Answer Comments   DME Provider: [place]    Start Date: 11/15/2023    Walker Type: Standard (2 Wheel)    Diagnosis: R26.89 - Impaired Gait and  Mobility      Diet   Order Comments: Follow this diet upon discharge: Regular     Order Specific Question Answer Comments   Is discharge order? Yes           Respectfully,   ROBERT Watkins, PA-C

## 2023-11-15 NOTE — PLAN OF CARE
OT: Orders received. Chart reviewed and discussed with care team.  OT not indicated as patient is able to manage basic ADLs, has supportive spouse to A at home, has also had previous lumbar surgeries and understanding spine precautions and ADL techniques.  Defer discharge recommendations to care team.  Will complete orders.

## 2023-11-15 NOTE — PLAN OF CARE
Patient vital signs are at baseline: Yes  Patient able to ambulate as they were prior to admission or with assist devices provided by therapies during their stay:  Yes assist x1 with gait belt, walker, and neck collar  Patient MUST void prior to discharge:  Yes in the bathroom   Patient able to tolerate oral intake:  Yes on regular diet; takes po meds ok  Pain has adequate pain control using Oral analgesics:  Yes rates his R thumb, R lower arm, and neck pain as sever. Received PRN Oxycodone 10 mg with scheduled Tylenol, and PRN Robaxin   Does patient have an identified :  Yes wife  Has goal D/C date and time been discussed with patient:  Yes 2-3 days in the hospital    Pt is A&O x4. VSS. On RA. Dressing CDI. CMS intact. Refuses cold to the surgical site. IV SL. Sleeps between cares.

## 2023-11-16 ENCOUNTER — TELEPHONE (OUTPATIENT)
Dept: NEUROSURGERY | Facility: CLINIC | Age: 78
End: 2023-11-16
Payer: COMMERCIAL

## 2023-11-16 ENCOUNTER — PATIENT OUTREACH (OUTPATIENT)
Dept: CARE COORDINATION | Facility: CLINIC | Age: 78
End: 2023-11-16
Payer: COMMERCIAL

## 2023-11-16 NOTE — PROGRESS NOTES
Clinic Care Coordination Contact  [facility]: Post-Discharge Note  SITUATION                                                      Admission:    Admission Date: 11/13/23   Reason for Admission: S/p cervical fusion  Discharge:   Discharge Date: 11/15/23  Discharge Diagnosis: S/p cervical fusion    BACKGROUND                                                      Per hospital discharge summary and inpatient provider notes:  This patient is a 78 year old male with a PMH significant for HTN, hx of persistent Afib s/p ablation on Eliquis, asthma who is POD 1 s/p C4-T1 spinal fusion.   Since surgery he has been complaining of pain over upper bilateral trapezius muscle along with some paresthesia of the right shoulder and right elbow.     1. S/p C4-T1 spinal fusion: doing well, cont PT/OT and pain control as per primary  He is complaining of mild upper shoulder pain with paresthesia.   Seen by neurosurgery, planning for Burns collar at all time  PT OT with repeat cervical x-ray today  2.  History of persistent G-ond-srkfctlsi in sinus.  Status post previous ablation.  He is maintained on metoprolol as well as Eliquis which is currently on hold.  -Okay to resume metoprolol with parameters  3.  Asthma-stable no history of recent exacerbation  4. DVT Prophylaxis: on SCDs as per primary, recommending resuming Eliquis as soon as okay with surgery  5. D/C planning: To home versus TCU as per primary    ASSESSMENT           Discharge Assessment  How are you doing now that you are home?: good  How are your symptoms? (Red Flag symptoms escalate to triage hotline per guidelines): Improved  Do you feel your condition is stable enough to be safe at home until your provider visit?: Yes  Does the patient have their discharge instructions? : Yes  Does the patient have questions regarding their discharge instructions? : No  Were you started on any new medications or were there changes to any of your previous medications? : Yes  Does  the patient have all of their medications?: Yes  Do you have questions regarding any of your medications? : No  Do you have all of your needed medical supplies or equipment (DME)?  (i.e. oxygen tank, CPAP, cane, etc.): Yes  Discharge follow-up appointment scheduled within 14 calendar days? : Yes                  PLAN                                                      Outpatient Plan:    Please follow up at [facility]surgery in 2 weeks and 6 weeks.  Please call the clinic at [phone] to schedule your appointment.         Future Appointments   Date Time Provider Department Center   11/29/2023 11:00 AM Nurse,  Spine/Brain [facility]   12/26/2023  1:00 PM Marty Miller PA-C [facility]   2/7/2024  1:00 PM Guanakito Yancey MD [facility]         For any urgent concerns, please contact our 24 hour nurse triage line: [phone] ([address] [place] [address])         BRI Patricia

## 2023-11-16 NOTE — TELEPHONE ENCOUNTER
Kassie- spouse calling in to let the care team know that Khurram fell last night. He woke up and got out of bed and tripped over a step. Not sure if he was sleep walking or not. He just had surgery on Monday and was released yesterday. He seems to be fine and not in any pain but they wanted the care team to know so they could note his chart.

## 2023-11-27 NOTE — ANESTHESIA CARE TRANSFER NOTE
Patient: Khurram Reynoso    Procedure(s):  CYSTOURETEROSCOPY, WITH HOLMIUM LASER LITHOTRIPSY USING LASER, URETERAL STENT REMOVAL, FLEXIBLE AND RIGID URETEROSCOPY, STONE EXTRACTION    Diagnosis: Ureteral calculus [N20.1]  Diagnosis Additional Information: No value filed.    Anesthesia Type:   General     Note:    Patient transferred to:PACU  Comments: Pt spont resps LMA removed to PACU VSS Report to RNHandoff Report: Identifed the Patient, Identified the Reponsible Provider, Reviewed the pertinent medical history, Discussed the surgical course, Reviewed Intra-OP anesthesia mangement and issues during anesthesia, Set expectations for post-procedure period and Allowed opportunity for questions and acknowledgement of understanding      Vitals: (Last set prior to Anesthesia Care Transfer)    CRNA VITALS  8/20/2020 1437 - 8/20/2020 1510      8/20/2020             Pulse:  70    SpO2:  97 %    Resp Rate (observed):  9                Electronically Signed By: MICKEY Hardwick CRNA  August 20, 2020  3:10 PM  
Post-Care Instructions: I reviewed with the patient in detail post-care instructions. Patient is to wear sunprotection, and avoid picking at any of the treated lesions. Pt may apply Vaseline to crusted or scabbing areas.
Detail Level: Detailed
Render Post-Care Instructions In Note?: yes
Render Note In Bullet Format When Appropriate: No
Number Of Freeze-Thaw Cycles: 1 freeze-thaw cycle
Consent: The patient's consent was obtained including but not limited to risks of crusting, scabbing, blistering, scarring, darker or lighter pigmentary change, recurrence, incomplete removal and infection.
Duration Of Freeze Thaw-Cycle (Seconds): 4

## 2023-11-29 ENCOUNTER — OFFICE VISIT (OUTPATIENT)
Dept: NEUROSURGERY | Facility: CLINIC | Age: 78
End: 2023-11-29
Attending: STUDENT IN AN ORGANIZED HEALTH CARE EDUCATION/TRAINING PROGRAM
Payer: COMMERCIAL

## 2023-11-29 DIAGNOSIS — Z98.1 S/P CERVICAL SPINAL FUSION: ICD-10-CM

## 2023-11-29 DIAGNOSIS — Z98.1 S/P CERVICAL SPINAL FUSION: Primary | ICD-10-CM

## 2023-11-29 PROCEDURE — 99207 PR NO CHARGE NURSE ONLY: CPT

## 2023-11-29 RX ORDER — OXYCODONE HYDROCHLORIDE 5 MG/1
5 TABLET ORAL EVERY 4 HOURS PRN
Qty: 40 TABLET | Refills: 0 | Status: SHIPPED | OUTPATIENT
Start: 2023-11-29 | End: 2024-06-17

## 2023-11-29 RX ORDER — METHOCARBAMOL 750 MG/1
750 TABLET, FILM COATED ORAL EVERY 6 HOURS PRN
Qty: 40 TABLET | Refills: 0 | Status: SHIPPED | OUTPATIENT
Start: 2023-11-29 | End: 2024-06-17

## 2023-11-29 NOTE — TELEPHONE ENCOUNTER
Patient calling for a refill of Oxycodone and Methocarbamol.     DOS: 11/13/23  Procedure: Cervical 4 to thoracic 1 posterior fusion and decompression  Surgeon: Dr Naye Goddard Post Op: 2    See note from today's 2 week followup    Last fill: 11/15/23  Next visit: 12/27/23

## 2023-11-29 NOTE — PATIENT INSTRUCTIONS
Instructions for Patient    Incision  Steri-strips were applied today, they will fall off in 7-10 days. Do not to pull them off.   Keep your incision clean and dry at all times.   It is okay to shower, just pat the incision dry   No submerging incision in water such as pools, hot tubs, or baths for at least 8 weeks and until the incision is healed  Do not apply lotions or ointments to incision    Activity  No lifting greater than 10 pounds. Limited bending, twisting, or overhead reaching.  Walking is the best way to start exercise after surgery. Take short frequent walks. You may gradually increase the distance as tolerated. If you feel pain, decrease your activity, but DO NOT stop walking. Walking will help you gain strength, prevent muscle soreness and spasms, and prevent blood clots.     Avoid bed rest and prolonged sitting for longer than 30 minutes (change positions frequently while awake)  No contact sports or high impact activities such as; running/jogging, snowmobile or 4 gil riding or any other recreational vehicles until after given clearance at one of your follow up visits    Medications   Refills of pain medication:   Please call the neurosurgery clinic to request 2-3 days before you run out  Weaning from narcotic pain medications  When it is time, start weaning by extending the time between doses.   For example, if you're taking 2 tabs every 4 hours, spread it out to 2 tabs over 4.5, 5, 6 hours. At that point you can certainly cut down to 1 tab, then wean to an as needed basis until completely done with them.Refills: call our clinic 2-3 business days before you are out of medication. A nurse will call you back to obtain a pain assessment.   Don't take more than 3000mg of Acetaminophen in 24 hours  Ok to begin taking Aspirin and NSAIDs (ex: ibuprofen/Advil). OK to resume Eliquis.  Encouraged icing for at least 3-4 times throughout the day for 20-30 minutes at a time. Avoid heat to the incision area.    Taking stool softeners regularly can reduce constipation commonly caused by narcotic pain medications.    Contact clinic or Emergency Room if you develop:   Infection (redness, swelling, warmth, drainage, fever over 101 F)  New injury  Bladder or bowel changes or loss of control    Signs of blood clot:  Swelling and/or warmth in one or both legs  Pain or tenderness in your leg, ankle, foot, or arm   Red or discolored skin     Go to the Emergency Room   If sudden onset of severe headache, weakness, confusion, change in level of consciousness, pain, or loss of movement.  Chest pain  Trouble breathing     Post-operative appointments  Arrive 30 minutes before your 6 week and 3 months post-op appointments to allow time for an x-ray before each    [facility]  [place] [address] [place] [id number]  Telephone:  [phone]   Fax:  [phone]

## 2023-11-29 NOTE — PROGRESS NOTES
Post-op Nurse Visit:    Patient seen today per the order of  Guanakito Yancey MD.   DOS: 11/13/23  Procedure: Cervical 4 to thoracic 1 posterior fusion and decompression    Pain/Neuro Assessment  Minimal incisional pain  Numbness/tingling: Right hand numbness continues   Strength: Equal strength to bilateral upper extremities. Denies weakness.     Pain Relief Measures:  Oxycodone: occasional (2 left)  Tylenol: occasional  Robaxin: 1 tab 2-3 times a day  Ice: yes     Incision  Incision inspected. Edges well-approximated. No redness, swelling, drainage, or warmth noted. Incision prepped with ChloraPrep and staple(s)  removed without difficulty. Steri-strips applied.    Activity  Following restrictions   Falls:  none  Patient is walking occasionally  without difficulty.   Denies redness, swelling, or warmth to bilateral calves.   Wearing brace? Yes. As per Dr Yancey, ok to remove when sitting in recliner.     GI/  Difficulty swallowing? No  Patient's appetite is normal  Bowel/bladder problems? No  Taking stool softeners? No     Refills/x-rays/return to work  Refills given at this appointment? No  Sent for x-rays after this appointment?   Ordered future x-rays? Yes  Scheduling team notified? (Yes or No. If xr order placed)  Return to work discussed at this appointment? Yes     All of patient's questions addressed today. Patient was instructed to call with any additional questions/concerns.

## 2023-11-29 NOTE — LETTER
11/29/2023         RE: Khurram Reynoso  [address]  [place] [address]        Dear Colleague,    Thank you for referring your patient, Khurram Reynoso, to the [facility] [place]. Please see a copy of my visit note below.    Post-op Nurse Visit:    Patient seen today per the order of  Guanakito Yancey MD.   DOS: 11/13/23  Procedure: Cervical 4 to thoracic 1 posterior fusion and decompression    Pain/Neuro Assessment  Minimal incisional pain  Numbness/tingling: Right hand numbness continues   Strength: Equal strength to bilateral upper extremities. Denies weakness.     Pain Relief Measures:  Oxycodone: occasional (2 left)  Tylenol: occasional  Robaxin: 1 tab 2-3 times a day  Ice: yes     Incision  Incision inspected. Edges well-approximated. No redness, swelling, drainage, or warmth noted. Incision prepped with ChloraPrep and staple(s)  removed without difficulty. Steri-strips applied.    Activity  Following restrictions   Falls:  none  Patient is walking occasionally  without difficulty.   Denies redness, swelling, or warmth to bilateral calves.   Wearing brace? Yes    GI/  Difficulty swallowing? No  Patient's appetite is normal  Bowel/bladder problems? No  Taking stool softeners? No     Refills/x-rays/return to work  Refills given at this appointment? No  Sent for x-rays after this appointment?   Ordered future x-rays? Yes  Scheduling team notified? (Yes or No. If xr order placed)  Return to work discussed at this appointment? Yes     All of patient's questions addressed today. Patient was instructed to call with any additional questions/concerns.             Again, thank you for allowing me to participate in the care of your patient.        Sincerely,         Spine/Brain Nurse

## 2023-12-07 ENCOUNTER — MYC MEDICAL ADVICE (OUTPATIENT)
Dept: NEUROSURGERY | Facility: CLINIC | Age: 78
End: 2023-12-07
Payer: COMMERCIAL

## 2023-12-17 ENCOUNTER — MYC MEDICAL ADVICE (OUTPATIENT)
Dept: INTERNAL MEDICINE | Facility: CLINIC | Age: 78
End: 2023-12-17
Payer: COMMERCIAL

## 2023-12-19 NOTE — TELEPHONE ENCOUNTER
Parking permit will be placed in Dr. Gillespie's [facility] for completion.     Cha Dunlap MA

## 2023-12-22 ENCOUNTER — ANCILLARY PROCEDURE (OUTPATIENT)
Dept: GENERAL RADIOLOGY | Facility: CLINIC | Age: 78
End: 2023-12-22
Attending: STUDENT IN AN ORGANIZED HEALTH CARE EDUCATION/TRAINING PROGRAM
Payer: COMMERCIAL

## 2023-12-22 ENCOUNTER — TELEPHONE (OUTPATIENT)
Dept: NEUROSURGERY | Facility: CLINIC | Age: 78
End: 2023-12-22

## 2023-12-22 DIAGNOSIS — Z98.1 S/P CERVICAL SPINAL FUSION: ICD-10-CM

## 2023-12-22 PROCEDURE — 72040 X-RAY EXAM NECK SPINE 2-3 VW: CPT | Mod: TC | Performed by: RADIOLOGY

## 2023-12-27 ENCOUNTER — OFFICE VISIT (OUTPATIENT)
Dept: NEUROSURGERY | Facility: CLINIC | Age: 78
End: 2023-12-27
Attending: PHYSICIAN ASSISTANT
Payer: COMMERCIAL

## 2023-12-27 VITALS
OXYGEN SATURATION: 93 % | BODY MASS INDEX: 29.66 KG/M2 | SYSTOLIC BLOOD PRESSURE: 111 MMHG | HEIGHT: 72 IN | DIASTOLIC BLOOD PRESSURE: 70 MMHG | HEART RATE: 72 BPM | WEIGHT: 219 LBS

## 2023-12-27 DIAGNOSIS — Z98.1 S/P CERVICAL SPINAL FUSION: Primary | ICD-10-CM

## 2023-12-27 PROCEDURE — G0463 HOSPITAL OUTPT CLINIC VISIT: HCPCS | Performed by: PHYSICIAN ASSISTANT

## 2023-12-27 PROCEDURE — 99024 POSTOP FOLLOW-UP VISIT: CPT | Performed by: PHYSICIAN ASSISTANT

## 2023-12-27 ASSESSMENT — PAIN SCALES - GENERAL: PAINLEVEL: NO PAIN (0)

## 2023-12-27 NOTE — PROGRESS NOTES
NEUROSURGERY CLINIC PROGRESS NOTE    DATE OF VISIT: 12/27/2023    HPI:     Khurram Reynoso is a pleasant 78 year old male who presents to the clinic today for a six-week post-operative follow-up visit. On 11/13/2023 the patient underwent a cervical 4 to thoracic 1 posterior fusion and decompression with Dr. Yancey  for spinal stenosis of cervical region. Per chart review, the patient's pre-operative symptoms consisted of right hand numbness. The numbness is in his third fourth and fifth digits and goes past the wrist up into the forearm. He does associate some discomfort in this region however no specific pain.  Today, the patient reports that he does continue to have the numbness but rates his pain at 0/10. He has remained compliant with his cervical collar. Overall he is pleased with his current outcome.     Current Outpatient Medications   Medication    albuterol (PROAIR HFA/PROVENTIL HFA/VENTOLIN HFA) 108 (90 Base) MCG/ACT inhaler    apixaban ANTICOAGULANT (ELIQUIS) 5 MG tablet    carboxymethylcellulose PF (REFRESH PLUS) 0.5 % ophthalmic solution    fluticasone (FLONASE) 50 MCG/ACT nasal spray    fluticasone-salmeterol (ADVAIR) 250-50 MCG/ACT inhaler    furosemide (LASIX) 20 MG tablet    lisinopril (ZESTRIL) 2.5 MG tablet    methocarbamol (ROBAXIN) 750 MG tablet    metoprolol succinate ER (TOPROL XL) 50 MG 24 hr tablet    montelukast (SINGULAIR) 10 MG tablet    oxyCODONE (ROXICODONE) 5 MG tablet    senna-docusate (SENOKOT-S/PERICOLACE) 8.6-50 MG tablet    VITAMIN D PO     No current facility-administered medications for this visit.       Allergies   Allergen Reactions    Seasonal Allergies        Past Medical History:   Diagnosis Date    Acute bronchitis 10/17/2005    Asthma 2018    Cardiac arrest (H) 06/2006    V-Fib arrest during heart cath    CARDIAC DYSRHYTHMIA NOS 07/27/2005    Congestive heart failure (H) July 11. 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 Provider to review    Uncomplicated asthma        Review Of Systems    Skin: negative  Eyes: negative  Ears/Nose/Throat: negative  Respiratory: No shortness of breath, dyspnea on exertion, cough, or hemoptysis  Cardiovascular: negative  Gastrointestinal: negative  Musculoskeletal: negative  Neurologic: numbness or tingling of hands  Psychiatric: negative  Hematologic/Lymphatic/Immunologic: negative  Endocrine: negative    OBJECTIVE:    /70   Pulse 72   Ht 1.831 m (6' 0.1\")   Wt 99.3 kg (219 lb)   SpO2 93%   BMI 29.62 kg/m      Imaging:    CERVICAL SPINE 2/3 VIEWS 12/22/2023 11:01 AM     Evaluation is mildly limited by motion artifact on the  lateral view. No gross vertebral body height loss is identified. There  is unchanged mild anterolisthesis of C4 on C5 and mild retrolisthesis  of C5 on C6. Minimal broad dextroconvex curvature.     Multilevel laminectomy changes, as before. There is bilateral  posterior fusion hardware spanning C4-T1, as before. On the frontal  view, there appears to be subtle lucency surrounding the bilateral T1  pedicle screws measuring 1-2 mm in thickness. This could potentially  be artifactual, but subtle changes of loosening cannot be completely  excluded. Recommend attention on follow-up. If clinically warranted,  CT could be considered for further evaluation. No obvious hardware  fracture " identified.     Moderate disc space narrowing at C5-C6. Mild disc space narrowing at  C4-C5. This is unchanged. Scattered endplate and uncovertebral spurs.  Multilevel degenerative facet disease. The prevertebral soft tissues  appear normal in thickness. Compared to prior, the skin staples have  been removed and the posterior soft tissue surgical drain has been  removed.     On the open-mouth odontoid view, no evidence for displaced fracture of  the base of the dens. Lateral masses of C1 and C2 appear symmetric.  Degenerative changes of the atlantoaxial joints is noted.    Radiographic Findings: Full radiological report in chart. I personally reviewed the images with the patient today.    Exam:    Patient appears comfortable and in no apparent distress. Moving all extremities.  Gait is non-antalgic.  CN II-XII grossly intact, alert and appropriate with conversation and following  commands  Bilateral upper extremities with full strength including hand intrinsics and grasp.  Sensation intact throughout.  Bilateral lower extremities 5/5 strength including plantar and dorsiflexion.  Diminished sensation throughout RUE.  Posterior cervical incision edges well approximated. Absent for edema, erythema, or drainage.    PLAN:    Khurram Reynoso is six weeks out from a cervical 4 to thoracic 1 posterior fusion and decompression performed by Dr. Yancey on 1113/2023. Today the patient reports that he does continue to have the numbness but rates his pain at 0/10. He has remained compliant with his cervical collar. Overall he is pleased with his current outcome.     Imaging was reviewed today and does not show any obvious hardware fracture or complication.    The patient has remained compliant with the post-operative restrictions which included  not lifting anything greater than 5-10 lbs. Today we discussed increasing activity from  10 lbs by 2-5 lbs per week, but encouraged continuing to avoid excessive bending,  twisting, and  turning at the neck and to avoid jostling and jarring activities.   He will wean out of his hard cervicla colar over the next two weeks.     Ms. Reynoso will return to the clinic in six weeks with repeat imaging.    The patient gave verbal understanding and is in agreement with the above plan. He  will call or return to the clinic for any worsening or changes in symptoms.      Respectfully,     ROBERT Watkins, FELICITAS

## 2023-12-27 NOTE — LETTER
12/27/2023         RE: Khurram Reynoso  [address]  [facility] [place] [address]        Dear Colleague,    Thank you for referring your patient, Khurram Reynoso, to the [facility] [place]. Please see a copy of my visit note below.    NEUROSURGERY CLINIC PROGRESS NOTE    DATE OF VISIT: 12/27/2023    HPI:     Khurram Reynoso is a pleasant 78 year old male who presents to the clinic today for a six-week post-operative follow-up visit. On 11/13/2023 the patient underwent a cervical 4 to thoracic 1 posterior fusion and decompression with Dr. Yancey  for spinal stenosis of cervical region. Per chart review, the patient's pre-operative symptoms consisted of right hand numbness. The numbness is in his third fourth and fifth digits and goes past the wrist up into the forearm. He does associate some discomfort in this region however no specific pain.  Today, the patient reports that he does continue to have the numbness but rates his pain at 0/10. He has remained compliant with his cervical collar. Overall he is pleased with his current outcome.     Current Outpatient Medications   Medication     albuterol (PROAIR HFA/PROVENTIL HFA/VENTOLIN HFA) 108 (90 Base) MCG/ACT inhaler     apixaban ANTICOAGULANT (ELIQUIS) 5 MG tablet     carboxymethylcellulose PF (REFRESH PLUS) 0.5 % ophthalmic solution     fluticasone (FLONASE) 50 MCG/ACT nasal spray     fluticasone-salmeterol (ADVAIR) 250-50 MCG/ACT inhaler     furosemide (LASIX) 20 MG tablet     lisinopril (ZESTRIL) 2.5 MG tablet     methocarbamol (ROBAXIN) 750 MG tablet     metoprolol succinate ER (TOPROL XL) 50 MG 24 hr tablet     montelukast (SINGULAIR) 10 MG tablet     oxyCODONE (ROXICODONE) 5 MG tablet     senna-docusate (SENOKOT-S/PERICOLACE) 8.6-50 MG tablet     VITAMIN D PO     No current facility-administered medications for this visit.       Allergies   Allergen Reactions     Seasonal Allergies        Past Medical History:   Diagnosis Date  "    Acute bronchitis 10/17/2005     Asthma 2018     Cardiac arrest (H) 06/2006    V-Fib arrest during heart cath     CARDIAC DYSRHYTHMIA NOS 07/27/2005     Congestive heart failure (H) July 11. 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 Provider to review     Uncomplicated asthma        Review Of Systems    Skin: negative  Eyes: negative  Ears/Nose/Throat: negative  Respiratory: No shortness of breath, dyspnea on exertion, cough, or hemoptysis  Cardiovascular: negative  Gastrointestinal: negative  Musculoskeletal: negative  Neurologic: numbness or tingling of hands  Psychiatric: negative  Hematologic/Lymphatic/Immunologic: negative  Endocrine: negative    OBJECTIVE:    /70   Pulse 72   Ht 1.831 m (6' 0.1\")   Wt 99.3 kg (219 lb)   SpO2 93%   BMI 29.62 kg/m      Imaging:    CERVICAL SPINE 2/3 VIEWS 12/22/2023 11:01 AM     Evaluation is mildly limited by motion artifact on the  lateral view. No gross vertebral body height loss is identified. There  is unchanged mild anterolisthesis of C4 on C5 and mild retrolisthesis  of C5 on C6. Minimal broad dextroconvex curvature.     Multilevel laminectomy changes, as before. There is bilateral  posterior fusion hardware spanning C4-T1, as before. On the frontal  view, there appears to be subtle lucency surrounding the bilateral " T1  pedicle screws measuring 1-2 mm in thickness. This could potentially  be artifactual, but subtle changes of loosening cannot be completely  excluded. Recommend attention on follow-up. If clinically warranted,  CT could be considered for further evaluation. No obvious hardware  fracture identified.     Moderate disc space narrowing at C5-C6. Mild disc space narrowing at  C4-C5. This is unchanged. Scattered endplate and uncovertebral spurs.  Multilevel degenerative facet disease. The prevertebral soft tissues  appear normal in thickness. Compared to prior, the skin staples have  been removed and the posterior soft tissue surgical drain has been  removed.     On the open-mouth odontoid view, no evidence for displaced fracture of  the base of the dens. Lateral masses of C1 and C2 appear symmetric.  Degenerative changes of the atlantoaxial joints is noted.    Radiographic Findings: Full radiological report in chart. I personally reviewed the images with the patient today.    Exam:    Patient appears comfortable and in no apparent distress. Moving all extremities.  Gait is non-antalgic.  CN II-XII grossly intact, alert and appropriate with conversation and following  commands  Bilateral upper extremities with full strength including hand intrinsics and grasp.  Sensation intact throughout.  Bilateral lower extremities 5/5 strength including plantar and dorsiflexion.  Diminished sensation throughout RUE.  Posterior cervical incision edges well approximated. Absent for edema, erythema, or drainage.    PLAN:    Khurram Reynoso is six weeks out from a cervical 4 to thoracic 1 posterior fusion and decompression performed by Dr. Yancey on 1113/2023. Today the patient reports that he does continue to have the numbness but rates his pain at 0/10. He has remained compliant with his cervical collar. Overall he is pleased with his current outcome.     Imaging was reviewed today and does not show any obvious hardware fracture or  complication.    The patient has remained compliant with the post-operative restrictions which included  not lifting anything greater than 5-10 lbs. Today we discussed increasing activity from  10 lbs by 2-5 lbs per week, but encouraged continuing to avoid excessive bending,  twisting, and turning at the neck and to avoid jostling and jarring activities.   He will wean out of his hard cervicla colar over the next two weeks.     Ms. Reynoso will return to the clinic in six weeks with repeat imaging.    The patient gave verbal understanding and is in agreement with the above plan. He  will call or return to the clinic for any worsening or changes in symptoms.      Respectfully,     ROBERT Watkins PA-C      Again, thank you for allowing me to participate in the care of your patient.        Sincerely,        Marty Miller PA-C

## 2023-12-27 NOTE — NURSING NOTE
"Khurram Reynoso is a 78 year old male who presents for:  Chief Complaint   Patient presents with    RECHECK     C4-T1 posterior fusion and decompression follow up        Initial Vitals:  /70   Pulse 72   Ht 6' 0.1\" (1.831 m)   Wt 219 lb (99.3 kg)   SpO2 93%   BMI 29.62 kg/m   Estimated body mass index is 29.62 kg/m  as calculated from the following:    Height as of this encounter: 6' 0.1\" (1.831 m).    Weight as of this encounter: 219 lb (99.3 kg).. Body surface area is 2.25 meters squared. BP completed using cuff size: regular  No Pain (0)        Nahed Fonseca   "

## 2024-01-08 ENCOUNTER — MYC MEDICAL ADVICE (OUTPATIENT)
Dept: INTERNAL MEDICINE | Facility: CLINIC | Age: 79
End: 2024-01-08
Payer: COMMERCIAL

## 2024-01-08 ENCOUNTER — TELEPHONE (OUTPATIENT)
Dept: CARDIOLOGY | Facility: CLINIC | Age: 79
End: 2024-01-08
Payer: COMMERCIAL

## 2024-01-08 DIAGNOSIS — I50.9 ACUTE HEART FAILURE, UNSPECIFIED HEART FAILURE TYPE (H): ICD-10-CM

## 2024-01-08 NOTE — TELEPHONE ENCOUNTER
[facility] Call [facility]    Phone Message    May a detailed message be left on voicemail: yes     Reason for Call: Other: Patient will like to speak with someone on the care team to go over what medications they should be taking, the amount and dosage. Please call patient back to further discuss.     Action Taken: Other: Cardiology    Travel Screening: Not Applicable    Thank you!  [facility]

## 2024-01-09 RX ORDER — METOPROLOL SUCCINATE 50 MG/1
50 TABLET, EXTENDED RELEASE ORAL DAILY
Qty: 90 TABLET | Refills: 3 | Status: SHIPPED | OUTPATIENT
Start: 2024-01-09

## 2024-01-09 RX ORDER — LISINOPRIL 2.5 MG/1
2.5 TABLET ORAL DAILY
Qty: 90 TABLET | Refills: 3 | Status: SHIPPED | OUTPATIENT
Start: 2024-01-09

## 2024-01-09 NOTE — TELEPHONE ENCOUNTER
Called pt back to address concerns. His pharmacy had filled an old dosage of his Metoprolol and he just wanted to make sure what he had on file was accurate. We reviewed his medication list and dosages. He requested a refill of his Metoprolol + Lisinopril and this was done [facility]. [place] [place]

## 2024-01-11 ENCOUNTER — APPOINTMENT (OUTPATIENT)
Dept: CT IMAGING | Facility: CLINIC | Age: 79
End: 2024-01-11
Payer: COMMERCIAL

## 2024-01-11 ENCOUNTER — HOSPITAL ENCOUNTER (EMERGENCY)
Facility: CLINIC | Age: 79
Discharge: HOME | End: 2024-01-11
Attending: EMERGENCY MEDICINE | Admitting: EMERGENCY MEDICINE
Payer: COMMERCIAL

## 2024-01-11 VITALS
TEMPERATURE: 98.2 F | SYSTOLIC BLOOD PRESSURE: 122 MMHG | DIASTOLIC BLOOD PRESSURE: 69 MMHG | OXYGEN SATURATION: 98 % | HEART RATE: 72 BPM | RESPIRATION RATE: 18 BRPM

## 2024-01-11 DIAGNOSIS — R10.9 LEFT FLANK PAIN: ICD-10-CM

## 2024-01-11 DIAGNOSIS — M54.50 ACUTE LEFT-SIDED LOW BACK PAIN WITHOUT SCIATICA: ICD-10-CM

## 2024-01-11 LAB
ALBUMIN SERPL BCG-MCNC: 4 G/DL (ref 3.5–5.2)
ALBUMIN UR-MCNC: NEGATIVE MG/DL
ALP SERPL-CCNC: 167 U/L (ref 40–150)
ALT SERPL W P-5'-P-CCNC: 13 U/L (ref 0–70)
ANION GAP SERPL CALCULATED.3IONS-SCNC: 9 MMOL/L (ref 7–15)
APPEARANCE UR: CLEAR
AST SERPL W P-5'-P-CCNC: 22 U/L (ref 0–45)
BASOPHILS # BLD AUTO: 0.1 10E3/UL (ref 0–0.2)
BASOPHILS NFR BLD AUTO: 1 %
BILIRUB DIRECT SERPL-MCNC: <0.2 MG/DL (ref 0–0.3)
BILIRUB SERPL-MCNC: 0.3 MG/DL
BILIRUB UR QL STRIP: NEGATIVE
BUN SERPL-MCNC: 12.9 MG/DL (ref 8–23)
CALCIUM SERPL-MCNC: 9.1 MG/DL (ref 8.8–10.2)
CHLORIDE SERPL-SCNC: 102 MMOL/L (ref 98–107)
COLOR UR AUTO: ABNORMAL
CREAT SERPL-MCNC: 1.05 MG/DL (ref 0.67–1.17)
DEPRECATED HCO3 PLAS-SCNC: 27 MMOL/L (ref 22–29)
EGFRCR SERPLBLD CKD-EPI 2021: 73 ML/MIN/1.73M2
EOSINOPHIL # BLD AUTO: 0.2 10E3/UL (ref 0–0.7)
EOSINOPHIL NFR BLD AUTO: 3 %
ERYTHROCYTE [DISTWIDTH] IN BLOOD BY AUTOMATED COUNT: 13.5 % (ref 10–15)
GLUCOSE SERPL-MCNC: 118 MG/DL (ref 70–99)
GLUCOSE UR STRIP-MCNC: NEGATIVE MG/DL
HCT VFR BLD AUTO: 43.1 % (ref 40–53)
HGB BLD-MCNC: 14.4 G/DL (ref 13.3–17.7)
HGB UR QL STRIP: NEGATIVE
HOLD SPECIMEN: NORMAL
HOLD SPECIMEN: NORMAL
IMM GRANULOCYTES # BLD: 0 10E3/UL
IMM GRANULOCYTES NFR BLD: 0 %
KETONES UR STRIP-MCNC: NEGATIVE MG/DL
LEUKOCYTE ESTERASE UR QL STRIP: NEGATIVE
LYMPHOCYTES # BLD AUTO: 2.4 10E3/UL (ref 0.8–5.3)
LYMPHOCYTES NFR BLD AUTO: 29 %
MCH RBC QN AUTO: 31.2 PG (ref 26.5–33)
MCHC RBC AUTO-ENTMCNC: 33.4 G/DL (ref 31.5–36.5)
MCV RBC AUTO: 94 FL (ref 78–100)
MONOCYTES # BLD AUTO: 0.6 10E3/UL (ref 0–1.3)
MONOCYTES NFR BLD AUTO: 7 %
MUCOUS THREADS #/AREA URNS LPF: PRESENT /LPF
NEUTROPHILS # BLD AUTO: 5 10E3/UL (ref 1.6–8.3)
NEUTROPHILS NFR BLD AUTO: 60 %
NITRATE UR QL: NEGATIVE
NRBC # BLD AUTO: 0 10E3/UL
NRBC BLD AUTO-RTO: 0 /100
PH UR STRIP: 5 [PH] (ref 5–7)
PLATELET # BLD AUTO: 377 10E3/UL (ref 150–450)
POTASSIUM SERPL-SCNC: 4.1 MMOL/L (ref 3.4–5.3)
PROT SERPL-MCNC: 7.9 G/DL (ref 6.4–8.3)
RBC # BLD AUTO: 4.61 10E6/UL (ref 4.4–5.9)
RBC URINE: 0 /HPF
SODIUM SERPL-SCNC: 138 MMOL/L (ref 135–145)
SP GR UR STRIP: 1.01 (ref 1–1.03)
UROBILINOGEN UR STRIP-MCNC: NORMAL MG/DL
WBC # BLD AUTO: 8.4 10E3/UL (ref 4–11)
WBC URINE: 0 /HPF

## 2024-01-11 PROCEDURE — 36415 COLL VENOUS BLD VENIPUNCTURE: CPT | Performed by: EMERGENCY MEDICINE

## 2024-01-11 PROCEDURE — 74177 CT ABD & PELVIS W/CONTRAST: CPT

## 2024-01-11 PROCEDURE — 99285 EMERGENCY DEPT VISIT HI MDM: CPT | Mod: 25

## 2024-01-11 PROCEDURE — 250N000011 HC RX IP 250 OP 636: Performed by: EMERGENCY MEDICINE

## 2024-01-11 PROCEDURE — 80048 BASIC METABOLIC PNL TOTAL CA: CPT | Performed by: EMERGENCY MEDICINE

## 2024-01-11 PROCEDURE — 96375 TX/PRO/DX INJ NEW DRUG ADDON: CPT

## 2024-01-11 PROCEDURE — 250N000013 HC RX MED GY IP 250 OP 250 PS 637

## 2024-01-11 PROCEDURE — 250N000011 HC RX IP 250 OP 636

## 2024-01-11 PROCEDURE — 85025 COMPLETE CBC W/AUTO DIFF WBC: CPT | Performed by: EMERGENCY MEDICINE

## 2024-01-11 PROCEDURE — 82248 BILIRUBIN DIRECT: CPT | Performed by: EMERGENCY MEDICINE

## 2024-01-11 PROCEDURE — 96374 THER/PROPH/DIAG INJ IV PUSH: CPT | Mod: 59

## 2024-01-11 PROCEDURE — 81001 URINALYSIS AUTO W/SCOPE: CPT | Performed by: EMERGENCY MEDICINE

## 2024-01-11 RX ORDER — MORPHINE SULFATE 4 MG/ML
4 INJECTION, SOLUTION INTRAMUSCULAR; INTRAVENOUS ONCE
Status: COMPLETED | OUTPATIENT
Start: 2024-01-11 | End: 2024-01-11

## 2024-01-11 RX ORDER — IOPAMIDOL 755 MG/ML
500 INJECTION, SOLUTION INTRAVASCULAR ONCE
Status: COMPLETED | OUTPATIENT
Start: 2024-01-11 | End: 2024-01-11

## 2024-01-11 RX ORDER — CYCLOBENZAPRINE HCL 5 MG
5 TABLET ORAL 3 TIMES DAILY PRN
Qty: 20 TABLET | Refills: 0 | Status: SHIPPED | OUTPATIENT
Start: 2024-01-11 | End: 2024-06-17

## 2024-01-11 RX ORDER — LIDOCAINE 50 MG/G
1 PATCH TOPICAL EVERY 24 HOURS
Qty: 15 PATCH | Refills: 0 | Status: SHIPPED | OUTPATIENT
Start: 2024-01-11 | End: 2024-06-17

## 2024-01-11 RX ORDER — LIDOCAINE 4 G/G
1 PATCH TOPICAL ONCE
Status: DISCONTINUED | OUTPATIENT
Start: 2024-01-11 | End: 2024-01-12 | Stop reason: HOSPADM

## 2024-01-11 RX ORDER — ONDANSETRON 2 MG/ML
4 INJECTION INTRAMUSCULAR; INTRAVENOUS ONCE
Status: COMPLETED | OUTPATIENT
Start: 2024-01-11 | End: 2024-01-11

## 2024-01-11 RX ADMIN — LIDOCAINE 1 PATCH: 4 PATCH TOPICAL at 21:51

## 2024-01-11 RX ADMIN — MORPHINE SULFATE 4 MG: 4 INJECTION, SOLUTION INTRAMUSCULAR; INTRAVENOUS at 20:58

## 2024-01-11 RX ADMIN — IOPAMIDOL 100 ML: 755 INJECTION, SOLUTION INTRAVENOUS at 21:18

## 2024-01-11 RX ADMIN — ONDANSETRON 4 MG: 2 INJECTION INTRAMUSCULAR; INTRAVENOUS at 20:58

## 2024-01-11 ASSESSMENT — ACTIVITIES OF DAILY LIVING (ADL): ADLS_ACUITY_SCORE: 35

## 2024-01-11 NOTE — ED TRIAGE NOTES
Pt reports that he was with his wife and stood up from a chair and noted onset of left back pain that he describes is under his left rib. PT denies the pain radiating anywhere at this time, has a hx of kidney stones. Pt denies changes in bowel or bladder. PT VSS and ABC's intact

## 2024-01-12 ENCOUNTER — PATIENT OUTREACH (OUTPATIENT)
Dept: CARE COORDINATION | Facility: CLINIC | Age: 79
End: 2024-01-12
Payer: COMMERCIAL

## 2024-01-12 ASSESSMENT — ENCOUNTER SYMPTOMS
COUGH: 0
RHINORRHEA: 0
SHORTNESS OF BREATH: 0
BLOOD IN STOOL: 0
ABDOMINAL PAIN: 0
NUMBNESS: 0
FEVER: 0
VOMITING: 0
HEMATURIA: 0
CHILLS: 0
HEADACHES: 0
NAUSEA: 0
DIZZINESS: 0
WEAKNESS: 0
BACK PAIN: 1
NECK PAIN: 0
DYSURIA: 0

## 2024-01-12 NOTE — ED PROVIDER NOTES
History     Chief Complaint:  Back Pain       HPI   Khurram Reynoso is a 78 year old male who is on Eliquis for atrial fibrillation presented today for evaluation of left flank pain for 1 week.  He first noticed the pain when going from a sitting to standing position, and since then has had exacerbation of the pain whenever doing sitting to standing movements.  Pain worsens with pushing twisting activities.  He denies any direct trauma to the area  such as fall.  He denies any fevers, nausea, vomiting, new numbness or weakness, saddle anesthesia, urinary symptoms such as hematuria or dysuria.  He has been taking ibuprofen at home without adequate pain relief.  He tried oxycodone which helped.  He also tried a muscle relaxer last night which helped.    Independent Historian:   None - Patient Only    Review of External Notes:   Reviewed neurosurgery progress notes from 12/27/2023 with Salazar Miller PA-C with neurosurgery.  He underwent a cervical fusion and decompression with Dr. Card on 11/13/2023.    Medications:    cyclobenzaprine (FLEXERIL) 5 MG tablet  lidocaine (LIDODERM) 5 % patch  albuterol (PROAIR HFA/PROVENTIL HFA/VENTOLIN HFA) 108 (90 Base) MCG/ACT inhaler  apixaban ANTICOAGULANT (ELIQUIS) 5 MG tablet  carboxymethylcellulose PF (REFRESH PLUS) 0.5 % ophthalmic solution  fluticasone (FLONASE) 50 MCG/ACT nasal spray  fluticasone-salmeterol (ADVAIR) 250-50 MCG/ACT inhaler  furosemide (LASIX) 20 MG tablet  lisinopril (ZESTRIL) 2.5 MG tablet  methocarbamol (ROBAXIN) 750 MG tablet  metoprolol succinate ER (TOPROL XL) 50 MG 24 hr tablet  montelukast (SINGULAIR) 10 MG tablet  oxyCODONE (ROXICODONE) 5 MG tablet  senna-docusate (SENOKOT-S/PERICOLACE) 8.6-50 MG tablet  VITAMIN D PO      Past Medical History:    Past Medical History:   Diagnosis Date    Acute bronchitis 10/17/2005    Asthma 2018    Cardiac arrest (H) 06/2006    CARDIAC DYSRHYTHMIA NOS 07/27/2005    Congestive heart failure (H) July 11. 2022     Degeneration of lumbar or lumbosacral intervertebral disc     Gastroesophageal reflux disease     Gout 2001    Neoplasm of uncertain behavior of skin 2017    Other chronic pain     PAC (premature atrial contraction)     Paroxysmal A-fib (H) 2006    Persistent atrial fibrillation (H) 10/28/2022    PONV (postoperative nausea and vomiting)     PRIM CARDIOMYOPATHY NEC 07/18/2006    PVC (premature ventricular contraction)     Renal disease     Tachycardia induced cardiomyopathy (H) 07/18/2006    Uncomplicated asthma      Past Surgical History:    Past Surgical History:   Procedure Laterality Date    ABLATION OF DYSRHYTHMIC FOCUS  04/03/2007    RVOT PVCs    ANESTHESIA CARDIOVERSION N/A 08/11/2022    Procedure: ANESTHESIA, FOR CARDIOVERSION;  Surgeon: GENERIC ANESTHESIA PROVIDER;  Location: [facility] OR    CARDIAC SURGERY      Ablation    COLONOSCOPY  2017    COMBINED CYSTOSCOPY, INSERT STENT URETER(S) Left 08/06/2020    Procedure: Video cystoscopy, left double-J stent placement and exam under anesthesia;  Surgeon: Dank Han MD;  Location: [facility] OR    DECOMPRESS DISC RF LUMBAR  03/2001    lumbar fusion L2-5    EP ABLATION ATRIAL FLUTTER N/A 10/28/2022    Procedure: Ablation Atrial Flutter;  Surgeon: Sourav Laruen MD;  Location: [facility] LAB CV    LASER HOLMIUM LITHOTRIPSY URETER(S), INSERT STENT, COMBINED Left 08/20/2020    Procedure: Video cystoscopy, left double-J stent removal, rigid ureteroscopy, flexible renoscopy with holmium laser lithotripsy and stone extraction.;  Surgeon: Dank Han MD;  Location: [facility] OR    OPTICAL TRACKING SYSTEM FUSION POSTERIOR CERVICAL THREE + LEVELS N/A 11/13/2023    Procedure: Cervical 4 to thoracic 1 posterior fusion and decompression;  Surgeon: Guanakito Yancey MD;  Location: [facility] OR    OPTICAL TRACKING SYSTEM FUSION SPINE POSTERIOR LUMBAR THREE+ LEVELS N/A 06/27/2016    Procedure: OPTICAL TRACKING SYSTEM FUSION SPINE POSTERIOR LUMBAR THREE+ LEVELS;  Surgeon: Hieu Araiza  MD Wilbert;  Location: [facility] OR    ORTHOPEDIC SURGERY Bilateral     total knee replacements    ORTHOPEDIC SURGERY Right     Total Hipe Replacement    SOFT TISSUE SURGERY Right     right wrist ganglion surgery    Z NONSPECIFIC PROCEDURE      knee    Z TOTAL HIP ARTHROPLASTY      Description: Total Hip Replacement;  Recorded: 09/24/2010;    ZC TOTAL KNEE ARTHROPLASTY      Description: Total Knee Arthroplasty;  Recorded: 09/24/2010;  Comments: right      Physical Exam   Patient Vitals for the past 24 hrs:   BP Temp Temp src Pulse Resp SpO2   01/11/24 2200 -- -- -- -- -- 98 %   01/11/24 2150 -- -- -- -- -- 97 %   01/11/24 2130 126/79 -- -- 72 -- 100 %   01/11/24 2045 123/71 -- -- -- -- 99 %   01/11/24 2035 -- -- -- -- -- 95 %   01/11/24 2030 126/75 -- -- 74 -- 97 %   01/11/24 1603 129/84 98.2  F (36.8  C) Temporal 70 18 99 %      Physical Exam  /79   Pulse 72   Temp 98.2  F (36.8  C) (Temporal)   Resp 18   SpO2 98%    General: Pleasant elderly male.  Sitting upright.  Accompanied by wife.  Examined in ED36.  Head: Atraumatic. Normocephalic.  EENT: PERRL. EOMI. Moist mucus membranes.   CV: Regular rate and rhythm. No appreciable murmurs, rubs, or gallops.   Respiratory: Breathing comfortably on room air. Lungs clear to auscultation bilaterally without wheezes, rhonchi, or rales.  GI: Soft, non-distended. Non-tender abdomen. No rebound, rigidity, or guarding.   Msk: Tenderness with palpation of the left flank without any overlying rash, ecchymosis, or mass.  Skin: Warm and dry. No rashes.  Neuro: Awake, alert, and conversant. Baseline left foot drop.  Strength 4/5 with dorsiflexion on the left and 5/5 on the right. 5/5 strength with plantarflexion, knee extension and flexion.   Psych: Appropriate mood and affect.    Emergency Department Course   Imaging:  Abd/pelvis CT,  IV  contrast only TRAUMA / AAA   Final Result   IMPRESSION:    1.  Small nonobstructing right renal stones. No ureteric stone or  hydronephrosis.      2.  Colonic diverticula without CT evidence for diverticulitis.      3.  Small fat-containing periumbilical hernia.      4.  Mild prostatic enlargement. Urinary bladder is distended.         Report per radiology    Laboratory:  Labs Ordered and Resulted from Time of ED Arrival to Time of ED Departure   BASIC METABOLIC PANEL - Abnormal       Result Value    Sodium 138      Potassium 4.1      Chloride 102      Carbon Dioxide (CO2) 27      Anion Gap 9      Urea Nitrogen 12.9      Creatinine 1.05      GFR Estimate 73      Calcium 9.1      Glucose 118 (*)    ROUTINE UA WITH MICROSCOPIC REFLEX TO CULTURE - Abnormal    Color Urine Straw      Appearance Urine Clear      Glucose Urine Negative      Bilirubin Urine Negative      Ketones Urine Negative      Specific Gravity Urine 1.010      Blood Urine Negative      pH Urine 5.0      Protein Albumin Urine Negative      Urobilinogen Urine Normal      Nitrite Urine Negative      Leukocyte Esterase Urine Negative      Mucus Urine Present (*)     RBC Urine 0      WBC Urine 0     HEPATIC FUNCTION PANEL - Abnormal    Protein Total 7.9      Albumin 4.0      Bilirubin Total 0.3      Alkaline Phosphatase 167 (*)     AST 22      ALT 13      Bilirubin Direct <0.20     CBC WITH PLATELETS AND DIFFERENTIAL    WBC Count 8.4      RBC Count 4.61      Hemoglobin 14.4      Hematocrit 43.1      MCV 94      MCH 31.2      MCHC 33.4      RDW 13.5      Platelet Count 377      % Neutrophils 60      % Lymphocytes 29      % Monocytes 7      % Eosinophils 3      % Basophils 1      % Immature Granulocytes 0      NRBCs per 100 WBC 0      Absolute Neutrophils 5.0      Absolute Lymphocytes 2.4      Absolute Monocytes 0.6      Absolute Eosinophils 0.2      Absolute Basophils 0.1      Absolute Immature Granulocytes 0.0      Absolute NRBCs 0.0        Emergency Department Course & Assessments:  Interventions:  Medications   Lidocaine (LIDOCARE) 4 % Patch 1 patch (1 patch Transdermal  $Patch/Med Applied 1/11/24 2151)   morphine (PF) injection 4 mg (4 mg Intravenous $Given 1/11/24 2058)   ondansetron (ZOFRAN) injection 4 mg (4 mg Intravenous $Given 1/11/24 2058)   sodium chloride (PF) 0.9% PF flush 100 mL (65 mLs Intravenous $Given 1/11/24 2118)   iopamidol (ISOVUE-370) solution 500 mL (100 mLs Intravenous $Given 1/11/24 2118)      Assessments and Consultations:  Patient was seen in conjunction with attending physician Dr. Gu.    2020   I performed my initial evaluation of the patient  ED Course as of 01/11/24 2206   Thu Jan 11, 2024 2048 I obtained history and examined the patient as noted above.    2203 Results and plan discussed with the patient and his wife.  He feels significantly improved following interventions here.  Agrees with plan for discharge to home.  Strict return precautions were discussed. -- Dragan POLK     Independent Interpretation (X-rays, CTs, rhythm strip):  None    Social Determinants of Health affecting care:   None    Disposition:  The patient was discharged to home.     Impression & Plan    Medical Decision Making:  This is a 78-year-old male coming in as above.  On arrival, vital signs are stable.  Differential was broad and included kidney stone, dissection, pyelonephritis, fracture, musculoskeletal etiology, shingles, PE, cauda equina syndrome.  Work was obtained as above which was reassuring.  Urine analysis clear without any blood or signs of infection.  Remainder of lab work was grossly unremarkable.  Stated PE, though the patient is on Eliquis and has not missed any of his medication doses, so feel this is less likely.  No rash overlying the area of pain to suggest shingles.  He has no midline T or L-spine tenderness to suggest compression fracture.  No numbness, new weakness, saddle anesthesia, bowel or bladder incontinence to suggest cauda equina syndrome.  Given age and risk factors, elected to obtain a CT scan of the abdomen and pelvis which did not show  any explanation for his pain, most specifically no kidney stone.  Patient feels improved with the above interventions.  His pain sounds most consistent with a musculoskeletal strain.  We talked about supportive care at home including heat, ice, gentle stretching.  He will also use lidocaine patch we discussed the correct way to use this.  Prescription for Flexeril was used and I advised the patient to use this with caution and to take at night to avoid oversedation.  Additionally, the patient has been using Advil.  I discouraged him from continuing to use this as he has had very little benefit and is on Eliquis.  He will use Tylenol instead.  Strong encouraged the patient to follow-up closely with his primary care provider and certainly return here if worse or developing neurologic deficits.    Diagnosis:    ICD-10-CM    1. Left flank pain  R10.9       2. Acute left-sided low back pain without sciatica  M54.50            Discharge Medications:  New Prescriptions    CYCLOBENZAPRINE (FLEXERIL) 5 MG TABLET    Take 1 tablet (5 mg) by mouth 3 times daily as needed for muscle spasms    LIDOCAINE (LIDODERM) 5 % PATCH    Place 1 patch onto the skin every 24 hours To prevent lidocaine toxicity, patient should be patch free for 12 hrs daily.        Cha Archibald PA-C  January 11, 2024   [facility]           Cha Archibald PA-C  01/11/24 [id number]

## 2024-01-12 NOTE — DISCHARGE INSTRUCTIONS
CT scan did not show any causes for your symptoms.  I suspect this is most likely a muscle strain.  I recommend the following:    Lidocaine patch.  Apply 1 patch every 24 hours.  Make sure to have a 12-hour period where you are patch free.  Flexeril 5 mg at night to help with muscle spasms  Tylenol 1000 mg every 8 hours as needed for pain  Follow-up closely with your primary care provider    Discharge Instructions  Back Pain  You were seen today for back pain. Back pain can have many causes, but most will get better without surgery or other specific treatment. Sometimes there is a herniated ( slipped ) disc. We do not usually do MRI scans to look for these right away, since most herniated discs will get better on their own with time.  Today, we did not find any evidence that your back pain was caused by a serious condition. However, sometimes symptoms develop over time and cannot be found during an emergency visit, so it is very important that you follow up with your primary provider.  Generally, every Emergency Department visit should have a follow-up clinic visit with either a primary or a specialty clinic/provider. Please follow-up as instructed by your emergency provider today.    Return to the Emergency Department if:  You develop a fever with your back pain.   You have weakness or change in sensation in one or both legs.  You lose control of your bowels or bladder, or cannot empty your bladder (cannot pee).  Your pain gets much worse.     Follow-up with your provider:  Unless your pain has completely gone away, please make an appointment with your provider within one week. Most of the routine care for back pain is available in a clinic and not the Emergency Department. You may need further management of your back pain, such as more pain medication, imaging such as an X-ray or MRI, or physical therapy.    What can I do to help myself?  Remain Active -- People are often afraid that they will hurt their back  further or delay recovery by remaining active, but this is one of the best things you can do for your back. In fact, staying in bed for a long time to rest is not recommended. Studies have shown that people with low back pain recover faster when they remain active. Movement helps to bring blood flow to the muscles and relieve muscle spasms as well as preventing loss of muscle strength.  Heat -- Using a heating pad can help with low back pain during the first few weeks. Do not sleep with a heating pad, as you can be burned.   Pain medications - You may take a pain medication such as Tylenol  (acetaminophen), Advil , Motrin  (ibuprofen) or Aleve  (naproxen).  If you were given a prescription for medicine here today, be sure to read all of the information (including the package insert) that comes with your prescription.  This will include important information about the medicine, its side effects, and any warnings that you need to know about.  The pharmacist who fills the prescription can provide more information and answer questions you may have about the medicine.  If you have questions or concerns that the pharmacist cannot address, please call or return to the Emergency Department.   Remember that you can always come back to the Emergency Department if you are not able to see your regular provider in the amount of time listed above, if you get any new symptoms, or if there is anything that worries you.

## 2024-01-12 NOTE — LETTER
Khurram Reynoso  [address] [facility] [address]    Dear Khurram Reynoso,      I am a team member within the [facility] with [facility]. I recently contacted you to ensure you are doing well following a visit within our health system. I also wanted to take this chance to introduce Clinic Care Coordination should you have any interest in this program in the future.    Below is a description of Clinic Care Coordination and how this team can further assist you:       The Clinic Care Coordination team is made up of a Registered Nurse, , Financial Resource Worker, and a Community Health Worker who understand and can help navigate the health care system. The goal of clinic care coordination is to help you manage your health, improve access to care, and achieve optimal health outcomes. They work alongside your provider to assist you in determining your health and social needs, obtain health care and community resources, and provide you with necessary information and education. Clinic Care Coordination can work with you through any barriers and develop a care plan that helps coordinate and strengthen the relationship between you and your care team.    If you wish to connect with the Clinic Care Coordination Team, please let your [facility] Primary Care Provider or Clinic Care Team know and they can place a referral. The Clinic Care Coordination team will then reach out by phone to further support you.    We are focused on providing you with the highest-quality healthcare experience possible.    Sincerely,   Your care team with [facility]

## 2024-01-12 NOTE — ED PROVIDER NOTES
ED ATTENDING PHYSICIAN NOTE:   I evaluated this patient in conjunction with Cha Archibald PA-C  I have participated in the care of the patient and personally performed key elements of the history, exam, and medical decision making.      HPI:   Khurram Reynoso is a 78 year old male with history of kidney stone and degeneration of disc who presents with a week of sharp left flank pain that is worse with changes in position from seated to standing and walking. There is no radiation of pain. His pain is 2/10 at rest but becomes sharp and 8/10 when he moves. The pain does does not feel like his past kidney stones, but given prior history, that is his predominant concern today. He notes chronic congestion and runny nose but denies fever, cough, sore throat, chest pain shortness of breath, nausea, vomiting, urinary symptoms, abnormal stools, weakness, numbness, headache, neck pain, and other pain.     Review of Systems   Constitutional:  Negative for chills and fever.   HENT:  Negative for congestion and rhinorrhea.    Respiratory:  Negative for cough and shortness of breath.    Cardiovascular:  Negative for chest pain.   Gastrointestinal:  Negative for abdominal pain, blood in stool, nausea and vomiting.   Genitourinary:  Negative for dysuria and hematuria.   Musculoskeletal:  Positive for back pain. Negative for neck pain.   Neurological:  Negative for dizziness, weakness, numbness and headaches.     Independent Historian:   None - Patient Only    Review of External Notes:   12/27/23 Neurosurgery office visit note      EXAM:   Constitutional:       General: Not in acute distress.        Appearance: Normal appearance.   HENT:      Head: Normocephalic and atraumatic.   Eyes:      Extraocular Movements: Extraocular movements intact.      Conjunctiva/sclera: Conjunctivae normal.   Cardiovascular:      Rate and Rhythm: Normal rate and regular rhythm.   Pulmonary:      Effort: Pulmonary effort is normal. No respiratory  distress.      Breath sounds: Normal breath sounds.   Abdominal:      General: Abdomen is flat. There is no distension.      Palpations: Abdomen is soft.      Tenderness: There is no abdominal tenderness to palpation. No CVA tenderness.  Musculoskeletal:      Cervical back: Normal range of motion. No rigidity.      General back: No midline C/T/L spine tenderness to palpation. No reproducible pain to palpation of the back.     Right lower leg: No edema.      Left lower leg: No edema.   Skin:     General: Skin is warm and dry.   Neurological:      General: No focal deficit present.      Mental Status: Alert and oriented to person, place, and time.   Psychiatric:         Mood and Affect: Mood normal.         Behavior: Behavior normal.    Assessments/Consultations/Discussion of Management or Tests:   ED Course as of 01/12/24 0235   Thu Jan 11, 2024 2048 I obtained history and examined the patient as noted above.    2203 Results and plan discussed with the patient and his wife.  He feels significantly improved following interventions here.  Agrees with plan for discharge to home.  Strict return precautions were discussed. -- Drgaan POLK     Social Determinants of Health affecting care:   None     MEDICAL DECISION MAKING/ASSESSMENT AND PLAN:   78-year-old male as described above presents to the emergency department for left flank pain.  Patient hemodynamically stable at time evaluation.  Afebrile.  Pain appears to be exacerbated with body movements and is specifically localized to small area of left low back/flank.  Broad differential diagnosis considered includes, but not limited to, renal colic/ureteral colic, pyelonephritis, abdominal aortic aneurysm/dissection, infectious colitis, diverticulitis, and muscle skeletal strain.  Abdominal pain/flank pain workup ordered.  Pain control.  Ultimately, CT imaging negative for acute findings to explain patient's pain today. Incidental findings discussed with the patient by  [facility]. Bladder distention noted on CT, but patient states he went to imaging with a full bladder and was able to void without difficulty upon return to room and prior to discharge. Pain well controlled with lidocaine patch/single dose morphine. Suspected symptoms may be secondary to musculoskeletal strain, I.e. iliopsoas syndrome type pain. Will have patient follow-up with PCP.  Prescription for lidocaine patch/muscle relaxant sent by [facility]. Advised cautious use of muscle relaxant given patient's age and increased risk for side effect. Discussed care plan with patient who voiced understanding and agreement with plan.  Answered all questions.  Additional workup and orders as listed in chart.     Please refer to ED course above as part of continuation of MDM for details on the patient's treatment course and any changes or updates in care plan beyond my initial evaluation and MDM creation.     Please see [facility] H&P for details.     DIAGNOSIS:     ICD-10-CM    1. Left flank pain  R10.9       2. Acute left-sided low back pain without sciatica  M54.50          DISPOSITION:   The patient was discharged to home.       Scribe Disclosure:  Adilson WELCH, am serving as a scribe at 8:41 PM on 1/11/2024 to document services personally performed by Froy Gu DO based on my observations and the provider's statements to me.   1/11/2024  [facility] EMERGENCY DEPT      Froy Gu DO  01/12/24 0235

## 2024-01-12 NOTE — PROGRESS NOTES
Clinic Care Coordination Contact  Community Health Worker Initial Outreach    CHW Initial Information Gathering:  Referral Source: ED Follow-Up  CHW Additional Questions  MyChart active?: Yes    CHW offered to schedule ED follow up at this time but declined.    Patient accepts CC: No, patient declined at this time. Patient will be sent Care Coordination introduction letter for future reference.     Mariam Capps  Community Health Worker  [facility] Care Resource [facility]

## 2024-02-07 ENCOUNTER — OFFICE VISIT (OUTPATIENT)
Dept: NEUROSURGERY | Facility: CLINIC | Age: 79
End: 2024-02-07
Attending: STUDENT IN AN ORGANIZED HEALTH CARE EDUCATION/TRAINING PROGRAM
Payer: COMMERCIAL

## 2024-02-07 ENCOUNTER — ANCILLARY PROCEDURE (OUTPATIENT)
Dept: GENERAL RADIOLOGY | Facility: CLINIC | Age: 79
End: 2024-02-07
Attending: STUDENT IN AN ORGANIZED HEALTH CARE EDUCATION/TRAINING PROGRAM
Payer: COMMERCIAL

## 2024-02-07 VITALS
HEART RATE: 81 BPM | OXYGEN SATURATION: 100 % | HEIGHT: 72 IN | WEIGHT: 219 LBS | BODY MASS INDEX: 29.66 KG/M2 | DIASTOLIC BLOOD PRESSURE: 66 MMHG | SYSTOLIC BLOOD PRESSURE: 108 MMHG

## 2024-02-07 DIAGNOSIS — Z98.1 S/P CERVICAL SPINAL FUSION: Primary | ICD-10-CM

## 2024-02-07 DIAGNOSIS — Z98.1 S/P CERVICAL SPINAL FUSION: ICD-10-CM

## 2024-02-07 PROCEDURE — 72040 X-RAY EXAM NECK SPINE 2-3 VW: CPT | Mod: TC | Performed by: RADIOLOGY

## 2024-02-07 PROCEDURE — 99024 POSTOP FOLLOW-UP VISIT: CPT | Performed by: STUDENT IN AN ORGANIZED HEALTH CARE EDUCATION/TRAINING PROGRAM

## 2024-02-07 PROCEDURE — G0463 HOSPITAL OUTPT CLINIC VISIT: HCPCS | Performed by: STUDENT IN AN ORGANIZED HEALTH CARE EDUCATION/TRAINING PROGRAM

## 2024-02-07 SDOH — HEALTH STABILITY: PHYSICAL HEALTH: ON AVERAGE, HOW MANY MINUTES DO YOU ENGAGE IN EXERCISE AT THIS LEVEL?: 0 MIN

## 2024-02-07 SDOH — HEALTH STABILITY: PHYSICAL HEALTH: ON AVERAGE, HOW MANY DAYS PER WEEK DO YOU ENGAGE IN MODERATE TO STRENUOUS EXERCISE (LIKE A BRISK WALK)?: 0 DAYS

## 2024-02-07 ASSESSMENT — SOCIAL DETERMINANTS OF HEALTH (SDOH): HOW OFTEN DO YOU GET TOGETHER WITH FRIENDS OR RELATIVES?: ONCE A WEEK

## 2024-02-07 ASSESSMENT — PAIN SCALES - GENERAL: PAINLEVEL: MILD PAIN (2)

## 2024-02-07 NOTE — PATIENT INSTRUCTIONS
Patient Next Steps:      Dr. Yancey would like you to see one of his physician assistants in 3 months with an XR prior    Please call us if you have any further questions or concerns.    [facility]   Phone: [phone]  Fax: [phone]

## 2024-02-07 NOTE — NURSING NOTE
"Khurram Reynoso is a 78 year old male who presents for:  Chief Complaint   Patient presents with    RECHECK     12 wk follow cervical fusion         Initial Vitals:  /66   Pulse 81   Ht 6' 0.1\" (1.831 m)   Wt 219 lb (99.3 kg)   SpO2 100%   BMI 29.62 kg/m   Estimated body mass index is 29.62 kg/m  as calculated from the following:    Height as of this encounter: 6' 0.1\" (1.831 m).    Weight as of this encounter: 219 lb (99.3 kg).. Body surface area is 2.25 meters squared. BP completed using cuff size: regular  Mild Pain (2)        Nahed Fonseca   "
IV discontinued, cath removed intact

## 2024-02-07 NOTE — LETTER
2/7/2024         RE: Khurram Reynoso  [address]  [facility] [address]        Dear Colleague,    Thank you for referring your patient, Khurram Reynoso, to the [facility]UROS[facility] [place]. Please see a copy of my visit note below.    HPI:  78-year-old with history of right hand numbness worse and does third fourth and fifth digits going into the wrist and forearm.  He was diagnosed with cervical myelopathy with stenosis.  He had difficulty with fine motor tasks and holding onto things worse in his right hand than his left.  He underwent a C4-T1 posterior fusion decompression in November 2023.  He returns for 12-week follow-up today.  He notes no significant changes since surgery and overall stable myelopathy symptoms although not worsening.  He does have some pain down the left lateral side of the spine with raising his right arm.  Otherwise no significant pain.  He has not started physical therapy.  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  This has been going on for quite some time without any significant change.  She denies any significant pain in his legs.  Current Outpatient Medications   Medication     albuterol (PROAIR HFA/PROVENTIL HFA/VENTOLIN HFA) 108 (90 Base) MCG/ACT inhaler     apixaban ANTICOAGULANT (ELIQUIS) 5 MG tablet     carboxymethylcellulose PF (REFRESH PLUS) 0.5 % ophthalmic solution     cyclobenzaprine (FLEXERIL) 5 MG tablet     fluticasone (FLONASE) 50 MCG/ACT nasal spray     fluticasone-salmeterol (ADVAIR) 250-50 MCG/ACT inhaler     furosemide (LASIX) 20 MG tablet     lidocaine (LIDODERM) 5 % patch     lisinopril (ZESTRIL) 2.5 MG tablet     methocarbamol (ROBAXIN) 750 MG tablet     metoprolol succinate ER (TOPROL XL) 50 MG 24 hr tablet     montelukast (SINGULAIR) 10 MG tablet     oxyCODONE (ROXICODONE) 5 MG tablet     senna-docusate (SENOKOT-S/PERICOLACE) 8.6-50 MG tablet     VITAMIN D PO     No current facility-administered  "medications for this visit.      Physical Exam:  Vital signs:                    Height: 183.1 cm (6' 0.1\") Weight: 99.3 kg (219 lb)  Estimated body mass index is 29.62 kg/m  as calculated from the following:    Height as of this encounter: 1.831 m (6' 0.1\").    Weight as of this encounter: 99.3 kg (219 lb).  He has full strength in his bilateral upper extremities.  Sensation is intact light touch throughout.  Incision is well-healed.  He does have bilateral dorsiflexion weakness.  Results Reviewed:  I personally viewed x-rays today which show stable hardware compared to prior at from C4-T1.  I also reviewed a prior CT scan of the lumbar spine which shows prior L2-L5 fusion with bilateral foraminal stenosis at L5-S1.  Assessment:  78-year-old male status post C4-T1 posterior fusion and decompression for cervical myelopathy.  Plan:  Okay to start physical therapy now.  He can follow-up with us again in 3 months.  We did discuss the possibility of further surgery in his lumbar spine to see if the bilateral dropfoot could improve however with the chronic nature of this I do think it is unlikely it would get better and based on him being otherwise asymptomatic I am skeptical that this would be a beneficial surgery for him overall.  He did agree with this assessment and thinks it is unlikely he would get better going forward based on the longevity of his symptoms as well.  I did recommend looking into AFOs with physical therapy to help prevent him from tripping.    Guanakito Yancey MD      Again, thank you for allowing me to participate in the care of your patient.        Sincerely,        Guanakito Yancey MD  "

## 2024-02-07 NOTE — PROGRESS NOTES
"HPI:  78-year-old with history of right hand numbness worse and does third fourth and fifth digits going into the wrist and forearm.  He was diagnosed with cervical myelopathy with stenosis.  He had difficulty with fine motor tasks and holding onto things worse in his right hand than his left.  He underwent a C4-T1 posterior fusion decompression in November 2023.  He returns for 12-week follow-up today.  He notes no significant changes since surgery and overall stable myelopathy symptoms although not worsening.  He does have some pain down the left lateral side of the spine with raising his right arm.  Otherwise no significant pain.  He has not started physical therapy.  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  This has been going on for quite some time without any significant change.  She denies any significant pain in his legs.  Current Outpatient Medications   Medication    albuterol (PROAIR HFA/PROVENTIL HFA/VENTOLIN HFA) 108 (90 Base) MCG/ACT inhaler    apixaban ANTICOAGULANT (ELIQUIS) 5 MG tablet    carboxymethylcellulose PF (REFRESH PLUS) 0.5 % ophthalmic solution    cyclobenzaprine (FLEXERIL) 5 MG tablet    fluticasone (FLONASE) 50 MCG/ACT nasal spray    fluticasone-salmeterol (ADVAIR) 250-50 MCG/ACT inhaler    furosemide (LASIX) 20 MG tablet    lidocaine (LIDODERM) 5 % patch    lisinopril (ZESTRIL) 2.5 MG tablet    methocarbamol (ROBAXIN) 750 MG tablet    metoprolol succinate ER (TOPROL XL) 50 MG 24 hr tablet    montelukast (SINGULAIR) 10 MG tablet    oxyCODONE (ROXICODONE) 5 MG tablet    senna-docusate (SENOKOT-S/PERICOLACE) 8.6-50 MG tablet    VITAMIN D PO     No current facility-administered medications for this visit.      Physical Exam:  Vital signs:                    Height: 183.1 cm (6' 0.1\") Weight: 99.3 kg (219 lb)  Estimated body mass index is 29.62 kg/m  as calculated from the following:    Height as of this encounter: 1.831 m (6' 0.1\").    Weight as of this " encounter: 99.3 kg (219 lb).  He has full strength in his bilateral upper extremities.  Sensation is intact light touch throughout.  Incision is well-healed.  He does have bilateral dorsiflexion weakness.  Results Reviewed:  I personally viewed x-rays today which show stable hardware compared to prior at from C4-T1.  I also reviewed a prior CT scan of the lumbar spine which shows prior L2-L5 fusion with bilateral foraminal stenosis at L5-S1.  Assessment:  78-year-old male status post C4-T1 posterior fusion and decompression for cervical myelopathy.  Plan:  Okay to start physical therapy now.  He can follow-up with us again in 3 months.  We did discuss the possibility of further surgery in his lumbar spine to see if the bilateral dropfoot could improve however with the chronic nature of this I do think it is unlikely it would get better and based on him being otherwise asymptomatic I am skeptical that this would be a beneficial surgery for him overall.  He did agree with this assessment and thinks it is unlikely he would get better going forward based on the longevity of his symptoms as well.  I did recommend looking into AFOs with physical therapy to help prevent him from tripping.    Guanakito Yancey MD

## 2024-02-13 SDOH — HEALTH STABILITY: PHYSICAL HEALTH: ON AVERAGE, HOW MANY MINUTES DO YOU ENGAGE IN EXERCISE AT THIS LEVEL?: 0 MIN

## 2024-02-13 SDOH — HEALTH STABILITY: PHYSICAL HEALTH: ON AVERAGE, HOW MANY DAYS PER WEEK DO YOU ENGAGE IN MODERATE TO STRENUOUS EXERCISE (LIKE A BRISK WALK)?: 0 DAYS

## 2024-02-13 ASSESSMENT — ASTHMA QUESTIONNAIRES
QUESTION_1 LAST FOUR WEEKS HOW MUCH OF THE TIME DID YOUR ASTHMA KEEP YOU FROM GETTING AS MUCH DONE AT WORK, SCHOOL OR AT HOME: NONE OF THE TIME
QUESTION_2 LAST FOUR WEEKS HOW OFTEN HAVE YOU HAD SHORTNESS OF BREATH: NOT AT ALL
QUESTION_4 LAST FOUR WEEKS HOW OFTEN HAVE YOU USED YOUR RESCUE INHALER OR NEBULIZER MEDICATION (SUCH AS ALBUTEROL): ONCE A WEEK OR LESS
QUESTION_5 LAST FOUR WEEKS HOW WOULD YOU RATE YOUR ASTHMA CONTROL: WELL CONTROLLED
ACT_TOTALSCORE: 23
QUESTION_3 LAST FOUR WEEKS HOW OFTEN DID YOUR ASTHMA SYMPTOMS (WHEEZING, COUGHING, SHORTNESS OF BREATH, CHEST TIGHTNESS OR PAIN) WAKE YOU UP AT NIGHT OR EARLIER THAN USUAL IN THE MORNING: NOT AT ALL
ACT_TOTALSCORE: 23

## 2024-02-13 ASSESSMENT — SOCIAL DETERMINANTS OF HEALTH (SDOH): HOW OFTEN DO YOU GET TOGETHER WITH FRIENDS OR RELATIVES?: ONCE A WEEK

## 2024-02-13 NOTE — COMMUNITY RESOURCES LIST (ENGLISH)
02/13/2024    [employer]  N/A  For questions about this resource list or additional care needs, please contact your primary care clinic or care manager.  Phone: [phone]   Email: N/A   Address: [address] [place] [address]   Hours: N/A        Exercise and Recreation       Gym or workout facility  [address] [place] [address] - Kickboxing Classes Distance: 3.72 miles      In-Person   [address] [place] [address]  Language: English  Hours: Mon - Fri 6:00 AM - 12:30 PM , Mon - Fri 3:00 PM - 8:00 PM , Sat 8:00 AM - 12:00 PM  Fees: Self Pay   Phone: [phone] [phone] Website: [email or web address]     2  [employer] [place] Distance: 7.77 miles      In-Person   [address] [place] [address]  Language: English  Hours: Mon - Sun Open 24 Hours  Fees: Insurance, Self Pay, Sliding Fee   Phone: [phone]) [phone] Email: [email or web address] Website: [email or web address]          Important Numbers & Websites       Emergency Services   911  [facility] Services   311  Poison Control   (372) 775-5234  Suicide Prevention Lifeline   (446) 353-4473 (TALK)  Child Abuse Hotline   (781) 427-5581 (4-A-Child)  Sexual Assault Hotline   (264) 545-4176 (HOPE)  National Runaway Safeline   (242) 215-5209 (RUNAWAY)  All-Options Talkline   (858) 496-5992  Substance Abuse Referral   (573) 392-1919 (HELP)

## 2024-02-13 NOTE — COMMUNITY RESOURCES LIST (ENGLISH)
02/13/2024    [employer] [place] [employer]  N/A  For questions about this resource list or additional care needs, please contact your primary care clinic or care manager.  Phone: [phone]   Email: N/A   Address: [address] [place] [address]   Hours: N/A        Exercise and Recreation       Gym or workout facility  [address]  [address] [place] [address] - Kickboxing Classes Distance: 3.72 miles      In-Person   [address] [place] [address]  Language: English  Hours: Mon - Fri 6:00 AM - 12:30 PM , Mon - Fri 3:00 PM - 8:00 PM , Sat 8:00 AM - 12:00 PM  Fees: Self Pay   Phone: [phone] Website: [email or web address]     2  [employer] [place] Distance: 7.77 miles      In-Person   [address] [place] [address]  Language: English  Hours: Mon - Sun Open 24 Hours  Fees: Insurance, Self Pay, Sliding Fee   Phone: ([phone] Email: [email or web address] Website: [email or web address]          Important Numbers & Websites       Emergency Services   911  [facility] Services   311  Poison Control   (655) 742-7843  Suicide Prevention Lifeline   (328) 611-9614 (TALK)  Child Abuse Hotline   (132) 791-2881 (4-A-Child)  Sexual Assault Hotline   (289) 521-5469 (HOPE)  National Runaway Safeline   (306) 794-4900 (RUNAWAY)  All-Options Talkline   (733) 232-5045  Substance Abuse Referral   (675) 421-1074 (HELP)

## 2024-02-13 NOTE — COMMUNITY RESOURCES LIST (ENGLISH)
02/13/2024    [employer] [place] [employer]  N/A  For questions about this resource list or additional care needs, please contact your primary care clinic or care manager.  Phone: [phone]   Email: N/A   Address: [address] [place] [address]   Hours: N/A        Exercise and Recreation       Gym or workout facility  [address]  [address] [place] [address] - Kickboxing Classes Distance: 3.72 miles      In-Person   [address] [place] [address]  Language: English  Hours: Mon - Fri 6:00 AM - 12:30 PM , Mon - Fri 3:00 PM - 8:00 PM , Sat 8:00 AM - 12:00 PM  Fees: Self Pay   Phone: ([phone] [phone] Website: [email or web address]     2  [employer] [place] Distance: 7.77 miles      In-Person   [address] [place] [address]  Language: English  Hours: Mon - Sun Open 24 Hours  Fees: Insurance, Self Pay, Sliding Fee   Phone: ([phone] Email: [email or web address] Website: [email or web address]          Important Numbers & Websites       Emergency Services   911  [facility] Services   311  Poison Control   (154) 650-8649  Suicide Prevention Lifeline   (573) 161-2072 (TALK)  Child Abuse Hotline   (395) 559-1575 (4-A-Child)  Sexual Assault Hotline   (386) 486-8517 (HOPE)  National Runaway Safeline   (799) 174-5738 (RUNAWAY)  All-Options Talkline   (563) 168-6959  Substance Abuse Referral   (460) 560-3946 (HELP)

## 2024-02-14 ENCOUNTER — ANCILLARY PROCEDURE (OUTPATIENT)
Dept: GENERAL RADIOLOGY | Facility: CLINIC | Age: 79
End: 2024-02-14
Attending: INTERNAL MEDICINE
Payer: COMMERCIAL

## 2024-02-14 ENCOUNTER — OFFICE VISIT (OUTPATIENT)
Dept: INTERNAL MEDICINE | Facility: CLINIC | Age: 79
End: 2024-02-14
Payer: COMMERCIAL

## 2024-02-14 VITALS
TEMPERATURE: 97.8 F | RESPIRATION RATE: 16 BRPM | HEART RATE: 85 BPM | DIASTOLIC BLOOD PRESSURE: 66 MMHG | OXYGEN SATURATION: 99 % | SYSTOLIC BLOOD PRESSURE: 99 MMHG | WEIGHT: 216.1 LBS | BODY MASS INDEX: 29.23 KG/M2

## 2024-02-14 DIAGNOSIS — M79.671 RIGHT FOOT PAIN: Primary | ICD-10-CM

## 2024-02-14 DIAGNOSIS — I83.891 VARICOSE VEINS OF RIGHT LEG WITH EDEMA: ICD-10-CM

## 2024-02-14 DIAGNOSIS — R05.1 ACUTE COUGH: ICD-10-CM

## 2024-02-14 DIAGNOSIS — R60.0 LEG EDEMA, RIGHT: ICD-10-CM

## 2024-02-14 LAB — URATE SERPL-MCNC: 8.4 MG/DL (ref 3.4–7)

## 2024-02-14 PROCEDURE — 99214 OFFICE O/P EST MOD 30 MIN: CPT | Performed by: INTERNAL MEDICINE

## 2024-02-14 PROCEDURE — 36415 COLL VENOUS BLD VENIPUNCTURE: CPT | Performed by: INTERNAL MEDICINE

## 2024-02-14 PROCEDURE — 71046 X-RAY EXAM CHEST 2 VIEWS: CPT | Mod: TC | Performed by: RADIOLOGY

## 2024-02-14 PROCEDURE — 73620 X-RAY EXAM OF FOOT: CPT | Mod: TC | Performed by: RADIOLOGY

## 2024-02-14 PROCEDURE — 84550 ASSAY OF BLOOD/URIC ACID: CPT | Performed by: INTERNAL MEDICINE

## 2024-02-14 NOTE — LETTER
February 14, 2024      Khurram MCP[place]  [address]  [facility] [address]        Dear PaulaReynoso,    We are writing to inform you of your test results.    Mild osteoarthritis of the toe. No changes of gout.     Resulted Orders   XR Foot Right 2 Views    Narrative    EXAM: XR FOOT RIGHT 2 VIEWS  DATE/TIME: 2/14/2024 9:09 AM    INDICATION: Varicose veins of right leg with edema  COMPARISON: None available.       Impression    IMPRESSION: Diffuse osseous demineralization. Mild degenerative  arthrosis of the first MTP and multiple additional joints of the  forefoot. No acute fracture.       GENE COLE DO         SYSTEM ID:  [id number]       If you have any questions or concerns, please call the clinic at the number listed above.       Sincerely,      Familia Gillespie MD

## 2024-02-14 NOTE — LETTER
February 14, 2024      Khurram Reynoso  [address]  [facility] [address]        Dear PaulaReynoso,    We are writing to inform you of your test results.    Your xray is normal.    Resulted Orders   XR Chest 2 Views    Narrative    XR CHEST 2 VIEWS 2/14/2024 9:09 AM    HISTORY: Acute cough    COMPARISON: 6/13/2023      Impression    IMPRESSION: Hyperexpanded lungs. No infiltrate, pleural effusion or  pneumothorax. Normal heart size.    FRANCHESCA MOREL MD         SYSTEM ID:  [id number]       If you have any questions or concerns, please call the clinic at the number listed above.       Sincerely,      Familia Gillespie MD             Yes

## 2024-02-14 NOTE — LETTER
February 16, 2024      Khurram Reynoso  [address]  [facility] [address]        Dear ,    We are writing to inform you of your test results.    Elevated uric acid- test for gout. Recommend to keep diet for gout and start on treatment to reduce uric acid.     Resulted Orders   Uric acid   Result Value Ref Range    Uric Acid 8.4 (H) 3.4 - 7.0 mg/dL       If you have any questions or concerns, please call the clinic at the number listed above.       Sincerely,      Familia Gillespie MD

## 2024-02-14 NOTE — PROGRESS NOTES
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    Weight as of this encounter: 98 kg (216 lb 1.6 oz).       Counseling  Appropriate preventive services were discussed with this patient, including applicable screening as appropriate for fall prevention, nutrition, physical activity, Tobacco-use cessation, weight loss and cognition.  Checklist reviewing preventive services available has been given to the patient.  Reviewed patient's diet, addressing concerns and/or questions.   The patient was provided with written information regarding signs of hearing loss.   I have reviewed Opioid Use Disorder and Substance Use Disorder risk factors and made any needed referrals.     Patient has been advised of split billing requirements and indicates understanding: Yes    See Patient Instructions    Forrest Paulson is a 78 year old, presenting for the following health issues:  Musculoskeletal Problem        2/14/2024     8:18 AM   Additional Questions   Roomed by azamit   Accompanied by self         2/14/2024     8:18 AM   Patient Reported Additional Medications   Patient reports taking the following new medications none     Musculoskeletal Problem    History of Present Illness       Reason for visit:  Swelling of right ankle    He eats 2-3 servings of fruits and vegetables daily.He consumes 1 sweetened beverage(s) daily.He exercises with enough effort to increase his heart rate 9 or less minutes per day.  He exercises with enough effort to increase his heart rate 3 or less days per week.   He is " taking medications regularly.         Presents with concern for right foot pain, swelling, lower leg swelling. For several weeks.   Has h/o gout. Leg is painful with ambulation. No injury. No redness.   Has h/o A fib, on AC, rate controlled.   Has had cough, worse with laying down, feels postnasal drainage. No SOB, CP.   Has h/o gout. On PRN Indocin.         Review of Systems  Constitutional, HEENT, cardiovascular, pulmonary, gi and gu systems are negative, except as otherwise noted.      Objective    BP 99/66 (BP Location: Left arm, Patient Position: Sitting, Cuff Size: Adult Regular)   Pulse 85   Temp 97.8  F (36.6  C) (Oral)   Resp 16   Wt 98 kg (216 lb 1.6 oz)   SpO2 99%   BMI 29.23 kg/m    Body mass index is 29.23 kg/m .  Physical Exam   GENERAL: alert and no distress  NECK: no adenopathy, no asymmetry, masses, or scars  RESP: lungs clear to auscultation - no rales, rhonchi or wheezes, base crackles   CV: regular rate and rhythm, normal S1 S2, no S3 or S4, no murmur, click or rub  ABDOMEN: soft, nontender, no hepatosplenomegaly, no masses and bowel sounds normal  MS: no gross musculoskeletal defects noted, right foot and lower leg edema, right calf is 5 cm bigger than left, palpatory pain in the right medial foot     Admission on 01/11/2024, Discharged on 01/11/2024   Component Date Value Ref Range Status    Sodium 01/11/2024 138  135 - 145 mmol/L Final    Reference intervals for this test were updated on 09/26/2023 to more accurately reflect our healthy population. There may be differences in the flagging of prior results with similar values performed with this method. Interpretation of those prior results can be made in the context of the updated reference intervals.     Potassium 01/11/2024 4.1  3.4 - 5.3 mmol/L Final    Chloride 01/11/2024 102  98 - 107 mmol/L Final    Carbon Dioxide (CO2) 01/11/2024 27  22 - 29 mmol/L Final    Anion Gap 01/11/2024 9  7 - 15 mmol/L Final    Urea Nitrogen 01/11/2024  12.9  8.0 - 23.0 mg/dL Final    Creatinine 01/11/2024 1.05  0.67 - 1.17 mg/dL Final    GFR Estimate 01/11/2024 73  >60 mL/min/1.73m2 Final    Calcium 01/11/2024 9.1  8.8 - 10.2 mg/dL Final    Glucose 01/11/2024 118 (H)  70 - 99 mg/dL Final    Color Urine 01/11/2024 Straw  Colorless, Straw, Light Yellow, Yellow Final    Appearance Urine 01/11/2024 Clear  Clear Final    Glucose Urine 01/11/2024 Negative  Negative mg/dL Final    Bilirubin Urine 01/11/2024 Negative  Negative Final    Ketones Urine 01/11/2024 Negative  Negative mg/dL Final    Specific Gravity Urine 01/11/2024 1.010  1.003 - 1.035 Final    Blood Urine 01/11/2024 Negative  Negative Final    pH Urine 01/11/2024 5.0  5.0 - 7.0 Final    Protein Albumin Urine 01/11/2024 Negative  Negative mg/dL Final    Urobilinogen Urine 01/11/2024 Normal  Normal, 2.0 mg/dL Final    Nitrite Urine 01/11/2024 Negative  Negative Final    Leukocyte Esterase Urine 01/11/2024 Negative  Negative Final    Mucus Urine 01/11/2024 Present (A)  None Seen /LPF Final    RBC Urine 01/11/2024 0  <=2 /HPF Final    WBC Urine 01/11/2024 0  <=5 /HPF Final    WBC Count 01/11/2024 8.4  4.0 - 11.0 10e3/uL Final    RBC Count 01/11/2024 4.61  4.40 - 5.90 10e6/uL Final    Hemoglobin 01/11/2024 14.4  13.3 - 17.7 g/dL Final    Hematocrit 01/11/2024 43.1  40.0 - 53.0 % Final    MCV 01/11/2024 94  78 - 100 fL Final    MCH 01/11/2024 31.2  26.5 - 33.0 pg Final    MCHC 01/11/2024 33.4  31.5 - 36.5 g/dL Final    RDW 01/11/2024 13.5  10.0 - 15.0 % Final    Platelet Count 01/11/2024 377  150 - 450 10e3/uL Final    % Neutrophils 01/11/2024 60  % Final    % Lymphocytes 01/11/2024 29  % Final    % Monocytes 01/11/2024 7  % Final    % Eosinophils 01/11/2024 3  % Final    % Basophils 01/11/2024 1  % Final    % Immature Granulocytes 01/11/2024 0  % Final    NRBCs per 100 WBC 01/11/2024 0  <1 /100 Final    Absolute Neutrophils 01/11/2024 5.0  1.6 - 8.3 10e3/uL Final    Absolute Lymphocytes 01/11/2024 2.4  0.8 - 5.3  10e3/uL Final    Absolute Monocytes 01/11/2024 0.6  0.0 - 1.3 10e3/uL Final    Absolute Eosinophils 01/11/2024 0.2  0.0 - 0.7 10e3/uL Final    Absolute Basophils 01/11/2024 0.1  0.0 - 0.2 10e3/uL Final    Absolute Immature Granulocytes 01/11/2024 0.0  <=0.4 10e3/uL Final    Absolute NRBCs 01/11/2024 0.0  10e3/uL Final    Hold Specimen 01/11/2024 JIC   Final    Hold Specimen 01/11/2024 JI   Final    Protein Total 01/11/2024 7.9  6.4 - 8.3 g/dL Final    Albumin 01/11/2024 4.0  3.5 - 5.2 g/dL Final    Bilirubin Total 01/11/2024 0.3  <=1.2 mg/dL Final    Alkaline Phosphatase 01/11/2024 167 (H)  40 - 150 U/L Final    Reference intervals for this test were updated on 11/14/2023 to more accurately reflect our healthy population. There may be differences in the flagging of prior results with similar values performed with this method. Interpretation of those prior results can be made in the context of the updated reference intervals.    AST 01/11/2024 22  0 - 45 U/L Final    Reference intervals for this test were updated on 6/12/2023 to more accurately reflect our healthy population. There may be differences in the flagging of prior results with similar values performed with this method. Interpretation of those prior results can be made in the context of the updated reference intervals.    ALT 01/11/2024 13  0 - 70 U/L Final    Reference intervals for this test were updated on 6/12/2023 to more accurately reflect our healthy population. There may be differences in the flagging of prior results with similar values performed with this method. Interpretation of those prior results can be made in the context of the updated reference intervals.      Bilirubin Direct 01/11/2024 <0.20  0.00 - 0.30 mg/dL Final           Signed Electronically by: Familia Gillespie MD

## 2024-02-16 ENCOUNTER — HOSPITAL ENCOUNTER (OUTPATIENT)
Dept: ULTRASOUND IMAGING | Facility: CLINIC | Age: 79
Discharge: HOME | End: 2024-02-16
Attending: INTERNAL MEDICINE | Admitting: INTERNAL MEDICINE
Payer: COMMERCIAL

## 2024-02-16 DIAGNOSIS — R60.0 LEG EDEMA, RIGHT: ICD-10-CM

## 2024-02-16 DIAGNOSIS — M79.671 RIGHT FOOT PAIN: ICD-10-CM

## 2024-02-16 PROCEDURE — 93971 EXTREMITY STUDY: CPT | Mod: RT

## 2024-02-16 NOTE — LETTER
February 19, 2024      Khurram MCPHERSON [place]  [address]  [facility] [address]        Dear ,    We are writing to inform you of your test results.    Your ultrasound showed no clot in the leg veins.   Cyst in the back of the knee. No need for treatment if not symptomatic.     Resulted Orders   US Lower Extremity Venous Duplex Right    Narrative    EXAM: US LOWER EXTREMITY VENOUS DUPLEX RIGHT  LOCATION: [facility]  DATE: 2/16/2024    INDICATION:  Right foot pain, Leg edema, right  COMPARISON: None.  TECHNIQUE: Venous Duplex ultrasound of the right lower extremity with and without compression, augmentation and duplex. Color flow and spectral Doppler with waveform analysis performed.    FINDINGS: Exam includes the common femoral, femoral, popliteal, and contralateral common femoral veins as well as segmentally visualized deep calf veins and greater saphenous vein.     RIGHT: No deep vein thrombosis. No superficial thrombophlebitis. Isoechoic collection within the right popliteal fossa measuring 5.3 cm.      Impression    IMPRESSION:  1.  No deep venous thrombosis in the right lower extremity.  2.  Complex right [place] cyst.       If you have any questions or concerns, please call the clinic at the number listed above.       Sincerely,      Familia Gillespie MD

## 2024-02-19 ENCOUNTER — THERAPY VISIT (OUTPATIENT)
Dept: PHYSICAL THERAPY | Facility: CLINIC | Age: 79
End: 2024-02-19
Attending: STUDENT IN AN ORGANIZED HEALTH CARE EDUCATION/TRAINING PROGRAM
Payer: COMMERCIAL

## 2024-02-19 DIAGNOSIS — I50.9 ACUTE HEART FAILURE, UNSPECIFIED HEART FAILURE TYPE (H): ICD-10-CM

## 2024-02-19 DIAGNOSIS — Z98.1 S/P CERVICAL SPINAL FUSION: ICD-10-CM

## 2024-02-19 PROCEDURE — 97110 THERAPEUTIC EXERCISES: CPT | Mod: GP | Performed by: PHYSICAL THERAPIST

## 2024-02-19 PROCEDURE — 97140 MANUAL THERAPY 1/> REGIONS: CPT | Mod: GP | Performed by: PHYSICAL THERAPIST

## 2024-02-19 PROCEDURE — 97161 PT EVAL LOW COMPLEX 20 MIN: CPT | Mod: GP | Performed by: PHYSICAL THERAPIST

## 2024-02-19 RX ORDER — FUROSEMIDE 20 MG
20 TABLET ORAL DAILY
Qty: 90 TABLET | Refills: 1 | Status: SHIPPED | OUTPATIENT
Start: 2024-02-19

## 2024-02-19 NOTE — PROGRESS NOTES
PHYSICAL THERAPY EVALUATION  Type of Visit: Evaluation    See electronic medical record for Abuse and Falls Screening details.    Subjective     Pt c/o neck pain with radiation into R U/E S/P C4-T1 posterior fusion 11/13/2023.  Notes continued N/T 3-5th R hand but minimal pain into U/E.  Notes pain in L sided pain with R U/E lifting.  MD order date 2/7/2024        Presenting condition or subjective complaint: Stinging sensation in the left side of my neck when I lift my right arm at a particular level. Sometimes this is when putting on my t-shirt or jacket.  Date of onset: 11/13/23 (surgery date)    Relevant medical history:     Dates & types of surgery: Cervicle fusion, Lumbar fusion    Prior diagnostic imaging/testing results: CT scan     Prior therapy history for the same diagnosis, illness or injury: No      Prior Level of Function  Transfers: Independent  Ambulation: Independent  ADL: Assistive equipment  IADL: Driving, Finances, Housekeeping, Laundry, Meal preparation, Medication management, Work    Living Environment  Social support: With a significant other or spouse   Type of home: House   Stairs to enter the home: Yes 3 Is there a railing: No   Ramp: No   Stairs inside the home: Yes 5 Is there a railing: Yes   Help at home:    Equipment owned:       Employment:      Hobbies/Interests: Volleyball official    Patient goals for therapy: I can do what I need to do.    Pain assessment: Pain present     Objective   CERVICAL SPINE EVALUATION  PAIN: Pain Level at Rest: 1/10  Pain Level with Use: 2/10  Pain Location: cervical spine and L sided  Pain Quality: Aching and stinging  Pain Frequency: intermittent  Pain is Worst: daytime  Pain is Exacerbated By: reaching with R arm, head movement   Pain is Relieved By: rest  Pain Progression: Improved  INTEGUMENTARY (edema, incisions):  well healing incision  POSTURE: WFL  GAIT:   Weightbearing Status: WBAT  Assistive Device(s): Cane (single end), occasional use  Gait  Deviations: Stride length decreased  Valery decreased  Drop foot L  BALANCE/PROPRIOCEPTION:   WEIGHTBEARING ALIGNMENT:   ROM:   (Degrees) Left AROM Right AROM    Cervical Flexion Mod loss +/-    Cervical Extension Mod loss +/-    Cervical Side bend Mod loss +/- L Mod loss +/- L    Cervical Rotation Min loss + L Min loss +/-    Cervical Protrusion     Cervical Retraction     Thoracic Flexion     Thoracic Extension     Thoracic Rotation       Left AROM Left PROM Right AROM Right PROM   Shoulder Flexion       Shoulder Extension       Shoulder Abduction       Shoulder Adduction       Shoulder IR       Shoulder ER       Shoulder Horiz Abduction       Shoulder Horiz Adduction         B shoulder ROM WFL    Pain:   End Feel:     MYOTOMES: WNL  DTR S:   CORD SIGNS:   DERMATOMES:  decreased R C6-T1  NEURAL TENSION:  + R ulna/median   FLEXIBILITY:  decreased B UT and mild pect    SPECIAL TESTS:   PALPATION:  TTP B UT/LS/cx paraspinals  SPINAL SEGMENTAL CONCLUSIONS:  NA per Dx       Assessment & Plan   CLINICAL IMPRESSIONS  Medical Diagnosis: S/P cervical spinal fusion    Treatment Diagnosis: S/P cervical spinal fusion   Impression/Assessment: Patient is a 78 year old male with Neck pain and stiffness S/P C4-T1 fusion complaints.  The following significant findings have been identified: Pain, Decreased ROM/flexibility, Decreased strength, Impaired muscle performance, Decreased activity tolerance, and Impaired posture. These impairments interfere with their ability to perform self care tasks, work tasks, recreational activities, household chores, and driving  as compared to previous level of function.     Clinical Decision Making (Complexity):  Clinical Presentation: Stable/Uncomplicated  Clinical Presentation Rationale: based on medical and personal factors listed in PT evaluation  Clinical Decision Making (Complexity): Low complexity    PLAN OF CARE  Treatment Interventions:  Interventions: Manual Therapy, Neuromuscular  Re-education, Therapeutic Activity, Therapeutic Exercise    Long Term Goals     PT Goal 1  Goal Identifier: Reaching: L sided neck pain with R U/E reaching at IE  Goal Description: Be able to reaching overhead/behind back/out to the side pain free  Rationale:  (for independent personal hygiene;for dressing;for bathing;for meal preparation;for care of infant/toddler;for job requirements in their work place;for safe driving, hook seat belt, reach shift lever and turn signal, using both hands on steering wheel;)  Target Date: 05/19/24      Frequency of Treatment: 1x/week  Duration of Treatment: 3 months    Recommended Referrals to Other Professionals:   Education Assessment:   Learner/Method: Patient;Demonstration;Pictures/Video    Risks and benefits of evaluation/treatment have been explained.   Patient/Family/caregiver agrees with Plan of Care.     Evaluation Time:     PT Eval, Low Complexity Minutes (78174): 15       Signing Clinician: Chino Asencio, PT      [facility]                                                                                   OUTPATIENT PHYSICAL THERAPY      PLAN OF TREATMENT FOR OUTPATIENT REHABILITATION   Patient's Last Name, First Name, Khurram Ibrara YOB: 1945   Provider's Name   [facility]   Medical Record No.  [id number]     Onset Date: 11/13/23 (surgery date)  Start of Care Date: 02/19/24     Medical Diagnosis:  S/P cervical spinal fusion      PT Treatment Diagnosis:  S/P cervical spinal fusion Plan of Treatment  Frequency/Duration: 1x/week/ 3 months    Certification date from 02/19/24 to 05/19/24         See note for plan of treatment details and functional goals     Chino Asencio, PT                         I CERTIFY THE NEED FOR THESE SERVICES FURNISHED UNDER        THIS PLAN OF TREATMENT AND WHILE UNDER MY CARE     (Physician attestation of this document indicates review and certification of  the therapy plan).              Referring Provider:  Guanakito Yancey    Initial Assessment  See Epic Evaluation- Start of Care Date: 02/19/24

## 2024-04-03 DIAGNOSIS — I48.19 PERSISTENT ATRIAL FIBRILLATION (H): ICD-10-CM

## 2024-05-08 ENCOUNTER — OFFICE VISIT (OUTPATIENT)
Dept: NEUROSURGERY | Facility: CLINIC | Age: 79
End: 2024-05-08
Attending: PHYSICIAN ASSISTANT
Payer: COMMERCIAL

## 2024-05-08 ENCOUNTER — ANCILLARY PROCEDURE (OUTPATIENT)
Dept: GENERAL RADIOLOGY | Facility: CLINIC | Age: 79
End: 2024-05-08
Attending: STUDENT IN AN ORGANIZED HEALTH CARE EDUCATION/TRAINING PROGRAM
Payer: COMMERCIAL

## 2024-05-08 VITALS — OXYGEN SATURATION: 95 % | HEART RATE: 66 BPM | SYSTOLIC BLOOD PRESSURE: 100 MMHG | DIASTOLIC BLOOD PRESSURE: 63 MMHG

## 2024-05-08 DIAGNOSIS — Z98.1 S/P CERVICAL SPINAL FUSION: ICD-10-CM

## 2024-05-08 DIAGNOSIS — Z98.1 S/P CERVICAL SPINAL FUSION: Primary | ICD-10-CM

## 2024-05-08 PROCEDURE — G0463 HOSPITAL OUTPT CLINIC VISIT: HCPCS | Performed by: PHYSICIAN ASSISTANT

## 2024-05-08 PROCEDURE — 72040 X-RAY EXAM NECK SPINE 2-3 VW: CPT | Mod: TC | Performed by: RADIOLOGY

## 2024-05-08 PROCEDURE — 99213 OFFICE O/P EST LOW 20 MIN: CPT | Performed by: PHYSICIAN ASSISTANT

## 2024-05-08 NOTE — PROGRESS NOTES
NEUROSURGERY CLINIC PROGRESS NOTE    DATE OF VISIT: 5/8/2024    HPI:     Khurram Reynoso is a pleasant 78 year old male who presents to the clinic today for a three-month post-operative follow-up visit. On 11/13/2023 the patient underwent a cervical 4 to thoracic 1 posterior fusion and decompression with Dr. Yancey  for spinal stenosis of cervical region. Per chart review, the patient's pre-operative symptoms consisted of right hand numbness. The numbness is in his third fourth and fifth digits and goes past the wrist up into the forearm.   Today, the patient reports that he is doing well. His symptoms have not changed much, but overall he is pleased with his outcome.     Current Outpatient Medications   Medication Sig Dispense Refill    apixaban ANTICOAGULANT (ELIQUIS ANTICOAGULANT) 5 MG tablet Take 1 tablet (5 mg) by mouth 2 times daily **Due for cardiology follow-up** 180 tablet 0    fluticasone (FLONASE) 50 MCG/ACT nasal spray SHAKE LIQUID AND USE 1 TO 2 SPRAYS IN EACH NOSTRIL DAILY 16 g 2    furosemide (LASIX) 20 MG tablet Take 1 tablet (20 mg) by mouth daily 90 tablet 1    lisinopril (ZESTRIL) 2.5 MG tablet Take 1 tablet (2.5 mg) by mouth daily 90 tablet 3    metoprolol succinate ER (TOPROL XL) 50 MG 24 hr tablet Take 1 tablet (50 mg) by mouth daily 90 tablet 3    montelukast (SINGULAIR) 10 MG tablet Take 1 tablet (10 mg) by mouth At Bedtime 90 tablet 3    VITAMIN D PO Take one tablet by mouth daily      albuterol (PROAIR HFA/PROVENTIL HFA/VENTOLIN HFA) 108 (90 Base) MCG/ACT inhaler Inhale 1-2 puffs into the lungs every 4 hours as needed for shortness of breath, wheezing or cough (Patient not taking: Reported on 2/14/2024)      carboxymethylcellulose PF (REFRESH PLUS) 0.5 % ophthalmic solution Place 1 drop into both eyes 3 times daily as needed for dry eyes      cyclobenzaprine (FLEXERIL) 5 MG tablet Take 1 tablet (5 mg) by mouth 3 times daily as needed for muscle spasms (Patient not taking: Reported on  2/14/2024) 20 tablet 0    fluticasone-salmeterol (ADVAIR) 250-50 MCG/ACT inhaler Inhale 1 puff into the lungs every 12 hours (Patient not taking: Reported on 2/7/2024) 3 each 3    lidocaine (LIDODERM) 5 % patch Place 1 patch onto the skin every 24 hours To prevent lidocaine toxicity, patient should be patch free for 12 hrs daily. (Patient not taking: Reported on 2/14/2024) 15 patch 0    methocarbamol (ROBAXIN) 750 MG tablet Take 1 tablet (750 mg) by mouth every 6 hours as needed for muscle spasms (Patient not taking: Reported on 2/7/2024) 40 tablet 0    oxyCODONE (ROXICODONE) 5 MG tablet Take 1 tablet (5 mg) by mouth every 4 hours as needed for moderate pain (Patient not taking: Reported on 2/7/2024) 40 tablet 0    senna-docusate (SENOKOT-S/PERICOLACE) 8.6-50 MG tablet Take 1-2 tablets by mouth 2 times daily as needed for constipation (Patient not taking: Reported on 2/7/2024) 40 tablet 0     No current facility-administered medications for this visit.       Allergies   Allergen Reactions    Seasonal Allergies        Past Medical History:   Diagnosis Date    Acute bronchitis 10/17/2005    Asthma 2018    Cardiac arrest (H) 06/2006    V-Fib arrest during heart cath    CARDIAC DYSRHYTHMIA NOS 07/27/2005    Congestive heart failure (H) July 11. 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  Provider to review    Uncomplicated asthma        Review Of Systems    Skin: negative  Eyes: negative  Ears/Nose/Throat: negative  Respiratory: No shortness of breath, dyspnea on exertion, cough, or hemoptysis  Cardiovascular: negative  Gastrointestinal: negative  Musculoskeletal: negative  Neurologic: numbness or tingling of feet  Psychiatric: negative  Hematologic/Lymphatic/Immunologic: negative  Endocrine: negative    OBJECTIVE:    /63   Pulse 66   SpO2 95%     Imaging:    CERVICAL SPINE TWO TO THREE VIEWS  5/8/2024 9:49 AM      Posterior spine fusion hardware from C4 down to T1 again  noted. No findings to suggest hardware failure or loosening. Moderate  degenerative anterolisthesis of C4 upon C5 again noted. Alignment  otherwise normal. Vertebral body heights normal. No evidence for  fracture.    Radiographic Findings: Full radiological report in chart. I personally reviewed the images with the patient today.    Exam:    Patient appears comfortable and in no apparent distress. Moving all extremities.  Gait is non-antalgic.  CN II-XII grossly intact, alert and appropriate with conversation and following  commands  Bilateral upper extremities with full strength including hand intrinsics and grasp.  Sensation intact throughout.  Bilateral lower extremities 5/5 strength including plantar and dorsiflexion.  Normal sensation throughout bilaterally.  Posterior cervical incision edges well approximated. Absent for edema, erythema, or drainage.      PLAN:    Khurram Reynoso is three months out from a cervical 4 to thoracic 1 posterior fusion and decompression with Dr. Yancey  for spinal stenosis of cervical region performed by Dr. Conway on 11/13/2023. Today the patient  reports that his numbness is in his third fourth and fifth digits and goes past the wrist up into the forearm have not changed much, but overall he is pleased with his outcome.     Imaging was reviewed today and show posterior spine fusion  hardware from C4 down to T1 without any findings to suggest hardware failure or loosening.     Today we discussed continuing to avoid excessive  jostling and jarring activities.     The patient gave verbal understanding and is in agreement with the above plan. He will call or return to the clinic for any worsening or changes in symptoms.        Respectfully,     ROBERT Watkins, PAYadiraC

## 2024-05-08 NOTE — LETTER
5/8/2024         RE: Khurram Reynoso  [address]  [place] [address]        Dear Colleague,    Thank you for referring your patient, Khurram Reynoso, to the [facility] [place]. Please see a copy of my visit note below.    NEUROSURGERY CLINIC PROGRESS NOTE    DATE OF VISIT: 5/8/2024    HPI:     Khurram Reynoso is a pleasant 78 year old male who presents to the clinic today for a three-month post-operative follow-up visit. On 11/13/2023 the patient underwent a cervical 4 to thoracic 1 posterior fusion and decompression with Dr. Yancey  for spinal stenosis of cervical region. Per chart review, the patient's pre-operative symptoms consisted of right hand numbness. The numbness is in his third fourth and fifth digits and goes past the wrist up into the forearm.   Today, the patient reports that he is doing well. His symptoms have not changed much, but overall he is pleased with his outcome.     Current Outpatient Medications   Medication Sig Dispense Refill     apixaban ANTICOAGULANT (ELIQUIS ANTICOAGULANT) 5 MG tablet Take 1 tablet (5 mg) by mouth 2 times daily **Due for cardiology follow-up** 180 tablet 0     fluticasone (FLONASE) 50 MCG/ACT nasal spray SHAKE LIQUID AND USE 1 TO 2 SPRAYS IN EACH NOSTRIL DAILY 16 g 2     furosemide (LASIX) 20 MG tablet Take 1 tablet (20 mg) by mouth daily 90 tablet 1     lisinopril (ZESTRIL) 2.5 MG tablet Take 1 tablet (2.5 mg) by mouth daily 90 tablet 3     metoprolol succinate ER (TOPROL XL) 50 MG 24 hr tablet Take 1 tablet (50 mg) by mouth daily 90 tablet 3     montelukast (SINGULAIR) 10 MG tablet Take 1 tablet (10 mg) by mouth At Bedtime 90 tablet 3     VITAMIN D PO Take one tablet by mouth daily       albuterol (PROAIR HFA/PROVENTIL HFA/VENTOLIN HFA) 108 (90 Base) MCG/ACT inhaler Inhale 1-2 puffs into the lungs every 4 hours as needed for shortness of breath, wheezing or cough (Patient not taking: Reported on 2/14/2024)        carboxymethylcellulose PF (REFRESH PLUS) 0.5 % ophthalmic solution Place 1 drop into both eyes 3 times daily as needed for dry eyes       cyclobenzaprine (FLEXERIL) 5 MG tablet Take 1 tablet (5 mg) by mouth 3 times daily as needed for muscle spasms (Patient not taking: Reported on 2/14/2024) 20 tablet 0     fluticasone-salmeterol (ADVAIR) 250-50 MCG/ACT inhaler Inhale 1 puff into the lungs every 12 hours (Patient not taking: Reported on 2/7/2024) 3 each 3     lidocaine (LIDODERM) 5 % patch Place 1 patch onto the skin every 24 hours To prevent lidocaine toxicity, patient should be patch free for 12 hrs daily. (Patient not taking: Reported on 2/14/2024) 15 patch 0     methocarbamol (ROBAXIN) 750 MG tablet Take 1 tablet (750 mg) by mouth every 6 hours as needed for muscle spasms (Patient not taking: Reported on 2/7/2024) 40 tablet 0     oxyCODONE (ROXICODONE) 5 MG tablet Take 1 tablet (5 mg) by mouth every 4 hours as needed for moderate pain (Patient not taking: Reported on 2/7/2024) 40 tablet 0     senna-docusate (SENOKOT-S/PERICOLACE) 8.6-50 MG tablet Take 1-2 tablets by mouth 2 times daily as needed for constipation (Patient not taking: Reported on 2/7/2024) 40 tablet 0     No current facility-administered medications for this visit.       Allergies   Allergen Reactions     Seasonal Allergies        Past Medical History:   Diagnosis Date     Acute bronchitis 10/17/2005     Asthma 2018     Cardiac arrest (H) 06/2006    V-Fib arrest during heart cath     CARDIAC DYSRHYTHMIA NOS 07/27/2005     Congestive heart failure (H) July 11. 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 Provider to review     Uncomplicated asthma        Review Of Systems    Skin: negative  Eyes: negative  Ears/Nose/Throat: negative  Respiratory: No shortness of breath, dyspnea on exertion, cough, or hemoptysis  Cardiovascular: negative  Gastrointestinal: negative  Musculoskeletal: negative  Neurologic: numbness or tingling of feet  Psychiatric: negative  Hematologic/Lymphatic/Immunologic: negative  Endocrine: negative    OBJECTIVE:    /63   Pulse 66   SpO2 95%     Imaging:    CERVICAL SPINE TWO TO THREE VIEWS  5/8/2024 9:49 AM      Posterior spine fusion hardware from C4 down to T1 again  noted. No findings to suggest hardware failure or loosening. Moderate  degenerative anterolisthesis of C4 upon C5 again noted. Alignment  otherwise normal. Vertebral body heights normal. No evidence for  fracture.    Radiographic Findings: Full radiological report in chart. I personally reviewed the images with the patient today.    Exam:    Patient appears comfortable and in no apparent distress. Moving all extremities.  Gait is non-antalgic.  CN II-XII grossly intact, alert and appropriate with conversation and following  commands  Bilateral upper extremities with full strength including hand intrinsics and grasp.  Sensation intact throughout.  Bilateral lower extremities 5/5 strength including plantar and dorsiflexion.  Normal sensation throughout bilaterally.  Posterior cervical incision edges well approximated. Absent for edema, erythema, or drainage.      PLAN:    Khurram Reynoso is three months out from a cervical 4 to thoracic 1 posterior fusion and decompression with Dr. Yancey  for spinal stenosis of cervical  region performed by Dr. Conway on 11/13/2023. Today the patient  reports that his numbness is in his third fourth and fifth digits and goes past the wrist up into the forearm have not changed much, but overall he is pleased with his outcome.     Imaging was reviewed today and show posterior spine fusion hardware from C4 down to T1 without any findings to suggest hardware failure or loosening.     Today we discussed continuing to avoid excessive  jostling and jarring activities.     The patient gave verbal understanding and is in agreement with the above plan. He will call or return to the clinic for any worsening or changes in symptoms.        Respectfully,     ROBERT Watkins PA-C      Again, thank you for allowing me to participate in the care of your patient.        Sincerely,        Marty Miller PA-C

## 2024-05-08 NOTE — NURSING NOTE
"Khurram Reynoso is a 78 year old male who presents for:  Chief Complaint   Patient presents with    Follow Up     6 months post op. Tightness in neck. No pain today.         Initial Vitals:  /63   Pulse 66   SpO2 95%  Estimated body mass index is 29.23 kg/m  as calculated from the following:    Height as of 2/7/24: 6' 0.1\" (1.831 m).    Weight as of 2/14/24: 216 lb 1.6 oz (98 kg).. There is no height or weight on file to calculate BSA. BP completed using cuff size: large  Data Unavailable        Mary Ding    "

## 2024-06-12 SDOH — HEALTH STABILITY: PHYSICAL HEALTH: ON AVERAGE, HOW MANY MINUTES DO YOU ENGAGE IN EXERCISE AT THIS LEVEL?: 0 MIN

## 2024-06-12 SDOH — HEALTH STABILITY: PHYSICAL HEALTH: ON AVERAGE, HOW MANY DAYS PER WEEK DO YOU ENGAGE IN MODERATE TO STRENUOUS EXERCISE (LIKE A BRISK WALK)?: 0 DAYS

## 2024-06-12 ASSESSMENT — ASTHMA QUESTIONNAIRES
QUESTION_3 LAST FOUR WEEKS HOW OFTEN DID YOUR ASTHMA SYMPTOMS (WHEEZING, COUGHING, SHORTNESS OF BREATH, CHEST TIGHTNESS OR PAIN) WAKE YOU UP AT NIGHT OR EARLIER THAN USUAL IN THE MORNING: NOT AT ALL
ACT_TOTALSCORE: 24
QUESTION_1 LAST FOUR WEEKS HOW MUCH OF THE TIME DID YOUR ASTHMA KEEP YOU FROM GETTING AS MUCH DONE AT WORK, SCHOOL OR AT HOME: NONE OF THE TIME
QUESTION_5 LAST FOUR WEEKS HOW WOULD YOU RATE YOUR ASTHMA CONTROL: COMPLETELY CONTROLLED
QUESTION_4 LAST FOUR WEEKS HOW OFTEN HAVE YOU USED YOUR RESCUE INHALER OR NEBULIZER MEDICATION (SUCH AS ALBUTEROL): ONCE A WEEK OR LESS
ACT_TOTALSCORE: 24
QUESTION_2 LAST FOUR WEEKS HOW OFTEN HAVE YOU HAD SHORTNESS OF BREATH: NOT AT ALL

## 2024-06-12 ASSESSMENT — SOCIAL DETERMINANTS OF HEALTH (SDOH): HOW OFTEN DO YOU GET TOGETHER WITH FRIENDS OR RELATIVES?: ONCE A WEEK

## 2024-06-17 ENCOUNTER — ANCILLARY PROCEDURE (OUTPATIENT)
Dept: GENERAL RADIOLOGY | Facility: CLINIC | Age: 79
End: 2024-06-17
Attending: INTERNAL MEDICINE
Payer: COMMERCIAL

## 2024-06-17 ENCOUNTER — OFFICE VISIT (OUTPATIENT)
Dept: INTERNAL MEDICINE | Facility: CLINIC | Age: 79
End: 2024-06-17
Attending: INTERNAL MEDICINE
Payer: COMMERCIAL

## 2024-06-17 VITALS
TEMPERATURE: 98.1 F | DIASTOLIC BLOOD PRESSURE: 57 MMHG | BODY MASS INDEX: 27.24 KG/M2 | SYSTOLIC BLOOD PRESSURE: 98 MMHG | OXYGEN SATURATION: 96 % | HEART RATE: 75 BPM | RESPIRATION RATE: 16 BRPM | WEIGHT: 201.1 LBS | HEIGHT: 72 IN

## 2024-06-17 DIAGNOSIS — R41.3 MEMORY IMPAIRMENT: ICD-10-CM

## 2024-06-17 DIAGNOSIS — I77.810 AORTIC ROOT DILATATION (H): ICD-10-CM

## 2024-06-17 DIAGNOSIS — J45.30 MILD PERSISTENT ASTHMA WITHOUT COMPLICATION: ICD-10-CM

## 2024-06-17 DIAGNOSIS — I48.19 PERSISTENT ATRIAL FIBRILLATION (H): ICD-10-CM

## 2024-06-17 DIAGNOSIS — M54.50 BILATERAL LOW BACK PAIN WITHOUT SCIATICA, UNSPECIFIED CHRONICITY: ICD-10-CM

## 2024-06-17 DIAGNOSIS — Z12.5 SCREENING FOR PROSTATE CANCER: ICD-10-CM

## 2024-06-17 DIAGNOSIS — Z23 NEED FOR SHINGLES VACCINE: ICD-10-CM

## 2024-06-17 DIAGNOSIS — Z29.11 NEED FOR VACCINATION AGAINST RESPIRATORY SYNCYTIAL VIRUS: ICD-10-CM

## 2024-06-17 DIAGNOSIS — M1A.0710 CHRONIC GOUT OF RIGHT ANKLE, UNSPECIFIED CAUSE: ICD-10-CM

## 2024-06-17 DIAGNOSIS — Z00.00 ENCOUNTER FOR PREVENTATIVE ADULT HEALTH CARE EXAMINATION: Primary | ICD-10-CM

## 2024-06-17 LAB
ALBUMIN UR-MCNC: NEGATIVE MG/DL
APPEARANCE UR: CLEAR
BACTERIA #/AREA URNS HPF: ABNORMAL /HPF
BILIRUB UR QL STRIP: NEGATIVE
COLOR UR AUTO: YELLOW
ERYTHROCYTE [DISTWIDTH] IN BLOOD BY AUTOMATED COUNT: 14 % (ref 10–15)
GLUCOSE UR STRIP-MCNC: NEGATIVE MG/DL
HCT VFR BLD AUTO: 41.3 % (ref 40–53)
HGB BLD-MCNC: 13.8 G/DL (ref 13.3–17.7)
HGB UR QL STRIP: NEGATIVE
KETONES UR STRIP-MCNC: NEGATIVE MG/DL
LEUKOCYTE ESTERASE UR QL STRIP: NEGATIVE
MCH RBC QN AUTO: 30.7 PG (ref 26.5–33)
MCHC RBC AUTO-ENTMCNC: 33.4 G/DL (ref 31.5–36.5)
MCV RBC AUTO: 92 FL (ref 78–100)
MUCOUS THREADS #/AREA URNS LPF: PRESENT /LPF
NITRATE UR QL: NEGATIVE
PH UR STRIP: 5.5 [PH] (ref 5–7)
PLATELET # BLD AUTO: 364 10E3/UL (ref 150–450)
RBC # BLD AUTO: 4.49 10E6/UL (ref 4.4–5.9)
RBC #/AREA URNS AUTO: ABNORMAL /HPF
SP GR UR STRIP: 1.02 (ref 1–1.03)
SQUAMOUS #/AREA URNS AUTO: ABNORMAL /LPF
UROBILINOGEN UR STRIP-ACNC: 0.2 E.U./DL
WBC # BLD AUTO: 7.5 10E3/UL (ref 4–11)
WBC #/AREA URNS AUTO: ABNORMAL /HPF

## 2024-06-17 PROCEDURE — 72100 X-RAY EXAM L-S SPINE 2/3 VWS: CPT | Mod: TC | Performed by: RADIOLOGY

## 2024-06-17 PROCEDURE — 99213 OFFICE O/P EST LOW 20 MIN: CPT | Mod: 25 | Performed by: INTERNAL MEDICINE

## 2024-06-17 PROCEDURE — 81001 URINALYSIS AUTO W/SCOPE: CPT | Performed by: INTERNAL MEDICINE

## 2024-06-17 PROCEDURE — 36415 COLL VENOUS BLD VENIPUNCTURE: CPT | Performed by: INTERNAL MEDICINE

## 2024-06-17 PROCEDURE — G0103 PSA SCREENING: HCPCS | Performed by: INTERNAL MEDICINE

## 2024-06-17 PROCEDURE — 84550 ASSAY OF BLOOD/URIC ACID: CPT | Performed by: INTERNAL MEDICINE

## 2024-06-17 PROCEDURE — 85027 COMPLETE CBC AUTOMATED: CPT | Performed by: INTERNAL MEDICINE

## 2024-06-17 PROCEDURE — 82607 VITAMIN B-12: CPT | Performed by: INTERNAL MEDICINE

## 2024-06-17 PROCEDURE — G0438 PPPS, INITIAL VISIT: HCPCS | Performed by: INTERNAL MEDICINE

## 2024-06-17 PROCEDURE — 80061 LIPID PANEL: CPT | Performed by: INTERNAL MEDICINE

## 2024-06-17 PROCEDURE — 84443 ASSAY THYROID STIM HORMONE: CPT | Mod: GZ | Performed by: INTERNAL MEDICINE

## 2024-06-17 PROCEDURE — 84439 ASSAY OF FREE THYROXINE: CPT | Mod: GZ | Performed by: INTERNAL MEDICINE

## 2024-06-17 PROCEDURE — 80053 COMPREHEN METABOLIC PANEL: CPT | Performed by: INTERNAL MEDICINE

## 2024-06-17 RX ORDER — RESPIRATORY SYNCYTIAL VIRUS VACCINE 120MCG/0.5
0.5 KIT INTRAMUSCULAR ONCE
Qty: 1 EACH | Refills: 0 | Status: CANCELLED | OUTPATIENT
Start: 2024-06-17 | End: 2024-06-17

## 2024-06-17 RX ORDER — CALCIUM CARBONATE/VITAMIN D3 600 MG-10
1000 TABLET ORAL DAILY
COMMUNITY

## 2024-06-17 RX ORDER — FLUTICASONE PROPIONATE AND SALMETEROL 250; 50 UG/1; UG/1
1 POWDER RESPIRATORY (INHALATION) EVERY 12 HOURS
Qty: 3 EACH | Refills: 3 | Status: SHIPPED | OUTPATIENT
Start: 2024-06-17

## 2024-06-17 ASSESSMENT — PAIN SCALES - GENERAL: PAINLEVEL: MILD PAIN (3)

## 2024-06-17 NOTE — PROGRESS NOTES
Preventive Care Visit  [facility]  Familia Gillespie MD, Internal Medicine  Jun 17, 2024      Assessment & Plan       Persistent atrial fibrillation (H)  Controlled rate, on AC   - OFFICE/OUTPT VISIT,EST,LEVL IV    Encounter for preventative adult health care examination  advised regular aerobic activity, low cholesterol, low salt diet, wearing seat belt,  self examinations, sunscreen protection.Obtain screening cholesterol, immunizations reviewed.    - Lipid panel reflex to direct LDL Fasting  - CBC with platelets  - Comprehensive metabolic panel (BMP + Alb, Alk Phos, ALT, AST, Total. Bili, TP)  - TSH with free T4 reflex  - PSA, screen  - UA with Microscopic reflex to Culture - lab collect  - Vitamin B12    Memory impairment  Assess B12, consider neurology assessment   - Vitamin B12  - OFFICE/OUTPT VISIT,EST,LEVL IV    Mild persistent asthma without complication  Restart Advair  - OFFICE/OUTPT VISIT,EST,LEVL IV    Screening for prostate cancer    - PSA, screen    Chronic gout of right ankle, unspecified cause  Assess uric acid   Consider Allopurinol treatment   - Uric acid  - OFFICE/OUTPT VISIT,EST,LEVL IV    Bilateral low back pain without sciatica, unspecified chronicity  Assess LS spine X rays, has h/o lumbar fusion.     - XR Lumbar Spine 2/3 Views; Future  - OFFICE/OUTPT VISIT,EST,LEVL IV    Aortic root dilatation (H24)  Asymptomatic, no heart murmur , controlled a fib     Patient has been advised of split billing requirements and indicates understanding: Yes        Counseling  Appropriate preventive services were discussed with this patient, including applicable screening as appropriate for fall prevention, nutrition, physical activity, Tobacco-use cessation, weight loss and cognition.  Checklist reviewing preventive services available has been given to the patient.  Reviewed patient's diet, addressing concerns and/or questions.   The patient was instructed to see the dentist every 6  months.   He is at risk for psychosocial distress and has been provided with information to reduce risk.   The patient was provided with written information regarding signs of hearing loss.   I have reviewed Opioid Use Disorder and Substance Use Disorder risk factors and made any needed referrals.       See Patient Instructions    Forrest Paulson is a 79 year old, presenting for the following:  Wellness Visit (Fasting/)        6/17/2024     8:40 AM   Additional Questions   Roomed by Cha AIKEN   Accompanied by n/a         Health Care Directive  Patient has a Health Care Directive on file  Advance care planning document is on file and is current.    HPI    Has history of atrial fibrillation. On anticoagulation with Eliquis and rate control medications. Asymptonatic - no chest pains , palpitations,  no side effects from medications.  Has h/o HTN. on medical treatment. BP has been controlled. No side effects from medications. No CP, HA, dizziness. good compliance with medications and low salt diet.  Has concerns for memory deficits, affecting short term memory, has had an episode of distorted eyesight , for seconds, no recurrence.   Has lost 35 lbs for 2 years, not intentional.   Has had increased LBP, has h/o lumbar stenosis post lumbar fusion. Has chronic left foot drop and uses a cane.   Has had right foot swelling and pain. Pain is improved. Mild swelling is present. Negative US for DVT.   Reports chronic cough, productive of clear sputums. Has h/o asthma, but is not using his inhaler.           6/12/2024   General Health   How would you rate your overall physical health? Good   Feel stress (tense, anxious, or unable to sleep) Only a little   (!) STRESS CONCERN      6/12/2024   Nutrition   Diet: Regular (no restrictions)         6/12/2024   Exercise   Days per week of moderate/strenous exercise 0 days   Average minutes spent exercising at this level 0 min   (!) EXERCISE CONCERN      6/12/2024   Social Factors    Frequency of gathering with friends or relatives Once a week   Worry food won't last until get money to buy more No   Food not last or not have enough money for food? No   Do you have housing?  Yes   Are you worried about losing your housing? No   Lack of transportation? No   Unable to get utilities (heat,electricity)? No         6/17/2024   Fall Risk   Gait Speed Test (Document in seconds) 4.61   Gait Speed Test Interpretation Less than or equal to 5.00 seconds - PASS          6/12/2024   Activities of Daily Living- Home Safety   Needs help with the following daily activites None of the above   Safety concerns in the home None of the above         6/12/2024   Dental   Dentist two times every year? (!) NO         6/12/2024   Hearing Screening   Hearing concerns? (!) TROUBLE UNDERSTANDING SOFT OR WHISPERED SPEECH.         6/12/2024   Driving Risk Screening   Patient/family members have concerns about driving No         6/12/2024   General Alertness/Fatigue Screening   Have you been more tired than usual lately? No         6/12/2024   Urinary Incontinence Screening   Bothered by leaking urine in past 6 months No         6/12/2024   TB Screening   Were you born outside of the [place]? No           Today's PHQ-2 Score:       2/13/2024     9:41 AM   PHQ-2 ( 1999 [employer])   Q1: Little interest or pleasure in doing things 0   Q2: Feeling down, depressed or hopeless 0   PHQ-2 Score 0   Q1: Little interest or pleasure in doing things Not at all   Q2: Feeling down, depressed or hopeless Not at all   PHQ-2 Score 0         6/12/2024   Substance Use   Alcohol more than 3/day or more than 7/wk Not Applicable   Do you have a current opioid prescription? (!) YES   How severe/bad is pain from 1 to 10? 3/10   Do you use any other substances recreationally? No       Social History     Tobacco Use    Smoking status: Never    Smokeless tobacco: Never   Vaping Use    Vaping status: Never Used   Substance Use Topics    Alcohol use: No    Drug  use: No       ASCVD Risk   The 10-year ASCVD risk score (Darren MYERS, et al., 2019) is: 20.2%    Values used to calculate the score:      Age: 79 years      Sex: Male      Is Non- : No      Diabetic: No      Tobacco smoker: No      Systolic Blood Pressure: 98 mmHg      Is BP treated: No      HDL Cholesterol: 40 mg/dL      Total Cholesterol: 180 mg/dL            Reviewed and updated as needed this visit by Provider                    Lab work is in process  Labs reviewed in EPIC  Current providers sharing in care for this patient include:  Patient Care Team:  Familia Gillespie MD as PCP - General (Internal Medicine)  [place], [facility] as PCP - Internal Medicine  [facility], Lavern Norris MD as MD (Otolaryngology)  Familia Gillespie MD as Assigned PCP  Sourav Lauren MD as MD (Cardiovascular Disease)  Ian Reyez MD as MD (Cardiovascular Disease)  Oh Mclain MD as MD (Neurology)  Ian Reyez MD as Assigned Heart and Vascular Provider  Marty Miller PA-C as Assigned Neuroscience Provider    The following health maintenance items are reviewed in Epic and correct as of today:  Health Maintenance   Topic Date Due    HF ACTION PLAN  Never done    RSV VACCINE (Pregnancy & 60+) (1 - 1-dose 60+ series) Never done    ZOSTER IMMUNIZATION (2 of 3) 03/05/2015    DIGOXIN  08/11/2023    COVID-19 Vaccine (4 - 2023-24 season) 09/01/2023    LIPID  06/16/2024    MEDICARE ANNUAL WELLNESS VISIT  06/16/2024    BMP  07/11/2024    ANNUAL REVIEW OF HM ORDERS  08/16/2024    INFLUENZA VACCINE (Season Ended) 09/01/2024    ASTHMA ACTION PLAN  11/03/2024    ASTHMA CONTROL TEST  12/17/2024    ALT  01/11/2025    CBC  01/11/2025    FALL RISK ASSESSMENT  06/17/2025    GLUCOSE  01/11/2027    ADVANCE CARE PLANNING  06/16/2028    DTAP/TDAP/TD IMMUNIZATION (4 - Td or Tdap) 11/09/2030    TSH W/FREE T4 REFLEX  Completed    HEPATITIS C SCREENING  Completed    PHQ-2 (once per  calendar year)  Completed    Pneumococcal Vaccine: 65+ Years  Completed    IPV IMMUNIZATION  Aged Out    HPV IMMUNIZATION  Aged Out    MENINGITIS IMMUNIZATION  Aged Out    RSV MONOCLONAL ANTIBODY  Aged Out    COLORECTAL CANCER SCREENING  Discontinued         Review of Systems  Constitutional, HEENT, cardiovascular, pulmonary, GI, , musculoskeletal, neuro, skin, endocrine and psych systems are negative, except as otherwise noted.     Objective    Exam  BP 98/57 (BP Location: Left arm, Cuff Size: Adult Regular)   Pulse 75   Temp 98.1  F (36.7  C) (Tympanic)   Resp 16   Ht 1.829 m (6')   Wt 91.2 kg (201 lb 1.6 oz)   SpO2 96%   BMI 27.27 kg/m     Estimated body mass index is 27.27 kg/m  as calculated from the following:    Height as of this encounter: 1.829 m (6').    Weight as of this encounter: 91.2 kg (201 lb 1.6 oz).    Physical Exam  GENERAL: alert and no distress  EYES: Eyes grossly normal to inspection, PERRL and conjunctivae and sclerae normal  HENT: ear canals and TM's normal, nose and mouth without ulcers or lesions  NECK: no adenopathy, no asymmetry, masses, or scars  RESP: lungs clear to auscultation - no rales, + course rhonchi and wheezes  CV: regular rate and rhythm, normal S1 S2, no S3 or S4, no murmur, click or rub, trace LE peripheral edema  ABDOMEN: soft, nontender, no hepatosplenomegaly, no masses and bowel sounds normal  MS: no gross musculoskeletal defects noted  SKIN: no suspicious lesions or rashes  NEURO: Normal strength and tone, mentation intact and speech normal. Left foot drop, uses a cane   PSYCH: mentation appears normal, affect normal/bright         6/17/2024   Mini Cog   Clock Draw Score 0 Abnormal   3 Item Recall 3 objects recalled   Mini Cog Total Score 3              Signed Electronically by: Familia Gillespie MD

## 2024-06-17 NOTE — LETTER
June 17, 2024      Khurram Reynoso  [id number]  [facility] [address]        Dear ,    We are writing to inform you of your test results.    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 Recommend PT if symptoms persist.     Resulted Orders   XR Lumbar Spine 2/3 Views    Narrative    XR LUMBAR SPINE 2/3 VIEWS  6/17/2024 9:48 AM     HISTORY: Bilateral low back pain without sciatica, unspecified  chronicity    COMPARISON: None.       Impression    IMPRESSION: Postsurgical changes of instrumented posterior spinal  fusion L2-L5. There is near complete bony fusion across fused levels.  Left convex curvature centered in the the lumbar spine. No loss of  vertebral body height. Multilevel degenerative changes with loss of  disc height, osteophyte formation and facet arthropathy.    FLORESITA RATLIFF MD         SYSTEM ID:  [id number]       If you have any questions or concerns, please call the clinic at the number listed above.       Sincerely,      Familia Gillespie MD

## 2024-06-18 LAB
ALBUMIN SERPL BCG-MCNC: 3.9 G/DL (ref 3.5–5.2)
ALP SERPL-CCNC: 147 U/L (ref 40–150)
ALT SERPL W P-5'-P-CCNC: 11 U/L (ref 0–70)
ANION GAP SERPL CALCULATED.3IONS-SCNC: 9 MMOL/L (ref 7–15)
AST SERPL W P-5'-P-CCNC: 23 U/L (ref 0–45)
BILIRUB SERPL-MCNC: 0.5 MG/DL
BUN SERPL-MCNC: 24.3 MG/DL (ref 8–23)
CALCIUM SERPL-MCNC: 9.1 MG/DL (ref 8.8–10.2)
CHLORIDE SERPL-SCNC: 105 MMOL/L (ref 98–107)
CHOLEST SERPL-MCNC: 144 MG/DL
CREAT SERPL-MCNC: 1.33 MG/DL (ref 0.67–1.17)
DEPRECATED HCO3 PLAS-SCNC: 26 MMOL/L (ref 22–29)
EGFRCR SERPLBLD CKD-EPI 2021: 55 ML/MIN/1.73M2
FASTING STATUS PATIENT QL REPORTED: YES
FASTING STATUS PATIENT QL REPORTED: YES
GLUCOSE SERPL-MCNC: 86 MG/DL (ref 70–99)
HDLC SERPL-MCNC: 43 MG/DL
LDLC SERPL CALC-MCNC: 87 MG/DL
NONHDLC SERPL-MCNC: 101 MG/DL
POTASSIUM SERPL-SCNC: 4.4 MMOL/L (ref 3.4–5.3)
PROT SERPL-MCNC: 7.4 G/DL (ref 6.4–8.3)
PSA SERPL DL<=0.01 NG/ML-MCNC: 3.09 NG/ML (ref 0–6.5)
SODIUM SERPL-SCNC: 140 MMOL/L (ref 135–145)
T4 FREE SERPL-MCNC: 1.46 NG/DL (ref 0.9–1.7)
TRIGL SERPL-MCNC: 68 MG/DL
TSH SERPL DL<=0.005 MIU/L-ACNC: 5.87 UIU/ML (ref 0.3–4.2)
URATE SERPL-MCNC: 8.6 MG/DL (ref 3.4–7)
VIT B12 SERPL-MCNC: 540 PG/ML (ref 232–1245)

## 2024-06-30 DIAGNOSIS — I48.19 PERSISTENT ATRIAL FIBRILLATION (H): ICD-10-CM

## 2024-07-01 ENCOUNTER — TELEPHONE (OUTPATIENT)
Dept: CARDIOLOGY | Facility: CLINIC | Age: 79
End: 2024-07-01
Payer: COMMERCIAL

## 2024-07-01 RX ORDER — APIXABAN 5 MG/1
TABLET, FILM COATED ORAL
Qty: 180 TABLET | Refills: 0 | Status: SHIPPED | OUTPATIENT
Start: 2024-07-01

## 2024-07-01 NOTE — TELEPHONE ENCOUNTER
[facility] Call Center    Phone Message    May a detailed message be left on voicemail: yes     Reason for Call: Other: Patient would like a call back from Dr Lauren's office. He wants to know what his care plan is or any recommendations as Dr Lauren no longer sees general cardiology and is an EP specialist .      Action Taken: Other: cardio    Travel Screening: Not Applicable     Date of Service:

## 2024-07-01 NOTE — TELEPHONE ENCOUNTER
Noted pt has seen Dr. Lauren last appt on 1/12/23.   Pt saw Dr. Reyez 4/14/23.     Return call placed to pt and pt explained that he would like to have a different general Cardiologist and wanted recommendations. Let pt know that he should contact the main scheduling line and they can further assist him with which Cardiologists are seeing new pt's. Pt verbalized understanding.    Janki Harrison RN

## 2024-07-31 ENCOUNTER — MYC MEDICAL ADVICE (OUTPATIENT)
Dept: INTERNAL MEDICINE | Facility: CLINIC | Age: 79
End: 2024-07-31
Payer: COMMERCIAL

## 2024-07-31 DIAGNOSIS — R41.3 MEMORY IMPAIRMENT: Primary | ICD-10-CM

## 2024-09-11 ENCOUNTER — TELEPHONE (OUTPATIENT)
Dept: INTERNAL MEDICINE | Facility: CLINIC | Age: 79
End: 2024-09-11

## 2024-09-11 ENCOUNTER — VIRTUAL VISIT (OUTPATIENT)
Dept: FAMILY MEDICINE | Facility: CLINIC | Age: 79
End: 2024-09-11
Payer: COMMERCIAL

## 2024-09-11 ENCOUNTER — MYC MEDICAL ADVICE (OUTPATIENT)
Dept: INTERNAL MEDICINE | Facility: CLINIC | Age: 79
End: 2024-09-11

## 2024-09-11 DIAGNOSIS — U07.1 INFECTION DUE TO 2019 NOVEL CORONAVIRUS: Primary | ICD-10-CM

## 2024-09-11 DIAGNOSIS — U07.1 INFECTION DUE TO 2019 NOVEL CORONAVIRUS: ICD-10-CM

## 2024-09-11 DIAGNOSIS — J45.30 MILD PERSISTENT ASTHMA WITHOUT COMPLICATION: Primary | ICD-10-CM

## 2024-09-11 PROCEDURE — 99441 PR PHYSICIAN TELEPHONE EVALUATION 5-10 MIN: CPT | Mod: 93 | Performed by: FAMILY MEDICINE

## 2024-09-11 NOTE — PROGRESS NOTES
Khurram is a 79 year old who is being evaluated via a billable telephone visit.    What phone number would you like to be contacted at? [phone]   How would you like to obtain your AVS? Pauline  Originating Location (pt. Location): Home    Distant Location (provider location):  On-site    Assessment & Plan   Problem List Items Addressed This Visit    None  Visit Diagnoses       Infection due to 2019 novel coronavirus    -  Primary    Relevant Medications    molnupiravir (LAGEVRIO) 200 MG capsule        79-year-old,  2 days of illness including cough which is usually productive, no significant shortness of breath, stuffy nose, pressure in his face and subjective fever-though temperatures have been normal when it is checked.  Tested positive for COVID-19    Patient has history of persistent atrial fibrillation and is taking Eliquis.  5 mg twice daily dose disqualifies him from consideration for Paxlovid.    Will use molnupiravir  Discussed potential side effects of this medication.  Discussed when to seek medical attention  Discussed isolation    Follow-up as needed.  Patient also had some questions about medication doses, I am going to defer those to his primary care team.         BMI  Estimated body mass index is 27.27 kg/m  as calculated from the following:    Height as of 6/17/24: 1.829 m (6').    Weight as of 6/17/24: 91.2 kg (201 lb 1.6 oz).             Subjective   Khurram is a 79 year old, presenting for the following health issues:  Covid 19 Testing      9/11/2024     2:52 PM   Additional Questions   Roomed by rita   Accompanied by self     HPI       COVID-19 Symptom Review  How many days ago did these symptoms start? 3    Are any of the following symptoms significant for you?  New or worsening difficulty breathing? No  Worsening cough? Yes, I am coughing up mucus.  Fever or chills? Yes, I felt feverish or had chills.  Headache: No  Sore throat: No  Chest pain: No  Diarrhea: No  Body aches? No    Does patient  live in a nursing home, group home, or shelter? No  Does patient have a way to get food/medications during quarantined? Yes, I have a friend or family member who can help me.                Objective           Vitals:  No vitals were obtained today due to virtual visit.    Physical Exam   General: Alert and no distress //Respiratory: No audible wheeze, cough, or shortness of breath // Psychiatric:  Appropriate affect, tone, and pace of words      Reports positive COVID test done by pharmacy      Phone call duration: 8 minutes  Signed Electronically by: Terence Palmer MD

## 2024-09-11 NOTE — TELEPHONE ENCOUNTER
Call to pt.   Pt takes Eliquis. He will need VV with provider.   PT has Asthma.     Pt states his symptoms started 2 or 3 days ago. Test was today, positive.     He is Coughing. Productive phlegm.   No fever.   Sinuses are congested.    A little tired. Appetite is fine, no other symptoms.     He is using his Advair, then when this runs out he is starting his Dulera.   His [facility] doctor prescribed this on 8/29/24 because of FORMULARY issue.     Transferred pt to scheduling. The Virtual COVID provider is full today and no openings at clinic.     Will route to Dr Gillespie to ADD Dulera to his med list and discontinue the Advair due to Formulary issue.

## 2024-09-11 NOTE — TELEPHONE ENCOUNTER
Dr. Palmer --    Please send in new script with corrected date to [facility].   Molnupiravir 200 mg capsule.   NOTE to PHARMACY: date of symptom onset:     If new script sent in there is no need to call pharmacy back.     Thank you,   Kelly Tinajero, BSN RN  [facility]

## 2024-09-30 DIAGNOSIS — I48.19 PERSISTENT ATRIAL FIBRILLATION (H): ICD-10-CM

## 2024-10-01 RX ORDER — APIXABAN 5 MG/1
TABLET, FILM COATED ORAL
Qty: 180 TABLET | Refills: 0 | OUTPATIENT
Start: 2024-10-01

## 2024-10-07 ENCOUNTER — TRANSFERRED RECORDS (OUTPATIENT)
Dept: HEALTH INFORMATION MANAGEMENT | Facility: CLINIC | Age: 79
End: 2024-10-07

## 2024-10-16 ENCOUNTER — TRANSFERRED RECORDS (OUTPATIENT)
Dept: HEALTH INFORMATION MANAGEMENT | Facility: CLINIC | Age: 79
End: 2024-10-16
Payer: COMMERCIAL

## 2024-10-17 ENCOUNTER — TRANSFERRED RECORDS (OUTPATIENT)
Dept: HEALTH INFORMATION MANAGEMENT | Facility: CLINIC | Age: 79
End: 2024-10-17

## 2024-12-01 DIAGNOSIS — I50.9 ACUTE HEART FAILURE, UNSPECIFIED HEART FAILURE TYPE (H): ICD-10-CM

## 2024-12-02 RX ORDER — FUROSEMIDE 20 MG/1
20 TABLET ORAL DAILY
Qty: 90 TABLET | Refills: 1 | OUTPATIENT
Start: 2024-12-02

## 2024-12-02 NOTE — TELEPHONE ENCOUNTER
Clinic RN: Please investigate patient's chart or contact patient if the information cannot be found because patient should have run out of this medication on 08/2024. Confirm patient is taking this medication as prescribed. Document findings and route refill encounter to provider for approval or denial.    Tish VALLEJO, RN, PHN

## 2024-12-02 NOTE — TELEPHONE ENCOUNTER
Spoke with patient.  Reports his cardiologist thru [facility] told him to stop Furosemide approx 6 months ago.  Patient did not provide a reason.    Medication refused to pharmacy.

## 2024-12-21 ENCOUNTER — MYC REFILL (OUTPATIENT)
Dept: INTERNAL MEDICINE | Facility: CLINIC | Age: 79
End: 2024-12-21
Payer: COMMERCIAL

## 2024-12-21 DIAGNOSIS — I50.9 ACUTE HEART FAILURE, UNSPECIFIED HEART FAILURE TYPE (H): ICD-10-CM

## 2024-12-23 RX ORDER — FUROSEMIDE 20 MG/1
20 TABLET ORAL DAILY
Qty: 90 TABLET | Refills: 1 | Status: SHIPPED | OUTPATIENT
Start: 2024-12-23

## 2024-12-23 NOTE — TELEPHONE ENCOUNTER
Call to patient who states he is still taking medication. Will forward to primary care provider.    Thank you,  Marc, Triage RN Lelo Purvis    2:33 PM 12/23/2024

## 2024-12-23 NOTE — TELEPHONE ENCOUNTER
Clinic RN: Please investigate patient's chart or contact patient if the information cannot be found because patient should have run out of this medication on 8/2024. Confirm patient is taking this medication as prescribed. Document findings and route refill encounter to provider for approval or denial.    Tish VALLEJO, RN, PHN

## 2025-01-02 ASSESSMENT — ASTHMA QUESTIONNAIRES
QUESTION_1 LAST FOUR WEEKS HOW MUCH OF THE TIME DID YOUR ASTHMA KEEP YOU FROM GETTING AS MUCH DONE AT WORK, SCHOOL OR AT HOME: NONE OF THE TIME
QUESTION_2 LAST FOUR WEEKS HOW OFTEN HAVE YOU HAD SHORTNESS OF BREATH: NOT AT ALL
ACT_TOTALSCORE: 24
QUESTION_5 LAST FOUR WEEKS HOW WOULD YOU RATE YOUR ASTHMA CONTROL: COMPLETELY CONTROLLED
QUESTION_4 LAST FOUR WEEKS HOW OFTEN HAVE YOU USED YOUR RESCUE INHALER OR NEBULIZER MEDICATION (SUCH AS ALBUTEROL): NOT AT ALL
QUESTION_3 LAST FOUR WEEKS HOW OFTEN DID YOUR ASTHMA SYMPTOMS (WHEEZING, COUGHING, SHORTNESS OF BREATH, CHEST TIGHTNESS OR PAIN) WAKE YOU UP AT NIGHT OR EARLIER THAN USUAL IN THE MORNING: ONCE OR TWICE
ACT_TOTALSCORE: 24

## 2025-01-06 ENCOUNTER — OFFICE VISIT (OUTPATIENT)
Dept: INTERNAL MEDICINE | Facility: CLINIC | Age: 80
End: 2025-01-06
Payer: COMMERCIAL

## 2025-01-06 VITALS
SYSTOLIC BLOOD PRESSURE: 119 MMHG | HEIGHT: 72 IN | OXYGEN SATURATION: 99 % | RESPIRATION RATE: 18 BRPM | WEIGHT: 216.4 LBS | TEMPERATURE: 97.8 F | DIASTOLIC BLOOD PRESSURE: 65 MMHG | HEART RATE: 70 BPM | BODY MASS INDEX: 29.31 KG/M2

## 2025-01-06 DIAGNOSIS — J45.30 MILD PERSISTENT ASTHMA WITHOUT COMPLICATION: ICD-10-CM

## 2025-01-06 DIAGNOSIS — I48.19 PERSISTENT ATRIAL FIBRILLATION (H): Primary | ICD-10-CM

## 2025-01-06 DIAGNOSIS — G62.9 PERIPHERAL POLYNEUROPATHY: ICD-10-CM

## 2025-01-06 DIAGNOSIS — R41.3 MEMORY IMPAIRMENT: ICD-10-CM

## 2025-01-06 DIAGNOSIS — R26.9 GAIT DIFFICULTY: ICD-10-CM

## 2025-01-06 LAB
ERYTHROCYTE [DISTWIDTH] IN BLOOD BY AUTOMATED COUNT: 13.5 % (ref 10–15)
HCT VFR BLD AUTO: 40.7 % (ref 40–53)
HGB BLD-MCNC: 13.9 G/DL (ref 13.3–17.7)
MCH RBC QN AUTO: 31.3 PG (ref 26.5–33)
MCHC RBC AUTO-ENTMCNC: 34.2 G/DL (ref 31.5–36.5)
MCV RBC AUTO: 92 FL (ref 78–100)
PLATELET # BLD AUTO: 269 10E3/UL (ref 150–450)
RBC # BLD AUTO: 4.44 10E6/UL (ref 4.4–5.9)
WBC # BLD AUTO: 8.5 10E3/UL (ref 4–11)

## 2025-01-06 PROCEDURE — 85027 COMPLETE CBC AUTOMATED: CPT | Performed by: INTERNAL MEDICINE

## 2025-01-06 PROCEDURE — 84443 ASSAY THYROID STIM HORMONE: CPT | Performed by: INTERNAL MEDICINE

## 2025-01-06 PROCEDURE — 84439 ASSAY OF FREE THYROXINE: CPT | Performed by: INTERNAL MEDICINE

## 2025-01-06 PROCEDURE — 99214 OFFICE O/P EST MOD 30 MIN: CPT | Performed by: INTERNAL MEDICINE

## 2025-01-06 PROCEDURE — 36415 COLL VENOUS BLD VENIPUNCTURE: CPT | Performed by: INTERNAL MEDICINE

## 2025-01-06 PROCEDURE — 80053 COMPREHEN METABOLIC PANEL: CPT | Performed by: INTERNAL MEDICINE

## 2025-01-06 ASSESSMENT — PAIN SCALES - GENERAL: PAINLEVEL_OUTOF10: MODERATE PAIN (5)

## 2025-01-06 NOTE — PROGRESS NOTES
Assessment & Plan     Persistent atrial fibrillation (H)  Rate controlled, on AC  Continue treatment   - CBC with platelets  - Comprehensive metabolic panel (BMP + Alb, Alk Phos, ALT, AST, Total. Bili, TP)  - TSH with free T4 reflex    Mild persistent asthma without complication  Advised to use his inhaler regularly  Continue treatment     Gait difficulty  Related to lumbar stenosis, peripheral neuropathy  Uses a cane.   Handicapped form filled for 2 years    Peripheral polyneuropathy  Follow up with neurology  Advised for use of foot inserts     Memory impairment  Pending neurophysiologic testing           BMI  Estimated body mass index is 29.35 kg/m  as calculated from the following:    Height as of this encounter: 1.829 m (6').    Weight as of this encounter: 98.2 kg (216 lb 6.4 oz).         See Patient Instructions    Forrest Paulson is a 79 year old, presenting for the following health issues:  RECHECK        1/6/2025     8:52 AM   Additional Questions   Roomed by Cha AIKEN   Accompanied by wife     History of Present Illness       Back Pain:  He presents for follow up of back pain. Patient's back pain is a recurring problem.  Location of back pain:  Right lower back, left lower back and right buttock  Description of back pain: dull ache  Back pain spreads: nowhere    Since patient first noticed back pain, pain is: unchanged  Does back pain interfere with his job:  Not applicable       He eats 2-3 servings of fruits and vegetables daily.He consumes 2 sweetened beverage(s) daily.He exercises with enough effort to increase his heart rate 9 or less minutes per day.  He exercises with enough effort to increase his heart rate 3 or less days per week.   He is taking medications regularly.     Patient is seen for a follow up visit.    Has history of atrial fibrillation. On anticoagulation with Eliquis and rate control medications. Asymptonatic - no chest pains , palpitations,  no side effects from medications.  Has  history of asthma. Has chronic cough and SOB on exertion. No increased symptoms. Does not use the inhalers regularly.   Has h/o impaired balance. Related to peripheral neuropathy. Seen neurology. Advised for use of foot orthotics.   Has h/o weight loss, now is stabilized, has gained weight.   Concern for memory deficits. Pending assessment.               Review of Systems  Constitutional, HEENT, cardiovascular, pulmonary, GI, , musculoskeletal, neuro, skin, endocrine and psych systems are negative, except as otherwise noted.      Objective    /65 (BP Location: Left arm, Cuff Size: Adult Regular)   Pulse 70   Temp 97.8  F (36.6  C) (Oral)   Resp 18   Ht 1.829 m (6')   Wt 98.2 kg (216 lb 6.4 oz)   SpO2 99%   BMI 29.35 kg/m    Body mass index is 29.35 kg/m .  Physical Exam   GENERAL: alert and no distress  EYES: Eyes grossly normal to inspection, PERRL and conjunctivae and sclerae normal  HENT: ear canals and TM's normal, nose and mouth without ulcers or lesions  NECK: no adenopathy, no asymmetry, masses, or scars  RESP: lungs clear to auscultation - no rales,  wheezes, scattered rhonchi  CV: regular rate and rhythm, normal S1 S2, no S3 or S4, no murmur, click or rub, no peripheral edema  ABDOMEN: soft, nontender, no hepatosplenomegaly, no masses and bowel sounds normal  MS: no gross musculoskeletal defects noted, no edema  SKIN: no suspicious lesions or rashes  NEURO: Normal strength and tone, mentation intact and speech normal, unstable gait, uses a cane   PSYCH: mentation appears normal, affect normal/bright    Office Visit on 06/17/2024   Component Date Value Ref Range Status    Cholesterol 06/17/2024 144  <200 mg/dL Final    Triglycerides 06/17/2024 68  <150 mg/dL Final    Direct Measure HDL 06/17/2024 43  >=40 mg/dL Final    LDL Cholesterol Calculated 06/17/2024 87  <=100 mg/dL Final    Non HDL Cholesterol 06/17/2024 101  <130 mg/dL Final    Patient Fasting > 8hrs? 06/17/2024 Yes   Final    WBC  Count 06/17/2024 7.5  4.0 - 11.0 10e3/uL Final    RBC Count 06/17/2024 4.49  4.40 - 5.90 10e6/uL Final    Hemoglobin 06/17/2024 13.8  13.3 - 17.7 g/dL Final    Hematocrit 06/17/2024 41.3  40.0 - 53.0 % Final    MCV 06/17/2024 92  78 - 100 fL Final    MCH 06/17/2024 30.7  26.5 - 33.0 pg Final    MCHC 06/17/2024 33.4  31.5 - 36.5 g/dL Final    RDW 06/17/2024 14.0  10.0 - 15.0 % Final    Platelet Count 06/17/2024 364  150 - 450 10e3/uL Final    Sodium 06/17/2024 140  135 - 145 mmol/L Final    Reference intervals for this test were updated on 09/26/2023 to more accurately reflect our healthy population. There may be differences in the flagging of prior results with similar values performed with this method. Interpretation of those prior results can be made in the context of the updated reference intervals.     Potassium 06/17/2024 4.4  3.4 - 5.3 mmol/L Final    Carbon Dioxide (CO2) 06/17/2024 26  22 - 29 mmol/L Final    Anion Gap 06/17/2024 9  7 - 15 mmol/L Final    Urea Nitrogen 06/17/2024 24.3 (H)  8.0 - 23.0 mg/dL Final    Creatinine 06/17/2024 1.33 (H)  0.67 - 1.17 mg/dL Final    GFR Estimate 06/17/2024 55 (L)  >60 mL/min/1.73m2 Final    eGFR calculated using 2021 CKD-EPI equation.    Calcium 06/17/2024 9.1  8.8 - 10.2 mg/dL Final    Chloride 06/17/2024 105  98 - 107 mmol/L Final    Glucose 06/17/2024 86  70 - 99 mg/dL Final    Alkaline Phosphatase 06/17/2024 147  40 - 150 U/L Final    AST 06/17/2024 23  0 - 45 U/L Final    Reference intervals for this test were updated on 6/12/2023 to more accurately reflect our healthy population. There may be differences in the flagging of prior results with similar values performed with this method. Interpretation of those prior results can be made in the context of the updated reference intervals.    ALT 06/17/2024 11  0 - 70 U/L Final    Reference intervals for this test were updated on 6/12/2023 to more accurately reflect our healthy population. There may be differences in  the flagging of prior results with similar values performed with this method. Interpretation of those prior results can be made in the context of the updated reference intervals.      Protein Total 06/17/2024 7.4  6.4 - 8.3 g/dL Final    Albumin 06/17/2024 3.9  3.5 - 5.2 g/dL Final    Bilirubin Total 06/17/2024 0.5  <=1.2 mg/dL Final    Patient Fasting > 8hrs? 06/17/2024 Yes   Final    TSH 06/17/2024 5.87 (H)  0.30 - 4.20 uIU/mL Final    Prostate Specific Antigen Screen 06/17/2024 3.09  0.00 - 6.50 ng/mL Final    Color Urine 06/17/2024 Yellow  Colorless, Straw, Light Yellow, Yellow Final    Appearance Urine 06/17/2024 Clear  Clear Final    Glucose Urine 06/17/2024 Negative  Negative mg/dL Final    Bilirubin Urine 06/17/2024 Negative  Negative Final    Ketones Urine 06/17/2024 Negative  Negative mg/dL Final    Specific Gravity Urine 06/17/2024 1.025  1.003 - 1.035 Final    Blood Urine 06/17/2024 Negative  Negative Final    pH Urine 06/17/2024 5.5  5.0 - 7.0 Final    Protein Albumin Urine 06/17/2024 Negative  Negative mg/dL Final    Urobilinogen Urine 06/17/2024 0.2  0.2, 1.0 E.U./dL Final    Nitrite Urine 06/17/2024 Negative  Negative Final    Leukocyte Esterase Urine 06/17/2024 Negative  Negative Final    Vitamin B12 06/17/2024 540  232 - 1,245 pg/mL Final    Uric Acid 06/17/2024 8.6 (H)  3.4 - 7.0 mg/dL Final    Bacteria Urine 06/17/2024 Moderate (A)  None Seen /HPF Final    RBC Urine 06/17/2024 None Seen  0-2 /HPF /HPF Final    WBC Urine 06/17/2024 0-5  0-5 /HPF /HPF Final    Squamous Epithelials Urine 06/17/2024 Few (A)  None Seen /LPF Final    Mucus Urine 06/17/2024 Present (A)  None Seen /LPF Final    Free T4 06/17/2024 1.46  0.90 - 1.70 ng/dL Final           Signed Electronically by: Familia Gillespie MD

## 2025-01-07 LAB
ALBUMIN SERPL BCG-MCNC: 3.8 G/DL (ref 3.5–5.2)
ALP SERPL-CCNC: 132 U/L (ref 40–150)
ALT SERPL W P-5'-P-CCNC: 15 U/L (ref 0–70)
ANION GAP SERPL CALCULATED.3IONS-SCNC: 11 MMOL/L (ref 7–15)
AST SERPL W P-5'-P-CCNC: 29 U/L (ref 0–45)
BILIRUB SERPL-MCNC: 0.6 MG/DL
BUN SERPL-MCNC: 24.5 MG/DL (ref 8–23)
CALCIUM SERPL-MCNC: 9.6 MG/DL (ref 8.8–10.4)
CHLORIDE SERPL-SCNC: 105 MMOL/L (ref 98–107)
CREAT SERPL-MCNC: 1.4 MG/DL (ref 0.67–1.17)
EGFRCR SERPLBLD CKD-EPI 2021: 51 ML/MIN/1.73M2
GLUCOSE SERPL-MCNC: 87 MG/DL (ref 70–99)
HCO3 SERPL-SCNC: 26 MMOL/L (ref 22–29)
POTASSIUM SERPL-SCNC: 4.8 MMOL/L (ref 3.4–5.3)
PROT SERPL-MCNC: 7.7 G/DL (ref 6.4–8.3)
SODIUM SERPL-SCNC: 142 MMOL/L (ref 135–145)
T4 FREE SERPL-MCNC: 1.19 NG/DL (ref 0.9–1.7)
TSH SERPL DL<=0.005 MIU/L-ACNC: 5.27 UIU/ML (ref 0.3–4.2)

## 2025-01-08 ENCOUNTER — TELEPHONE (OUTPATIENT)
Dept: INTERNAL MEDICINE | Facility: CLINIC | Age: 80
End: 2025-01-08
Payer: COMMERCIAL

## 2025-01-08 DIAGNOSIS — R79.89 ELEVATED SERUM CREATININE: Primary | ICD-10-CM

## 2025-01-08 NOTE — TELEPHONE ENCOUNTER
----- Message from Familia Gillespie sent at 1/7/2025  9:26 PM CST -----  Slightly decreased kidney function.   Recommend to keep good hydration, assess kidney US.   Avoid NSAID use.

## 2025-01-08 NOTE — TELEPHONE ENCOUNTER
Called and spoke with patient to relay lab results and provider message. Patient verbalizes understanding of instructions and indicates no further questions at this time.    Patient is okay with proceeding with kidney ultrasound. Please place ultrasound order.    Thank you,  Marc, Triage RN [facility] [place]   9:23 AM 1/8/2025

## 2025-02-13 DIAGNOSIS — E85.9 AMYLOIDOSIS, UNSPECIFIED (H): ICD-10-CM

## 2025-02-13 DIAGNOSIS — I42.8 INFILTRATIVE CARDIOMYOPATHY (H): Primary | ICD-10-CM

## 2025-02-13 DIAGNOSIS — I48.91 ATRIAL FIBRILLATION, UNSPECIFIED TYPE (H): ICD-10-CM

## 2025-02-20 ENCOUNTER — TRANSFERRED RECORDS (OUTPATIENT)
Dept: HEALTH INFORMATION MANAGEMENT | Facility: CLINIC | Age: 80
End: 2025-02-20
Payer: COMMERCIAL

## 2025-02-27 ENCOUNTER — TELEPHONE (OUTPATIENT)
Dept: INTERNAL MEDICINE | Facility: CLINIC | Age: 80
End: 2025-02-27
Payer: COMMERCIAL

## 2025-02-27 NOTE — TELEPHONE ENCOUNTER
Patient Quality Outreach    Patient is due for the following:       Topic Date Due    Zoster (Shingles) Vaccine (2 of 3) 03/05/2015    Flu Vaccine (1) 09/01/2024    COVID-19 Vaccine (4 - 2024-25 season) 09/01/2024       Action(s) Taken:   No follow up needed at this time.    Type of outreach:    Chart review performed, no outreach needed.    Questions for provider review:    None           Cha Bonilla, CMA

## 2025-05-19 ENCOUNTER — PATIENT OUTREACH (OUTPATIENT)
Dept: CARE COORDINATION | Facility: CLINIC | Age: 80
End: 2025-05-19
Payer: COMMERCIAL

## 2025-06-12 SDOH — HEALTH STABILITY: PHYSICAL HEALTH: ON AVERAGE, HOW MANY MINUTES DO YOU ENGAGE IN EXERCISE AT THIS LEVEL?: 0 MIN

## 2025-06-12 SDOH — HEALTH STABILITY: PHYSICAL HEALTH: ON AVERAGE, HOW MANY DAYS PER WEEK DO YOU ENGAGE IN MODERATE TO STRENUOUS EXERCISE (LIKE A BRISK WALK)?: 0 DAYS

## 2025-06-12 ASSESSMENT — ASTHMA QUESTIONNAIRES
QUESTION_4 LAST FOUR WEEKS HOW OFTEN HAVE YOU USED YOUR RESCUE INHALER OR NEBULIZER MEDICATION (SUCH AS ALBUTEROL): NOT AT ALL
QUESTION_5 LAST FOUR WEEKS HOW WOULD YOU RATE YOUR ASTHMA CONTROL: WELL CONTROLLED
ACT_TOTALSCORE: 24
QUESTION_3 LAST FOUR WEEKS HOW OFTEN DID YOUR ASTHMA SYMPTOMS (WHEEZING, COUGHING, SHORTNESS OF BREATH, CHEST TIGHTNESS OR PAIN) WAKE YOU UP AT NIGHT OR EARLIER THAN USUAL IN THE MORNING: NOT AT ALL
QUESTION_2 LAST FOUR WEEKS HOW OFTEN HAVE YOU HAD SHORTNESS OF BREATH: NOT AT ALL
QUESTION_1 LAST FOUR WEEKS HOW MUCH OF THE TIME DID YOUR ASTHMA KEEP YOU FROM GETTING AS MUCH DONE AT WORK, SCHOOL OR AT HOME: NONE OF THE TIME

## 2025-06-12 ASSESSMENT — SOCIAL DETERMINANTS OF HEALTH (SDOH): HOW OFTEN DO YOU GET TOGETHER WITH FRIENDS OR RELATIVES?: THREE TIMES A WEEK

## 2025-08-10 SDOH — HEALTH STABILITY: PHYSICAL HEALTH: ON AVERAGE, HOW MANY MINUTES DO YOU ENGAGE IN EXERCISE AT THIS LEVEL?: 0 MIN

## 2025-08-10 SDOH — HEALTH STABILITY: PHYSICAL HEALTH: ON AVERAGE, HOW MANY DAYS PER WEEK DO YOU ENGAGE IN MODERATE TO STRENUOUS EXERCISE (LIKE A BRISK WALK)?: 0 DAYS

## 2025-08-10 ASSESSMENT — SOCIAL DETERMINANTS OF HEALTH (SDOH): HOW OFTEN DO YOU GET TOGETHER WITH FRIENDS OR RELATIVES?: ONCE A WEEK

## 2025-08-13 ENCOUNTER — OFFICE VISIT (OUTPATIENT)
Dept: INTERNAL MEDICINE | Facility: CLINIC | Age: 80
End: 2025-08-13
Attending: INTERNAL MEDICINE
Payer: COMMERCIAL

## 2025-08-13 VITALS
RESPIRATION RATE: 20 BRPM | WEIGHT: 208.2 LBS | HEART RATE: 69 BPM | DIASTOLIC BLOOD PRESSURE: 71 MMHG | OXYGEN SATURATION: 97 % | HEIGHT: 71 IN | SYSTOLIC BLOOD PRESSURE: 116 MMHG | BODY MASS INDEX: 29.15 KG/M2 | TEMPERATURE: 98.8 F

## 2025-08-13 DIAGNOSIS — Z12.5 SCREENING FOR PROSTATE CANCER: ICD-10-CM

## 2025-08-13 DIAGNOSIS — Z13.6 SCREENING FOR CARDIOVASCULAR CONDITION: ICD-10-CM

## 2025-08-13 DIAGNOSIS — I48.91 ATRIAL FIBRILLATION, UNSPECIFIED TYPE (H): ICD-10-CM

## 2025-08-13 DIAGNOSIS — Z23 NEED FOR VACCINATION: ICD-10-CM

## 2025-08-13 DIAGNOSIS — I48.19 PERSISTENT ATRIAL FIBRILLATION (H): ICD-10-CM

## 2025-08-13 DIAGNOSIS — J45.30 MILD PERSISTENT ASTHMA WITHOUT COMPLICATION: ICD-10-CM

## 2025-08-13 DIAGNOSIS — E85.9 AMYLOIDOSIS, UNSPECIFIED (H): ICD-10-CM

## 2025-08-13 DIAGNOSIS — Z00.00 ENCOUNTER FOR PREVENTATIVE ADULT HEALTH CARE EXAMINATION: Primary | ICD-10-CM

## 2025-08-13 DIAGNOSIS — I42.8 INFILTRATIVE CARDIOMYOPATHY (H): ICD-10-CM

## 2025-08-13 DIAGNOSIS — E11.9 CONTROLLED TYPE 2 DIABETES MELLITUS WITHOUT COMPLICATION, WITHOUT LONG-TERM CURRENT USE OF INSULIN (H): ICD-10-CM

## 2025-08-13 LAB
ALBUMIN UR-MCNC: NEGATIVE MG/DL
APPEARANCE UR: CLEAR
BILIRUB UR QL STRIP: NEGATIVE
COLOR UR AUTO: YELLOW
ERYTHROCYTE [DISTWIDTH] IN BLOOD BY AUTOMATED COUNT: 13.2 % (ref 10–15)
EST. AVERAGE GLUCOSE BLD GHB EST-MCNC: 103 MG/DL
GLUCOSE UR STRIP-MCNC: NEGATIVE MG/DL
HBA1C MFR BLD: 5.2 % (ref 0–5.6)
HCT VFR BLD AUTO: 43.2 % (ref 40–53)
HGB BLD-MCNC: 14.9 G/DL (ref 13.3–17.7)
HGB UR QL STRIP: NEGATIVE
KETONES UR STRIP-MCNC: NEGATIVE MG/DL
LEUKOCYTE ESTERASE UR QL STRIP: NEGATIVE
MCH RBC QN AUTO: 31.6 PG (ref 26.5–33)
MCHC RBC AUTO-ENTMCNC: 34.5 G/DL (ref 31.5–36.5)
MCV RBC AUTO: 91.5 FL (ref 78–100)
NITRATE UR QL: NEGATIVE
PH UR STRIP: 5.5 [PH] (ref 5–7)
PLATELET # BLD AUTO: 390 10E3/UL (ref 150–450)
RBC # BLD AUTO: 4.72 10E6/UL (ref 4.4–5.9)
RBC #/AREA URNS AUTO: NORMAL /HPF
SP GR UR STRIP: 1.01 (ref 1–1.03)
UROBILINOGEN UR STRIP-ACNC: 0.2 E.U./DL
WBC # BLD AUTO: 8.08 10E3/UL (ref 4–11)
WBC #/AREA URNS AUTO: NORMAL /HPF

## 2025-08-13 RX ORDER — ALBUTEROL SULFATE 90 UG/1
2 INHALANT RESPIRATORY (INHALATION) EVERY 6 HOURS PRN
Qty: 18 G | Refills: 3 | Status: SHIPPED | OUTPATIENT
Start: 2025-08-13

## 2025-08-13 RX ORDER — MONTELUKAST SODIUM 10 MG/1
10 TABLET ORAL AT BEDTIME
Qty: 90 TABLET | Refills: 3 | Status: SHIPPED | OUTPATIENT
Start: 2025-08-13

## 2025-08-13 RX ORDER — FLUTICASONE PROPIONATE AND SALMETEROL 250; 50 UG/1; UG/1
1 POWDER RESPIRATORY (INHALATION) EVERY 12 HOURS
Qty: 3 EACH | Refills: 3 | Status: SHIPPED | OUTPATIENT
Start: 2025-08-13

## 2025-08-13 ASSESSMENT — PAIN SCALES - GENERAL: PAINLEVEL_OUTOF10: NO PAIN (0)

## 2025-08-14 LAB
ALBUMIN SERPL BCG-MCNC: 3.9 G/DL (ref 3.5–5.2)
ALP SERPL-CCNC: 145 U/L (ref 40–150)
ALT SERPL W P-5'-P-CCNC: 13 U/L (ref 0–70)
ANION GAP SERPL CALCULATED.3IONS-SCNC: 11 MMOL/L (ref 7–15)
AST SERPL W P-5'-P-CCNC: 25 U/L (ref 0–45)
BILIRUB SERPL-MCNC: 0.5 MG/DL
BUN SERPL-MCNC: 20.6 MG/DL (ref 8–23)
CALCIUM SERPL-MCNC: 9.6 MG/DL (ref 8.8–10.4)
CHLORIDE SERPL-SCNC: 102 MMOL/L (ref 98–107)
CHOLEST SERPL-MCNC: 154 MG/DL
CREAT SERPL-MCNC: 1.23 MG/DL (ref 0.67–1.17)
EGFRCR SERPLBLD CKD-EPI 2021: 60 ML/MIN/1.73M2
FASTING STATUS PATIENT QL REPORTED: YES
FASTING STATUS PATIENT QL REPORTED: YES
GLUCOSE SERPL-MCNC: 84 MG/DL (ref 70–99)
HCO3 SERPL-SCNC: 26 MMOL/L (ref 22–29)
HDLC SERPL-MCNC: 41 MG/DL
LDLC SERPL CALC-MCNC: 94 MG/DL
NONHDLC SERPL-MCNC: 113 MG/DL
POTASSIUM SERPL-SCNC: 4.6 MMOL/L (ref 3.4–5.3)
PROT SERPL-MCNC: 7.9 G/DL (ref 6.4–8.3)
PSA SERPL DL<=0.01 NG/ML-MCNC: 3.4 NG/ML (ref 0–6.5)
SODIUM SERPL-SCNC: 139 MMOL/L (ref 135–145)
TRIGL SERPL-MCNC: 95 MG/DL
TSH SERPL DL<=0.005 MIU/L-ACNC: 4.05 UIU/ML (ref 0.3–4.2)

## (undated) DEVICE — CATH DECAPOLAR INQUIRY LG 110CM 81104

## (undated) DEVICE — SOL WATER IRRIG 1000ML BOTTLE 2F7114

## (undated) DEVICE — GOWN XLG DISP 9545

## (undated) DEVICE — MIDAS REX DISSECTING TOOL TRI-FLUTED BURR 14MH30T

## (undated) DEVICE — Device

## (undated) DEVICE — SU VICRYL 3-0 SH 8X18" UND J864D

## (undated) DEVICE — PROTECTOR ARM ONE-STEP TRENDELENBURG 40418

## (undated) DEVICE — CATHETER IRRIGATED ABLATION BIDIRECTIONAL D-F CURVE 8FR

## (undated) DEVICE — MARKER SPHERES PASSIVE MEDT PACK 5 8801075

## (undated) DEVICE — BASKET RETRIEVAL 1.9FRX120CM ESCAPE NTNL 4 WIRE 390-201

## (undated) DEVICE — SOL WATER IRRIG 3000ML BAG 2B7117

## (undated) DEVICE — PAD CHUX UNDERPAD 30X36" P3036C

## (undated) DEVICE — SU VICRYL 0 CT-1 CR 8X18" J740D

## (undated) DEVICE — GUIDEWIRE JTIP 3MM .035 180CM IQ35F180J3

## (undated) DEVICE — PREP TECHNI-CARE CHLOROXYLENOL 3% 4OZ BOTTLE C222-4ZWO

## (undated) DEVICE — GLOVE BIOGEL PI SZ 6.5 40865

## (undated) DEVICE — COVER FOOTSWITCH W/CINCH 20X24" 923267

## (undated) DEVICE — DRAIN JACKSON PRATT 07MM FLAT 3/4 PERF

## (undated) DEVICE — GLOVE PROTEXIS POWDER FREE SMT 7.5  2D72PT75X

## (undated) DEVICE — CABLE UMBILICAL CRYOABLATION ELEC

## (undated) DEVICE — LINEN HALF SHEET 5512

## (undated) DEVICE — LITHOVUE

## (undated) DEVICE — SHEATH PINNACLE 9/25/038 RSS905

## (undated) DEVICE — MANIFOLD 4 GANG 504BN-R

## (undated) DEVICE — BAG CLEAR TRASH 1.3M 39X33" P4040C

## (undated) DEVICE — VAMP PLUS SYSTEM RESERVOIR WITH 60IN PATIENT TUBING

## (undated) DEVICE — PEN MARKING SKIN W/LABELS 31145884

## (undated) DEVICE — IMP SCREW INFINITY SPINE MULTIAXIAL14MMX3.5MM 3603514: Type: IMPLANTABLE DEVICE | Site: SPINE CERVICAL | Status: NON-FUNCTIONAL

## (undated) DEVICE — NDL COUNTER 40CT  31142311

## (undated) DEVICE — CATH TRAY FOLEY SURESTEP 16FR DRAIN BAG STATOCK A899916

## (undated) DEVICE — TUBING IRRIG TUR Y TYPE 96" LF 6543-01

## (undated) DEVICE — SET FLUID DELIVERY 108IN H965913000091

## (undated) DEVICE — INTRO SHEATH TERUMO 10FRX25CM PINNACLE RSS006

## (undated) DEVICE — RX SURGIFLO HEMOSTATIC MATRIX 8ML 2991

## (undated) DEVICE — BLADE CLIPPER SGL USE 9680

## (undated) DEVICE — LINEN POUCH DBL 5427

## (undated) DEVICE — DRAPE X-RAY TUBE 00-901169-01-OEC

## (undated) DEVICE — ESU GROUND PAD ADULT W/CORD E7507

## (undated) DEVICE — CATH EP WOVENFLEXIE QUAD 5FRX110CM REPRO SYK200597

## (undated) DEVICE — PACK CYSTO CUSTOM RIDGES

## (undated) DEVICE — RULER SURGICAL PLASTIC STRL LF CS628

## (undated) DEVICE — LASER FIBER HOLMIUM 365UM HTB-365

## (undated) DEVICE — ELECTRODE ADULT PACING MULTI P-211-M1

## (undated) DEVICE — LINEN DRAPE 54X72" 5467

## (undated) DEVICE — CATH ARCTIC FRONT ADVANCE 2AF284

## (undated) DEVICE — GLOVE BIOGEL PI MICRO INDICATOR UNDERGLOVE SZ 6.5 48965

## (undated) DEVICE — TOOL DISSECT MIDAS MR8 14CM BALL 4MM DIAMOND MR8-14BA40D

## (undated) DEVICE — CATH MAPPING ADVISOR HD GRID SE D-AVHD-DF16

## (undated) DEVICE — TRANSDUCER W/MONITORING TRAY 42632-05

## (undated) DEVICE — DRAPE MAYO STAND 23X54 8337

## (undated) DEVICE — CATH URETERAL FLEX TIP TIGERTAIL 06FRX70CM 139006

## (undated) DEVICE — SOL NACL 0.9% IRRIG 1000ML BOTTLE 2F7124

## (undated) DEVICE — TUBING KIT COOL POINT

## (undated) DEVICE — BASKET GEMINI 0 TIP HELICAL

## (undated) DEVICE — PACK SMALL SPINE RIDGES

## (undated) DEVICE — GUIDEWIRE URO STR STIFF .035"X150CM NITINOL 150NSS35

## (undated) DEVICE — SUCTION MANIFOLD NEPTUNE 2 SYS 4 PORT 0702-020-000

## (undated) DEVICE — CUSHION INSERT LG PRONE VIEW JACKSON TABLE

## (undated) DEVICE — CABLE BIPOLAR PACING THRESHOLD 8 WHITE

## (undated) DEVICE — SPONGE SURGIFOAM 12 1972

## (undated) DEVICE — DRAIN HEMOVAC RESERVOIR KIT 10FR 1/8" MED 00-2550-002-10

## (undated) DEVICE — LINEN FULL SHEET 5511

## (undated) DEVICE — WIRE GUIDE 0.035"X260CM AMPTLAZ XSTIFF CVD THSCF-35-260-3-A

## (undated) DEVICE — CATH TRAY FOLEY COUDE SURESTEP 16FR W/DRN BAG LATEX A304416A

## (undated) DEVICE — DECANTER BAG 2002S

## (undated) DEVICE — SU VICRYL 2-0 CT-1 18' J739D

## (undated) DEVICE — LINEN ORTHO ACL PACK 5447

## (undated) DEVICE — VALVE ROTATING HEMOSTATIC ACS 23242

## (undated) DEVICE — DRSG ADAPTIC 3X8" 6113

## (undated) DEVICE — GLOVE BIOGEL PI MICRO INDICATOR UNDERGLOVE SZ 7.5 48975

## (undated) DEVICE — PACK SET-UP STD 9102

## (undated) DEVICE — SPONGE SURGIFOAM 100 COMPRESSED

## (undated) DEVICE — SPONGE KITTNER 30-101

## (undated) DEVICE — INTRO MICRO MINI STICK 5FR STIFF NITINOL

## (undated) DEVICE — CATH UMBILICAL COAX CBL 203CXC

## (undated) DEVICE — TUBING SUCTION 12"X1/4" N612

## (undated) DEVICE — CATHETER ICE VIEWFLEX XTRA

## (undated) DEVICE — GLOVE BIOGEL PI MICRO INDICATOR UNDERGLOVE SZ 8.0 48980

## (undated) DEVICE — GLOVE BIOGEL PI MICRO SZ 7.0 48570

## (undated) DEVICE — KIT ENSITE SURFACE ELECTRODE ENSITE-SEK-5-01

## (undated) DEVICE — SPONGE COTTONOID 1/2X1" 80-1402

## (undated) DEVICE — CUSTOM PACK EP

## (undated) DEVICE — LINEN TOWEL PACK X5 5464

## (undated) DEVICE — STPL SKIN 35W 6.9MM  PXW35

## (undated) RX ORDER — DEXAMETHASONE SODIUM PHOSPHATE 4 MG/ML
INJECTION, SOLUTION INTRA-ARTICULAR; INTRALESIONAL; INTRAMUSCULAR; INTRAVENOUS; SOFT TISSUE
Status: DISPENSED
Start: 2020-08-20

## (undated) RX ORDER — GLYCOPYRROLATE 0.2 MG/ML
INJECTION INTRAMUSCULAR; INTRAVENOUS
Status: DISPENSED
Start: 2020-08-06

## (undated) RX ORDER — GINSENG 100 MG
CAPSULE ORAL
Status: DISPENSED
Start: 2023-11-13

## (undated) RX ORDER — FENTANYL CITRATE-0.9 % NACL/PF 10 MCG/ML
PLASTIC BAG, INJECTION (ML) INTRAVENOUS
Status: DISPENSED
Start: 2023-11-13

## (undated) RX ORDER — PROPOFOL 10 MG/ML
INJECTION, EMULSION INTRAVENOUS
Status: DISPENSED
Start: 2020-08-06

## (undated) RX ORDER — VASOPRESSIN 20 U/ML
INJECTION PARENTERAL
Status: DISPENSED
Start: 2023-11-13

## (undated) RX ORDER — LIDOCAINE HYDROCHLORIDE 10 MG/ML
INJECTION, SOLUTION EPIDURAL; INFILTRATION; INTRACAUDAL; PERINEURAL
Status: DISPENSED
Start: 2022-08-11

## (undated) RX ORDER — EPHEDRINE SULFATE 50 MG/ML
INJECTION, SOLUTION INTRAMUSCULAR; INTRAVENOUS; SUBCUTANEOUS
Status: DISPENSED
Start: 2023-11-13

## (undated) RX ORDER — PROPOFOL 10 MG/ML
INJECTION, EMULSION INTRAVENOUS
Status: DISPENSED
Start: 2022-10-28

## (undated) RX ORDER — CEFAZOLIN SODIUM/WATER 2 G/20 ML
SYRINGE (ML) INTRAVENOUS
Status: DISPENSED
Start: 2023-11-13

## (undated) RX ORDER — DEXAMETHASONE SODIUM PHOSPHATE 10 MG/ML
INJECTION, SOLUTION INTRAMUSCULAR; INTRAVENOUS
Status: DISPENSED
Start: 2022-10-28

## (undated) RX ORDER — PROPOFOL 10 MG/ML
INJECTION, EMULSION INTRAVENOUS
Status: DISPENSED
Start: 2023-11-13

## (undated) RX ORDER — FENTANYL CITRATE-0.9 % NACL/PF 10 MCG/ML
PLASTIC BAG, INJECTION (ML) INTRAVENOUS
Status: DISPENSED
Start: 2020-08-06

## (undated) RX ORDER — PHENYLEPHRINE HYDROCHLORIDE 10 MG/ML
INJECTION INTRAVENOUS
Status: DISPENSED
Start: 2023-11-13

## (undated) RX ORDER — KETOROLAC TROMETHAMINE 30 MG/ML
INJECTION, SOLUTION INTRAMUSCULAR; INTRAVENOUS
Status: DISPENSED
Start: 2020-08-06

## (undated) RX ORDER — DEXAMETHASONE SODIUM PHOSPHATE 4 MG/ML
INJECTION, SOLUTION INTRA-ARTICULAR; INTRALESIONAL; INTRAMUSCULAR; INTRAVENOUS; SOFT TISSUE
Status: DISPENSED
Start: 2020-08-06

## (undated) RX ORDER — FENTANYL CITRATE 50 UG/ML
INJECTION, SOLUTION INTRAMUSCULAR; INTRAVENOUS
Status: DISPENSED
Start: 2022-10-28

## (undated) RX ORDER — EPHEDRINE SULFATE 50 MG/ML
INJECTION, SOLUTION INTRAMUSCULAR; INTRAVENOUS; SUBCUTANEOUS
Status: DISPENSED
Start: 2022-10-28

## (undated) RX ORDER — FENTANYL CITRATE 50 UG/ML
INJECTION, SOLUTION INTRAMUSCULAR; INTRAVENOUS
Status: DISPENSED
Start: 2023-11-13

## (undated) RX ORDER — FENTANYL CITRATE 50 UG/ML
INJECTION, SOLUTION INTRAMUSCULAR; INTRAVENOUS
Status: DISPENSED
Start: 2020-08-06

## (undated) RX ORDER — CEFAZOLIN SODIUM 1 G/3ML
INJECTION, POWDER, FOR SOLUTION INTRAMUSCULAR; INTRAVENOUS
Status: DISPENSED
Start: 2023-11-13

## (undated) RX ORDER — VANCOMYCIN HYDROCHLORIDE 1 G/20ML
INJECTION, POWDER, LYOPHILIZED, FOR SOLUTION INTRAVENOUS
Status: DISPENSED
Start: 2023-11-13

## (undated) RX ORDER — ONDANSETRON 2 MG/ML
INJECTION INTRAMUSCULAR; INTRAVENOUS
Status: DISPENSED
Start: 2020-08-06

## (undated) RX ORDER — ONDANSETRON 2 MG/ML
INJECTION INTRAMUSCULAR; INTRAVENOUS
Status: DISPENSED
Start: 2022-10-28

## (undated) RX ORDER — LIDOCAINE HYDROCHLORIDE 10 MG/ML
INJECTION, SOLUTION EPIDURAL; INFILTRATION; INTRACAUDAL; PERINEURAL
Status: DISPENSED
Start: 2022-10-28

## (undated) RX ORDER — CEFAZOLIN SODIUM 2 G/100ML
INJECTION, SOLUTION INTRAVENOUS
Status: DISPENSED
Start: 2020-08-06

## (undated) RX ORDER — BUPIVACAINE HYDROCHLORIDE 5 MG/ML
INJECTION, SOLUTION EPIDURAL; INTRACAUDAL
Status: DISPENSED
Start: 2023-11-13

## (undated) RX ORDER — ONDANSETRON 2 MG/ML
INJECTION INTRAMUSCULAR; INTRAVENOUS
Status: DISPENSED
Start: 2023-11-13

## (undated) RX ORDER — LIDOCAINE HYDROCHLORIDE 10 MG/ML
INJECTION, SOLUTION EPIDURAL; INFILTRATION; INTRACAUDAL; PERINEURAL
Status: DISPENSED
Start: 2020-08-06

## (undated) RX ORDER — DEXAMETHASONE SODIUM PHOSPHATE 4 MG/ML
INJECTION, SOLUTION INTRA-ARTICULAR; INTRALESIONAL; INTRAMUSCULAR; INTRAVENOUS; SOFT TISSUE
Status: DISPENSED
Start: 2023-11-13

## (undated) RX ORDER — HEPARIN SODIUM 10000 [USP'U]/100ML
INJECTION, SOLUTION INTRAVENOUS
Status: DISPENSED
Start: 2022-10-28

## (undated) RX ORDER — CEFAZOLIN SODIUM 2 G/100ML
INJECTION, SOLUTION INTRAVENOUS
Status: DISPENSED
Start: 2020-08-20

## (undated) RX ORDER — NEOSTIGMINE METHYLSULFATE 1 MG/ML
VIAL (ML) INJECTION
Status: DISPENSED
Start: 2020-08-06

## (undated) RX ORDER — KETOROLAC TROMETHAMINE 30 MG/ML
INJECTION, SOLUTION INTRAMUSCULAR; INTRAVENOUS
Status: DISPENSED
Start: 2020-08-20

## (undated) RX ORDER — FUROSEMIDE 10 MG/ML
INJECTION INTRAMUSCULAR; INTRAVENOUS
Status: DISPENSED
Start: 2022-10-28

## (undated) RX ORDER — PROTAMINE SULFATE 10 MG/ML
INJECTION, SOLUTION INTRAVENOUS
Status: DISPENSED
Start: 2022-10-28

## (undated) RX ORDER — PROPOFOL 10 MG/ML
INJECTION, EMULSION INTRAVENOUS
Status: DISPENSED
Start: 2022-08-11

## (undated) RX ORDER — FENTANYL CITRATE 50 UG/ML
INJECTION, SOLUTION INTRAMUSCULAR; INTRAVENOUS
Status: DISPENSED
Start: 2020-08-20

## (undated) RX ORDER — LIDOCAINE HYDROCHLORIDE 10 MG/ML
INJECTION, SOLUTION EPIDURAL; INFILTRATION; INTRACAUDAL; PERINEURAL
Status: DISPENSED
Start: 2023-11-13